# Patient Record
Sex: MALE | Race: WHITE | NOT HISPANIC OR LATINO | Employment: FULL TIME | URBAN - NONMETROPOLITAN AREA
[De-identification: names, ages, dates, MRNs, and addresses within clinical notes are randomized per-mention and may not be internally consistent; named-entity substitution may affect disease eponyms.]

---

## 2018-01-01 ENCOUNTER — OFFICE VISIT (OUTPATIENT)
Dept: URGENT CARE | Facility: PHYSICIAN GROUP | Age: 73
End: 2018-01-01
Payer: MEDICARE

## 2018-01-01 VITALS
WEIGHT: 149 LBS | TEMPERATURE: 97.2 F | SYSTOLIC BLOOD PRESSURE: 136 MMHG | BODY MASS INDEX: 22.07 KG/M2 | OXYGEN SATURATION: 97 % | RESPIRATION RATE: 16 BRPM | HEART RATE: 76 BPM | HEIGHT: 69 IN | DIASTOLIC BLOOD PRESSURE: 68 MMHG

## 2018-01-01 DIAGNOSIS — J30.9 ALLERGIC RHINITIS, UNSPECIFIED SEASONALITY, UNSPECIFIED TRIGGER: ICD-10-CM

## 2018-01-01 DIAGNOSIS — J02.9 ALLERGIC PHARYNGITIS: ICD-10-CM

## 2018-01-01 PROCEDURE — 99214 OFFICE O/P EST MOD 30 MIN: CPT | Performed by: PHYSICIAN ASSISTANT

## 2018-01-01 RX ORDER — CETIRIZINE HYDROCHLORIDE, PSEUDOEPHEDRINE HYDROCHLORIDE 5; 120 MG/1; MG/1
1 TABLET, FILM COATED, EXTENDED RELEASE ORAL 2 TIMES DAILY
Qty: 30 TAB | Refills: 0
Start: 2018-01-01 | End: 2019-01-01

## 2018-01-01 RX ORDER — FLUTICASONE PROPIONATE 50 MCG
1 SPRAY, SUSPENSION (ML) NASAL 2 TIMES DAILY
Qty: 1 BOTTLE | Refills: 0
Start: 2018-01-01 | End: 2019-01-01

## 2018-02-23 ENCOUNTER — OCCUPATIONAL MEDICINE (OUTPATIENT)
Dept: URGENT CARE | Facility: PHYSICIAN GROUP | Age: 73
End: 2018-02-23
Payer: OTHER MISCELLANEOUS

## 2018-02-23 VITALS
HEART RATE: 76 BPM | TEMPERATURE: 97.6 F | SYSTOLIC BLOOD PRESSURE: 136 MMHG | WEIGHT: 160 LBS | OXYGEN SATURATION: 94 % | HEIGHT: 69 IN | DIASTOLIC BLOOD PRESSURE: 64 MMHG | RESPIRATION RATE: 16 BRPM | BODY MASS INDEX: 23.7 KG/M2

## 2018-02-23 DIAGNOSIS — S70.01XA CONTUSION OF RIGHT HIP, INITIAL ENCOUNTER: ICD-10-CM

## 2018-02-23 PROCEDURE — 99204 OFFICE O/P NEW MOD 45 MIN: CPT | Mod: 29 | Performed by: PHYSICIAN ASSISTANT

## 2018-02-23 NOTE — LETTER
[facility]  [address] [place] [address] [place] [address]  Phone:  [phone] - Fax:  [phone]   [facility] Progress Report and Disability Certification  Date of Service: 2/23/2018   No Show:  No  Date / Time of Next Visit: 3/2/2018   Claim Information   Patient Name: Justus Barnard  Claim Number:     Employer:   [employer] Date of Injury: 2/23/2018     Insurer / TPA: Misc Workers Comp  ID / SSN:     Occupation: HOUSE KEEPING LEAD  Diagnosis: The encounter diagnosis was Contusion of right hip, initial encounter.    Medical Information   Related to Industrial Injury? Yes    Subjective Complaints:  Right hip pain after fall at work   Objective Findings: Right is without any visual deformity, erythema, edema or ecchymosis. He has some tenderness to palpation of the ischial crest. Good distal range of motion, strength, circulation and sensation     Pre-Existing Condition(s):     Assessment:   Initial Visit    Status: Additional Care Required  Permanent Disability:No    Plan: Medication  Comments:over-the-counter medications    Diagnostics:      Comments:       Disability Information   Status: Released to Full Duty    From:  2/23/2018  Through: 3/2/2018 Restrictions are: Temporary   Physical Restrictions   Sitting:    Standing:    Stooping:    Bending:      Squatting:    Walking:    Climbing:    Pushing:      Pulling:    Other:    Reaching Above Shoulder (L):   Reaching Above Shoulder (R):       Reaching Below Shoulder (L):    Reaching Below Shoulder (R):      Not to exceed Weight Limits   Carrying(hrs):   Weight Limit(lb):   Lifting(hrs):   Weight  Limit(lb):     Comments:      Repetitive Actions   Hands: i.e. Fine Manipulations from Grasping:     Feet: i.e. Operating Foot Controls:     Driving / Operate Machinery:     Physician Name: Rubén Garza P.A.-C. Physician Signature: RUBÉN Acosta P.A.-C. e-Signature: Dr. Jose Manuel Jay, Medical Director   Clinic Name /  Location: [address] [place] [address] Clinic Phone Number: Dept: [phone]   Appointment Time: 12:05 Pm Visit Start Time: 12:33 PM   Check-In Time:  12:21 Pm Visit Discharge Time:  1:00PM   Original-Treating Physician or Chiropractor    Page 2-Insurer/TPA    Page 3-Employer    Page 4-Employee

## 2018-02-23 NOTE — PROGRESS NOTES
Chief Complaint   Patient presents with   • Fall     Pt states slipped on ice this morning at work hitting R hip       HISTORY OF PRESENT ILLNESS: Patient is a 72 y.o. male who presents today because he has a work comp injury.    Date of injury 2/23/2018. He was walking on an icy ramp, slipped on ice and hit his right hip up against the handrail. He did not fall to the ground. He started to limp and someone at work saw him and recommended that he come in for evaluation. He takes Vicodin on a regular basis for other chronic pain issues. He denies any distal paresthesias, states really feels fine, except with certain movements of his head.    There are no active problems to display for this patient.      Allergies:Nkda [no known drug allergy]    Current Outpatient Prescriptions Ordered in [facility]   Medication Sig Dispense Refill   • ZOCOR PO Take 80 mg by mouth every bedtime.      • CLARITIN 10 MG PO TABS Take by mouth every day.      • PROSCAR 5 MG PO TABS 1 Tab by Oral route every day.      • ASPIRIN 162 MG PO TBEC      • levofloxacin (LEVAQUIN) 500 MG tablet Take 1 Tab by mouth every day. 10 Tab 0   • benzonatate (TESSALON) 200 MG capsule Take 1 Cap by mouth 3 times a day as needed for Cough. 30 Cap 0   • FLOMAX 0.4 MG PO CP24 1 Cap by Oral route every bedtime.        No current Epic-ordered facility-administered medications on file.        Past Medical History:   Diagnosis Date   • Backpain    • Glaucoma    • Hypertension    • Indigestion    • Psychiatric problem    • Stroke (CMS-[facility])        Social History   Substance Use Topics   • Smoking status: Current Every Day Smoker     Packs/day: 0.50     Years: 15.00     Types: Cigarettes   • Smokeless tobacco: Never Used   • Alcohol use No       No family status information on file.   History reviewed. No pertinent family history.    ROS:  Review of Systems   Constitutional: Negative for fever, chills, weight loss and malaise/fatigue.   HENT: Negative for ear pain,  "nosebleeds, congestion, sore throat and neck pain.    Eyes: Negative for blurred vision.   Respiratory: Negative for cough, sputum production, shortness of breath and wheezing.    Cardiovascular: Negative for chest pain, palpitations, orthopnea and leg swelling.   Gastrointestinal: Negative for heartburn, nausea, vomiting and abdominal pain.   Genitourinary: Negative for dysuria, urgency and frequency.     Exam:  Blood pressure 136/64, pulse 76, temperature 36.4 °C (97.6 °F), resp. rate 16, height 1.753 m (5' 9\"), weight 72.6 kg (160 lb), SpO2 94 %.  General:  Well nourished, well developed male in NAD  Head:Normocephalic, atraumatic  Eyes: PERRLA, EOM within normal limits, no conjunctival injection, no scleral icterus, visual fields and acuity grossly intact.  Extremities: no clubbing, cyanosis, or edema.  Right is without any visual deformity, erythema, edema or ecchymosis. He has some tenderness to palpation of the ischial crest. Good distal range of motion, strength, circulation and sensation    Please note that this dictation was created using voice recognition software. I have made every reasonable attempt to correct obvious errors, but I expect that there are errors of grammar and possibly content that I did not discover before finalizing the note.    Assessment/Plan:  1. Contusion of right hip, initial encounter     Follow-up in one week, recommended over-the-counter Tylenol, ice        "

## 2018-02-23 NOTE — LETTER
"EMPLOYEE’S CLAIM FOR COMPENSATION/ REPORT OF INITIAL TREATMENT  FORM C-4    EMPLOYEE’S CLAIM - PROVIDE ALL INFORMATION REQUESTED   First Name  Justus Last Name  Akil Birthdate                    1945                Sex  male Claim Number   Home Address  [address] Age  72 y.o. 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    Address or Location of Accident (if applicable)  [[facility] ]   What were you doing at the time of accident? (if applicable)  WALKING SLIP ON ICE     How did this injury or occupational disease occur? (Be specific an answer in detail. Use additional sheet if necessary)  SLIPPED ON ICE    If you believe that you have an occupational disease, when did you first have knowledge of the disability and it relationship to your employment?  NA  Witnesses to the Accident  NONE       Nature of Injury or Occupational Disease  Workers' Compensation  Part(s) of Body Injured or Affected  Hip (R), ,     I certify that the above is true and correct to the best of my knowledge and that I have provided this information in order to obtain the benefits of [place] " or [place]).  I hereby authorize any physician, chiropractor, surgeon, practitioner, or other person, any hospital, including [facility] or [facility] hospital, any medical service organization, any insurance company, or other institution or organization to release to each other, any medical or other information, including benefits paid or payable, pertinent to this injury or disease, except information relative to diagnosis, treatment and/or counseling for AIDS, psychological conditions, alcohol or controlled substances, for which I must give specific authorization.  A Photostat of this authorization shall be as valid as the original.     Date 02/23/2018   [facility]   Employee’s Signature   THIS REPORT MUST BE COMPLETED AND MAILED WITHIN 3 WORKING DAYS OF TREATMENT   Place  [facility]  Name of [facility]   Date  2/23/2018 Diagnosis  (S70.01XA) Contusion of right hip, initial encounter Is there evidence the injured employee was under the influence of alcohol and/or another controlled substance at the time of accident?   Hour  12:33 PM Description of Injury or Disease  The encounter diagnosis was Contusion of right hip, initial encounter. No   Treatment  Ice, over-the-counter anti-inflammatories  Have you advised the patient to remain off work five days or more? No   X-Ray Findings      If Yes   From Date  To Date      From information given by the employee, together with medical evidence, can you directly connect this injury or occupational disease as job incurred?  Yes If No Full Duty  Yes Modified Duty      Is additional medical care by a physician indicated?  Yes  Comments:follow-up in 6 days If Modified Duty, Specify any Limitations / Restrictions      Do you know of any previous injury or disease contributing to this condition or occupational disease?                            No   Date  2/23/2018 Print Doctor’s Name Rubén Garza P.A.-C. I certify the  "employer’s copy of  this form was mailed on:   Address  [address] Insurer’s Use Only     [facility] Zip  [address]    Provider’s Tax ID Number  [id number] Telephone  Dept: [phone]        corwin-HARSHAL Menjivar P.A.-C.   e-Signature: Dr. Jose Manuel Jay, Medical Director Degree           ORIGINAL-TREATING PHYSICIAN OR CHIROPRACTOR    PAGE 2-INSURER/TPA    PAGE 3-EMPLOYER    PAGE 4-EMPLOYEE             Form C-4 (rev10/07)              BRIEF DESCRIPTION OF RIGHTS AND BENEFITS  (Pursuant to [place] 616C.[place])    Notice of Injury or Occupational Disease (Incident Report Form C-1): If an injury or occupational disease (OD) arises out of and in the  course of employment, you must provide written notice to your employer as soon as practicable, but no later than 7 days after the accident or  OD. Your employer shall maintain a sufficient supply of the required forms.    Claim for Compensation (Form C-4): If medical treatment is sought, the form C-4 is available at the place of initial treatment. A completed  \"Claim for Compensation\" (Form C-4) must be filed within 90 days after an accident or OD. The treating physician or chiropractor must,  within 3 working days after treatment, complete and mail to the employer, the employer's insurer and third-party , the Claim for  Compensation.    Medical Treatment: If you require medical treatment for your on-the-job injury or OD, you may be required to select a physician or  chiropractor from a list provided by your workers’ compensation insurer, if it has contracted with an Organization for Managed Care (MCO) or  Preferred Provider Organization (PPO) or providers of health care. If your employer has not entered into a contract with an MCO or PPO, you  may select a physician or chiropractor from the Panel of Physicians and Chiropractors. Any medical costs related to your industrial injury or  OD will be paid by your insurer.    Temporary Total " Disability (TTD): If your doctor has certified that you are unable to work for a period of at least 5 consecutive days, or 5  cumulative days in a 20-day period, or places restrictions on you that your employer does not accommodate, you may be entitled to TTD  compensation.    Temporary Partial Disability (TPD): If the wage you receive upon reemployment is less than the compensation for TTD to which you are  entitled, the insurer may be required to pay you TPD compensation to make up the difference. TPD can only be paid for a maximum of 24  months.    Permanent Partial Disability (PPD): When your medical condition is stable and there is an indication of a PPD as a result of your injury or  OD, within 30 days, your insurer must arrange for an evaluation by a rating physician or chiropractor to determine the degree of your PPD. The  amount of your PPD award depends on the date of injury, the results of the PPD evaluation and your age and wage.    Permanent Total Disability (PTD): If you are medically certified by a treating physician or chiropractor as permanently and totally disabled  and have been granted a PTD status by your insurer, you are entitled to receive monthly benefits not to exceed 66 2/3% of your average  monthly wage. The amount of your PTD payments is subject to reduction if you previously received a PPD award.    Vocational Rehabilitation Services: You may be eligible for vocational rehabilitation services if you are unable to return to the job due to a  permanent physical impairment or permanent restrictions as a result of your injury or occupational disease.    Transportation and Per Elin Reimbursement: You may be eligible for travel expenses and per elin associated with medical treatment.    Reopening: You may be able to reopen your claim if your condition worsens after claim closure.    Appeal Process: If you disagree with a written determination issued by the insurer or the insurer does not  respond to your request, you may  appeal to the Department of Administration, , by following the instructions contained in your determination letter. You must  appeal the determination within 70 days from the date of the determination letter at [address], [address], [place], [place]  [address], or [address], [address], [place], [place] [address]. If you disagree with the  decision, you may appeal to the  Department of Administration, . You must file your appeal within 30 days from the date of the  decision  letter at [address], [address], [place], [place] [address], or [address], [address], [place], [place] [address]. If you  disagree with a decision of an , you may file a petition for judicial review with the District Court. You must do so within 30  days of the Appeal Officer’s decision. You may be represented by an  at your own expense or you may contact the [facility] for possible  representation.    [place]  for Injured Workers (NAIW): If you disagree with a  decision, you may request that NAIW represent you  without charge at an  Hearing. For information regarding denial of benefits, you may contact the [facility] at: [address], [address], [place] [address], [phone], or [address], [address], [place] [address], [phone]    To File a Complaint with the Division: If you wish to file a complaint with the  of the Division of Industrial Relations (DIR),  please contact the Workers’ Compensation Section, [address], [address], [place], [place] [address], telephone [phone], or  [address], [place] [address], telephone [phone].    For assistance with Workers’ Compensation Issues: you may contact the Office of the [facility] Consumer Health Assistance, [address]  [facility], [address], [place], [place] [address], Toll Free 1-621.455.3950, Web site: http://govcha.[facility].nv., E-mail  [email or web address].                                                                                                                                                                                                                                   __________________________________________________________________                                                                   ____02/23/2018_______                Employee Name / Signature                                                                                                                                                       Date                                                                                                                                                                                                     D-2 (rev. 10/07)

## 2018-03-01 ENCOUNTER — OCCUPATIONAL MEDICINE (OUTPATIENT)
Dept: URGENT CARE | Facility: PHYSICIAN GROUP | Age: 73
End: 2018-03-01
Payer: OTHER MISCELLANEOUS

## 2018-03-01 VITALS
HEIGHT: 69 IN | RESPIRATION RATE: 16 BRPM | OXYGEN SATURATION: 96 % | HEART RATE: 75 BPM | BODY MASS INDEX: 23.7 KG/M2 | WEIGHT: 160 LBS | SYSTOLIC BLOOD PRESSURE: 130 MMHG | DIASTOLIC BLOOD PRESSURE: 76 MMHG | TEMPERATURE: 98.5 F

## 2018-03-01 DIAGNOSIS — S70.01XD CONTUSION OF RIGHT HIP, SUBSEQUENT ENCOUNTER: ICD-10-CM

## 2018-03-01 PROCEDURE — 99213 OFFICE O/P EST LOW 20 MIN: CPT | Performed by: FAMILY MEDICINE

## 2018-03-01 NOTE — PROGRESS NOTES
"Subjective:      Chief Complaint   Patient presents with   • Hip Injury     WC F[facility]         Date of injury 2/23/2018. He was walking on an icy ramp, slipped on ice and hit his right hip up against the handrail.    He was seen in  on 2/23 and diagnosed with hip contusion.      Currently reports no pain - he is able to walk and ambulate without difficulty.         Pertinent negatives include no fever, hematuria, loss of consciousness, tingling or visual change.          Social History   Substance Use Topics   • Smoking status: Current Every Day Smoker     Packs/day: 0.50     Years: 15.00     Types: Cigarettes   • Smokeless tobacco: Never Used   • Alcohol use No         Past Medical History:   Diagnosis Date   • Backpain    • Glaucoma    • Hypertension    • Indigestion    • Psychiatric problem    • Stroke (CMS-[facility])          Current Outpatient Prescriptions on File Prior to Visit   Medication Sig Dispense Refill   • levofloxacin (LEVAQUIN) 500 MG tablet Take 1 Tab by mouth every day. 10 Tab 0   • ZOCOR PO Take 80 mg by mouth every bedtime.      • CLARITIN 10 MG PO TABS Take by mouth every day.      • PROSCAR 5 MG PO TABS 1 Tab by Oral route every day.      • ASPIRIN 162 MG PO TBEC      • benzonatate (TESSALON) 200 MG capsule Take 1 Cap by mouth 3 times a day as needed for Cough. 30 Cap 0   • FLOMAX 0.4 MG PO CP24 1 Cap by Oral route every bedtime.        No current facility-administered medications on file prior to visit.               Review of Systems   Constitutional: Negative for fever.   Genitourinary: Negative for hematuria.   Neurological: Negative for tingling and loss of consciousness.          Objective:     Blood pressure 130/76, pulse 75, temperature 36.9 °C (98.5 °F), resp. rate 16, height 1.753 m (5' 9.02\"), weight 72.6 kg (160 lb), SpO2 96 %.    Physical Exam   Constitutional: She is oriented to person, place, and time. Pt appears well-developed and well-nourished. No distress.   HENT:    "   Musculoskeletal:   Left hip - full AROM.  No tenderness to palpation, erythema or bruising.   Neurological: pt is alert and oriented to person, place, and time.   Skin: Skin is warm. Pt is not diaphoretic. No erythema.   Nursing note and vitals reviewed.              Assessment/Plan:        1. Contusion of right hip, subsequent encounter  Resolved  MMI   Cleared for full duty

## 2018-03-01 NOTE — LETTER
[facility]  [facility] [place] [address]  Phone:  [phone] - Fax:  [phone]   [facility] Progress Report and Disability Certification  Date of Service: 3/1/2018   No Show:  No  Date / Time of Next Visit:     Claim Information   Patient Name: Justus Barnard  Claim Number:     Employer:   [employer] Date of Injury: 2/23/2018     Insurer / TPA: Misc Workers Comp  ID / SSN:     Occupation: HOUSE KEEPING LEAD  Diagnosis: There were no encounter diagnoses.    Medical Information   Related to Industrial Injury? Yes    Subjective Complaints:    Date of injury 2/23/2018. He was walking on an icy ramp, slipped on ice and hit his right hip up against the handrail.    He was seen in  on 2/23 and diagnosed with hip contusion.      Currently reports no pain - he is able to walk and ambulate without difficulty.         Pertinent negatives include no fever, hematuria, loss of consciousness, tingling or visual change.       Objective Findings: Musculoskeletal:   Left hip - full AROM.  No tenderness to palpation, erythema or bruising.   Neurological: pt is alert and oriented to person, place, and time.   Skin: Skin is warm. Pt is not diaphoretic. No erythema.    Pre-Existing Condition(s):     Assessment:   Condition Improved    Status: Discharged /  MMI  Permanent Disability:No    Plan:      Diagnostics:      Comments:       Disability Information   Status: Released to Full Duty    From:     Through:   Restrictions are:     Physical Restrictions   Sitting:    Standing:    Stooping:    Bending:      Squatting:    Walking:    Climbing:    Pushing:      Pulling:    Other:    Reaching Above Shoulder (L):   Reaching Above Shoulder (R):       Reaching Below Shoulder (L):    Reaching Below Shoulder (R):      Not to exceed Weight Limits   Carrying(hrs):   Weight Limit(lb):   Lifting(hrs):   Weight  Limit(lb):     Comments: Left hip contusion  Resolved  MMI  Cleared for full duty       Repetitive Actions   Hands: i.e. Fine Manipulations from Grasping:     Feet: i.e. Operating Foot Controls:     Driving / Operate Machinery:     Physician Name: Eren Jha M.D. Physician Signature: EREN Gardner M.D. e-Signature: Dr. Jose Manuel Jay, Medical Director   Clinic Name / Location: [address] [address] Clinic Phone Number: Dept: [phone]   Appointment Time: 4:00 Pm Visit Start Time: 3:41 PM   Check-In Time:  3:35 Pm Visit Discharge Time:  3:54 pm   Original-Treating Physician or Chiropractor    Page 2-Insurer/TPA    Page 3-Employer    Page 4-Employee

## 2018-03-08 ENCOUNTER — OFFICE VISIT (OUTPATIENT)
Dept: URGENT CARE | Facility: PHYSICIAN GROUP | Age: 73
End: 2018-03-08
Payer: MEDICARE

## 2018-03-08 VITALS
HEIGHT: 69 IN | SYSTOLIC BLOOD PRESSURE: 128 MMHG | RESPIRATION RATE: 16 BRPM | DIASTOLIC BLOOD PRESSURE: 74 MMHG | WEIGHT: 160 LBS | OXYGEN SATURATION: 92 % | HEART RATE: 76 BPM | BODY MASS INDEX: 23.7 KG/M2 | TEMPERATURE: 98.2 F

## 2018-03-08 DIAGNOSIS — J44.1 COPD EXACERBATION (HCC): ICD-10-CM

## 2018-03-08 PROCEDURE — 94760 N-INVAS EAR/PLS OXIMETRY 1: CPT | Performed by: FAMILY MEDICINE

## 2018-03-08 PROCEDURE — 99214 OFFICE O/P EST MOD 30 MIN: CPT | Mod: 25 | Performed by: FAMILY MEDICINE

## 2018-03-08 RX ORDER — BENZONATATE 200 MG/1
200 CAPSULE ORAL 3 TIMES DAILY PRN
Qty: 30 CAP | Refills: 0 | Status: SHIPPED | OUTPATIENT
Start: 2018-03-08 | End: 2019-01-01

## 2018-03-08 RX ORDER — DOXYCYCLINE HYCLATE 100 MG
100 TABLET ORAL 2 TIMES DAILY
Qty: 20 TAB | Refills: 0 | Status: SHIPPED | OUTPATIENT
Start: 2018-03-08 | End: 2018-03-18

## 2018-03-08 ASSESSMENT — ENCOUNTER SYMPTOMS
MYALGIAS: 0
WEIGHT LOSS: 0
EYE DISCHARGE: 0
EYE REDNESS: 0
HEADACHES: 0

## 2018-03-08 NOTE — LETTER
March 8, 2018         Patient: Justus Barnard   YOB: 1945   Date of Visit: 3/8/2018           To Whom it May Concern:    Justus Barnard was seen in my clinic on 3/8/2018. Please excuse 3/5 through 3/9/2018.    Sincerely,           Ted Winters M.D.  Electronically Signed

## 2018-11-14 NOTE — PROGRESS NOTES
Chief Complaint   Patient presents with   • Other     hard time swollwing       HISTORY OF PRESENT ILLNESS: Patient is a 73 y.o. male who presents today because he has had a sore throat intermittently over the last 3-4 weeks.  Sometimes when he swallows he feels like it is difficult to swallow because his throat feels swollen and he cannot swallow away.  He has not been taking any medications for symptoms.  Denies any fevers, chills, nausea, vomiting or diarrhea    There are no active problems to display for this patient.      Allergies:Nkda [no known drug allergy]    Current Outpatient Prescriptions Ordered in [facility]   Medication Sig Dispense Refill   • cetirizine-psuedoephedrine (ZYRTEC-D ALLERGY & CONGESTION) 5-120 MG per tablet Take 1 Tab by mouth 2 times a day. 30 Tab 0   • fluticasone (FLONASE) 50 MCG/ACT nasal spray Spray 1 Spray in nose 2 times a day. 1 Bottle 0   • benzonatate (TESSALON) 200 MG capsule Take 1 Cap by mouth 3 times a day as needed for Cough. 30 Cap 0   • levofloxacin (LEVAQUIN) 500 MG tablet Take 1 Tab by mouth every day. 10 Tab 0   • benzonatate (TESSALON) 200 MG capsule Take 1 Cap by mouth 3 times a day as needed for Cough. 30 Cap 0   • ZOCOR PO Take 80 mg by mouth every bedtime.      • CLARITIN 10 MG PO TABS Take by mouth every day.      • FLOMAX 0.4 MG PO CP24 1 Cap by Oral route every bedtime.      • PROSCAR 5 MG PO TABS 1 Tab by Oral route every day.      • ASPIRIN 162 MG PO TBEC        No current Epic-ordered facility-administered medications on file.        Past Medical History:   Diagnosis Date   • Backpain    • Glaucoma    • Hypertension    • Indigestion    • Psychiatric problem    • Stroke (HCC)        Social History   Substance Use Topics   • Smoking status: Current Every Day Smoker     Packs/day: 0.50     Years: 15.00     Types: Cigarettes   • Smokeless tobacco: Never Used   • Alcohol use No       No family status information on file.   No family history on file.    ROS:  Review of  "Systems   Constitutional: Negative for fever, chills, weight loss and malaise/fatigue.   HENT: Negative for ear pain, nosebleeds, positive for nasal congestion, sore throat and no neck pain.    Eyes: Negative for blurred vision.   Respiratory: Negative for cough, sputum production, shortness of breath and wheezing.    Cardiovascular: Negative for chest pain, palpitations, orthopnea and leg swelling.   Gastrointestinal: Negative for heartburn, negative for nausea, vomiting and abdominal pain.   Genitourinary: Negative for dysuria, urgency and frequency.     Exam:  Blood pressure 136/68, pulse 76, temperature 36.2 °C (97.2 °F), temperature source Temporal, resp. rate 16, height 1.753 m (5' 9\"), weight 67.6 kg (149 lb), SpO2 97 %.  General:  Well nourished, well developed male in NAD  Head:Normocephalic, atraumatic  Eyes: PERRLA, EOM within normal limits, no conjunctival injection, no scleral icterus, visual fields and acuity grossly intact.  Ears: Normal shape and symmetry, no tenderness, no discharge. External canals are without any significant edema or erythema. Tympanic membranes are without any inflammation, no effusion. Gross auditory acuity is intact  Nose: Symmetrical without tenderness, no discharge.  Nasal mucosa is very pale and edematous bilaterally  Mouth: reasonable hygiene, no erythema exudates or tonsillar enlargement.  Neck: no masses, range of motion within normal limits, no tracheal deviation. No obvious thyroid enlargement.  Pulmonary: chest is symmetrical with respiration, no wheezes, crackles, or rhonchi.  Cardiovascular: regular rate and rhythm without murmurs, rubs, or gallops.  Abdomen: Nondistended, bowel tones in all 4 quadrants, soft, no tenderness to palpation, no organomegaly, no rebound referred rebound tenderness, no Whitt's or McBurney's point tenderness.  Extremities: no clubbing, cyanosis, or edema.    Please note that this dictation was created using voice recognition software. I " have made every reasonable attempt to correct obvious errors, but I expect that there are errors of grammar and possibly content that I did not discover before finalizing the note.    Assessment/Plan:  1. Allergic pharyngitis  cetirizine-psuedoephedrine (ZYRTEC-D ALLERGY & CONGESTION) 5-120 MG per tablet   2. Allergic rhinitis, unspecified seasonality, unspecified trigger  fluticasone (FLONASE) 50 MCG/ACT nasal spray   He has an appointment with his primary care provider in 2 weeks.  Recommended follow-up at that time, may have underlying hiatal hernia and will need further workup and evaluation through primary care.    Followup with primary care in the next 7-10 days if not significantly improving, return to the urgent care or go to the emergency room sooner for any worsening of symptoms.

## 2018-11-14 NOTE — PATIENT INSTRUCTIONS
"Smoking Cessation, Tips for Success  If you are ready to quit smoking, congratulations! You have chosen to help yourself be healthier. Cigarettes bring nicotine, tar, carbon monoxide, and other irritants into your body. Your lungs, heart, and blood vessels will be able to work better without these poisons. There are many different ways to quit smoking. Nicotine gum, nicotine patches, a nicotine inhaler, or nicotine nasal spray can help with physical craving. Hypnosis, support groups, and medicines help break the habit of smoking.  WHAT THINGS CAN I DO TO MAKE QUITTING EASIER?   Here are some tips to help you quit for good:  · Pick a date when you will quit smoking completely. Tell all of your friends and family about your plan to quit on that date.  · Do not try to slowly cut down on the number of cigarettes you are smoking. Pick a quit date and quit smoking completely starting on that day.  · Throw away all cigarettes.    · Clean and remove all ashtrays from your home, work, and car.  · On a card, write down your reasons for quitting. Carry the card with you and read it when you get the urge to smoke.  · Cleanse your body of nicotine. Drink enough water and fluids to keep your urine clear or pale yellow. Do this after quitting to flush the nicotine from your body.  · Learn to predict your moods. Do not let a bad situation be your excuse to have a cigarette. Some situations in your life might tempt you into wanting a cigarette.  · Never have \"just one\" cigarette. It leads to wanting another and another. Remind yourself of your decision to quit.  · Change habits associated with smoking. If you smoked while driving or when feeling stressed, try other activities to replace smoking. Stand up when drinking your coffee. Brush your teeth after eating. Sit in a different chair when you read the paper. Avoid alcohol while trying to quit, and try to drink fewer caffeinated beverages. Alcohol and caffeine may urge you to " "smoke.  · Avoid foods and drinks that can trigger a desire to smoke, such as sugary or spicy foods and alcohol.  · Ask people who smoke not to smoke around you.  · Have something planned to do right after eating or having a cup of coffee. For example, plan to take a walk or exercise.  · Try a relaxation exercise to calm you down and decrease your stress. Remember, you may be tense and nervous for the first 2 weeks after you quit, but this will pass.  · Find new activities to keep your hands busy. Play with a pen, coin, or rubber band. Doodle or draw things on paper.  · Brush your teeth right after eating. This will help cut down on the craving for the taste of tobacco after meals. You can also try mouthwash.    · Use oral substitutes in place of cigarettes. Try using lemon drops, carrots, cinnamon sticks, or chewing gum. Keep them handy so they are available when you have the urge to smoke.  · When you have the urge to smoke, try deep breathing.  · Designate your home as a nonsmoking area.  · If you are a heavy smoker, ask your health care provider about a prescription for nicotine chewing gum. It can ease your withdrawal from nicotine.  · Reward yourself. Set aside the cigarette money you save and buy yourself something nice.  · Look for support from others. Join a support group or smoking cessation program. Ask someone at home or at work to help you with your plan to quit smoking.  · Always ask yourself, \"Do I need this cigarette or is this just a reflex?\" Tell yourself, \"Today, I choose not to smoke,\" or \"I do not want to smoke.\" You are reminding yourself of your decision to quit.  · Do not replace cigarette smoking with electronic cigarettes (commonly called e-cigarettes). The safety of e-cigarettes is unknown, and some may contain harmful chemicals.  · If you relapse, do not give up! Plan ahead and think about what you will do the next time you get the urge to smoke.  HOW WILL I FEEL WHEN I QUIT SMOKING?  You " may have symptoms of withdrawal because your body is used to nicotine (the addictive substance in cigarettes). You may crave cigarettes, be irritable, feel very hungry, cough often, get headaches, or have difficulty concentrating. The withdrawal symptoms are only temporary. They are strongest when you first quit but will go away within 10-14 days. When withdrawal symptoms occur, stay in control. Think about your reasons for quitting. Remind yourself that these are signs that your body is healing and getting used to being without cigarettes. Remember that withdrawal symptoms are easier to treat than the major diseases that smoking can cause.   Even after the withdrawal is over, expect periodic urges to smoke. However, these cravings are generally short lived and will go away whether you smoke or not. Do not smoke!  WHAT RESOURCES ARE AVAILABLE TO HELP ME QUIT SMOKING?  Your health care provider can direct you to community resources or hospitals for support, which may include:  · Group support.  · Education.  · Hypnosis.  · Therapy.     This information is not intended to replace advice given to you by your health care provider. Make sure you discuss any questions you have with your health care provider.     Document Released: 09/15/2005 Document Revised: 01/08/2016 Document Reviewed: 06/05/2014  [employer] Interactive Patient Education ©2016 [employer]

## 2019-01-01 ENCOUNTER — APPOINTMENT (OUTPATIENT)
Dept: RADIOLOGY | Facility: MEDICAL CENTER | Age: 74
DRG: 656 | End: 2019-01-01
Attending: INTERNAL MEDICINE
Payer: MEDICARE

## 2019-01-01 ENCOUNTER — APPOINTMENT (OUTPATIENT)
Dept: RADIOLOGY | Facility: MEDICAL CENTER | Age: 74
DRG: 641 | End: 2019-01-01
Attending: UROLOGY
Payer: MEDICARE

## 2019-01-01 ENCOUNTER — HOSPITAL ENCOUNTER (OUTPATIENT)
Dept: RADIATION ONCOLOGY | Facility: MEDICAL CENTER | Age: 74
End: 2019-03-07

## 2019-01-01 ENCOUNTER — APPOINTMENT (OUTPATIENT)
Dept: RADIATION ONCOLOGY | Facility: MEDICAL CENTER | Age: 74
End: 2019-01-01
Payer: MEDICARE

## 2019-01-01 ENCOUNTER — APPOINTMENT (OUTPATIENT)
Dept: RADIOLOGY | Facility: MEDICAL CENTER | Age: 74
DRG: 641 | End: 2019-01-01
Attending: EMERGENCY MEDICINE
Payer: MEDICARE

## 2019-01-01 ENCOUNTER — HOSPITAL ENCOUNTER (OUTPATIENT)
Dept: RADIATION ONCOLOGY | Facility: MEDICAL CENTER | Age: 74
End: 2019-03-20

## 2019-01-01 ENCOUNTER — APPOINTMENT (OUTPATIENT)
Dept: RADIOLOGY | Facility: MEDICAL CENTER | Age: 74
DRG: 656 | End: 2019-01-01
Attending: UROLOGY
Payer: MEDICARE

## 2019-01-01 ENCOUNTER — HOSPITAL ENCOUNTER (INPATIENT)
Facility: MEDICAL CENTER | Age: 74
LOS: 6 days | DRG: 641 | End: 2019-02-18
Attending: EMERGENCY MEDICINE | Admitting: HOSPITALIST
Payer: MEDICARE

## 2019-01-01 ENCOUNTER — APPOINTMENT (OUTPATIENT)
Dept: ADMISSIONS | Facility: MEDICAL CENTER | Age: 74
End: 2019-01-01
Attending: INTERNAL MEDICINE
Payer: MEDICARE

## 2019-01-01 ENCOUNTER — APPOINTMENT (OUTPATIENT)
Dept: RADIOLOGY | Facility: MEDICAL CENTER | Age: 74
DRG: 656 | End: 2019-01-01
Attending: FAMILY MEDICINE
Payer: MEDICARE

## 2019-01-01 ENCOUNTER — HOSPITAL ENCOUNTER (OUTPATIENT)
Dept: RADIATION ONCOLOGY | Facility: MEDICAL CENTER | Age: 74
End: 2019-03-12

## 2019-01-01 ENCOUNTER — HOSPITAL ENCOUNTER (OUTPATIENT)
Dept: RADIATION ONCOLOGY | Facility: MEDICAL CENTER | Age: 74
End: 2019-03-14

## 2019-01-01 ENCOUNTER — HOSPITAL ENCOUNTER (OUTPATIENT)
Facility: MEDICAL CENTER | Age: 74
End: 2019-02-01
Attending: INTERNAL MEDICINE | Admitting: INTERNAL MEDICINE
Payer: MEDICARE

## 2019-01-01 ENCOUNTER — HOSPITAL ENCOUNTER (INPATIENT)
Facility: MEDICAL CENTER | Age: 74
LOS: 44 days | DRG: 656 | End: 2019-04-03
Attending: INTERNAL MEDICINE | Admitting: INTERNAL MEDICINE
Payer: MEDICARE

## 2019-01-01 ENCOUNTER — HOSPITAL ENCOUNTER (OUTPATIENT)
Dept: RADIATION ONCOLOGY | Facility: MEDICAL CENTER | Age: 74
End: 2019-03-21

## 2019-01-01 ENCOUNTER — PATIENT OUTREACH (OUTPATIENT)
Dept: HEALTH INFORMATION MANAGEMENT | Facility: OTHER | Age: 74
End: 2019-01-01

## 2019-01-01 ENCOUNTER — APPOINTMENT (OUTPATIENT)
Dept: RADIOLOGY | Facility: MEDICAL CENTER | Age: 74
DRG: 656 | End: 2019-01-01
Attending: HOSPITALIST
Payer: MEDICARE

## 2019-01-01 ENCOUNTER — HOSPITAL ENCOUNTER (OUTPATIENT)
Dept: RADIATION ONCOLOGY | Facility: MEDICAL CENTER | Age: 74
End: 2019-03-13

## 2019-01-01 ENCOUNTER — HOSPITAL ENCOUNTER (OUTPATIENT)
Dept: RADIATION ONCOLOGY | Facility: MEDICAL CENTER | Age: 74
End: 2019-03-15

## 2019-01-01 ENCOUNTER — HOSPITAL ENCOUNTER (OUTPATIENT)
Dept: RADIATION ONCOLOGY | Facility: MEDICAL CENTER | Age: 74
End: 2019-03-18

## 2019-01-01 VITALS
HEART RATE: 82 BPM | DIASTOLIC BLOOD PRESSURE: 66 MMHG | HEIGHT: 69 IN | BODY MASS INDEX: 17.83 KG/M2 | RESPIRATION RATE: 18 BRPM | WEIGHT: 120.37 LBS | TEMPERATURE: 97.8 F | OXYGEN SATURATION: 91 % | SYSTOLIC BLOOD PRESSURE: 161 MMHG

## 2019-01-01 VITALS
BODY MASS INDEX: 19.4 KG/M2 | HEIGHT: 69 IN | TEMPERATURE: 98.6 F | RESPIRATION RATE: 16 BRPM | DIASTOLIC BLOOD PRESSURE: 66 MMHG | OXYGEN SATURATION: 92 % | SYSTOLIC BLOOD PRESSURE: 143 MMHG | WEIGHT: 130.95 LBS

## 2019-01-01 VITALS
DIASTOLIC BLOOD PRESSURE: 50 MMHG | HEART RATE: 82 BPM | SYSTOLIC BLOOD PRESSURE: 112 MMHG | TEMPERATURE: 96.9 F | HEIGHT: 66 IN | OXYGEN SATURATION: 93 % | RESPIRATION RATE: 17 BRPM | WEIGHT: 132.5 LBS | BODY MASS INDEX: 21.29 KG/M2

## 2019-01-01 DIAGNOSIS — G89.3 CANCER ASSOCIATED PAIN: ICD-10-CM

## 2019-01-01 DIAGNOSIS — J98.4 PNEUMONITIS: ICD-10-CM

## 2019-01-01 DIAGNOSIS — R10.84 GENERALIZED ABDOMINAL PAIN: ICD-10-CM

## 2019-01-01 DIAGNOSIS — R64 MALIGNANT CACHEXIA (HCC): ICD-10-CM

## 2019-01-01 DIAGNOSIS — C15.9 MALIGNANT NEOPLASM OF ESOPHAGUS, UNSPECIFIED LOCATION (HCC): ICD-10-CM

## 2019-01-01 DIAGNOSIS — J43.1 PANLOBULAR EMPHYSEMA (HCC): ICD-10-CM

## 2019-01-01 DIAGNOSIS — F41.9 ANXIETY: ICD-10-CM

## 2019-01-01 DIAGNOSIS — C15.5 MALIGNANT NEOPLASM OF LOWER THIRD OF ESOPHAGUS (HCC): ICD-10-CM

## 2019-01-01 DIAGNOSIS — K59.00 CONSTIPATION, UNSPECIFIED CONSTIPATION TYPE: ICD-10-CM

## 2019-01-01 DIAGNOSIS — C64.1 RENAL CELL CARCINOMA OF RIGHT KIDNEY (HCC): ICD-10-CM

## 2019-01-01 DIAGNOSIS — R62.7 FAILURE TO THRIVE IN ADULT: ICD-10-CM

## 2019-01-01 LAB
ALBUMIN SERPL BCP-MCNC: 1.8 G/DL (ref 3.2–4.9)
ALBUMIN SERPL BCP-MCNC: 2 G/DL (ref 3.2–4.9)
ALBUMIN SERPL BCP-MCNC: 2.4 G/DL (ref 3.2–4.9)
ALBUMIN SERPL BCP-MCNC: 2.5 G/DL (ref 3.2–4.9)
ALBUMIN SERPL BCP-MCNC: 2.5 G/DL (ref 3.2–4.9)
ALBUMIN SERPL BCP-MCNC: 2.6 G/DL (ref 3.2–4.9)
ALBUMIN SERPL BCP-MCNC: 2.6 G/DL (ref 3.2–4.9)
ALBUMIN SERPL BCP-MCNC: 2.7 G/DL (ref 3.2–4.9)
ALBUMIN SERPL BCP-MCNC: 2.8 G/DL (ref 3.2–4.9)
ALBUMIN SERPL BCP-MCNC: 2.8 G/DL (ref 3.2–4.9)
ALBUMIN SERPL BCP-MCNC: 2.9 G/DL (ref 3.2–4.9)
ALBUMIN/GLOB SERPL: 0.7 G/DL
ALBUMIN/GLOB SERPL: 0.8 G/DL
ALBUMIN/GLOB SERPL: 0.9 G/DL
ALBUMIN/GLOB SERPL: 0.9 G/DL
ALBUMIN/GLOB SERPL: 1 G/DL
ALBUMIN/GLOB SERPL: 1.1 G/DL
ALP SERPL-CCNC: 100 U/L (ref 30–99)
ALP SERPL-CCNC: 101 U/L (ref 30–99)
ALP SERPL-CCNC: 108 U/L (ref 30–99)
ALP SERPL-CCNC: 116 U/L (ref 30–99)
ALP SERPL-CCNC: 125 U/L (ref 30–99)
ALP SERPL-CCNC: 77 U/L (ref 30–99)
ALP SERPL-CCNC: 83 U/L (ref 30–99)
ALP SERPL-CCNC: 83 U/L (ref 30–99)
ALP SERPL-CCNC: 89 U/L (ref 30–99)
ALP SERPL-CCNC: 93 U/L (ref 30–99)
ALP SERPL-CCNC: 95 U/L (ref 30–99)
ALT SERPL-CCNC: 11 U/L (ref 2–50)
ALT SERPL-CCNC: 12 U/L (ref 2–50)
ALT SERPL-CCNC: 13 U/L (ref 2–50)
ALT SERPL-CCNC: 14 U/L (ref 2–50)
ALT SERPL-CCNC: 14 U/L (ref 2–50)
ALT SERPL-CCNC: 19 U/L (ref 2–50)
ALT SERPL-CCNC: 22 U/L (ref 2–50)
ALT SERPL-CCNC: 23 U/L (ref 2–50)
ALT SERPL-CCNC: 6 U/L (ref 2–50)
ALT SERPL-CCNC: 8 U/L (ref 2–50)
ALT SERPL-CCNC: 9 U/L (ref 2–50)
ANION GAP SERPL CALC-SCNC: 11 MMOL/L (ref 0–11.9)
ANION GAP SERPL CALC-SCNC: 12 MMOL/L (ref 0–11.9)
ANION GAP SERPL CALC-SCNC: 13 MMOL/L (ref 0–11.9)
ANION GAP SERPL CALC-SCNC: 4 MMOL/L (ref 0–11.9)
ANION GAP SERPL CALC-SCNC: 4 MMOL/L (ref 0–11.9)
ANION GAP SERPL CALC-SCNC: 5 MMOL/L (ref 0–11.9)
ANION GAP SERPL CALC-SCNC: 6 MMOL/L (ref 0–11.9)
ANION GAP SERPL CALC-SCNC: 7 MMOL/L (ref 0–11.9)
ANION GAP SERPL CALC-SCNC: 8 MMOL/L (ref 0–11.9)
ANION GAP SERPL CALC-SCNC: 8 MMOL/L (ref 0–11.9)
ANION GAP SERPL CALC-SCNC: 9 MMOL/L (ref 0–11.9)
ANISOCYTOSIS BLD QL SMEAR: ABNORMAL
APPEARANCE UR: ABNORMAL
AST SERPL-CCNC: 15 U/L (ref 12–45)
AST SERPL-CCNC: 15 U/L (ref 12–45)
AST SERPL-CCNC: 16 U/L (ref 12–45)
AST SERPL-CCNC: 17 U/L (ref 12–45)
AST SERPL-CCNC: 17 U/L (ref 12–45)
AST SERPL-CCNC: 20 U/L (ref 12–45)
AST SERPL-CCNC: 21 U/L (ref 12–45)
AST SERPL-CCNC: 25 U/L (ref 12–45)
AST SERPL-CCNC: 26 U/L (ref 12–45)
BACTERIA #/AREA URNS HPF: ABNORMAL /HPF
BACTERIA SPEC RESP CULT: ABNORMAL
BASOPHILS # BLD AUTO: 0.2 % (ref 0–1.8)
BASOPHILS # BLD AUTO: 0.3 % (ref 0–1.8)
BASOPHILS # BLD AUTO: 0.4 % (ref 0–1.8)
BASOPHILS # BLD AUTO: 0.5 % (ref 0–1.8)
BASOPHILS # BLD AUTO: 0.6 % (ref 0–1.8)
BASOPHILS # BLD AUTO: 0.9 % (ref 0–1.8)
BASOPHILS # BLD: 0.02 K/UL (ref 0–0.12)
BASOPHILS # BLD: 0.04 K/UL (ref 0–0.12)
BASOPHILS # BLD: 0.04 K/UL (ref 0–0.12)
BASOPHILS # BLD: 0.05 K/UL (ref 0–0.12)
BASOPHILS # BLD: 0.06 K/UL (ref 0–0.12)
BASOPHILS # BLD: 0.07 K/UL (ref 0–0.12)
BASOPHILS # BLD: 0.08 K/UL (ref 0–0.12)
BASOPHILS # BLD: 0.08 K/UL (ref 0–0.12)
BASOPHILS # BLD: 0.16 K/UL (ref 0–0.12)
BILIRUB SERPL-MCNC: 0.2 MG/DL (ref 0.1–1.5)
BILIRUB SERPL-MCNC: 0.2 MG/DL (ref 0.1–1.5)
BILIRUB SERPL-MCNC: 0.3 MG/DL (ref 0.1–1.5)
BILIRUB SERPL-MCNC: 0.4 MG/DL (ref 0.1–1.5)
BILIRUB SERPL-MCNC: 0.5 MG/DL (ref 0.1–1.5)
BILIRUB SERPL-MCNC: 0.7 MG/DL (ref 0.1–1.5)
BILIRUB SERPL-MCNC: 0.9 MG/DL (ref 0.1–1.5)
BILIRUB SERPL-MCNC: 1.1 MG/DL (ref 0.1–1.5)
BILIRUB SERPL-MCNC: 1.2 MG/DL (ref 0.1–1.5)
BILIRUB UR QL STRIP.AUTO: ABNORMAL
BUN SERPL-MCNC: 15 MG/DL (ref 8–22)
BUN SERPL-MCNC: 16 MG/DL (ref 8–22)
BUN SERPL-MCNC: 17 MG/DL (ref 8–22)
BUN SERPL-MCNC: 19 MG/DL (ref 8–22)
BUN SERPL-MCNC: 19 MG/DL (ref 8–22)
BUN SERPL-MCNC: 21 MG/DL (ref 8–22)
BUN SERPL-MCNC: 23 MG/DL (ref 8–22)
BUN SERPL-MCNC: 25 MG/DL (ref 8–22)
BUN SERPL-MCNC: 26 MG/DL (ref 8–22)
BUN SERPL-MCNC: 28 MG/DL (ref 8–22)
BUN SERPL-MCNC: 29 MG/DL (ref 8–22)
BUN SERPL-MCNC: 30 MG/DL (ref 8–22)
BUN SERPL-MCNC: 30 MG/DL (ref 8–22)
BUN SERPL-MCNC: 31 MG/DL (ref 8–22)
BUN SERPL-MCNC: 32 MG/DL (ref 8–22)
CALCIUM SERPL-MCNC: 7.5 MG/DL (ref 8.5–10.5)
CALCIUM SERPL-MCNC: 7.8 MG/DL (ref 8.5–10.5)
CALCIUM SERPL-MCNC: 7.9 MG/DL (ref 8.4–10.2)
CALCIUM SERPL-MCNC: 7.9 MG/DL (ref 8.5–10.5)
CALCIUM SERPL-MCNC: 8 MG/DL (ref 8.4–10.2)
CALCIUM SERPL-MCNC: 8 MG/DL (ref 8.5–10.5)
CALCIUM SERPL-MCNC: 8 MG/DL (ref 8.5–10.5)
CALCIUM SERPL-MCNC: 8.1 MG/DL (ref 8.5–10.5)
CALCIUM SERPL-MCNC: 8.2 MG/DL (ref 8.5–10.5)
CALCIUM SERPL-MCNC: 8.3 MG/DL (ref 8.5–10.5)
CALCIUM SERPL-MCNC: 8.3 MG/DL (ref 8.5–10.5)
CALCIUM SERPL-MCNC: 8.4 MG/DL (ref 8.4–10.2)
CALCIUM SERPL-MCNC: 8.4 MG/DL (ref 8.5–10.5)
CALCIUM SERPL-MCNC: 8.4 MG/DL (ref 8.5–10.5)
CALCIUM SERPL-MCNC: 8.5 MG/DL (ref 8.5–10.5)
CALCIUM SERPL-MCNC: 8.6 MG/DL (ref 8.5–10.5)
CALCIUM SERPL-MCNC: 8.6 MG/DL (ref 8.5–10.5)
CALCIUM SERPL-MCNC: 8.7 MG/DL (ref 8.5–10.5)
CALCIUM SERPL-MCNC: 8.9 MG/DL (ref 8.4–10.2)
CALCIUM SERPL-MCNC: 8.9 MG/DL (ref 8.4–10.2)
CALCIUM SERPL-MCNC: 9 MG/DL (ref 8.5–10.5)
CALCIUM SERPL-MCNC: 9.1 MG/DL (ref 8.5–10.5)
CALCIUM SERPL-MCNC: 9.1 MG/DL (ref 8.5–10.5)
CALCIUM SERPL-MCNC: 9.7 MG/DL (ref 8.4–10.2)
CHEMOTHERAPY INFUSION START DATE: NORMAL
CHEMOTHERAPY RECORDS: 3
CHEMOTHERAPY RECORDS: 3000
CHEMOTHERAPY RECORDS: NORMAL
CHEMOTHERAPY RX CANCER: NORMAL
CHLORIDE SERPL-SCNC: 100 MMOL/L (ref 96–112)
CHLORIDE SERPL-SCNC: 101 MMOL/L (ref 96–112)
CHLORIDE SERPL-SCNC: 102 MMOL/L (ref 96–112)
CHLORIDE SERPL-SCNC: 103 MMOL/L (ref 96–112)
CHLORIDE SERPL-SCNC: 103 MMOL/L (ref 96–112)
CHLORIDE SERPL-SCNC: 104 MMOL/L (ref 96–112)
CHLORIDE SERPL-SCNC: 105 MMOL/L (ref 96–112)
CHLORIDE SERPL-SCNC: 94 MMOL/L (ref 96–112)
CHLORIDE SERPL-SCNC: 96 MMOL/L (ref 96–112)
CHLORIDE SERPL-SCNC: 97 MMOL/L (ref 96–112)
CHLORIDE SERPL-SCNC: 98 MMOL/L (ref 96–112)
CHLORIDE SERPL-SCNC: 99 MMOL/L (ref 96–112)
CO2 SERPL-SCNC: 22 MMOL/L (ref 20–33)
CO2 SERPL-SCNC: 23 MMOL/L (ref 20–33)
CO2 SERPL-SCNC: 25 MMOL/L (ref 20–33)
CO2 SERPL-SCNC: 27 MMOL/L (ref 20–33)
CO2 SERPL-SCNC: 27 MMOL/L (ref 20–33)
CO2 SERPL-SCNC: 28 MMOL/L (ref 20–33)
CO2 SERPL-SCNC: 29 MMOL/L (ref 20–33)
CO2 SERPL-SCNC: 30 MMOL/L (ref 20–33)
CO2 SERPL-SCNC: 31 MMOL/L (ref 20–33)
CO2 SERPL-SCNC: 32 MMOL/L (ref 20–33)
CO2 SERPL-SCNC: 34 MMOL/L (ref 20–33)
CO2 SERPL-SCNC: 35 MMOL/L (ref 20–33)
COLOR UR: YELLOW
CREAT SERPL-MCNC: 0.28 MG/DL (ref 0.5–1.4)
CREAT SERPL-MCNC: 0.31 MG/DL (ref 0.5–1.4)
CREAT SERPL-MCNC: 0.34 MG/DL (ref 0.5–1.4)
CREAT SERPL-MCNC: 0.36 MG/DL (ref 0.5–1.4)
CREAT SERPL-MCNC: 0.41 MG/DL (ref 0.5–1.4)
CREAT SERPL-MCNC: 0.42 MG/DL (ref 0.5–1.4)
CREAT SERPL-MCNC: 0.43 MG/DL (ref 0.5–1.4)
CREAT SERPL-MCNC: 0.51 MG/DL (ref 0.5–1.4)
CREAT SERPL-MCNC: 0.54 MG/DL (ref 0.5–1.4)
CREAT SERPL-MCNC: 0.57 MG/DL (ref 0.5–1.4)
CREAT SERPL-MCNC: 0.59 MG/DL (ref 0.5–1.4)
CREAT SERPL-MCNC: 0.6 MG/DL (ref 0.5–1.4)
CREAT SERPL-MCNC: 0.62 MG/DL (ref 0.5–1.4)
CREAT SERPL-MCNC: 0.63 MG/DL (ref 0.5–1.4)
CREAT SERPL-MCNC: 0.64 MG/DL (ref 0.5–1.4)
CREAT SERPL-MCNC: 0.67 MG/DL (ref 0.5–1.4)
CREAT SERPL-MCNC: 0.69 MG/DL (ref 0.5–1.4)
CREAT SERPL-MCNC: 0.75 MG/DL (ref 0.5–1.4)
CREAT SERPL-MCNC: 0.75 MG/DL (ref 0.5–1.4)
CREAT SERPL-MCNC: 0.76 MG/DL (ref 0.5–1.4)
CREAT SERPL-MCNC: 0.79 MG/DL (ref 0.5–1.4)
CREAT SERPL-MCNC: 0.83 MG/DL (ref 0.5–1.4)
CRP SERPL HS-MCNC: 1.12 MG/DL (ref 0–0.75)
CRP SERPL HS-MCNC: 19.63 MG/DL (ref 0–0.75)
CRP SERPL HS-MCNC: 2.26 MG/DL (ref 0–0.75)
CRP SERPL HS-MCNC: 2.26 MG/DL (ref 0–0.75)
CRP SERPL HS-MCNC: 3.47 MG/DL (ref 0–0.75)
CRP SERPL HS-MCNC: 3.9 MG/DL (ref 0–0.75)
CRP SERPL HS-MCNC: 5.73 MG/DL (ref 0–0.75)
EKG IMPRESSION: NORMAL
EOSINOPHIL # BLD AUTO: 0 K/UL (ref 0–0.51)
EOSINOPHIL # BLD AUTO: 0.01 K/UL (ref 0–0.51)
EOSINOPHIL # BLD AUTO: 0.16 K/UL (ref 0–0.51)
EOSINOPHIL # BLD AUTO: 0.16 K/UL (ref 0–0.51)
EOSINOPHIL # BLD AUTO: 0.17 K/UL (ref 0–0.51)
EOSINOPHIL # BLD AUTO: 0.2 K/UL (ref 0–0.51)
EOSINOPHIL # BLD AUTO: 0.23 K/UL (ref 0–0.51)
EOSINOPHIL # BLD AUTO: 0.23 K/UL (ref 0–0.51)
EOSINOPHIL # BLD AUTO: 0.29 K/UL (ref 0–0.51)
EOSINOPHIL # BLD AUTO: 0.31 K/UL (ref 0–0.51)
EOSINOPHIL # BLD AUTO: 0.34 K/UL (ref 0–0.51)
EOSINOPHIL # BLD AUTO: 0.35 K/UL (ref 0–0.51)
EOSINOPHIL # BLD AUTO: 0.42 K/UL (ref 0–0.51)
EOSINOPHIL NFR BLD: 0 % (ref 0–6.9)
EOSINOPHIL NFR BLD: 0.1 % (ref 0–6.9)
EOSINOPHIL NFR BLD: 1.1 % (ref 0–6.9)
EOSINOPHIL NFR BLD: 1.2 % (ref 0–6.9)
EOSINOPHIL NFR BLD: 1.2 % (ref 0–6.9)
EOSINOPHIL NFR BLD: 1.5 % (ref 0–6.9)
EOSINOPHIL NFR BLD: 1.6 % (ref 0–6.9)
EOSINOPHIL NFR BLD: 1.8 % (ref 0–6.9)
EOSINOPHIL NFR BLD: 1.8 % (ref 0–6.9)
EOSINOPHIL NFR BLD: 2.1 % (ref 0–6.9)
EOSINOPHIL NFR BLD: 2.1 % (ref 0–6.9)
EOSINOPHIL NFR BLD: 2.2 % (ref 0–6.9)
EOSINOPHIL NFR BLD: 2.3 % (ref 0–6.9)
EOSINOPHIL NFR BLD: 2.5 % (ref 0–6.9)
EOSINOPHIL NFR BLD: 2.9 % (ref 0–6.9)
EPI CELLS #/AREA URNS HPF: ABNORMAL /HPF
ERYTHROCYTE [DISTWIDTH] IN BLOOD BY AUTOMATED COUNT: 51.8 FL (ref 35.9–50)
ERYTHROCYTE [DISTWIDTH] IN BLOOD BY AUTOMATED COUNT: 52.2 FL (ref 35.9–50)
ERYTHROCYTE [DISTWIDTH] IN BLOOD BY AUTOMATED COUNT: 52.8 FL (ref 35.9–50)
ERYTHROCYTE [DISTWIDTH] IN BLOOD BY AUTOMATED COUNT: 54.8 FL (ref 35.9–50)
ERYTHROCYTE [DISTWIDTH] IN BLOOD BY AUTOMATED COUNT: 55 FL (ref 35.9–50)
ERYTHROCYTE [DISTWIDTH] IN BLOOD BY AUTOMATED COUNT: 55.5 FL (ref 35.9–50)
ERYTHROCYTE [DISTWIDTH] IN BLOOD BY AUTOMATED COUNT: 55.6 FL (ref 35.9–50)
ERYTHROCYTE [DISTWIDTH] IN BLOOD BY AUTOMATED COUNT: 56.2 FL (ref 35.9–50)
ERYTHROCYTE [DISTWIDTH] IN BLOOD BY AUTOMATED COUNT: 56.5 FL (ref 35.9–50)
ERYTHROCYTE [DISTWIDTH] IN BLOOD BY AUTOMATED COUNT: 56.6 FL (ref 35.9–50)
ERYTHROCYTE [DISTWIDTH] IN BLOOD BY AUTOMATED COUNT: 56.7 FL (ref 35.9–50)
ERYTHROCYTE [DISTWIDTH] IN BLOOD BY AUTOMATED COUNT: 56.7 FL (ref 35.9–50)
ERYTHROCYTE [DISTWIDTH] IN BLOOD BY AUTOMATED COUNT: 56.8 FL (ref 35.9–50)
ERYTHROCYTE [DISTWIDTH] IN BLOOD BY AUTOMATED COUNT: 57 FL (ref 35.9–50)
ERYTHROCYTE [DISTWIDTH] IN BLOOD BY AUTOMATED COUNT: 57.1 FL (ref 35.9–50)
ERYTHROCYTE [DISTWIDTH] IN BLOOD BY AUTOMATED COUNT: 57.1 FL (ref 35.9–50)
ERYTHROCYTE [DISTWIDTH] IN BLOOD BY AUTOMATED COUNT: 57.4 FL (ref 35.9–50)
ERYTHROCYTE [DISTWIDTH] IN BLOOD BY AUTOMATED COUNT: 58.5 FL (ref 35.9–50)
ERYTHROCYTE [DISTWIDTH] IN BLOOD BY AUTOMATED COUNT: 59 FL (ref 35.9–50)
ERYTHROCYTE [DISTWIDTH] IN BLOOD BY AUTOMATED COUNT: 59.1 FL (ref 35.9–50)
ERYTHROCYTE [DISTWIDTH] IN BLOOD BY AUTOMATED COUNT: 59.7 FL (ref 35.9–50)
ERYTHROCYTE [DISTWIDTH] IN BLOOD BY AUTOMATED COUNT: 61.9 FL (ref 35.9–50)
ERYTHROCYTE [DISTWIDTH] IN BLOOD BY AUTOMATED COUNT: 61.9 FL (ref 35.9–50)
ERYTHROCYTE [SEDIMENTATION RATE] IN BLOOD BY WESTERGREN METHOD: 42 MM/HOUR (ref 0–20)
EST. AVERAGE GLUCOSE BLD GHB EST-MCNC: 123 MG/DL
GLOBULIN SER CALC-MCNC: 2.4 G/DL (ref 1.9–3.5)
GLOBULIN SER CALC-MCNC: 2.6 G/DL (ref 1.9–3.5)
GLOBULIN SER CALC-MCNC: 2.8 G/DL (ref 1.9–3.5)
GLOBULIN SER CALC-MCNC: 2.9 G/DL (ref 1.9–3.5)
GLOBULIN SER CALC-MCNC: 2.9 G/DL (ref 1.9–3.5)
GLOBULIN SER CALC-MCNC: 3 G/DL (ref 1.9–3.5)
GLOBULIN SER CALC-MCNC: 3.2 G/DL (ref 1.9–3.5)
GLOBULIN SER CALC-MCNC: 3.5 G/DL (ref 1.9–3.5)
GLOBULIN SER CALC-MCNC: 4 G/DL (ref 1.9–3.5)
GLUCOSE BLD-MCNC: 78 MG/DL (ref 65–99)
GLUCOSE BLD-MCNC: 85 MG/DL (ref 65–99)
GLUCOSE SERPL-MCNC: 106 MG/DL (ref 65–99)
GLUCOSE SERPL-MCNC: 107 MG/DL (ref 65–99)
GLUCOSE SERPL-MCNC: 108 MG/DL (ref 65–99)
GLUCOSE SERPL-MCNC: 111 MG/DL (ref 65–99)
GLUCOSE SERPL-MCNC: 112 MG/DL (ref 65–99)
GLUCOSE SERPL-MCNC: 115 MG/DL (ref 65–99)
GLUCOSE SERPL-MCNC: 118 MG/DL (ref 65–99)
GLUCOSE SERPL-MCNC: 120 MG/DL (ref 65–99)
GLUCOSE SERPL-MCNC: 125 MG/DL (ref 65–99)
GLUCOSE SERPL-MCNC: 126 MG/DL (ref 65–99)
GLUCOSE SERPL-MCNC: 128 MG/DL (ref 65–99)
GLUCOSE SERPL-MCNC: 128 MG/DL (ref 65–99)
GLUCOSE SERPL-MCNC: 130 MG/DL (ref 65–99)
GLUCOSE SERPL-MCNC: 132 MG/DL (ref 65–99)
GLUCOSE SERPL-MCNC: 133 MG/DL (ref 65–99)
GLUCOSE SERPL-MCNC: 138 MG/DL (ref 65–99)
GLUCOSE SERPL-MCNC: 172 MG/DL (ref 65–99)
GLUCOSE SERPL-MCNC: 86 MG/DL (ref 65–99)
GLUCOSE SERPL-MCNC: 89 MG/DL (ref 65–99)
GLUCOSE SERPL-MCNC: 89 MG/DL (ref 65–99)
GLUCOSE SERPL-MCNC: 93 MG/DL (ref 65–99)
GLUCOSE SERPL-MCNC: 94 MG/DL (ref 65–99)
GLUCOSE SERPL-MCNC: 99 MG/DL (ref 65–99)
GLUCOSE SERPL-MCNC: 99 MG/DL (ref 65–99)
GLUCOSE UR STRIP.AUTO-MCNC: NEGATIVE MG/DL
GRAM STN SPEC: ABNORMAL
GRAM STN SPEC: ABNORMAL
GRAM STN SPEC: NORMAL
GRAM STN SPEC: NORMAL
HBA1C MFR BLD: 5.9 % (ref 0–5.6)
HCT VFR BLD AUTO: 26 % (ref 42–52)
HCT VFR BLD AUTO: 26.9 % (ref 42–52)
HCT VFR BLD AUTO: 28.1 % (ref 42–52)
HCT VFR BLD AUTO: 28.6 % (ref 42–52)
HCT VFR BLD AUTO: 29.9 % (ref 42–52)
HCT VFR BLD AUTO: 32.4 % (ref 42–52)
HCT VFR BLD AUTO: 32.8 % (ref 42–52)
HCT VFR BLD AUTO: 33.6 % (ref 42–52)
HCT VFR BLD AUTO: 33.8 % (ref 42–52)
HCT VFR BLD AUTO: 34.1 % (ref 42–52)
HCT VFR BLD AUTO: 34.7 % (ref 42–52)
HCT VFR BLD AUTO: 34.7 % (ref 42–52)
HCT VFR BLD AUTO: 34.8 % (ref 42–52)
HCT VFR BLD AUTO: 35.9 % (ref 42–52)
HCT VFR BLD AUTO: 36.4 % (ref 42–52)
HCT VFR BLD AUTO: 37 % (ref 42–52)
HCT VFR BLD AUTO: 37.2 % (ref 42–52)
HCT VFR BLD AUTO: 37.8 % (ref 42–52)
HCT VFR BLD AUTO: 38.9 % (ref 42–52)
HCT VFR BLD AUTO: 39.7 % (ref 42–52)
HCT VFR BLD AUTO: 41.5 % (ref 42–52)
HCT VFR BLD AUTO: 45 % (ref 42–52)
HCT VFR BLD AUTO: 45.2 % (ref 42–52)
HGB BLD-MCNC: 10.1 G/DL (ref 14–18)
HGB BLD-MCNC: 10.3 G/DL (ref 14–18)
HGB BLD-MCNC: 10.4 G/DL (ref 14–18)
HGB BLD-MCNC: 10.5 G/DL (ref 14–18)
HGB BLD-MCNC: 10.8 G/DL (ref 14–18)
HGB BLD-MCNC: 10.8 G/DL (ref 14–18)
HGB BLD-MCNC: 10.9 G/DL (ref 14–18)
HGB BLD-MCNC: 11 G/DL (ref 14–18)
HGB BLD-MCNC: 11.3 G/DL (ref 14–18)
HGB BLD-MCNC: 11.3 G/DL (ref 14–18)
HGB BLD-MCNC: 11.4 G/DL (ref 14–18)
HGB BLD-MCNC: 11.6 G/DL (ref 14–18)
HGB BLD-MCNC: 11.9 G/DL (ref 14–18)
HGB BLD-MCNC: 12.1 G/DL (ref 14–18)
HGB BLD-MCNC: 12.4 G/DL (ref 14–18)
HGB BLD-MCNC: 13.3 G/DL (ref 14–18)
HGB BLD-MCNC: 14.3 G/DL (ref 14–18)
HGB BLD-MCNC: 14.5 G/DL (ref 14–18)
HGB BLD-MCNC: 8 G/DL (ref 14–18)
HGB BLD-MCNC: 8.2 G/DL (ref 14–18)
HGB BLD-MCNC: 8.8 G/DL (ref 14–18)
HGB BLD-MCNC: 8.9 G/DL (ref 14–18)
HGB BLD-MCNC: 9.4 G/DL (ref 14–18)
IMM GRANULOCYTES # BLD AUTO: 0.06 K/UL (ref 0–0.11)
IMM GRANULOCYTES # BLD AUTO: 0.06 K/UL (ref 0–0.11)
IMM GRANULOCYTES # BLD AUTO: 0.07 K/UL (ref 0–0.11)
IMM GRANULOCYTES # BLD AUTO: 0.07 K/UL (ref 0–0.11)
IMM GRANULOCYTES # BLD AUTO: 0.08 K/UL (ref 0–0.11)
IMM GRANULOCYTES # BLD AUTO: 0.08 K/UL (ref 0–0.11)
IMM GRANULOCYTES # BLD AUTO: 0.09 K/UL (ref 0–0.11)
IMM GRANULOCYTES # BLD AUTO: 0.1 K/UL (ref 0–0.11)
IMM GRANULOCYTES # BLD AUTO: 0.11 K/UL (ref 0–0.11)
IMM GRANULOCYTES # BLD AUTO: 0.13 K/UL (ref 0–0.11)
IMM GRANULOCYTES # BLD AUTO: 0.14 K/UL (ref 0–0.11)
IMM GRANULOCYTES # BLD AUTO: 0.14 K/UL (ref 0–0.11)
IMM GRANULOCYTES # BLD AUTO: 0.16 K/UL (ref 0–0.11)
IMM GRANULOCYTES # BLD AUTO: 0.16 K/UL (ref 0–0.11)
IMM GRANULOCYTES NFR BLD AUTO: 0.4 % (ref 0–0.9)
IMM GRANULOCYTES NFR BLD AUTO: 0.5 % (ref 0–0.9)
IMM GRANULOCYTES NFR BLD AUTO: 0.5 % (ref 0–0.9)
IMM GRANULOCYTES NFR BLD AUTO: 0.6 % (ref 0–0.9)
IMM GRANULOCYTES NFR BLD AUTO: 0.7 % (ref 0–0.9)
IMM GRANULOCYTES NFR BLD AUTO: 0.8 % (ref 0–0.9)
IMM GRANULOCYTES NFR BLD AUTO: 0.9 % (ref 0–0.9)
IMM GRANULOCYTES NFR BLD AUTO: 1 % (ref 0–0.9)
IMM GRANULOCYTES NFR BLD AUTO: 1 % (ref 0–0.9)
INR PPP: 1.24 (ref 0.87–1.13)
KETONES UR STRIP.AUTO-MCNC: 40 MG/DL
LACTATE BLD-SCNC: 1.3 MMOL/L (ref 0.5–2)
LEUKOCYTE ESTERASE UR QL STRIP.AUTO: NEGATIVE
LIPASE SERPL-CCNC: 37 U/L (ref 7–58)
LYMPHOCYTES # BLD AUTO: 0.63 K/UL (ref 1–4.8)
LYMPHOCYTES # BLD AUTO: 1.07 K/UL (ref 1–4.8)
LYMPHOCYTES # BLD AUTO: 1.4 K/UL (ref 1–4.8)
LYMPHOCYTES # BLD AUTO: 1.41 K/UL (ref 1–4.8)
LYMPHOCYTES # BLD AUTO: 1.43 K/UL (ref 1–4.8)
LYMPHOCYTES # BLD AUTO: 1.45 K/UL (ref 1–4.8)
LYMPHOCYTES # BLD AUTO: 1.54 K/UL (ref 1–4.8)
LYMPHOCYTES # BLD AUTO: 1.67 K/UL (ref 1–4.8)
LYMPHOCYTES # BLD AUTO: 1.69 K/UL (ref 1–4.8)
LYMPHOCYTES # BLD AUTO: 1.76 K/UL (ref 1–4.8)
LYMPHOCYTES # BLD AUTO: 1.81 K/UL (ref 1–4.8)
LYMPHOCYTES # BLD AUTO: 1.88 K/UL (ref 1–4.8)
LYMPHOCYTES # BLD AUTO: 1.92 K/UL (ref 1–4.8)
LYMPHOCYTES NFR BLD: 10 % (ref 22–41)
LYMPHOCYTES NFR BLD: 10.5 % (ref 22–41)
LYMPHOCYTES NFR BLD: 10.6 % (ref 22–41)
LYMPHOCYTES NFR BLD: 12.3 % (ref 22–41)
LYMPHOCYTES NFR BLD: 12.8 % (ref 22–41)
LYMPHOCYTES NFR BLD: 12.9 % (ref 22–41)
LYMPHOCYTES NFR BLD: 13.3 % (ref 22–41)
LYMPHOCYTES NFR BLD: 13.6 % (ref 22–41)
LYMPHOCYTES NFR BLD: 13.8 % (ref 22–41)
LYMPHOCYTES NFR BLD: 14.2 % (ref 22–41)
LYMPHOCYTES NFR BLD: 4.7 % (ref 22–41)
LYMPHOCYTES NFR BLD: 7.8 % (ref 22–41)
LYMPHOCYTES NFR BLD: 7.9 % (ref 22–41)
LYMPHOCYTES NFR BLD: 8.5 % (ref 22–41)
LYMPHOCYTES NFR BLD: 9.7 % (ref 22–41)
MACROCYTES BLD QL SMEAR: ABNORMAL
MAGNESIUM SERPL-MCNC: 1.7 MG/DL (ref 1.5–2.5)
MAGNESIUM SERPL-MCNC: 1.7 MG/DL (ref 1.5–2.5)
MAGNESIUM SERPL-MCNC: 2 MG/DL (ref 1.5–2.5)
MAGNESIUM SERPL-MCNC: 2 MG/DL (ref 1.5–2.5)
MAGNESIUM SERPL-MCNC: 2.1 MG/DL (ref 1.5–2.5)
MAGNESIUM SERPL-MCNC: 2.3 MG/DL (ref 1.5–2.5)
MANUAL DIFF BLD: NORMAL
MCH RBC QN AUTO: 25 PG (ref 27–33)
MCH RBC QN AUTO: 25.4 PG (ref 27–33)
MCH RBC QN AUTO: 25.5 PG (ref 27–33)
MCH RBC QN AUTO: 25.8 PG (ref 27–33)
MCH RBC QN AUTO: 26 PG (ref 27–33)
MCH RBC QN AUTO: 26 PG (ref 27–33)
MCH RBC QN AUTO: 26.1 PG (ref 27–33)
MCH RBC QN AUTO: 26.2 PG (ref 27–33)
MCH RBC QN AUTO: 26.3 PG (ref 27–33)
MCH RBC QN AUTO: 26.4 PG (ref 27–33)
MCH RBC QN AUTO: 26.5 PG (ref 27–33)
MCH RBC QN AUTO: 26.8 PG (ref 27–33)
MCH RBC QN AUTO: 27 PG (ref 27–33)
MCH RBC QN AUTO: 27.2 PG (ref 27–33)
MCH RBC QN AUTO: 27.2 PG (ref 27–33)
MCH RBC QN AUTO: 27.4 PG (ref 27–33)
MCHC RBC AUTO-ENTMCNC: 30.5 G/DL (ref 33.7–35.3)
MCHC RBC AUTO-ENTMCNC: 30.5 G/DL (ref 33.7–35.3)
MCHC RBC AUTO-ENTMCNC: 30.8 G/DL (ref 33.7–35.3)
MCHC RBC AUTO-ENTMCNC: 30.8 G/DL (ref 33.7–35.3)
MCHC RBC AUTO-ENTMCNC: 31 G/DL (ref 33.7–35.3)
MCHC RBC AUTO-ENTMCNC: 31 G/DL (ref 33.7–35.3)
MCHC RBC AUTO-ENTMCNC: 31.1 G/DL (ref 33.7–35.3)
MCHC RBC AUTO-ENTMCNC: 31.2 G/DL (ref 33.7–35.3)
MCHC RBC AUTO-ENTMCNC: 31.3 G/DL (ref 33.7–35.3)
MCHC RBC AUTO-ENTMCNC: 31.3 G/DL (ref 33.7–35.3)
MCHC RBC AUTO-ENTMCNC: 31.4 G/DL (ref 33.7–35.3)
MCHC RBC AUTO-ENTMCNC: 31.4 G/DL (ref 33.7–35.3)
MCHC RBC AUTO-ENTMCNC: 31.5 G/DL (ref 33.7–35.3)
MCHC RBC AUTO-ENTMCNC: 31.7 G/DL (ref 33.7–35.3)
MCHC RBC AUTO-ENTMCNC: 31.8 G/DL (ref 33.7–35.3)
MCHC RBC AUTO-ENTMCNC: 31.8 G/DL (ref 33.7–35.3)
MCHC RBC AUTO-ENTMCNC: 32 G/DL (ref 33.7–35.3)
MCHC RBC AUTO-ENTMCNC: 32 G/DL (ref 33.7–35.3)
MCHC RBC AUTO-ENTMCNC: 32.1 G/DL (ref 33.7–35.3)
MCV RBC AUTO: 78.2 FL (ref 81.4–97.8)
MCV RBC AUTO: 79.2 FL (ref 81.4–97.8)
MCV RBC AUTO: 80.2 FL (ref 81.4–97.8)
MCV RBC AUTO: 81.3 FL (ref 81.4–97.8)
MCV RBC AUTO: 82.9 FL (ref 81.4–97.8)
MCV RBC AUTO: 83.4 FL (ref 81.4–97.8)
MCV RBC AUTO: 83.5 FL (ref 81.4–97.8)
MCV RBC AUTO: 83.6 FL (ref 81.4–97.8)
MCV RBC AUTO: 84 FL (ref 81.4–97.8)
MCV RBC AUTO: 84.4 FL (ref 81.4–97.8)
MCV RBC AUTO: 84.4 FL (ref 81.4–97.8)
MCV RBC AUTO: 84.5 FL (ref 81.4–97.8)
MCV RBC AUTO: 84.6 FL (ref 81.4–97.8)
MCV RBC AUTO: 84.6 FL (ref 81.4–97.8)
MCV RBC AUTO: 84.8 FL (ref 81.4–97.8)
MCV RBC AUTO: 86.1 FL (ref 81.4–97.8)
MCV RBC AUTO: 86.4 FL (ref 81.4–97.8)
MCV RBC AUTO: 87 FL (ref 81.4–97.8)
MCV RBC AUTO: 87.1 FL (ref 81.4–97.8)
MCV RBC AUTO: 87.2 FL (ref 81.4–97.8)
MCV RBC AUTO: 87.7 FL (ref 81.4–97.8)
MICRO URNS: ABNORMAL
MICROCYTES BLD QL SMEAR: ABNORMAL
MONOCYTES # BLD AUTO: 1.28 K/UL (ref 0–0.85)
MONOCYTES # BLD AUTO: 1.36 K/UL (ref 0–0.85)
MONOCYTES # BLD AUTO: 1.38 K/UL (ref 0–0.85)
MONOCYTES # BLD AUTO: 1.4 K/UL (ref 0–0.85)
MONOCYTES # BLD AUTO: 1.52 K/UL (ref 0–0.85)
MONOCYTES # BLD AUTO: 1.55 K/UL (ref 0–0.85)
MONOCYTES # BLD AUTO: 1.56 K/UL (ref 0–0.85)
MONOCYTES # BLD AUTO: 1.57 K/UL (ref 0–0.85)
MONOCYTES # BLD AUTO: 1.58 K/UL (ref 0–0.85)
MONOCYTES # BLD AUTO: 1.59 K/UL (ref 0–0.85)
MONOCYTES # BLD AUTO: 1.68 K/UL (ref 0–0.85)
MONOCYTES # BLD AUTO: 1.93 K/UL (ref 0–0.85)
MONOCYTES # BLD AUTO: 2.05 K/UL (ref 0–0.85)
MONOCYTES # BLD AUTO: 2.05 K/UL (ref 0–0.85)
MONOCYTES # BLD AUTO: 2.29 K/UL (ref 0–0.85)
MONOCYTES NFR BLD AUTO: 10.1 % (ref 0–13.4)
MONOCYTES NFR BLD AUTO: 10.5 % (ref 0–13.4)
MONOCYTES NFR BLD AUTO: 11.1 % (ref 0–13.4)
MONOCYTES NFR BLD AUTO: 11.2 % (ref 0–13.4)
MONOCYTES NFR BLD AUTO: 12 % (ref 0–13.4)
MONOCYTES NFR BLD AUTO: 12.3 % (ref 0–13.4)
MONOCYTES NFR BLD AUTO: 14.8 % (ref 0–13.4)
MONOCYTES NFR BLD AUTO: 16 % (ref 0–13.4)
MONOCYTES NFR BLD AUTO: 16.7 % (ref 0–13.4)
MONOCYTES NFR BLD AUTO: 17.7 % (ref 0–13.4)
MONOCYTES NFR BLD AUTO: 7 % (ref 0–13.4)
MONOCYTES NFR BLD AUTO: 8.7 % (ref 0–13.4)
MONOCYTES NFR BLD AUTO: 9.3 % (ref 0–13.4)
MONOCYTES NFR BLD AUTO: 9.9 % (ref 0–13.4)
MONOCYTES NFR BLD AUTO: 9.9 % (ref 0–13.4)
MORPHOLOGY BLD-IMP: NORMAL
MUCOUS THREADS #/AREA URNS HPF: ABNORMAL /HPF
NEUTROPHILS # BLD AUTO: 10.34 K/UL (ref 1.82–7.42)
NEUTROPHILS # BLD AUTO: 10.83 K/UL (ref 1.82–7.42)
NEUTROPHILS # BLD AUTO: 11.1 K/UL (ref 1.82–7.42)
NEUTROPHILS # BLD AUTO: 12.26 K/UL (ref 1.82–7.42)
NEUTROPHILS # BLD AUTO: 13.85 K/UL (ref 1.82–7.42)
NEUTROPHILS # BLD AUTO: 14.38 K/UL (ref 1.82–7.42)
NEUTROPHILS # BLD AUTO: 15.43 K/UL (ref 1.82–7.42)
NEUTROPHILS # BLD AUTO: 15.45 K/UL (ref 1.82–7.42)
NEUTROPHILS # BLD AUTO: 7.23 K/UL (ref 1.82–7.42)
NEUTROPHILS # BLD AUTO: 7.58 K/UL (ref 1.82–7.42)
NEUTROPHILS # BLD AUTO: 8.26 K/UL (ref 1.82–7.42)
NEUTROPHILS # BLD AUTO: 8.55 K/UL (ref 1.82–7.42)
NEUTROPHILS # BLD AUTO: 9.33 K/UL (ref 1.82–7.42)
NEUTROPHILS # BLD AUTO: 9.39 K/UL (ref 1.82–7.42)
NEUTROPHILS # BLD AUTO: 9.91 K/UL (ref 1.82–7.42)
NEUTROPHILS NFR BLD: 65.4 % (ref 44–72)
NEUTROPHILS NFR BLD: 67 % (ref 44–72)
NEUTROPHILS NFR BLD: 67.5 % (ref 44–72)
NEUTROPHILS NFR BLD: 68 % (ref 44–72)
NEUTROPHILS NFR BLD: 71.9 % (ref 44–72)
NEUTROPHILS NFR BLD: 72.6 % (ref 44–72)
NEUTROPHILS NFR BLD: 74.6 % (ref 44–72)
NEUTROPHILS NFR BLD: 75.6 % (ref 44–72)
NEUTROPHILS NFR BLD: 76.2 % (ref 44–72)
NEUTROPHILS NFR BLD: 76.5 % (ref 44–72)
NEUTROPHILS NFR BLD: 76.7 % (ref 44–72)
NEUTROPHILS NFR BLD: 78.8 % (ref 44–72)
NEUTROPHILS NFR BLD: 80.7 % (ref 44–72)
NEUTROPHILS NFR BLD: 81.7 % (ref 44–72)
NEUTROPHILS NFR BLD: 83.5 % (ref 44–72)
NEUTS BAND NFR BLD MANUAL: 2.6 % (ref 0–10)
NITRITE UR QL STRIP.AUTO: NEGATIVE
NRBC # BLD AUTO: 0 K/UL
NRBC # BLD AUTO: 0.02 K/UL
NRBC BLD-RTO: 0 /100 WBC
NRBC BLD-RTO: 0.2 /100 WBC
PATHOLOGY CONSULT NOTE: NORMAL
PATHOLOGY CONSULT NOTE: NORMAL
PH UR STRIP.AUTO: 5.5 [PH]
PHOSPHATE SERPL-MCNC: 2.7 MG/DL (ref 2.5–4.5)
PHOSPHATE SERPL-MCNC: 2.7 MG/DL (ref 2.5–4.5)
PLATELET # BLD AUTO: 222 K/UL (ref 164–446)
PLATELET # BLD AUTO: 273 K/UL (ref 164–446)
PLATELET # BLD AUTO: 275 K/UL (ref 164–446)
PLATELET # BLD AUTO: 278 K/UL (ref 164–446)
PLATELET # BLD AUTO: 339 K/UL (ref 164–446)
PLATELET # BLD AUTO: 356 K/UL (ref 164–446)
PLATELET # BLD AUTO: 361 K/UL (ref 164–446)
PLATELET # BLD AUTO: 368 K/UL (ref 164–446)
PLATELET # BLD AUTO: 382 K/UL (ref 164–446)
PLATELET # BLD AUTO: 382 K/UL (ref 164–446)
PLATELET # BLD AUTO: 391 K/UL (ref 164–446)
PLATELET # BLD AUTO: 391 K/UL (ref 164–446)
PLATELET # BLD AUTO: 397 K/UL (ref 164–446)
PLATELET # BLD AUTO: 402 K/UL (ref 164–446)
PLATELET # BLD AUTO: 406 K/UL (ref 164–446)
PLATELET # BLD AUTO: 412 K/UL (ref 164–446)
PLATELET # BLD AUTO: 413 K/UL (ref 164–446)
PLATELET # BLD AUTO: 417 K/UL (ref 164–446)
PLATELET # BLD AUTO: 420 K/UL (ref 164–446)
PLATELET # BLD AUTO: 431 K/UL (ref 164–446)
PLATELET # BLD AUTO: 433 K/UL (ref 164–446)
PLATELET # BLD AUTO: 451 K/UL (ref 164–446)
PLATELET # BLD AUTO: 525 K/UL (ref 164–446)
PLATELET BLD QL SMEAR: NORMAL
PMV BLD AUTO: 10.1 FL (ref 9–12.9)
PMV BLD AUTO: 10.1 FL (ref 9–12.9)
PMV BLD AUTO: 10.4 FL (ref 9–12.9)
PMV BLD AUTO: 10.6 FL (ref 9–12.9)
PMV BLD AUTO: 8.8 FL (ref 9–12.9)
PMV BLD AUTO: 9.2 FL (ref 9–12.9)
PMV BLD AUTO: 9.2 FL (ref 9–12.9)
PMV BLD AUTO: 9.3 FL (ref 9–12.9)
PMV BLD AUTO: 9.3 FL (ref 9–12.9)
PMV BLD AUTO: 9.4 FL (ref 9–12.9)
PMV BLD AUTO: 9.4 FL (ref 9–12.9)
PMV BLD AUTO: 9.5 FL (ref 9–12.9)
PMV BLD AUTO: 9.6 FL (ref 9–12.9)
PMV BLD AUTO: 9.6 FL (ref 9–12.9)
PMV BLD AUTO: 9.7 FL (ref 9–12.9)
PMV BLD AUTO: 9.8 FL (ref 9–12.9)
PMV BLD AUTO: 9.9 FL (ref 9–12.9)
POTASSIUM SERPL-SCNC: 2.5 MMOL/L (ref 3.6–5.5)
POTASSIUM SERPL-SCNC: 3.2 MMOL/L (ref 3.6–5.5)
POTASSIUM SERPL-SCNC: 3.6 MMOL/L (ref 3.6–5.5)
POTASSIUM SERPL-SCNC: 3.8 MMOL/L (ref 3.6–5.5)
POTASSIUM SERPL-SCNC: 3.9 MMOL/L (ref 3.6–5.5)
POTASSIUM SERPL-SCNC: 4 MMOL/L (ref 3.6–5.5)
POTASSIUM SERPL-SCNC: 4.1 MMOL/L (ref 3.6–5.5)
POTASSIUM SERPL-SCNC: 4.2 MMOL/L (ref 3.6–5.5)
POTASSIUM SERPL-SCNC: 4.3 MMOL/L (ref 3.6–5.5)
POTASSIUM SERPL-SCNC: 4.4 MMOL/L (ref 3.6–5.5)
POTASSIUM SERPL-SCNC: 4.4 MMOL/L (ref 3.6–5.5)
POTASSIUM SERPL-SCNC: 4.5 MMOL/L (ref 3.6–5.5)
POTASSIUM SERPL-SCNC: 4.6 MMOL/L (ref 3.6–5.5)
POTASSIUM SERPL-SCNC: 4.8 MMOL/L (ref 3.6–5.5)
POTASSIUM SERPL-SCNC: 4.9 MMOL/L (ref 3.6–5.5)
PREALB SERPL-MCNC: 13 MG/DL (ref 18–38)
PREALB SERPL-MCNC: 13 MG/DL (ref 18–38)
PREALB SERPL-MCNC: 14 MG/DL (ref 18–38)
PREALB SERPL-MCNC: 20 MG/DL (ref 18–38)
PREALB SERPL-MCNC: 5 MG/DL (ref 18–38)
PREALB SERPL-MCNC: 6 MG/DL (ref 18–38)
PROCALCITONIN SERPL-MCNC: 0.07 NG/ML
PROCALCITONIN SERPL-MCNC: <0.05 NG/ML
PROT SERPL-MCNC: 4.2 G/DL (ref 6–8.2)
PROT SERPL-MCNC: 4.6 G/DL (ref 6–8.2)
PROT SERPL-MCNC: 5.2 G/DL (ref 6–8.2)
PROT SERPL-MCNC: 5.3 G/DL (ref 6–8.2)
PROT SERPL-MCNC: 5.4 G/DL (ref 6–8.2)
PROT SERPL-MCNC: 5.5 G/DL (ref 6–8.2)
PROT SERPL-MCNC: 5.9 G/DL (ref 6–8.2)
PROT SERPL-MCNC: 6.4 G/DL (ref 6–8.2)
PROT SERPL-MCNC: 6.8 G/DL (ref 6–8.2)
PROT UR QL STRIP: NEGATIVE MG/DL
PROTHROMBIN TIME: 15.5 SEC (ref 12–14.6)
RAD ONC ARIA COURSE TREATMENT ELAPSED DAYS: NORMAL
RAD ONC ARIA PLAN TREATMENT DATES: NORMAL
RAD ONC ARIA REFERENCE POINT DOSAGE GIVEN TO DATE: 9
RAD ONC ARIA REFERENCE POINT DOSAGE GIVEN TO DATE: 9
RAD ONC ARIA REFERENCE POINT ID: NORMAL
RAD ONC ARIA REFERENCE POINT ID: NORMAL
RAD ONC ARIA REFERENCE POINT SESSION DOSAGE GIVEN: 3
RAD ONC ARIA REFERENCE POINT SESSION DOSAGE GIVEN: 3
RBC # BLD AUTO: 3.02 M/UL (ref 4.7–6.1)
RBC # BLD AUTO: 3.09 M/UL (ref 4.7–6.1)
RBC # BLD AUTO: 3.23 M/UL (ref 4.7–6.1)
RBC # BLD AUTO: 3.39 M/UL (ref 4.7–6.1)
RBC # BLD AUTO: 3.46 M/UL (ref 4.7–6.1)
RBC # BLD AUTO: 3.76 M/UL (ref 4.7–6.1)
RBC # BLD AUTO: 3.83 M/UL (ref 4.7–6.1)
RBC # BLD AUTO: 4 M/UL (ref 4.7–6.1)
RBC # BLD AUTO: 4.02 M/UL (ref 4.7–6.1)
RBC # BLD AUTO: 4.03 M/UL (ref 4.7–6.1)
RBC # BLD AUTO: 4.12 M/UL (ref 4.7–6.1)
RBC # BLD AUTO: 4.13 M/UL (ref 4.7–6.1)
RBC # BLD AUTO: 4.22 M/UL (ref 4.7–6.1)
RBC # BLD AUTO: 4.27 M/UL (ref 4.7–6.1)
RBC # BLD AUTO: 4.31 M/UL (ref 4.7–6.1)
RBC # BLD AUTO: 4.33 M/UL (ref 4.7–6.1)
RBC # BLD AUTO: 4.41 M/UL (ref 4.7–6.1)
RBC # BLD AUTO: 4.53 M/UL (ref 4.7–6.1)
RBC # BLD AUTO: 4.66 M/UL (ref 4.7–6.1)
RBC # BLD AUTO: 4.68 M/UL (ref 4.7–6.1)
RBC # BLD AUTO: 5.31 M/UL (ref 4.7–6.1)
RBC # BLD AUTO: 5.61 M/UL (ref 4.7–6.1)
RBC # BLD AUTO: 5.71 M/UL (ref 4.7–6.1)
RBC # URNS HPF: ABNORMAL /HPF
RBC BLD AUTO: PRESENT
RBC UR QL AUTO: ABNORMAL
SIGNIFICANT IND 70042: ABNORMAL
SIGNIFICANT IND 70042: ABNORMAL
SIGNIFICANT IND 70042: NORMAL
SIGNIFICANT IND 70042: NORMAL
SITE SITE: ABNORMAL
SITE SITE: ABNORMAL
SITE SITE: NORMAL
SITE SITE: NORMAL
SODIUM SERPL-SCNC: 131 MMOL/L (ref 135–145)
SODIUM SERPL-SCNC: 132 MMOL/L (ref 135–145)
SODIUM SERPL-SCNC: 133 MMOL/L (ref 135–145)
SODIUM SERPL-SCNC: 134 MMOL/L (ref 135–145)
SODIUM SERPL-SCNC: 135 MMOL/L (ref 135–145)
SODIUM SERPL-SCNC: 136 MMOL/L (ref 135–145)
SODIUM SERPL-SCNC: 137 MMOL/L (ref 135–145)
SODIUM SERPL-SCNC: 138 MMOL/L (ref 135–145)
SODIUM SERPL-SCNC: 139 MMOL/L (ref 135–145)
SODIUM SERPL-SCNC: 140 MMOL/L (ref 135–145)
SOURCE SOURCE: ABNORMAL
SOURCE SOURCE: ABNORMAL
SOURCE SOURCE: NORMAL
SOURCE SOURCE: NORMAL
SP GR UR STRIP.AUTO: 1.01
TSH SERPL DL<=0.005 MIU/L-ACNC: 1.51 UIU/ML (ref 0.38–5.33)
UNIDENT CRYS URNS QL MICRO: ABNORMAL /HPF
WBC # BLD AUTO: 10.7 K/UL (ref 4.8–10.8)
WBC # BLD AUTO: 11.1 K/UL (ref 4.8–10.8)
WBC # BLD AUTO: 11.6 K/UL (ref 4.8–10.8)
WBC # BLD AUTO: 12.8 K/UL (ref 4.8–10.8)
WBC # BLD AUTO: 13.1 K/UL (ref 4.8–10.8)
WBC # BLD AUTO: 13.3 K/UL (ref 4.8–10.8)
WBC # BLD AUTO: 13.6 K/UL (ref 4.8–10.8)
WBC # BLD AUTO: 13.7 K/UL (ref 4.8–10.8)
WBC # BLD AUTO: 13.7 K/UL (ref 4.8–10.8)
WBC # BLD AUTO: 14 K/UL (ref 4.8–10.8)
WBC # BLD AUTO: 14.1 K/UL (ref 4.8–10.8)
WBC # BLD AUTO: 14.1 K/UL (ref 4.8–10.8)
WBC # BLD AUTO: 14.9 K/UL (ref 4.8–10.8)
WBC # BLD AUTO: 15.2 K/UL (ref 4.8–10.8)
WBC # BLD AUTO: 15.7 K/UL (ref 4.8–10.8)
WBC # BLD AUTO: 16.1 K/UL (ref 4.8–10.8)
WBC # BLD AUTO: 17.7 K/UL (ref 4.8–10.8)
WBC # BLD AUTO: 17.8 K/UL (ref 4.8–10.8)
WBC # BLD AUTO: 18.1 K/UL (ref 4.8–10.8)
WBC # BLD AUTO: 18.3 K/UL (ref 4.8–10.8)
WBC # BLD AUTO: 18.6 K/UL (ref 4.8–10.8)
WBC # BLD AUTO: 19.6 K/UL (ref 4.8–10.8)
WBC # BLD AUTO: 8.9 K/UL (ref 4.8–10.8)
WBC #/AREA URNS HPF: ABNORMAL /HPF

## 2019-01-01 PROCEDURE — 97530 THERAPEUTIC ACTIVITIES: CPT

## 2019-01-01 PROCEDURE — 700102 HCHG RX REV CODE 250 W/ 637 OVERRIDE(OP): Performed by: INTERNAL MEDICINE

## 2019-01-01 PROCEDURE — 700102 HCHG RX REV CODE 250 W/ 637 OVERRIDE(OP): Performed by: FAMILY MEDICINE

## 2019-01-01 PROCEDURE — A9270 NON-COVERED ITEM OR SERVICE: HCPCS | Performed by: HOSPITALIST

## 2019-01-01 PROCEDURE — 700102 HCHG RX REV CODE 250 W/ 637 OVERRIDE(OP): Performed by: HOSPITALIST

## 2019-01-01 PROCEDURE — 99232 SBSQ HOSP IP/OBS MODERATE 35: CPT | Performed by: HOSPITALIST

## 2019-01-01 PROCEDURE — A9270 NON-COVERED ITEM OR SERVICE: HCPCS | Performed by: INTERNAL MEDICINE

## 2019-01-01 PROCEDURE — 700111 HCHG RX REV CODE 636 W/ 250 OVERRIDE (IP): Performed by: INTERNAL MEDICINE

## 2019-01-01 PROCEDURE — 770001 HCHG ROOM/CARE - MED/SURG/GYN PRIV*

## 2019-01-01 PROCEDURE — A9270 NON-COVERED ITEM OR SERVICE: HCPCS | Performed by: FAMILY MEDICINE

## 2019-01-01 PROCEDURE — 700111 HCHG RX REV CODE 636 W/ 250 OVERRIDE (IP): Performed by: EMERGENCY MEDICINE

## 2019-01-01 PROCEDURE — 97535 SELF CARE MNGMENT TRAINING: CPT

## 2019-01-01 PROCEDURE — 94640 AIRWAY INHALATION TREATMENT: CPT

## 2019-01-01 PROCEDURE — 84134 ASSAY OF PREALBUMIN: CPT

## 2019-01-01 PROCEDURE — 86140 C-REACTIVE PROTEIN: CPT

## 2019-01-01 PROCEDURE — 99232 SBSQ HOSP IP/OBS MODERATE 35: CPT | Performed by: INTERNAL MEDICINE

## 2019-01-01 PROCEDURE — 97165 OT EVAL LOW COMPLEX 30 MIN: CPT

## 2019-01-01 PROCEDURE — 99232 SBSQ HOSP IP/OBS MODERATE 35: CPT | Performed by: FAMILY MEDICINE

## 2019-01-01 PROCEDURE — 700101 HCHG RX REV CODE 250

## 2019-01-01 PROCEDURE — 700111 HCHG RX REV CODE 636 W/ 250 OVERRIDE (IP): Performed by: ANESTHESIOLOGY

## 2019-01-01 PROCEDURE — 94760 N-INVAS EAR/PLS OXIMETRY 1: CPT

## 2019-01-01 PROCEDURE — 700105 HCHG RX REV CODE 258: Performed by: HOSPITALIST

## 2019-01-01 PROCEDURE — 700111 HCHG RX REV CODE 636 W/ 250 OVERRIDE (IP): Performed by: HOSPITALIST

## 2019-01-01 PROCEDURE — 88313 SPECIAL STAINS GROUP 2: CPT

## 2019-01-01 PROCEDURE — 80048 BASIC METABOLIC PNL TOTAL CA: CPT

## 2019-01-01 PROCEDURE — 500066 HCHG BITE BLOCK, ECT: Performed by: INTERNAL MEDICINE

## 2019-01-01 PROCEDURE — A9270 NON-COVERED ITEM OR SERVICE: HCPCS | Performed by: NURSE PRACTITIONER

## 2019-01-01 PROCEDURE — 80053 COMPREHEN METABOLIC PANEL: CPT

## 2019-01-01 PROCEDURE — 77427 RADIATION TX MANAGEMENT X5: CPT | Performed by: RADIOLOGY

## 2019-01-01 PROCEDURE — 3E1K78Z IRRIGATION OF GENITOURINARY TRACT USING IRRIGATING SUBSTANCE, VIA NATURAL OR ARTIFICIAL OPENING: ICD-10-PCS | Performed by: UROLOGY

## 2019-01-01 PROCEDURE — 700105 HCHG RX REV CODE 258

## 2019-01-01 PROCEDURE — 700111 HCHG RX REV CODE 636 W/ 250 OVERRIDE (IP): Performed by: FAMILY MEDICINE

## 2019-01-01 PROCEDURE — 77334 RADIATION TREATMENT AID(S): CPT | Mod: 26 | Performed by: RADIOLOGY

## 2019-01-01 PROCEDURE — 36415 COLL VENOUS BLD VENIPUNCTURE: CPT

## 2019-01-01 PROCEDURE — 160009 HCHG ANES TIME/MIN: Performed by: UROLOGY

## 2019-01-01 PROCEDURE — 77300 RADIATION THERAPY DOSE PLAN: CPT | Performed by: RADIOLOGY

## 2019-01-01 PROCEDURE — 99498 ADVNCD CARE PLAN ADDL 30 MIN: CPT | Performed by: NURSE PRACTITIONER

## 2019-01-01 PROCEDURE — 500800 HCHG LAPAROSCOPIC J/L HOOK: Performed by: SURGERY

## 2019-01-01 PROCEDURE — 160009 HCHG ANES TIME/MIN: Performed by: INTERNAL MEDICINE

## 2019-01-01 PROCEDURE — 87205 SMEAR GRAM STAIN: CPT

## 2019-01-01 PROCEDURE — 302140 GU CART: Performed by: NURSE PRACTITIONER

## 2019-01-01 PROCEDURE — 700101 HCHG RX REV CODE 250: Performed by: INTERNAL MEDICINE

## 2019-01-01 PROCEDURE — 85025 COMPLETE CBC W/AUTO DIFF WBC: CPT

## 2019-01-01 PROCEDURE — 700102 HCHG RX REV CODE 250 W/ 637 OVERRIDE(OP): Performed by: NURSE PRACTITIONER

## 2019-01-01 PROCEDURE — 302172 SOFT BED BELT: Performed by: FAMILY MEDICINE

## 2019-01-01 PROCEDURE — 94667 MNPJ CHEST WALL 1ST: CPT

## 2019-01-01 PROCEDURE — 770006 HCHG ROOM/CARE - MED/SURG/GYN SEMI*

## 2019-01-01 PROCEDURE — 306595 SYSTEM,FEED TUBE CLOG ZAPPER: Performed by: HOSPITALIST

## 2019-01-01 PROCEDURE — 501838 HCHG SUTURE GENERAL: Performed by: SURGERY

## 2019-01-01 PROCEDURE — 700111 HCHG RX REV CODE 636 W/ 250 OVERRIDE (IP)

## 2019-01-01 PROCEDURE — 500002 HCHG ADHESIVE, DERMABOND: Performed by: UROLOGY

## 2019-01-01 PROCEDURE — 700105 HCHG RX REV CODE 258: Performed by: INTERNAL MEDICINE

## 2019-01-01 PROCEDURE — 99233 SBSQ HOSP IP/OBS HIGH 50: CPT | Performed by: FAMILY MEDICINE

## 2019-01-01 PROCEDURE — 770020 HCHG ROOM/CARE - TELE (206)

## 2019-01-01 PROCEDURE — 85610 PROTHROMBIN TIME: CPT

## 2019-01-01 PROCEDURE — 84443 ASSAY THYROID STIM HORMONE: CPT

## 2019-01-01 PROCEDURE — 97110 THERAPEUTIC EXERCISES: CPT

## 2019-01-01 PROCEDURE — 160036 HCHG PACU - EA ADDL 30 MINS PHASE I: Performed by: UROLOGY

## 2019-01-01 PROCEDURE — 77470 SPECIAL RADIATION TREATMENT: CPT | Mod: 26 | Performed by: RADIOLOGY

## 2019-01-01 PROCEDURE — 85027 COMPLETE CBC AUTOMATED: CPT

## 2019-01-01 PROCEDURE — 160036 HCHG PACU - EA ADDL 30 MINS PHASE I: Performed by: INTERNAL MEDICINE

## 2019-01-01 PROCEDURE — 88307 TISSUE EXAM BY PATHOLOGIST: CPT

## 2019-01-01 PROCEDURE — 74177 CT ABD & PELVIS W/CONTRAST: CPT

## 2019-01-01 PROCEDURE — 160036 HCHG PACU - EA ADDL 30 MINS PHASE I: Performed by: SURGERY

## 2019-01-01 PROCEDURE — 500868 HCHG NEEDLE, SURGI(VARES): Performed by: SURGERY

## 2019-01-01 PROCEDURE — 96374 THER/PROPH/DIAG INJ IV PUSH: CPT

## 2019-01-01 PROCEDURE — 96376 TX/PRO/DX INJ SAME DRUG ADON: CPT

## 2019-01-01 PROCEDURE — 83690 ASSAY OF LIPASE: CPT

## 2019-01-01 PROCEDURE — 85652 RBC SED RATE AUTOMATED: CPT

## 2019-01-01 PROCEDURE — 700117 HCHG RX CONTRAST REV CODE 255: Performed by: EMERGENCY MEDICINE

## 2019-01-01 PROCEDURE — 96375 TX/PRO/DX INJ NEW DRUG ADDON: CPT

## 2019-01-01 PROCEDURE — 306310 ANTI-EMBOLISM STOCKINGS XXLRG REG: Performed by: INTERNAL MEDICINE

## 2019-01-01 PROCEDURE — 700102 HCHG RX REV CODE 250 W/ 637 OVERRIDE(OP): Performed by: ANESTHESIOLOGY

## 2019-01-01 PROCEDURE — 160031 HCHG SURGERY MINUTES - 1ST 30 MINS LEVEL 5: Performed by: UROLOGY

## 2019-01-01 PROCEDURE — 700102 HCHG RX REV CODE 250 W/ 637 OVERRIDE(OP)

## 2019-01-01 PROCEDURE — 81001 URINALYSIS AUTO W/SCOPE: CPT

## 2019-01-01 PROCEDURE — 160028 HCHG SURGERY MINUTES - 1ST 30 MINS LEVEL 3: Performed by: SURGERY

## 2019-01-01 PROCEDURE — 97116 GAIT TRAINING THERAPY: CPT

## 2019-01-01 PROCEDURE — 501623 HCHG TUBE, GASTROSTOMY: Performed by: SURGERY

## 2019-01-01 PROCEDURE — 160203 HCHG ENDO MINUTES - 1ST 30 MINS LEVEL 4: Performed by: INTERNAL MEDICINE

## 2019-01-01 PROCEDURE — 99497 ADVNCD CARE PLAN 30 MIN: CPT | Performed by: NURSE PRACTITIONER

## 2019-01-01 PROCEDURE — 160047 HCHG PACU  - EA ADDL 30 MINS PHASE II: Performed by: INTERNAL MEDICINE

## 2019-01-01 PROCEDURE — 3E0G76Z INTRODUCTION OF NUTRITIONAL SUBSTANCE INTO UPPER GI, VIA NATURAL OR ARTIFICIAL OPENING: ICD-10-PCS | Performed by: SURGERY

## 2019-01-01 PROCEDURE — 84100 ASSAY OF PHOSPHORUS: CPT

## 2019-01-01 PROCEDURE — 160002 HCHG RECOVERY MINUTES (STAT)

## 2019-01-01 PROCEDURE — 160002 HCHG RECOVERY MINUTES (STAT): Performed by: INTERNAL MEDICINE

## 2019-01-01 PROCEDURE — 700101 HCHG RX REV CODE 250: Performed by: ANESTHESIOLOGY

## 2019-01-01 PROCEDURE — 99223 1ST HOSP IP/OBS HIGH 75: CPT | Mod: AI | Performed by: INTERNAL MEDICINE

## 2019-01-01 PROCEDURE — 77334 RADIATION TREATMENT AID(S): CPT | Performed by: RADIOLOGY

## 2019-01-01 PROCEDURE — 93010 ELECTROCARDIOGRAM REPORT: CPT | Performed by: INTERNAL MEDICINE

## 2019-01-01 PROCEDURE — A6402 STERILE GAUZE <= 16 SQ IN: HCPCS | Performed by: SURGERY

## 2019-01-01 PROCEDURE — 502714 HCHG ROBOTIC SURGERY SERVICES: Performed by: UROLOGY

## 2019-01-01 PROCEDURE — 82962 GLUCOSE BLOOD TEST: CPT

## 2019-01-01 PROCEDURE — 160048 HCHG OR STATISTICAL LEVEL 1-5: Performed by: SURGERY

## 2019-01-01 PROCEDURE — 71260 CT THORAX DX C+: CPT

## 2019-01-01 PROCEDURE — 501838 HCHG SUTURE GENERAL: Performed by: UROLOGY

## 2019-01-01 PROCEDURE — 700102 HCHG RX REV CODE 250 W/ 637 OVERRIDE(OP): Performed by: SURGERY

## 2019-01-01 PROCEDURE — 99223 1ST HOSP IP/OBS HIGH 75: CPT | Performed by: RADIOLOGY

## 2019-01-01 PROCEDURE — 302135 SEQUENTIAL COMPRESSION MACHINE: Performed by: INTERNAL MEDICINE

## 2019-01-01 PROCEDURE — 501571 HCHG TROCAR, SEPARATOR 12X100: Performed by: UROLOGY

## 2019-01-01 PROCEDURE — 92610 EVALUATE SWALLOWING FUNCTION: CPT

## 2019-01-01 PROCEDURE — 99221 1ST HOSP IP/OBS SF/LOW 40: CPT | Mod: 25 | Performed by: NURSE PRACTITIONER

## 2019-01-01 PROCEDURE — 99233 SBSQ HOSP IP/OBS HIGH 50: CPT | Mod: 25 | Performed by: NURSE PRACTITIONER

## 2019-01-01 PROCEDURE — 77300 RADIATION THERAPY DOSE PLAN: CPT | Mod: 26 | Performed by: RADIOLOGY

## 2019-01-01 PROCEDURE — A9270 NON-COVERED ITEM OR SERVICE: HCPCS | Performed by: SURGERY

## 2019-01-01 PROCEDURE — C1751 CATH, INF, PER/CENT/MIDLINE: HCPCS

## 2019-01-01 PROCEDURE — 71045 X-RAY EXAM CHEST 1 VIEW: CPT

## 2019-01-01 PROCEDURE — 500514 HCHG ENDOCLIP: Performed by: UROLOGY

## 2019-01-01 PROCEDURE — 92526 ORAL FUNCTION THERAPY: CPT

## 2019-01-01 PROCEDURE — 87077 CULTURE AEROBIC IDENTIFY: CPT | Mod: 91

## 2019-01-01 PROCEDURE — 83735 ASSAY OF MAGNESIUM: CPT

## 2019-01-01 PROCEDURE — 77014 PR CT GUIDANCE PLACEMENT RAD THERAPY FIELDS: CPT | Mod: 26 | Performed by: RADIOLOGY

## 2019-01-01 PROCEDURE — 99233 SBSQ HOSP IP/OBS HIGH 50: CPT | Performed by: INTERNAL MEDICINE

## 2019-01-01 PROCEDURE — 160002 HCHG RECOVERY MINUTES (STAT): Performed by: SURGERY

## 2019-01-01 PROCEDURE — 77295 3-D RADIOTHERAPY PLAN: CPT | Mod: 26 | Performed by: RADIOLOGY

## 2019-01-01 PROCEDURE — 88331 PATH CONSLTJ SURG 1 BLK 1SPC: CPT

## 2019-01-01 PROCEDURE — 93971 EXTREMITY STUDY: CPT | Mod: LT

## 2019-01-01 PROCEDURE — 110454 HCHG SHELL REV 250: Performed by: UROLOGY

## 2019-01-01 PROCEDURE — 501338 HCHG SHEARS, ENDO: Performed by: UROLOGY

## 2019-01-01 PROCEDURE — 74018 RADEX ABDOMEN 1 VIEW: CPT

## 2019-01-01 PROCEDURE — 77290 THER RAD SIMULAJ FIELD CPLX: CPT | Mod: 26 | Performed by: RADIOLOGY

## 2019-01-01 PROCEDURE — 99232 SBSQ HOSP IP/OBS MODERATE 35: CPT | Mod: 25 | Performed by: HOSPITALIST

## 2019-01-01 PROCEDURE — 160048 HCHG OR STATISTICAL LEVEL 1-5: Performed by: UROLOGY

## 2019-01-01 PROCEDURE — 0DH64UZ INSERTION OF FEEDING DEVICE INTO STOMACH, PERCUTANEOUS ENDOSCOPIC APPROACH: ICD-10-PCS | Performed by: SURGERY

## 2019-01-01 PROCEDURE — 71046 X-RAY EXAM CHEST 2 VIEWS: CPT

## 2019-01-01 PROCEDURE — 77412 RADIATION TX DELIVERY LVL 3: CPT | Performed by: RADIOLOGY

## 2019-01-01 PROCEDURE — A9270 NON-COVERED ITEM OR SERVICE: HCPCS

## 2019-01-01 PROCEDURE — 501570 HCHG TROCAR, SEPARATOR: Performed by: UROLOGY

## 2019-01-01 PROCEDURE — 87070 CULTURE OTHR SPECIMN AEROBIC: CPT

## 2019-01-01 PROCEDURE — 99497 ADVNCD CARE PLAN 30 MIN: CPT | Performed by: HOSPITALIST

## 2019-01-01 PROCEDURE — 36592 COLLECT BLOOD FROM PICC: CPT

## 2019-01-01 PROCEDURE — A9270 NON-COVERED ITEM OR SERVICE: HCPCS | Performed by: UROLOGY

## 2019-01-01 PROCEDURE — 502571 HCHG PACK, LAP CHOLE: Performed by: SURGERY

## 2019-01-01 PROCEDURE — 500042 HCHG BAG, URINARY DRAINAGE (CLOSED): Performed by: UROLOGY

## 2019-01-01 PROCEDURE — 160009 HCHG ANES TIME/MIN: Performed by: SURGERY

## 2019-01-01 PROCEDURE — 99232 SBSQ HOSP IP/OBS MODERATE 35: CPT | Mod: 25 | Performed by: NURSE PRACTITIONER

## 2019-01-01 PROCEDURE — 160035 HCHG PACU - 1ST 60 MINS PHASE I: Performed by: UROLOGY

## 2019-01-01 PROCEDURE — 160035 HCHG PACU - 1ST 60 MINS PHASE I: Performed by: SURGERY

## 2019-01-01 PROCEDURE — 83605 ASSAY OF LACTIC ACID: CPT

## 2019-01-01 PROCEDURE — 88342 IMHCHEM/IMCYTCHM 1ST ANTB: CPT

## 2019-01-01 PROCEDURE — 501450 HCHG STAPLES, ENDO MULTIFIRE: Performed by: UROLOGY

## 2019-01-01 PROCEDURE — 87186 SC STD MICRODIL/AGAR DIL: CPT

## 2019-01-01 PROCEDURE — 307059 PAD,EAR PROTECTOR: Performed by: HOSPITALIST

## 2019-01-01 PROCEDURE — 85007 BL SMEAR W/DIFF WBC COUNT: CPT

## 2019-01-01 PROCEDURE — 84145 PROCALCITONIN (PCT): CPT

## 2019-01-01 PROCEDURE — 160048 HCHG OR STATISTICAL LEVEL 1-5: Performed by: INTERNAL MEDICINE

## 2019-01-01 PROCEDURE — 77336 RADIATION PHYSICS CONSULT: CPT | Performed by: RADIOLOGY

## 2019-01-01 PROCEDURE — 94668 MNPJ CHEST WALL SBSQ: CPT

## 2019-01-01 PROCEDURE — 94669 MECHANICAL CHEST WALL OSCILL: CPT

## 2019-01-01 PROCEDURE — 700117 HCHG RX CONTRAST REV CODE 255: Performed by: INTERNAL MEDICINE

## 2019-01-01 PROCEDURE — 700111 HCHG RX REV CODE 636 W/ 250 OVERRIDE (IP): Performed by: NURSE PRACTITIONER

## 2019-01-01 PROCEDURE — 700117 HCHG RX CONTRAST REV CODE 255: Performed by: FAMILY MEDICINE

## 2019-01-01 PROCEDURE — 83036 HEMOGLOBIN GLYCOSYLATED A1C: CPT

## 2019-01-01 PROCEDURE — 99231 SBSQ HOSP IP/OBS SF/LOW 25: CPT | Performed by: NURSE PRACTITIONER

## 2019-01-01 PROCEDURE — 0T9B70Z DRAINAGE OF BLADDER WITH DRAINAGE DEVICE, VIA NATURAL OR ARTIFICIAL OPENING: ICD-10-PCS | Performed by: UROLOGY

## 2019-01-01 PROCEDURE — 0TT04ZZ RESECTION OF RIGHT KIDNEY, PERCUTANEOUS ENDOSCOPIC APPROACH: ICD-10-PCS | Performed by: UROLOGY

## 2019-01-01 PROCEDURE — 77417 THER RADIOLOGY PORT IMAGE(S): CPT | Performed by: RADIOLOGY

## 2019-01-01 PROCEDURE — 97162 PT EVAL MOD COMPLEX 30 MIN: CPT

## 2019-01-01 PROCEDURE — 77387 GUIDANCE FOR RADJ TX DLVR: CPT | Performed by: RADIOLOGY

## 2019-01-01 PROCEDURE — 0TB03ZX EXCISION OF RIGHT KIDNEY, PERCUTANEOUS APPROACH, DIAGNOSTIC: ICD-10-PCS | Performed by: RADIOLOGY

## 2019-01-01 PROCEDURE — 99223 1ST HOSP IP/OBS HIGH 75: CPT | Performed by: INTERNAL MEDICINE

## 2019-01-01 PROCEDURE — 99285 EMERGENCY DEPT VISIT HI MDM: CPT

## 2019-01-01 PROCEDURE — 160039 HCHG SURGERY MINUTES - EA ADDL 1 MIN LEVEL 3: Performed by: SURGERY

## 2019-01-01 PROCEDURE — 160046 HCHG PACU - 1ST 60 MINS PHASE II: Performed by: INTERNAL MEDICINE

## 2019-01-01 PROCEDURE — 500868 HCHG NEEDLE, SURGI(VARES): Performed by: UROLOGY

## 2019-01-01 PROCEDURE — A9270 NON-COVERED ITEM OR SERVICE: HCPCS | Performed by: EMERGENCY MEDICINE

## 2019-01-01 PROCEDURE — 99221 1ST HOSP IP/OBS SF/LOW 40: CPT | Mod: AI | Performed by: HOSPITALIST

## 2019-01-01 PROCEDURE — 700102 HCHG RX REV CODE 250 W/ 637 OVERRIDE(OP): Performed by: UROLOGY

## 2019-01-01 PROCEDURE — 99239 HOSP IP/OBS DSCHRG MGMT >30: CPT | Performed by: INTERNAL MEDICINE

## 2019-01-01 PROCEDURE — 77012 CT SCAN FOR NEEDLE BIOPSY: CPT

## 2019-01-01 PROCEDURE — 302136 NUTRITION PUMP: Performed by: INTERNAL MEDICINE

## 2019-01-01 PROCEDURE — 501664 HCHG TUBING, FILTER STRYKER: Performed by: UROLOGY

## 2019-01-01 PROCEDURE — 77295 3-D RADIOTHERAPY PLAN: CPT | Performed by: RADIOLOGY

## 2019-01-01 PROCEDURE — 160025 RECOVERY II MINUTES (STATS): Performed by: INTERNAL MEDICINE

## 2019-01-01 PROCEDURE — 8E0W4CZ ROBOTIC ASSISTED PROCEDURE OF TRUNK REGION, PERCUTANEOUS ENDOSCOPIC APPROACH: ICD-10-PCS | Performed by: UROLOGY

## 2019-01-01 PROCEDURE — 87184 SC STD DISK METHOD PER PLATE: CPT

## 2019-01-01 PROCEDURE — 88305 TISSUE EXAM BY PATHOLOGIST: CPT

## 2019-01-01 PROCEDURE — 3E043XZ INTRODUCTION OF VASOPRESSOR INTO CENTRAL VEIN, PERCUTANEOUS APPROACH: ICD-10-PCS | Performed by: INTERNAL MEDICINE

## 2019-01-01 PROCEDURE — 93005 ELECTROCARDIOGRAM TRACING: CPT | Performed by: INTERNAL MEDICINE

## 2019-01-01 PROCEDURE — 160042 HCHG SURGERY MINUTES - EA ADDL 1 MIN LEVEL 5: Performed by: UROLOGY

## 2019-01-01 PROCEDURE — 700102 HCHG RX REV CODE 250 W/ 637 OVERRIDE(OP): Performed by: EMERGENCY MEDICINE

## 2019-01-01 PROCEDURE — 77290 THER RAD SIMULAJ FIELD CPLX: CPT | Performed by: RADIOLOGY

## 2019-01-01 PROCEDURE — 501399 HCHG SPECIMAN BAG, ENDO CATC: Performed by: UROLOGY

## 2019-01-01 PROCEDURE — 77280 THER RAD SIMULAJ FIELD SMPL: CPT | Mod: 26 | Performed by: RADIOLOGY

## 2019-01-01 PROCEDURE — 99231 SBSQ HOSP IP/OBS SF/LOW 25: CPT | Performed by: INTERNAL MEDICINE

## 2019-01-01 PROCEDURE — 05HY33Z INSERTION OF INFUSION DEVICE INTO UPPER VEIN, PERCUTANEOUS APPROACH: ICD-10-PCS | Performed by: INTERNAL MEDICINE

## 2019-01-01 PROCEDURE — 77470 SPECIAL RADIATION TREATMENT: CPT | Performed by: RADIOLOGY

## 2019-01-01 PROCEDURE — 160002 HCHG RECOVERY MINUTES (STAT): Performed by: UROLOGY

## 2019-01-01 PROCEDURE — 0WBF4ZX EXCISION OF ABDOMINAL WALL, PERCUTANEOUS ENDOSCOPIC APPROACH, DIAGNOSTIC: ICD-10-PCS | Performed by: UROLOGY

## 2019-01-01 PROCEDURE — 77263 THER RADIOLOGY TX PLNG CPLX: CPT | Performed by: RADIOLOGY

## 2019-01-01 PROCEDURE — 77280 THER RAD SIMULAJ FIELD SMPL: CPT | Performed by: RADIOLOGY

## 2019-01-01 PROCEDURE — A9270 NON-COVERED ITEM OR SERVICE: HCPCS | Performed by: ANESTHESIOLOGY

## 2019-01-01 PROCEDURE — 88341 IMHCHEM/IMCYTCHM EA ADD ANTB: CPT | Mod: 91

## 2019-01-01 PROCEDURE — 302136 NUTRITION PUMP: Performed by: SURGERY

## 2019-01-01 PROCEDURE — 160035 HCHG PACU - 1ST 60 MINS PHASE I: Performed by: INTERNAL MEDICINE

## 2019-01-01 PROCEDURE — DD001ZZ BEAM RADIATION OF ESOPHAGUS USING PHOTONS 1 - 10 MEV: ICD-10-PCS | Performed by: RADIOLOGY

## 2019-01-01 PROCEDURE — 501576 HCHG TROCAR, SMTH CAN&SEAL12: Performed by: UROLOGY

## 2019-01-01 RX ORDER — GABAPENTIN 250 MG/5ML
250 SOLUTION ORAL 3 TIMES DAILY
Status: DISCONTINUED | OUTPATIENT
Start: 2019-01-01 | End: 2019-01-01

## 2019-01-01 RX ORDER — SODIUM CHLORIDE 9 MG/ML
INJECTION, SOLUTION INTRAVENOUS CONTINUOUS
Status: DISCONTINUED | OUTPATIENT
Start: 2019-01-01 | End: 2019-01-01

## 2019-01-01 RX ORDER — DIPHENHYDRAMINE HYDROCHLORIDE 50 MG/ML
12.5 INJECTION INTRAMUSCULAR; INTRAVENOUS
Status: DISCONTINUED | OUTPATIENT
Start: 2019-01-01 | End: 2019-01-01 | Stop reason: HOSPADM

## 2019-01-01 RX ORDER — METOPROLOL TARTRATE 1 MG/ML
1 INJECTION, SOLUTION INTRAVENOUS
Status: DISCONTINUED | OUTPATIENT
Start: 2019-01-01 | End: 2019-01-01 | Stop reason: HOSPADM

## 2019-01-01 RX ORDER — SODIUM CHLORIDE, SODIUM LACTATE, POTASSIUM CHLORIDE, CALCIUM CHLORIDE 600; 310; 30; 20 MG/100ML; MG/100ML; MG/100ML; MG/100ML
INJECTION, SOLUTION INTRAVENOUS CONTINUOUS
Status: DISCONTINUED | OUTPATIENT
Start: 2019-01-01 | End: 2019-01-01 | Stop reason: HOSPADM

## 2019-01-01 RX ORDER — DULOXETIN HYDROCHLORIDE 20 MG/1
20 CAPSULE, DELAYED RELEASE ORAL DAILY
Qty: 30 CAP | Refills: 2 | Status: SHIPPED | OUTPATIENT
Start: 2019-01-01

## 2019-01-01 RX ORDER — SODIUM CHLORIDE 9 MG/ML
500 INJECTION, SOLUTION INTRAVENOUS
Status: ACTIVE | OUTPATIENT
Start: 2019-01-01 | End: 2019-01-01

## 2019-01-01 RX ORDER — MIDAZOLAM HYDROCHLORIDE 1 MG/ML
1 INJECTION INTRAMUSCULAR; INTRAVENOUS
Status: DISCONTINUED | OUTPATIENT
Start: 2019-01-01 | End: 2019-01-01 | Stop reason: HOSPADM

## 2019-01-01 RX ORDER — PROMETHAZINE HYDROCHLORIDE 25 MG/1
25 TABLET ORAL EVERY 6 HOURS PRN
Status: DISCONTINUED | OUTPATIENT
Start: 2019-01-01 | End: 2019-01-01

## 2019-01-01 RX ORDER — MORPHINE SULFATE 4 MG/ML
4-6 INJECTION, SOLUTION INTRAMUSCULAR; INTRAVENOUS
Status: DISCONTINUED | OUTPATIENT
Start: 2019-01-01 | End: 2019-01-01 | Stop reason: HOSPADM

## 2019-01-01 RX ORDER — ONDANSETRON 2 MG/ML
4 INJECTION INTRAMUSCULAR; INTRAVENOUS PRN
Status: ACTIVE | OUTPATIENT
Start: 2019-01-01 | End: 2019-01-01

## 2019-01-01 RX ORDER — ONDANSETRON 2 MG/ML
4 INJECTION INTRAMUSCULAR; INTRAVENOUS
Status: DISCONTINUED | OUTPATIENT
Start: 2019-01-01 | End: 2019-01-01 | Stop reason: HOSPADM

## 2019-01-01 RX ORDER — ONDANSETRON 2 MG/ML
4 INJECTION INTRAMUSCULAR; INTRAVENOUS EVERY 8 HOURS PRN
Status: CANCELLED | OUTPATIENT
Start: 2019-01-01 | End: 2019-01-01

## 2019-01-01 RX ORDER — FENTANYL 50 UG/1
1 PATCH TRANSDERMAL
Status: DISCONTINUED | OUTPATIENT
Start: 2019-01-01 | End: 2019-01-01

## 2019-01-01 RX ORDER — LISINOPRIL 20 MG/1
20 TABLET ORAL
Status: DISCONTINUED | OUTPATIENT
Start: 2019-01-01 | End: 2019-01-01 | Stop reason: HOSPADM

## 2019-01-01 RX ORDER — LORAZEPAM 2 MG/ML
0.5 INJECTION INTRAMUSCULAR ONCE
Status: COMPLETED | OUTPATIENT
Start: 2019-01-01 | End: 2019-01-01

## 2019-01-01 RX ORDER — HYDROMORPHONE HYDROCHLORIDE 1 MG/ML
1 INJECTION, SOLUTION INTRAMUSCULAR; INTRAVENOUS; SUBCUTANEOUS ONCE
Status: COMPLETED | OUTPATIENT
Start: 2019-01-01 | End: 2019-01-01

## 2019-01-01 RX ORDER — DIPHENHYDRAMINE HCL 25 MG
25 TABLET ORAL
Status: DISCONTINUED | OUTPATIENT
Start: 2019-01-01 | End: 2019-01-01 | Stop reason: HOSPADM

## 2019-01-01 RX ORDER — HYDROMORPHONE HYDROCHLORIDE 1 MG/ML
0.4 INJECTION, SOLUTION INTRAMUSCULAR; INTRAVENOUS; SUBCUTANEOUS
Status: DISCONTINUED | OUTPATIENT
Start: 2019-01-01 | End: 2019-01-01 | Stop reason: HOSPADM

## 2019-01-01 RX ORDER — AMLODIPINE BESYLATE 10 MG/1
10 TABLET ORAL
Status: DISCONTINUED | OUTPATIENT
Start: 2019-01-01 | End: 2019-01-01

## 2019-01-01 RX ORDER — MIDAZOLAM HYDROCHLORIDE 1 MG/ML
.5-2 INJECTION INTRAMUSCULAR; INTRAVENOUS PRN
Status: ACTIVE | OUTPATIENT
Start: 2019-01-01 | End: 2019-01-01

## 2019-01-01 RX ORDER — OXYCODONE HCL 5 MG/5 ML
5 SOLUTION, ORAL ORAL EVERY 4 HOURS PRN
Status: DISCONTINUED | OUTPATIENT
Start: 2019-01-01 | End: 2019-01-01 | Stop reason: HOSPADM

## 2019-01-01 RX ORDER — POLYETHYLENE GLYCOL 3350 17 G/17G
1 POWDER, FOR SOLUTION ORAL 2 TIMES DAILY
Status: DISCONTINUED | OUTPATIENT
Start: 2019-01-01 | End: 2019-01-01

## 2019-01-01 RX ORDER — POLYETHYLENE GLYCOL 3350 17 G/17G
1 POWDER, FOR SOLUTION ORAL 3 TIMES DAILY
Status: DISCONTINUED | OUTPATIENT
Start: 2019-01-01 | End: 2019-01-01

## 2019-01-01 RX ORDER — SODIUM CHLORIDE, SODIUM LACTATE, POTASSIUM CHLORIDE, CALCIUM CHLORIDE 600; 310; 30; 20 MG/100ML; MG/100ML; MG/100ML; MG/100ML
INJECTION, SOLUTION INTRAVENOUS ONCE
Status: COMPLETED | OUTPATIENT
Start: 2019-01-01 | End: 2019-01-01

## 2019-01-01 RX ORDER — BUPIVACAINE HYDROCHLORIDE 2.5 MG/ML
INJECTION, SOLUTION EPIDURAL; INFILTRATION; INTRACAUDAL
Status: DISCONTINUED | OUTPATIENT
Start: 2019-01-01 | End: 2019-01-01 | Stop reason: HOSPADM

## 2019-01-01 RX ORDER — AMOXICILLIN 250 MG
2 CAPSULE ORAL 2 TIMES DAILY
Status: DISCONTINUED | OUTPATIENT
Start: 2019-01-01 | End: 2019-01-01

## 2019-01-01 RX ORDER — SODIUM CHLORIDE 9 MG/ML
INJECTION, SOLUTION INTRAVENOUS
Status: COMPLETED
Start: 2019-01-01 | End: 2019-01-01

## 2019-01-01 RX ORDER — POLYETHYLENE GLYCOL 3350 17 G/17G
1 POWDER, FOR SOLUTION ORAL 2 TIMES DAILY
Status: CANCELLED | OUTPATIENT
Start: 2019-01-01

## 2019-01-01 RX ORDER — FENTANYL 50 UG/1
1 PATCH TRANSDERMAL
Status: CANCELLED | OUTPATIENT
Start: 2019-01-01

## 2019-01-01 RX ORDER — ACETAMINOPHEN 160 MG/5ML
650 SUSPENSION ORAL ONCE
Status: CANCELLED | OUTPATIENT
Start: 2019-01-01 | End: 2019-01-01

## 2019-01-01 RX ORDER — POLYETHYLENE GLYCOL 3350 17 G/17G
1 POWDER, FOR SOLUTION ORAL
Status: DISCONTINUED | OUTPATIENT
Start: 2019-01-01 | End: 2019-01-01

## 2019-01-01 RX ORDER — BISACODYL 10 MG
10 SUPPOSITORY, RECTAL RECTAL DAILY
Status: CANCELLED | OUTPATIENT
Start: 2019-01-01

## 2019-01-01 RX ORDER — METRONIDAZOLE 500 MG/1
500 TABLET ORAL 3 TIMES DAILY
Status: ON HOLD | COMMUNITY
Start: 2019-01-01 | End: 2019-01-01

## 2019-01-01 RX ORDER — NICOTINE 21 MG/24HR
21 PATCH, TRANSDERMAL 24 HOURS TRANSDERMAL
Status: DISCONTINUED | OUTPATIENT
Start: 2019-01-01 | End: 2019-01-01

## 2019-01-01 RX ORDER — HYDROMORPHONE HYDROCHLORIDE 2 MG/ML
2 INJECTION, SOLUTION INTRAMUSCULAR; INTRAVENOUS; SUBCUTANEOUS ONCE
Status: COMPLETED | OUTPATIENT
Start: 2019-01-01 | End: 2019-01-01

## 2019-01-01 RX ORDER — OXYCODONE HCL 5 MG/5 ML
15 SOLUTION, ORAL ORAL EVERY 4 HOURS PRN
Status: DISCONTINUED | OUTPATIENT
Start: 2019-01-01 | End: 2019-01-01 | Stop reason: HOSPADM

## 2019-01-01 RX ORDER — ONDANSETRON 4 MG/1
4 TABLET, ORALLY DISINTEGRATING ORAL EVERY 4 HOURS PRN
Qty: 30 TAB | Refills: 2 | Status: SHIPPED | OUTPATIENT
Start: 2019-01-01

## 2019-01-01 RX ORDER — OXYCODONE HCL 5 MG/5 ML
10 SOLUTION, ORAL ORAL
Status: DISCONTINUED | OUTPATIENT
Start: 2019-01-01 | End: 2019-01-01 | Stop reason: HOSPADM

## 2019-01-01 RX ORDER — BISACODYL 10 MG
10 SUPPOSITORY, RECTAL RECTAL DAILY
Status: DISCONTINUED | OUTPATIENT
Start: 2019-01-01 | End: 2019-01-01 | Stop reason: HOSPADM

## 2019-01-01 RX ORDER — HYDROMORPHONE HYDROCHLORIDE 1 MG/ML
1 INJECTION, SOLUTION INTRAMUSCULAR; INTRAVENOUS; SUBCUTANEOUS
Status: DISCONTINUED | OUTPATIENT
Start: 2019-01-01 | End: 2019-01-01 | Stop reason: HOSPADM

## 2019-01-01 RX ORDER — SIMETHICONE 40MG/0.6ML
80 SUSPENSION, DROPS(FINAL DOSAGE FORM)(ML) ORAL EVERY 6 HOURS PRN
Status: DISCONTINUED | OUTPATIENT
Start: 2019-01-01 | End: 2019-01-01

## 2019-01-01 RX ORDER — DILTIAZEM HYDROCHLORIDE 300 MG/1
300 CAPSULE, COATED, EXTENDED RELEASE ORAL DAILY
COMMUNITY

## 2019-01-01 RX ORDER — LIDOCAINE HYDROCHLORIDE 10 MG/ML
INJECTION, SOLUTION INFILTRATION; PERINEURAL
Status: COMPLETED
Start: 2019-01-01 | End: 2019-01-01

## 2019-01-01 RX ORDER — DEXAMETHASONE 4 MG/1
4 TABLET ORAL 2 TIMES DAILY
Qty: 60 TAB | Refills: 2 | Status: SHIPPED | OUTPATIENT
Start: 2019-01-01

## 2019-01-01 RX ORDER — PRAVASTATIN SODIUM 40 MG
40 TABLET ORAL NIGHTLY
Status: ON HOLD | COMMUNITY
End: 2019-01-01

## 2019-01-01 RX ORDER — OXYCODONE HYDROCHLORIDE 5 MG/1
5 TABLET ORAL
Status: CANCELLED | OUTPATIENT
Start: 2019-01-01 | End: 2019-01-01

## 2019-01-01 RX ORDER — POLYETHYLENE GLYCOL 3350 17 G/17G
1 POWDER, FOR SOLUTION ORAL DAILY
Status: DISCONTINUED | OUTPATIENT
Start: 2019-01-01 | End: 2019-01-01 | Stop reason: HOSPADM

## 2019-01-01 RX ORDER — OXYCODONE HCL 5 MG/5 ML
15 SOLUTION, ORAL ORAL EVERY 4 HOURS PRN
Qty: 240 ML | Refills: 0 | Status: SHIPPED | OUTPATIENT
Start: 2019-01-01 | End: 2019-01-01

## 2019-01-01 RX ORDER — ALBUTEROL SULFATE 90 UG/1
2 AEROSOL, METERED RESPIRATORY (INHALATION) EVERY 4 HOURS PRN
Status: DISCONTINUED | OUTPATIENT
Start: 2019-01-01 | End: 2019-01-01 | Stop reason: HOSPADM

## 2019-01-01 RX ORDER — NICOTINE 21 MG/24HR
14 PATCH, TRANSDERMAL 24 HOURS TRANSDERMAL
Status: DISCONTINUED | OUTPATIENT
Start: 2019-01-01 | End: 2019-01-01 | Stop reason: HOSPADM

## 2019-01-01 RX ORDER — ONDANSETRON 2 MG/ML
4 INJECTION INTRAMUSCULAR; INTRAVENOUS EVERY 6 HOURS PRN
Status: CANCELLED | OUTPATIENT
Start: 2019-01-01

## 2019-01-01 RX ORDER — OXYCODONE HCL 5 MG/5 ML
5 SOLUTION, ORAL ORAL EVERY 4 HOURS PRN
Status: DISCONTINUED | OUTPATIENT
Start: 2019-01-01 | End: 2019-01-01

## 2019-01-01 RX ORDER — ALPRAZOLAM 0.5 MG/1
0.5 TABLET ORAL
Status: DISCONTINUED | OUTPATIENT
Start: 2019-01-01 | End: 2019-01-01 | Stop reason: HOSPADM

## 2019-01-01 RX ORDER — HYDROMORPHONE HYDROCHLORIDE 1 MG/ML
1 INJECTION, SOLUTION INTRAMUSCULAR; INTRAVENOUS; SUBCUTANEOUS ONCE
Status: DISCONTINUED | OUTPATIENT
Start: 2019-01-01 | End: 2019-01-01

## 2019-01-01 RX ORDER — HALOPERIDOL 5 MG/ML
1 INJECTION INTRAMUSCULAR
Status: DISCONTINUED | OUTPATIENT
Start: 2019-01-01 | End: 2019-01-01 | Stop reason: HOSPADM

## 2019-01-01 RX ORDER — FENTANYL 75 UG/1
1 PATCH TRANSDERMAL
Status: DISCONTINUED | OUTPATIENT
Start: 2019-01-01 | End: 2019-01-01

## 2019-01-01 RX ORDER — ALBUTEROL SULFATE 90 UG/1
AEROSOL, METERED RESPIRATORY (INHALATION)
Status: COMPLETED
Start: 2019-01-01 | End: 2019-01-01

## 2019-01-01 RX ORDER — BISACODYL 10 MG
10 SUPPOSITORY, RECTAL RECTAL DAILY
Status: DISCONTINUED | OUTPATIENT
Start: 2019-01-01 | End: 2019-01-01

## 2019-01-01 RX ORDER — GABAPENTIN 250 MG/5ML
250 SOLUTION ORAL 3 TIMES DAILY
Status: DISCONTINUED | OUTPATIENT
Start: 2019-01-01 | End: 2019-01-01 | Stop reason: HOSPADM

## 2019-01-01 RX ORDER — HYDROMORPHONE HYDROCHLORIDE 1 MG/ML
0.2 INJECTION, SOLUTION INTRAMUSCULAR; INTRAVENOUS; SUBCUTANEOUS
Status: DISCONTINUED | OUTPATIENT
Start: 2019-01-01 | End: 2019-01-01 | Stop reason: HOSPADM

## 2019-01-01 RX ORDER — HYDROMORPHONE HYDROCHLORIDE 1 MG/ML
0.5 INJECTION, SOLUTION INTRAMUSCULAR; INTRAVENOUS; SUBCUTANEOUS ONCE
Status: COMPLETED | OUTPATIENT
Start: 2019-01-01 | End: 2019-01-01

## 2019-01-01 RX ORDER — OMEPRAZOLE 40 MG/1
40 CAPSULE, DELAYED RELEASE ORAL DAILY
COMMUNITY

## 2019-01-01 RX ORDER — MEPERIDINE HYDROCHLORIDE 25 MG/ML
12.5 INJECTION INTRAMUSCULAR; INTRAVENOUS; SUBCUTANEOUS
Status: DISCONTINUED | OUTPATIENT
Start: 2019-01-01 | End: 2019-01-01 | Stop reason: HOSPADM

## 2019-01-01 RX ORDER — FUROSEMIDE 20 MG/1
20 TABLET ORAL
Status: DISCONTINUED | OUTPATIENT
Start: 2019-01-01 | End: 2019-01-01

## 2019-01-01 RX ORDER — FUROSEMIDE 10 MG/ML
40 INJECTION INTRAMUSCULAR; INTRAVENOUS ONCE
Status: DISCONTINUED | OUTPATIENT
Start: 2019-01-01 | End: 2019-01-01

## 2019-01-01 RX ORDER — OXYCODONE HCL 5 MG/5 ML
5 SOLUTION, ORAL ORAL
Status: DISCONTINUED | OUTPATIENT
Start: 2019-01-01 | End: 2019-01-01 | Stop reason: HOSPADM

## 2019-01-01 RX ORDER — MORPHINE SULFATE 4 MG/ML
2-4 INJECTION, SOLUTION INTRAMUSCULAR; INTRAVENOUS
Status: DISCONTINUED | OUTPATIENT
Start: 2019-01-01 | End: 2019-01-01

## 2019-01-01 RX ORDER — MORPHINE SULFATE 4 MG/ML
4-6 INJECTION, SOLUTION INTRAMUSCULAR; INTRAVENOUS
Status: DISCONTINUED | OUTPATIENT
Start: 2019-01-01 | End: 2019-01-01

## 2019-01-01 RX ORDER — SODIUM CHLORIDE AND POTASSIUM CHLORIDE 300; 900 MG/100ML; MG/100ML
INJECTION, SOLUTION INTRAVENOUS CONTINUOUS
Status: DISCONTINUED | OUTPATIENT
Start: 2019-01-01 | End: 2019-01-01

## 2019-01-01 RX ORDER — FENTANYL 100 UG/1
1 PATCH TRANSDERMAL
Qty: 10 PATCH | Refills: 0 | Status: SHIPPED | OUTPATIENT
Start: 2019-01-01 | End: 2019-05-02

## 2019-01-01 RX ORDER — DEXAMETHASONE 4 MG/1
4 TABLET ORAL 2 TIMES DAILY
Status: DISCONTINUED | OUTPATIENT
Start: 2019-01-01 | End: 2019-01-01 | Stop reason: HOSPADM

## 2019-01-01 RX ORDER — AMLODIPINE BESYLATE 5 MG/1
10 TABLET ORAL
Status: CANCELLED | OUTPATIENT
Start: 2019-01-01

## 2019-01-01 RX ORDER — METOPROLOL TARTRATE 1 MG/ML
2.5 INJECTION, SOLUTION INTRAVENOUS EVERY 6 HOURS
Status: DISCONTINUED | OUTPATIENT
Start: 2019-01-01 | End: 2019-01-01

## 2019-01-01 RX ORDER — MORPHINE SULFATE 4 MG/ML
4-6 INJECTION, SOLUTION INTRAMUSCULAR; INTRAVENOUS
Status: CANCELLED | OUTPATIENT
Start: 2019-01-01

## 2019-01-01 RX ORDER — MEPERIDINE HYDROCHLORIDE 25 MG/ML
6.25 INJECTION INTRAMUSCULAR; INTRAVENOUS; SUBCUTANEOUS
Status: DISCONTINUED | OUTPATIENT
Start: 2019-01-01 | End: 2019-01-01 | Stop reason: HOSPADM

## 2019-01-01 RX ORDER — HYDRALAZINE HYDROCHLORIDE 20 MG/ML
10 INJECTION INTRAMUSCULAR; INTRAVENOUS EVERY 4 HOURS PRN
Status: DISCONTINUED | OUTPATIENT
Start: 2019-01-01 | End: 2019-01-01 | Stop reason: HOSPADM

## 2019-01-01 RX ORDER — FENTANYL 50 UG/1
1 PATCH TRANSDERMAL
Status: DISCONTINUED | OUTPATIENT
Start: 2019-01-01 | End: 2019-01-01 | Stop reason: HOSPADM

## 2019-01-01 RX ORDER — SIMETHICONE 80 MG
80 TABLET,CHEWABLE ORAL EVERY 6 HOURS PRN
Qty: 30 TAB | Refills: 3 | Status: SHIPPED | OUTPATIENT
Start: 2019-01-01

## 2019-01-01 RX ORDER — LISINOPRIL 20 MG/1
20 TABLET ORAL DAILY
Qty: 30 TAB | Refills: 2 | Status: SHIPPED | OUTPATIENT
Start: 2019-01-01

## 2019-01-01 RX ORDER — ZOLPIDEM TARTRATE 10 MG/1
10 TABLET ORAL
COMMUNITY

## 2019-01-01 RX ORDER — ONDANSETRON 2 MG/ML
4 INJECTION INTRAMUSCULAR; INTRAVENOUS EVERY 6 HOURS PRN
Status: DISCONTINUED | OUTPATIENT
Start: 2019-01-01 | End: 2019-01-01 | Stop reason: HOSPADM

## 2019-01-01 RX ORDER — ALPRAZOLAM 0.5 MG/1
0.5 TABLET ORAL
Status: DISCONTINUED | OUTPATIENT
Start: 2019-01-01 | End: 2019-01-01

## 2019-01-01 RX ORDER — PROMETHAZINE HYDROCHLORIDE 25 MG/1
25 TABLET ORAL EVERY 6 HOURS PRN
Status: DISCONTINUED | OUTPATIENT
Start: 2019-01-01 | End: 2019-01-01 | Stop reason: HOSPADM

## 2019-01-01 RX ORDER — METOCLOPRAMIDE HYDROCHLORIDE 5 MG/ML
10 INJECTION INTRAMUSCULAR; INTRAVENOUS EVERY 6 HOURS
Status: CANCELLED | OUTPATIENT
Start: 2019-01-01

## 2019-01-01 RX ORDER — SODIUM CHLORIDE FOR INHALATION 3 %
3 VIAL, NEBULIZER (ML) INHALATION
Status: DISCONTINUED | OUTPATIENT
Start: 2019-01-01 | End: 2019-01-01

## 2019-01-01 RX ORDER — ALBUTEROL SULFATE 90 UG/1
2 AEROSOL, METERED RESPIRATORY (INHALATION) EVERY 6 HOURS PRN
COMMUNITY

## 2019-01-01 RX ORDER — IPRATROPIUM BROMIDE AND ALBUTEROL SULFATE 2.5; .5 MG/3ML; MG/3ML
3 SOLUTION RESPIRATORY (INHALATION) 4 TIMES DAILY
Status: DISCONTINUED | OUTPATIENT
Start: 2019-01-01 | End: 2019-01-01

## 2019-01-01 RX ORDER — ALPRAZOLAM 0.5 MG/1
0.5 TABLET ORAL 3 TIMES DAILY PRN
Qty: 15 TAB | Refills: 0 | Status: SHIPPED | OUTPATIENT
Start: 2019-01-01 | End: 2019-01-01

## 2019-01-01 RX ORDER — NICOTINE 21 MG/24HR
21 PATCH, TRANSDERMAL 24 HOURS TRANSDERMAL
Status: CANCELLED | OUTPATIENT
Start: 2019-01-01

## 2019-01-01 RX ORDER — HYDROMORPHONE HYDROCHLORIDE 1 MG/ML
0.5 INJECTION, SOLUTION INTRAMUSCULAR; INTRAVENOUS; SUBCUTANEOUS
Status: DISCONTINUED | OUTPATIENT
Start: 2019-01-01 | End: 2019-01-01 | Stop reason: HOSPADM

## 2019-01-01 RX ORDER — FUROSEMIDE 10 MG/ML
20 INJECTION INTRAMUSCULAR; INTRAVENOUS ONCE
Status: COMPLETED | OUTPATIENT
Start: 2019-01-01 | End: 2019-01-01

## 2019-01-01 RX ORDER — SIMETHICONE 80 MG
80 TABLET,CHEWABLE ORAL EVERY 6 HOURS PRN
Status: DISCONTINUED | OUTPATIENT
Start: 2019-01-01 | End: 2019-01-01 | Stop reason: HOSPADM

## 2019-01-01 RX ORDER — OXYCODONE AND ACETAMINOPHEN 10; 325 MG/1; MG/1
1 TABLET ORAL
Status: ON HOLD | COMMUNITY
End: 2019-01-01

## 2019-01-01 RX ORDER — LISINOPRIL 10 MG/1
10 TABLET ORAL
Status: DISCONTINUED | OUTPATIENT
Start: 2019-01-01 | End: 2019-01-01

## 2019-01-01 RX ORDER — GABAPENTIN 100 MG/1
200 CAPSULE ORAL 2 TIMES DAILY
Status: DISCONTINUED | OUTPATIENT
Start: 2019-01-01 | End: 2019-01-01

## 2019-01-01 RX ORDER — CEFAZOLIN SODIUM 2 G/100ML
2 INJECTION, SOLUTION INTRAVENOUS EVERY 8 HOURS
Status: DISCONTINUED | OUTPATIENT
Start: 2019-01-01 | End: 2019-01-01

## 2019-01-01 RX ORDER — FENTANYL 100 UG/1
1 PATCH TRANSDERMAL
Status: DISCONTINUED | OUTPATIENT
Start: 2019-01-01 | End: 2019-01-01 | Stop reason: HOSPADM

## 2019-01-01 RX ORDER — DIPHENHYDRAMINE HCL 25 MG
25 TABLET ORAL
Status: DISCONTINUED | OUTPATIENT
Start: 2019-01-01 | End: 2019-01-01

## 2019-01-01 RX ORDER — METOCLOPRAMIDE HYDROCHLORIDE 5 MG/ML
10 INJECTION INTRAMUSCULAR; INTRAVENOUS EVERY 6 HOURS
Status: DISCONTINUED | OUTPATIENT
Start: 2019-01-01 | End: 2019-01-01

## 2019-01-01 RX ORDER — OXYCODONE HCL 5 MG/5 ML
20 SOLUTION, ORAL ORAL
Status: COMPLETED | OUTPATIENT
Start: 2019-01-01 | End: 2019-01-01

## 2019-01-01 RX ORDER — ACETAMINOPHEN 325 MG/1
650 TABLET ORAL EVERY 6 HOURS PRN
Status: DISCONTINUED | OUTPATIENT
Start: 2019-01-01 | End: 2019-01-01

## 2019-01-01 RX ORDER — ONDANSETRON 2 MG/ML
4 INJECTION INTRAMUSCULAR; INTRAVENOUS ONCE
Status: COMPLETED | OUTPATIENT
Start: 2019-01-01 | End: 2019-01-01

## 2019-01-01 RX ORDER — HYDROMORPHONE HYDROCHLORIDE 1 MG/ML
0.1 INJECTION, SOLUTION INTRAMUSCULAR; INTRAVENOUS; SUBCUTANEOUS
Status: DISCONTINUED | OUTPATIENT
Start: 2019-01-01 | End: 2019-01-01 | Stop reason: HOSPADM

## 2019-01-01 RX ORDER — FUROSEMIDE 10 MG/ML
40 INJECTION INTRAMUSCULAR; INTRAVENOUS ONCE
Status: COMPLETED | OUTPATIENT
Start: 2019-01-01 | End: 2019-01-01

## 2019-01-01 RX ORDER — HYDRALAZINE HYDROCHLORIDE 20 MG/ML
5 INJECTION INTRAMUSCULAR; INTRAVENOUS
Status: DISCONTINUED | OUTPATIENT
Start: 2019-01-01 | End: 2019-01-01 | Stop reason: HOSPADM

## 2019-01-01 RX ORDER — HYDRALAZINE HYDROCHLORIDE 10 MG/1
5 TABLET, FILM COATED ORAL EVERY 6 HOURS PRN
Status: DISCONTINUED | OUTPATIENT
Start: 2019-01-01 | End: 2019-01-01 | Stop reason: HOSPADM

## 2019-01-01 RX ORDER — METOCLOPRAMIDE HYDROCHLORIDE 5 MG/ML
10 INJECTION INTRAMUSCULAR; INTRAVENOUS EVERY 6 HOURS
Status: DISCONTINUED | OUTPATIENT
Start: 2019-01-01 | End: 2019-01-01 | Stop reason: HOSPADM

## 2019-01-01 RX ORDER — DULOXETIN HYDROCHLORIDE 20 MG/1
20 CAPSULE, DELAYED RELEASE ORAL DAILY
Status: DISCONTINUED | OUTPATIENT
Start: 2019-01-01 | End: 2019-01-01 | Stop reason: HOSPADM

## 2019-01-01 RX ORDER — POTASSIUM CHLORIDE 7.45 MG/ML
10 INJECTION INTRAVENOUS
Status: COMPLETED | OUTPATIENT
Start: 2019-01-01 | End: 2019-01-01

## 2019-01-01 RX ORDER — ZOLPIDEM TARTRATE 5 MG/1
10 TABLET ORAL ONCE
Status: COMPLETED | OUTPATIENT
Start: 2019-01-01 | End: 2019-01-01

## 2019-01-01 RX ORDER — IPRATROPIUM BROMIDE AND ALBUTEROL SULFATE 2.5; .5 MG/3ML; MG/3ML
3 SOLUTION RESPIRATORY (INHALATION)
Status: DISCONTINUED | OUTPATIENT
Start: 2019-01-01 | End: 2019-01-01

## 2019-01-01 RX ORDER — OXYCODONE HCL 5 MG/5 ML
15 SOLUTION, ORAL ORAL ONCE
Status: CANCELLED | OUTPATIENT
Start: 2019-01-01 | End: 2019-01-01

## 2019-01-01 RX ORDER — NICOTINE 21 MG/24HR
21 PATCH, TRANSDERMAL 24 HOURS TRANSDERMAL
Status: DISCONTINUED | OUTPATIENT
Start: 2019-01-01 | End: 2019-01-01 | Stop reason: HOSPADM

## 2019-01-01 RX ORDER — GABAPENTIN 250 MG/5ML
250 SOLUTION ORAL 3 TIMES DAILY
Qty: 1 BOTTLE | Refills: 2 | Status: SHIPPED | OUTPATIENT
Start: 2019-01-01

## 2019-01-01 RX ORDER — OXYCODONE HCL 5 MG/5 ML
10 SOLUTION, ORAL ORAL EVERY 4 HOURS PRN
Status: DISCONTINUED | OUTPATIENT
Start: 2019-01-01 | End: 2019-01-01

## 2019-01-01 RX ORDER — AMOXICILLIN 250 MG
2 CAPSULE ORAL DAILY
Qty: 30 TAB | Refills: 0 | Status: SHIPPED | OUTPATIENT
Start: 2019-01-01

## 2019-01-01 RX ORDER — ZOLPIDEM TARTRATE 5 MG/1
10 TABLET ORAL NIGHTLY PRN
Status: DISCONTINUED | OUTPATIENT
Start: 2019-01-01 | End: 2019-01-01 | Stop reason: HOSPADM

## 2019-01-01 RX ORDER — BUPIVACAINE HYDROCHLORIDE AND EPINEPHRINE 5; 5 MG/ML; UG/ML
INJECTION, SOLUTION EPIDURAL; INTRACAUDAL; PERINEURAL
Status: DISCONTINUED | OUTPATIENT
Start: 2019-01-01 | End: 2019-01-01 | Stop reason: HOSPADM

## 2019-01-01 RX ORDER — OXYCODONE HCL 5 MG/5 ML
5 SOLUTION, ORAL ORAL EVERY 4 HOURS PRN
Status: CANCELLED | OUTPATIENT
Start: 2019-01-01

## 2019-01-01 RX ORDER — MAGNESIUM SULFATE HEPTAHYDRATE 40 MG/ML
2 INJECTION, SOLUTION INTRAVENOUS ONCE
Status: COMPLETED | OUTPATIENT
Start: 2019-01-01 | End: 2019-01-01

## 2019-01-01 RX ORDER — BACITRACIN ZINC 500 [USP'U]/G
OINTMENT TOPICAL 3 TIMES DAILY
Status: DISCONTINUED | OUTPATIENT
Start: 2019-01-01 | End: 2019-01-01

## 2019-01-01 RX ORDER — DULOXETIN HYDROCHLORIDE 20 MG/1
20 CAPSULE, DELAYED RELEASE ORAL DAILY
Status: DISCONTINUED | OUTPATIENT
Start: 2019-01-01 | End: 2019-01-01

## 2019-01-01 RX ORDER — IPRATROPIUM BROMIDE AND ALBUTEROL SULFATE 2.5; .5 MG/3ML; MG/3ML
3 SOLUTION RESPIRATORY (INHALATION)
Status: DISCONTINUED | OUTPATIENT
Start: 2019-01-01 | End: 2019-01-01 | Stop reason: HOSPADM

## 2019-01-01 RX ORDER — SODIUM CHLORIDE 9 MG/ML
INJECTION, SOLUTION INTRAVENOUS
Status: ACTIVE
Start: 2019-01-01 | End: 2019-01-01

## 2019-01-01 RX ORDER — SODIUM CHLORIDE, SODIUM LACTATE, POTASSIUM CHLORIDE, CALCIUM CHLORIDE 600; 310; 30; 20 MG/100ML; MG/100ML; MG/100ML; MG/100ML
INJECTION, SOLUTION INTRAVENOUS
Status: ACTIVE
Start: 2019-01-01 | End: 2019-01-01

## 2019-01-01 RX ORDER — ACETAMINOPHEN 325 MG/1
650 TABLET ORAL EVERY 6 HOURS PRN
Status: DISCONTINUED | OUTPATIENT
Start: 2019-01-01 | End: 2019-01-01 | Stop reason: HOSPADM

## 2019-01-01 RX ORDER — OXYCODONE HCL 5 MG/5 ML
15 SOLUTION, ORAL ORAL EVERY 4 HOURS PRN
Status: DISCONTINUED | OUTPATIENT
Start: 2019-01-01 | End: 2019-01-01

## 2019-01-01 RX ORDER — ZOLPIDEM TARTRATE 5 MG/1
10 TABLET ORAL
Status: DISCONTINUED | OUTPATIENT
Start: 2019-01-01 | End: 2019-01-01

## 2019-01-01 RX ORDER — OXYCODONE HYDROCHLORIDE 10 MG/1
10 TABLET ORAL
Status: CANCELLED | OUTPATIENT
Start: 2019-01-01 | End: 2019-01-01

## 2019-01-01 RX ORDER — AMLODIPINE BESYLATE 10 MG/1
10 TABLET ORAL
Status: DISCONTINUED | OUTPATIENT
Start: 2019-01-01 | End: 2019-01-01 | Stop reason: HOSPADM

## 2019-01-01 RX ORDER — HEPARIN SODIUM 5000 [USP'U]/ML
5000 INJECTION, SOLUTION INTRAVENOUS; SUBCUTANEOUS EVERY 8 HOURS
Status: DISCONTINUED | OUTPATIENT
Start: 2019-01-01 | End: 2019-01-01

## 2019-01-01 RX ORDER — FUROSEMIDE 10 MG/ML
80 INJECTION INTRAMUSCULAR; INTRAVENOUS ONCE
Status: DISCONTINUED | OUTPATIENT
Start: 2019-01-01 | End: 2019-01-01

## 2019-01-01 RX ORDER — AMLODIPINE BESYLATE 5 MG/1
10 TABLET ORAL
Status: DISCONTINUED | OUTPATIENT
Start: 2019-01-01 | End: 2019-01-01 | Stop reason: HOSPADM

## 2019-01-01 RX ORDER — ALPRAZOLAM 0.25 MG/1
0.25 TABLET ORAL
Status: COMPLETED | OUTPATIENT
Start: 2019-01-01 | End: 2019-01-01

## 2019-01-01 RX ORDER — AMOXICILLIN 250 MG
2 CAPSULE ORAL DAILY
Status: DISCONTINUED | OUTPATIENT
Start: 2019-01-01 | End: 2019-01-01 | Stop reason: HOSPADM

## 2019-01-01 RX ORDER — OXYCODONE HCL 5 MG/5 ML
15 SOLUTION, ORAL ORAL
Status: COMPLETED | OUTPATIENT
Start: 2019-01-01 | End: 2019-01-01

## 2019-01-01 RX ADMIN — GABAPENTIN 250 MG: 250 SUSPENSION ORAL at 12:18

## 2019-01-01 RX ADMIN — APIXABAN 5 MG: 5 TABLET, FILM COATED ORAL at 17:32

## 2019-01-01 RX ADMIN — MORPHINE SULFATE 4 MG: 4 INJECTION INTRAVENOUS at 11:39

## 2019-01-01 RX ADMIN — MORPHINE SULFATE 4 MG: 4 INJECTION INTRAVENOUS at 22:59

## 2019-01-01 RX ADMIN — SENNOSIDES AND DOCUSATE SODIUM 2 TABLET: 8.6; 5 TABLET ORAL at 18:00

## 2019-01-01 RX ADMIN — OXYCODONE HYDROCHLORIDE 15 MG: 5 SOLUTION ORAL at 21:02

## 2019-01-01 RX ADMIN — Medication 1 APPLICATION: at 18:33

## 2019-01-01 RX ADMIN — LISINOPRIL 20 MG: 20 TABLET ORAL at 04:42

## 2019-01-01 RX ADMIN — METOCLOPRAMIDE 10 MG: 5 INJECTION, SOLUTION INTRAMUSCULAR; INTRAVENOUS at 00:32

## 2019-01-01 RX ADMIN — GABAPENTIN 250 MG: 250 SUSPENSION ORAL at 05:56

## 2019-01-01 RX ADMIN — OXYCODONE HYDROCHLORIDE 15 MG: 5 SOLUTION ORAL at 15:47

## 2019-01-01 RX ADMIN — ENOXAPARIN SODIUM 60 MG: 100 INJECTION SUBCUTANEOUS at 05:41

## 2019-01-01 RX ADMIN — METOCLOPRAMIDE 10 MG: 5 INJECTION, SOLUTION INTRAMUSCULAR; INTRAVENOUS at 04:51

## 2019-01-01 RX ADMIN — FUROSEMIDE 20 MG: 20 TABLET ORAL at 05:58

## 2019-01-01 RX ADMIN — APIXABAN 5 MG: 5 TABLET, FILM COATED ORAL at 17:30

## 2019-01-01 RX ADMIN — POTASSIUM CHLORIDE AND SODIUM CHLORIDE: 900; 300 INJECTION, SOLUTION INTRAVENOUS at 01:33

## 2019-01-01 RX ADMIN — ENOXAPARIN SODIUM 60 MG: 100 INJECTION SUBCUTANEOUS at 06:22

## 2019-01-01 RX ADMIN — SODIUM CHLORIDE: 9 INJECTION, SOLUTION INTRAVENOUS at 09:39

## 2019-01-01 RX ADMIN — METOCLOPRAMIDE 10 MG: 5 INJECTION, SOLUTION INTRAMUSCULAR; INTRAVENOUS at 11:10

## 2019-01-01 RX ADMIN — OXYCODONE HYDROCHLORIDE 5 MG: 5 SOLUTION ORAL at 17:16

## 2019-01-01 RX ADMIN — DULOXETINE HYDROCHLORIDE 20 MG: 20 CAPSULE, DELAYED RELEASE ORAL at 04:41

## 2019-01-01 RX ADMIN — OXYCODONE HYDROCHLORIDE 15 MG: 5 SOLUTION ORAL at 05:37

## 2019-01-01 RX ADMIN — AMLODIPINE BESYLATE 10 MG: 10 TABLET ORAL at 05:43

## 2019-01-01 RX ADMIN — NICOTINE 21 MG: 21 PATCH, EXTENDED RELEASE TRANSDERMAL at 06:53

## 2019-01-01 RX ADMIN — HYDROMORPHONE HYDROCHLORIDE 1 MG: 1 INJECTION, SOLUTION INTRAMUSCULAR; INTRAVENOUS; SUBCUTANEOUS at 09:28

## 2019-01-01 RX ADMIN — HYDROMORPHONE HYDROCHLORIDE 1 MG: 1 INJECTION, SOLUTION INTRAMUSCULAR; INTRAVENOUS; SUBCUTANEOUS at 09:43

## 2019-01-01 RX ADMIN — OXYCODONE HYDROCHLORIDE 15 MG: 5 SOLUTION ORAL at 04:50

## 2019-01-01 RX ADMIN — OXYCODONE HYDROCHLORIDE 15 MG: 5 SOLUTION ORAL at 22:03

## 2019-01-01 RX ADMIN — RIVAROXABAN 15 MG: 15 TABLET, FILM COATED ORAL at 07:38

## 2019-01-01 RX ADMIN — POTASSIUM CHLORIDE AND SODIUM CHLORIDE: 900; 300 INJECTION, SOLUTION INTRAVENOUS at 10:59

## 2019-01-01 RX ADMIN — Medication: at 05:10

## 2019-01-01 RX ADMIN — APIXABAN 5 MG: 5 TABLET, FILM COATED ORAL at 05:43

## 2019-01-01 RX ADMIN — DIPHENHYDRAMINE HCL 25 MG: 25 TABLET ORAL at 23:17

## 2019-01-01 RX ADMIN — SENNOSIDES AND DOCUSATE SODIUM 2 TABLET: 8.6; 5 TABLET ORAL at 04:53

## 2019-01-01 RX ADMIN — MORPHINE SULFATE 4 MG: 4 INJECTION INTRAVENOUS at 06:46

## 2019-01-01 RX ADMIN — ENOXAPARIN SODIUM 60 MG: 100 INJECTION SUBCUTANEOUS at 18:05

## 2019-01-01 RX ADMIN — OXYCODONE HYDROCHLORIDE 15 MG: 5 SOLUTION ORAL at 14:52

## 2019-01-01 RX ADMIN — Medication: at 16:54

## 2019-01-01 RX ADMIN — DEXAMETHASONE 4 MG: 4 TABLET ORAL at 18:16

## 2019-01-01 RX ADMIN — GABAPENTIN 250 MG: 250 SUSPENSION ORAL at 12:46

## 2019-01-01 RX ADMIN — ALBUTEROL SULFATE 2 PUFF: 90 AEROSOL, METERED RESPIRATORY (INHALATION) at 04:15

## 2019-01-01 RX ADMIN — NICOTINE 21 MG: 21 PATCH, EXTENDED RELEASE TRANSDERMAL at 05:43

## 2019-01-01 RX ADMIN — POLYETHYLENE GLYCOL 3350 1 PACKET: 17 POWDER, FOR SOLUTION ORAL at 05:36

## 2019-01-01 RX ADMIN — BISACODYL 10 MG: 10 SUPPOSITORY RECTAL at 07:59

## 2019-01-01 RX ADMIN — ENOXAPARIN SODIUM 60 MG: 100 INJECTION SUBCUTANEOUS at 05:32

## 2019-01-01 RX ADMIN — AMLODIPINE BESYLATE 10 MG: 10 TABLET ORAL at 05:35

## 2019-01-01 RX ADMIN — GABAPENTIN 250 MG: 250 SUSPENSION ORAL at 17:55

## 2019-01-01 RX ADMIN — Medication: at 12:39

## 2019-01-01 RX ADMIN — FUROSEMIDE 20 MG: 20 TABLET ORAL at 05:41

## 2019-01-01 RX ADMIN — ENOXAPARIN SODIUM 60 MG: 100 INJECTION SUBCUTANEOUS at 05:38

## 2019-01-01 RX ADMIN — HYDRALAZINE HYDROCHLORIDE 10 MG: 20 INJECTION INTRAMUSCULAR; INTRAVENOUS at 17:45

## 2019-01-01 RX ADMIN — LISINOPRIL 20 MG: 20 TABLET ORAL at 05:18

## 2019-01-01 RX ADMIN — MORPHINE SULFATE 4 MG: 4 INJECTION INTRAVENOUS at 07:21

## 2019-01-01 RX ADMIN — AMLODIPINE BESYLATE 10 MG: 10 TABLET ORAL at 06:16

## 2019-01-01 RX ADMIN — OXYCODONE HYDROCHLORIDE 15 MG: 5 SOLUTION ORAL at 09:32

## 2019-01-01 RX ADMIN — OXYCODONE HYDROCHLORIDE 15 MG: 5 SOLUTION ORAL at 18:51

## 2019-01-01 RX ADMIN — GABAPENTIN 250 MG: 250 SUSPENSION ORAL at 05:25

## 2019-01-01 RX ADMIN — SODIUM CHLORIDE: 9 INJECTION, SOLUTION INTRAVENOUS at 17:55

## 2019-01-01 RX ADMIN — LISINOPRIL 20 MG: 20 TABLET ORAL at 06:04

## 2019-01-01 RX ADMIN — MORPHINE SULFATE 4 MG: 4 INJECTION INTRAVENOUS at 09:54

## 2019-01-01 RX ADMIN — GABAPENTIN 250 MG: 250 SUSPENSION ORAL at 12:20

## 2019-01-01 RX ADMIN — OXYCODONE HYDROCHLORIDE 15 MG: 5 SOLUTION ORAL at 21:50

## 2019-01-01 RX ADMIN — PIPERACILLIN AND TAZOBACTAM 4.5 G: 4; .5 INJECTION, POWDER, LYOPHILIZED, FOR SOLUTION INTRAVENOUS; PARENTERAL at 11:54

## 2019-01-01 RX ADMIN — HYDROMORPHONE HYDROCHLORIDE 2 MG: 2 INJECTION INTRAMUSCULAR; INTRAVENOUS; SUBCUTANEOUS at 09:45

## 2019-01-01 RX ADMIN — ENOXAPARIN SODIUM 60 MG: 100 INJECTION SUBCUTANEOUS at 06:11

## 2019-01-01 RX ADMIN — OXYCODONE HYDROCHLORIDE 15 MG: 5 SOLUTION ORAL at 23:15

## 2019-01-01 RX ADMIN — OXYCODONE HYDROCHLORIDE 5 MG: 5 SOLUTION ORAL at 07:11

## 2019-01-01 RX ADMIN — DEXAMETHASONE 4 MG: 4 TABLET ORAL at 18:00

## 2019-01-01 RX ADMIN — SENNOSIDES AND DOCUSATE SODIUM 2 TABLET: 8.6; 5 TABLET ORAL at 06:00

## 2019-01-01 RX ADMIN — AMLODIPINE BESYLATE 10 MG: 5 TABLET ORAL at 09:56

## 2019-01-01 RX ADMIN — APIXABAN 5 MG: 5 TABLET, FILM COATED ORAL at 17:24

## 2019-01-01 RX ADMIN — MORPHINE SULFATE 4 MG: 4 INJECTION INTRAVENOUS at 17:25

## 2019-01-01 RX ADMIN — DEXAMETHASONE 4 MG: 4 TABLET ORAL at 08:22

## 2019-01-01 RX ADMIN — FENTANYL 1 PATCH: 75 PATCH, EXTENDED RELEASE TRANSDERMAL at 09:38

## 2019-01-01 RX ADMIN — GABAPENTIN 250 MG: 250 SUSPENSION ORAL at 11:38

## 2019-01-01 RX ADMIN — POLYETHYLENE GLYCOL 3350 1 PACKET: 17 POWDER, FOR SOLUTION ORAL at 17:55

## 2019-01-01 RX ADMIN — CEFAZOLIN SODIUM 2 G: 2 INJECTION, SOLUTION INTRAVENOUS at 05:40

## 2019-01-01 RX ADMIN — FUROSEMIDE 20 MG: 20 TABLET ORAL at 06:04

## 2019-01-01 RX ADMIN — Medication: at 11:13

## 2019-01-01 RX ADMIN — OXYCODONE HYDROCHLORIDE 15 MG: 5 SOLUTION ORAL at 08:22

## 2019-01-01 RX ADMIN — HYDROMORPHONE HYDROCHLORIDE 1 MG: 1 INJECTION, SOLUTION INTRAMUSCULAR; INTRAVENOUS; SUBCUTANEOUS at 20:29

## 2019-01-01 RX ADMIN — HYDROMORPHONE HYDROCHLORIDE 1 MG: 1 INJECTION, SOLUTION INTRAMUSCULAR; INTRAVENOUS; SUBCUTANEOUS at 23:12

## 2019-01-01 RX ADMIN — PIPERACILLIN AND TAZOBACTAM 4.5 G: 4; .5 INJECTION, POWDER, LYOPHILIZED, FOR SOLUTION INTRAVENOUS; PARENTERAL at 22:12

## 2019-01-01 RX ADMIN — LISINOPRIL 10 MG: 10 TABLET ORAL at 09:06

## 2019-01-01 RX ADMIN — ENOXAPARIN SODIUM 60 MG: 100 INJECTION SUBCUTANEOUS at 18:49

## 2019-01-01 RX ADMIN — OXYCODONE HYDROCHLORIDE 5 MG: 5 SOLUTION ORAL at 18:20

## 2019-01-01 RX ADMIN — POLYETHYLENE GLYCOL 3350 1 PACKET: 17 POWDER, FOR SOLUTION ORAL at 16:52

## 2019-01-01 RX ADMIN — FUROSEMIDE 20 MG: 20 TABLET ORAL at 06:39

## 2019-01-01 RX ADMIN — SODIUM CHLORIDE: 9 INJECTION, SOLUTION INTRAVENOUS at 12:53

## 2019-01-01 RX ADMIN — FENTANYL CITRATE 50 MCG: 50 INJECTION, SOLUTION INTRAMUSCULAR; INTRAVENOUS at 16:30

## 2019-01-01 RX ADMIN — MORPHINE SULFATE 2 MG: 4 INJECTION INTRAVENOUS at 15:01

## 2019-01-01 RX ADMIN — DEXAMETHASONE 4 MG: 4 TABLET ORAL at 08:46

## 2019-01-01 RX ADMIN — GABAPENTIN 250 MG: 250 SUSPENSION ORAL at 17:56

## 2019-01-01 RX ADMIN — METOCLOPRAMIDE 10 MG: 5 INJECTION, SOLUTION INTRAMUSCULAR; INTRAVENOUS at 05:07

## 2019-01-01 RX ADMIN — HYDROMORPHONE HYDROCHLORIDE 1 MG: 1 INJECTION, SOLUTION INTRAMUSCULAR; INTRAVENOUS; SUBCUTANEOUS at 15:54

## 2019-01-01 RX ADMIN — CEFAZOLIN SODIUM 2 G: 2 INJECTION, SOLUTION INTRAVENOUS at 21:45

## 2019-01-01 RX ADMIN — HYDROMORPHONE HYDROCHLORIDE 1 MG: 1 INJECTION, SOLUTION INTRAMUSCULAR; INTRAVENOUS; SUBCUTANEOUS at 15:58

## 2019-01-01 RX ADMIN — Medication: at 17:52

## 2019-01-01 RX ADMIN — ENOXAPARIN SODIUM 60 MG: 100 INJECTION SUBCUTANEOUS at 06:04

## 2019-01-01 RX ADMIN — FENTANYL 1 PATCH: 100 PATCH, EXTENDED RELEASE TRANSDERMAL at 09:23

## 2019-01-01 RX ADMIN — DEXAMETHASONE 4 MG: 4 TABLET ORAL at 10:06

## 2019-01-01 RX ADMIN — SODIUM CHLORIDE: 9 INJECTION, SOLUTION INTRAVENOUS at 09:17

## 2019-01-01 RX ADMIN — HYDROMORPHONE HYDROCHLORIDE 1 MG: 1 INJECTION, SOLUTION INTRAMUSCULAR; INTRAVENOUS; SUBCUTANEOUS at 12:06

## 2019-01-01 RX ADMIN — METOCLOPRAMIDE 10 MG: 5 INJECTION, SOLUTION INTRAMUSCULAR; INTRAVENOUS at 23:46

## 2019-01-01 RX ADMIN — GABAPENTIN 250 MG: 250 SUSPENSION ORAL at 17:24

## 2019-01-01 RX ADMIN — LISINOPRIL 10 MG: 10 TABLET ORAL at 05:41

## 2019-01-01 RX ADMIN — SODIUM CHLORIDE: 9 INJECTION, SOLUTION INTRAVENOUS at 15:48

## 2019-01-01 RX ADMIN — ONDANSETRON 4 MG: 2 INJECTION INTRAMUSCULAR; INTRAVENOUS at 16:29

## 2019-01-01 RX ADMIN — IOHEXOL 100 ML: 350 INJECTION, SOLUTION INTRAVENOUS at 22:11

## 2019-01-01 RX ADMIN — FENTANYL 1 PATCH: 75 PATCH, EXTENDED RELEASE TRANSDERMAL at 07:44

## 2019-01-01 RX ADMIN — FUROSEMIDE 20 MG: 20 TABLET ORAL at 06:15

## 2019-01-01 RX ADMIN — HYDROMORPHONE HYDROCHLORIDE 1 MG: 1 INJECTION, SOLUTION INTRAMUSCULAR; INTRAVENOUS; SUBCUTANEOUS at 05:21

## 2019-01-01 RX ADMIN — LISINOPRIL 20 MG: 20 TABLET ORAL at 06:10

## 2019-01-01 RX ADMIN — SODIUM CHLORIDE SOLN NEBU 3% 3 ML: 3 NEBU SOLN at 20:29

## 2019-01-01 RX ADMIN — GABAPENTIN 250 MG: 250 SUSPENSION ORAL at 13:15

## 2019-01-01 RX ADMIN — Medication: at 06:48

## 2019-01-01 RX ADMIN — IPRATROPIUM BROMIDE AND ALBUTEROL SULFATE 3 ML: .5; 3 SOLUTION RESPIRATORY (INHALATION) at 20:29

## 2019-01-01 RX ADMIN — ENOXAPARIN SODIUM 60 MG: 100 INJECTION SUBCUTANEOUS at 17:38

## 2019-01-01 RX ADMIN — GABAPENTIN 250 MG: 250 SUSPENSION ORAL at 18:31

## 2019-01-01 RX ADMIN — GABAPENTIN 250 MG: 250 SUSPENSION ORAL at 06:10

## 2019-01-01 RX ADMIN — CEFAZOLIN SODIUM 2 G: 2 INJECTION, SOLUTION INTRAVENOUS at 05:10

## 2019-01-01 RX ADMIN — AMLODIPINE BESYLATE 10 MG: 10 TABLET ORAL at 06:00

## 2019-01-01 RX ADMIN — ENOXAPARIN SODIUM 40 MG: 100 INJECTION SUBCUTANEOUS at 04:51

## 2019-01-01 RX ADMIN — MORPHINE SULFATE 4 MG: 4 INJECTION INTRAVENOUS at 01:24

## 2019-01-01 RX ADMIN — OXYCODONE HYDROCHLORIDE 15 MG: 5 SOLUTION ORAL at 19:39

## 2019-01-01 RX ADMIN — OXYCODONE HYDROCHLORIDE 15 MG: 5 SOLUTION ORAL at 17:08

## 2019-01-01 RX ADMIN — NICOTINE 14 MG: 14 PATCH, EXTENDED RELEASE TRANSDERMAL at 06:22

## 2019-01-01 RX ADMIN — PIPERACILLIN AND TAZOBACTAM 4.5 G: 4; .5 INJECTION, POWDER, LYOPHILIZED, FOR SOLUTION INTRAVENOUS; PARENTERAL at 20:44

## 2019-01-01 RX ADMIN — OXYCODONE HYDROCHLORIDE 15 MG: 5 SOLUTION ORAL at 05:36

## 2019-01-01 RX ADMIN — LISINOPRIL 10 MG: 10 TABLET ORAL at 05:18

## 2019-01-01 RX ADMIN — OXYCODONE HYDROCHLORIDE 15 MG: 5 SOLUTION ORAL at 14:15

## 2019-01-01 RX ADMIN — POLYETHYLENE GLYCOL 3350 1 PACKET: 17 POWDER, FOR SOLUTION ORAL at 05:21

## 2019-01-01 RX ADMIN — OXYCODONE HYDROCHLORIDE 5 MG: 5 SOLUTION ORAL at 14:28

## 2019-01-01 RX ADMIN — OXYCODONE HYDROCHLORIDE 15 MG: 5 SOLUTION ORAL at 18:50

## 2019-01-01 RX ADMIN — OXYCODONE HYDROCHLORIDE 15 MG: 5 SOLUTION ORAL at 04:53

## 2019-01-01 RX ADMIN — ENOXAPARIN SODIUM 60 MG: 100 INJECTION SUBCUTANEOUS at 18:16

## 2019-01-01 RX ADMIN — FUROSEMIDE 20 MG: 20 TABLET ORAL at 06:22

## 2019-01-01 RX ADMIN — MORPHINE SULFATE 4 MG: 4 INJECTION INTRAVENOUS at 00:59

## 2019-01-01 RX ADMIN — OXYCODONE HYDROCHLORIDE 15 MG: 5 SOLUTION ORAL at 23:54

## 2019-01-01 RX ADMIN — DEXAMETHASONE 4 MG: 4 TABLET ORAL at 09:12

## 2019-01-01 RX ADMIN — POLYETHYLENE GLYCOL 3350 1 PACKET: 17 POWDER, FOR SOLUTION ORAL at 17:16

## 2019-01-01 RX ADMIN — POLYETHYLENE GLYCOL 3350 1 PACKET: 17 POWDER, FOR SOLUTION ORAL at 17:19

## 2019-01-01 RX ADMIN — OXYCODONE HYDROCHLORIDE 15 MG: 5 SOLUTION ORAL at 05:45

## 2019-01-01 RX ADMIN — HYDROMORPHONE HYDROCHLORIDE 1 MG: 1 INJECTION, SOLUTION INTRAMUSCULAR; INTRAVENOUS; SUBCUTANEOUS at 20:30

## 2019-01-01 RX ADMIN — OXYCODONE HYDROCHLORIDE 15 MG: 5 SOLUTION ORAL at 23:41

## 2019-01-01 RX ADMIN — MORPHINE SULFATE 4 MG: 4 INJECTION INTRAVENOUS at 09:26

## 2019-01-01 RX ADMIN — Medication: at 18:00

## 2019-01-01 RX ADMIN — ALTEPLASE 2 MG: 2.2 INJECTION, POWDER, LYOPHILIZED, FOR SOLUTION INTRAVENOUS at 01:13

## 2019-01-01 RX ADMIN — OXYCODONE HYDROCHLORIDE 15 MG: 5 SOLUTION ORAL at 23:14

## 2019-01-01 RX ADMIN — MORPHINE SULFATE 4 MG: 4 INJECTION INTRAVENOUS at 17:40

## 2019-01-01 RX ADMIN — MORPHINE SULFATE 4 MG: 4 INJECTION INTRAVENOUS at 12:48

## 2019-01-01 RX ADMIN — Medication: at 13:10

## 2019-01-01 RX ADMIN — MORPHINE SULFATE 4 MG: 4 INJECTION INTRAVENOUS at 07:27

## 2019-01-01 RX ADMIN — Medication: at 11:57

## 2019-01-01 RX ADMIN — OXYCODONE HYDROCHLORIDE 5 MG: 5 SOLUTION ORAL at 05:55

## 2019-01-01 RX ADMIN — LORAZEPAM 0.5 MG: 2 INJECTION INTRAMUSCULAR; INTRAVENOUS at 21:48

## 2019-01-01 RX ADMIN — ENOXAPARIN SODIUM 40 MG: 100 INJECTION SUBCUTANEOUS at 06:00

## 2019-01-01 RX ADMIN — DULOXETINE HYDROCHLORIDE 20 MG: 20 CAPSULE, DELAYED RELEASE ORAL at 06:04

## 2019-01-01 RX ADMIN — FENTANYL 1 PATCH: 75 PATCH, EXTENDED RELEASE TRANSDERMAL at 08:10

## 2019-01-01 RX ADMIN — RIVAROXABAN 15 MG: 15 TABLET, FILM COATED ORAL at 18:08

## 2019-01-01 RX ADMIN — OXYCODONE HYDROCHLORIDE 15 MG: 5 SOLUTION ORAL at 00:08

## 2019-01-01 RX ADMIN — AMLODIPINE BESYLATE 10 MG: 10 TABLET ORAL at 05:21

## 2019-01-01 RX ADMIN — MORPHINE SULFATE 4 MG: 4 INJECTION INTRAVENOUS at 20:54

## 2019-01-01 RX ADMIN — FUROSEMIDE 20 MG: 20 TABLET ORAL at 04:50

## 2019-01-01 RX ADMIN — HYDROMORPHONE HYDROCHLORIDE 1 MG: 1 INJECTION, SOLUTION INTRAMUSCULAR; INTRAVENOUS; SUBCUTANEOUS at 08:53

## 2019-01-01 RX ADMIN — GABAPENTIN 250 MG: 250 SUSPENSION ORAL at 15:55

## 2019-01-01 RX ADMIN — POLYETHYLENE GLYCOL 3350 1 PACKET: 17 POWDER, FOR SOLUTION ORAL at 12:57

## 2019-01-01 RX ADMIN — AMLODIPINE BESYLATE 10 MG: 10 TABLET ORAL at 05:58

## 2019-01-01 RX ADMIN — METOCLOPRAMIDE 10 MG: 5 INJECTION, SOLUTION INTRAMUSCULAR; INTRAVENOUS at 00:59

## 2019-01-01 RX ADMIN — ENOXAPARIN SODIUM 60 MG: 100 INJECTION SUBCUTANEOUS at 05:23

## 2019-01-01 RX ADMIN — OXYCODONE HYDROCHLORIDE 15 MG: 5 SOLUTION ORAL at 05:14

## 2019-01-01 RX ADMIN — NICOTINE 14 MG: 14 PATCH, EXTENDED RELEASE TRANSDERMAL at 15:09

## 2019-01-01 RX ADMIN — OXYCODONE HYDROCHLORIDE 15 MG: 5 SOLUTION ORAL at 09:10

## 2019-01-01 RX ADMIN — MORPHINE SULFATE 4 MG: 4 INJECTION INTRAVENOUS at 11:48

## 2019-01-01 RX ADMIN — NICOTINE 14 MG: 14 PATCH, EXTENDED RELEASE TRANSDERMAL at 05:53

## 2019-01-01 RX ADMIN — DEXAMETHASONE 4 MG: 4 TABLET ORAL at 09:41

## 2019-01-01 RX ADMIN — NICOTINE 14 MG: 14 PATCH, EXTENDED RELEASE TRANSDERMAL at 05:17

## 2019-01-01 RX ADMIN — OXYCODONE HYDROCHLORIDE 15 MG: 5 SOLUTION ORAL at 18:29

## 2019-01-01 RX ADMIN — HYDROMORPHONE HYDROCHLORIDE 1 MG: 1 INJECTION, SOLUTION INTRAMUSCULAR; INTRAVENOUS; SUBCUTANEOUS at 07:55

## 2019-01-01 RX ADMIN — OXYCODONE HYDROCHLORIDE 15 MG: 5 SOLUTION ORAL at 16:24

## 2019-01-01 RX ADMIN — PIPERACILLIN AND TAZOBACTAM 4.5 G: 4; .5 INJECTION, POWDER, LYOPHILIZED, FOR SOLUTION INTRAVENOUS; PARENTERAL at 05:35

## 2019-01-01 RX ADMIN — GABAPENTIN 250 MG: 250 SUSPENSION ORAL at 05:31

## 2019-01-01 RX ADMIN — HYDROMORPHONE HYDROCHLORIDE 1 MG: 1 INJECTION, SOLUTION INTRAMUSCULAR; INTRAVENOUS; SUBCUTANEOUS at 20:18

## 2019-01-01 RX ADMIN — SODIUM CHLORIDE: 9 INJECTION, SOLUTION INTRAVENOUS at 06:39

## 2019-01-01 RX ADMIN — OXYCODONE HYDROCHLORIDE 15 MG: 5 SOLUTION ORAL at 13:34

## 2019-01-01 RX ADMIN — OXYCODONE HYDROCHLORIDE 15 MG: 5 SOLUTION ORAL at 10:19

## 2019-01-01 RX ADMIN — Medication: at 12:03

## 2019-01-01 RX ADMIN — LISINOPRIL 20 MG: 20 TABLET ORAL at 06:40

## 2019-01-01 RX ADMIN — METOCLOPRAMIDE 10 MG: 5 INJECTION, SOLUTION INTRAMUSCULAR; INTRAVENOUS at 16:20

## 2019-01-01 RX ADMIN — ENOXAPARIN SODIUM 60 MG: 100 INJECTION SUBCUTANEOUS at 05:14

## 2019-01-01 RX ADMIN — NICOTINE 21 MG: 21 PATCH, EXTENDED RELEASE TRANSDERMAL at 05:50

## 2019-01-01 RX ADMIN — HYDROMORPHONE HYDROCHLORIDE 1 MG: 1 INJECTION, SOLUTION INTRAMUSCULAR; INTRAVENOUS; SUBCUTANEOUS at 12:39

## 2019-01-01 RX ADMIN — Medication: at 06:37

## 2019-01-01 RX ADMIN — NICOTINE 21 MG: 21 PATCH, EXTENDED RELEASE TRANSDERMAL at 06:12

## 2019-01-01 RX ADMIN — MORPHINE SULFATE 4 MG: 4 INJECTION INTRAVENOUS at 18:12

## 2019-01-01 RX ADMIN — PIPERACILLIN AND TAZOBACTAM 4.5 G: 4; .5 INJECTION, POWDER, LYOPHILIZED, FOR SOLUTION INTRAVENOUS; PARENTERAL at 18:11

## 2019-01-01 RX ADMIN — POLYETHYLENE GLYCOL 3350 1 PACKET: 17 POWDER, FOR SOLUTION ORAL at 06:00

## 2019-01-01 RX ADMIN — OXYCODONE HYDROCHLORIDE 15 MG: 5 SOLUTION ORAL at 15:19

## 2019-01-01 RX ADMIN — GABAPENTIN 250 MG: 250 SUSPENSION ORAL at 06:05

## 2019-01-01 RX ADMIN — OXYCODONE HYDROCHLORIDE 15 MG: 5 SOLUTION ORAL at 04:27

## 2019-01-01 RX ADMIN — HYDROMORPHONE HYDROCHLORIDE 1 MG: 1 INJECTION, SOLUTION INTRAMUSCULAR; INTRAVENOUS; SUBCUTANEOUS at 05:20

## 2019-01-01 RX ADMIN — FUROSEMIDE 20 MG: 20 TABLET ORAL at 05:22

## 2019-01-01 RX ADMIN — ONDANSETRON 4 MG: 2 INJECTION INTRAMUSCULAR; INTRAVENOUS at 15:21

## 2019-01-01 RX ADMIN — OXYCODONE HYDROCHLORIDE 15 MG: 5 SOLUTION ORAL at 05:31

## 2019-01-01 RX ADMIN — CEFAZOLIN SODIUM 2 G: 2 INJECTION, SOLUTION INTRAVENOUS at 21:54

## 2019-01-01 RX ADMIN — DIPHENHYDRAMINE HCL 25 MG: 25 TABLET ORAL at 23:59

## 2019-01-01 RX ADMIN — HYDROMORPHONE HYDROCHLORIDE 1 MG: 1 INJECTION, SOLUTION INTRAMUSCULAR; INTRAVENOUS; SUBCUTANEOUS at 20:20

## 2019-01-01 RX ADMIN — GABAPENTIN 250 MG: 250 SUSPENSION ORAL at 13:16

## 2019-01-01 RX ADMIN — HYDROMORPHONE HYDROCHLORIDE 1 MG: 1 INJECTION, SOLUTION INTRAMUSCULAR; INTRAVENOUS; SUBCUTANEOUS at 19:32

## 2019-01-01 RX ADMIN — OXYCODONE HYDROCHLORIDE 15 MG: 5 SOLUTION ORAL at 10:07

## 2019-01-01 RX ADMIN — OXYCODONE HYDROCHLORIDE 15 MG: 5 SOLUTION ORAL at 13:26

## 2019-01-01 RX ADMIN — OXYCODONE HYDROCHLORIDE 15 MG: 5 SOLUTION ORAL at 01:43

## 2019-01-01 RX ADMIN — NICOTINE 21 MG: 21 PATCH, EXTENDED RELEASE TRANSDERMAL at 04:59

## 2019-01-01 RX ADMIN — GABAPENTIN 250 MG: 250 SUSPENSION ORAL at 12:59

## 2019-01-01 RX ADMIN — OXYCODONE HYDROCHLORIDE 15 MG: 5 SOLUTION ORAL at 00:44

## 2019-01-01 RX ADMIN — FUROSEMIDE 40 MG: 10 INJECTION, SOLUTION INTRAMUSCULAR; INTRAVENOUS at 13:24

## 2019-01-01 RX ADMIN — AMLODIPINE BESYLATE 10 MG: 5 TABLET ORAL at 06:01

## 2019-01-01 RX ADMIN — DULOXETINE HYDROCHLORIDE 20 MG: 20 CAPSULE, DELAYED RELEASE ORAL at 05:56

## 2019-01-01 RX ADMIN — Medication: at 05:18

## 2019-01-01 RX ADMIN — ENOXAPARIN SODIUM 60 MG: 100 INJECTION SUBCUTANEOUS at 06:17

## 2019-01-01 RX ADMIN — HYDROMORPHONE HYDROCHLORIDE 1 MG: 1 INJECTION, SOLUTION INTRAMUSCULAR; INTRAVENOUS; SUBCUTANEOUS at 11:32

## 2019-01-01 RX ADMIN — OXYCODONE HYDROCHLORIDE 15 MG: 5 SOLUTION ORAL at 12:22

## 2019-01-01 RX ADMIN — DULOXETINE HYDROCHLORIDE 20 MG: 30 CAPSULE, DELAYED RELEASE ORAL at 18:07

## 2019-01-01 RX ADMIN — OXYCODONE HYDROCHLORIDE 15 MG: 5 SOLUTION ORAL at 11:36

## 2019-01-01 RX ADMIN — POLYETHYLENE GLYCOL 3350 1 PACKET: 17 POWDER, FOR SOLUTION ORAL at 05:22

## 2019-01-01 RX ADMIN — DEXAMETHASONE 4 MG: 4 TABLET ORAL at 17:54

## 2019-01-01 RX ADMIN — OXYCODONE HYDROCHLORIDE 5 MG: 5 SOLUTION ORAL at 02:03

## 2019-01-01 RX ADMIN — DULOXETINE HYDROCHLORIDE 20 MG: 30 CAPSULE, DELAYED RELEASE ORAL at 04:46

## 2019-01-01 RX ADMIN — HYDROMORPHONE HYDROCHLORIDE 1 MG: 1 INJECTION, SOLUTION INTRAMUSCULAR; INTRAVENOUS; SUBCUTANEOUS at 01:43

## 2019-01-01 RX ADMIN — LISINOPRIL 10 MG: 10 TABLET ORAL at 06:22

## 2019-01-01 RX ADMIN — METOCLOPRAMIDE 10 MG: 5 INJECTION, SOLUTION INTRAMUSCULAR; INTRAVENOUS at 18:21

## 2019-01-01 RX ADMIN — Medication: at 18:05

## 2019-01-01 RX ADMIN — OXYCODONE HYDROCHLORIDE 15 MG: 5 SOLUTION ORAL at 12:44

## 2019-01-01 RX ADMIN — DIPHENHYDRAMINE HCL 25 MG: 25 TABLET ORAL at 06:22

## 2019-01-01 RX ADMIN — AMLODIPINE BESYLATE 10 MG: 10 TABLET ORAL at 05:14

## 2019-01-01 RX ADMIN — HYDROMORPHONE HYDROCHLORIDE 1 MG: 1 INJECTION, SOLUTION INTRAMUSCULAR; INTRAVENOUS; SUBCUTANEOUS at 09:22

## 2019-01-01 RX ADMIN — HYDROMORPHONE HYDROCHLORIDE 1 MG: 1 INJECTION, SOLUTION INTRAMUSCULAR; INTRAVENOUS; SUBCUTANEOUS at 16:38

## 2019-01-01 RX ADMIN — GABAPENTIN 250 MG: 250 SUSPENSION ORAL at 18:35

## 2019-01-01 RX ADMIN — MORPHINE SULFATE 4 MG: 4 INJECTION INTRAVENOUS at 22:28

## 2019-01-01 RX ADMIN — ENOXAPARIN SODIUM 60 MG: 100 INJECTION SUBCUTANEOUS at 17:07

## 2019-01-01 RX ADMIN — OXYCODONE HYDROCHLORIDE 15 MG: 5 SOLUTION ORAL at 18:19

## 2019-01-01 RX ADMIN — FENTANYL 1 PATCH: 75 PATCH, EXTENDED RELEASE TRANSDERMAL at 09:12

## 2019-01-01 RX ADMIN — OXYCODONE HYDROCHLORIDE 15 MG: 5 SOLUTION ORAL at 23:38

## 2019-01-01 RX ADMIN — ENOXAPARIN SODIUM 60 MG: 100 INJECTION SUBCUTANEOUS at 04:41

## 2019-01-01 RX ADMIN — ACETAMINOPHEN 650 MG: 325 TABLET, FILM COATED ORAL at 11:59

## 2019-01-01 RX ADMIN — OXYCODONE HYDROCHLORIDE 15 MG: 5 SOLUTION ORAL at 22:49

## 2019-01-01 RX ADMIN — MORPHINE SULFATE 4 MG: 4 INJECTION INTRAVENOUS at 08:40

## 2019-01-01 RX ADMIN — PIPERACILLIN AND TAZOBACTAM 4.5 G: 4; .5 INJECTION, POWDER, LYOPHILIZED, FOR SOLUTION INTRAVENOUS; PARENTERAL at 21:31

## 2019-01-01 RX ADMIN — ENOXAPARIN SODIUM 60 MG: 100 INJECTION SUBCUTANEOUS at 06:00

## 2019-01-01 RX ADMIN — HYDROMORPHONE HYDROCHLORIDE 1 MG: 1 INJECTION, SOLUTION INTRAMUSCULAR; INTRAVENOUS; SUBCUTANEOUS at 14:01

## 2019-01-01 RX ADMIN — METOCLOPRAMIDE 10 MG: 5 INJECTION, SOLUTION INTRAMUSCULAR; INTRAVENOUS at 12:52

## 2019-01-01 RX ADMIN — RIVAROXABAN 15 MG: 15 TABLET, FILM COATED ORAL at 09:06

## 2019-01-01 RX ADMIN — METOCLOPRAMIDE 10 MG: 5 INJECTION, SOLUTION INTRAMUSCULAR; INTRAVENOUS at 04:57

## 2019-01-01 RX ADMIN — Medication: at 18:22

## 2019-01-01 RX ADMIN — MORPHINE SULFATE 4 MG: 4 INJECTION INTRAVENOUS at 16:03

## 2019-01-01 RX ADMIN — Medication: at 05:31

## 2019-01-01 RX ADMIN — POLYETHYLENE GLYCOL 3350 1 PACKET: 17 POWDER, FOR SOLUTION ORAL at 04:41

## 2019-01-01 RX ADMIN — ENOXAPARIN SODIUM 60 MG: 100 INJECTION SUBCUTANEOUS at 18:29

## 2019-01-01 RX ADMIN — HYDRALAZINE HYDROCHLORIDE 5 MG: 20 INJECTION, SOLUTION INTRAMUSCULAR; INTRAVENOUS at 16:14

## 2019-01-01 RX ADMIN — IPRATROPIUM BROMIDE AND ALBUTEROL SULFATE 3 ML: .5; 3 SOLUTION RESPIRATORY (INHALATION) at 16:01

## 2019-01-01 RX ADMIN — MORPHINE SULFATE 4 MG: 4 INJECTION INTRAVENOUS at 20:39

## 2019-01-01 RX ADMIN — OXYCODONE HYDROCHLORIDE 15 MG: 5 SOLUTION ORAL at 07:21

## 2019-01-01 RX ADMIN — OXYCODONE HYDROCHLORIDE 15 MG: 5 SOLUTION ORAL at 00:19

## 2019-01-01 RX ADMIN — AMLODIPINE BESYLATE 10 MG: 10 TABLET ORAL at 05:41

## 2019-01-01 RX ADMIN — ACETAMINOPHEN 650 MG: 325 TABLET, FILM COATED ORAL at 17:22

## 2019-01-01 RX ADMIN — POLYETHYLENE GLYCOL 3350 1 PACKET: 17 POWDER, FOR SOLUTION ORAL at 17:31

## 2019-01-01 RX ADMIN — OXYCODONE HYDROCHLORIDE 15 MG: 5 SOLUTION ORAL at 08:06

## 2019-01-01 RX ADMIN — NICOTINE 14 MG: 14 PATCH, EXTENDED RELEASE TRANSDERMAL at 06:16

## 2019-01-01 RX ADMIN — FUROSEMIDE 20 MG: 10 INJECTION, SOLUTION INTRAMUSCULAR; INTRAVENOUS at 13:15

## 2019-01-01 RX ADMIN — OXYCODONE HYDROCHLORIDE 15 MG: 5 SOLUTION ORAL at 08:11

## 2019-01-01 RX ADMIN — GABAPENTIN 250 MG: 250 SUSPENSION ORAL at 17:49

## 2019-01-01 RX ADMIN — OXYCODONE HYDROCHLORIDE 15 MG: 5 SOLUTION ORAL at 10:22

## 2019-01-01 RX ADMIN — MORPHINE SULFATE 4 MG: 4 INJECTION INTRAVENOUS at 00:32

## 2019-01-01 RX ADMIN — OXYCODONE HYDROCHLORIDE 15 MG: 5 SOLUTION ORAL at 18:16

## 2019-01-01 RX ADMIN — OXYCODONE HYDROCHLORIDE 15 MG: 5 SOLUTION ORAL at 01:32

## 2019-01-01 RX ADMIN — OXYCODONE HYDROCHLORIDE 15 MG: 5 SOLUTION ORAL at 23:09

## 2019-01-01 RX ADMIN — BISACODYL 10 MG: 10 SUPPOSITORY RECTAL at 05:00

## 2019-01-01 RX ADMIN — APIXABAN 5 MG: 5 TABLET, FILM COATED ORAL at 05:31

## 2019-01-01 RX ADMIN — METOCLOPRAMIDE 10 MG: 5 INJECTION, SOLUTION INTRAMUSCULAR; INTRAVENOUS at 05:01

## 2019-01-01 RX ADMIN — MORPHINE SULFATE 4 MG: 4 INJECTION INTRAVENOUS at 07:25

## 2019-01-01 RX ADMIN — BISACODYL 10 MG: 10 SUPPOSITORY RECTAL at 06:01

## 2019-01-01 RX ADMIN — POLYETHYLENE GLYCOL 3350 1 PACKET: 17 POWDER, FOR SOLUTION ORAL at 18:12

## 2019-01-01 RX ADMIN — OXYCODONE HYDROCHLORIDE 15 MG: 5 SOLUTION ORAL at 05:03

## 2019-01-01 RX ADMIN — Medication: at 17:07

## 2019-01-01 RX ADMIN — OXYCODONE HYDROCHLORIDE 15 MG: 5 SOLUTION ORAL at 07:59

## 2019-01-01 RX ADMIN — PIPERACILLIN AND TAZOBACTAM 4.5 G: 4; .5 INJECTION, POWDER, LYOPHILIZED, FOR SOLUTION INTRAVENOUS; PARENTERAL at 05:02

## 2019-01-01 RX ADMIN — Medication 1 EACH: at 12:22

## 2019-01-01 RX ADMIN — SODIUM CHLORIDE: 9 INJECTION, SOLUTION INTRAVENOUS at 07:17

## 2019-01-01 RX ADMIN — AMLODIPINE BESYLATE 10 MG: 10 TABLET ORAL at 06:39

## 2019-01-01 RX ADMIN — Medication: at 17:12

## 2019-01-01 RX ADMIN — FUROSEMIDE 20 MG: 20 TABLET ORAL at 05:09

## 2019-01-01 RX ADMIN — HYDROMORPHONE HYDROCHLORIDE 1 MG: 1 INJECTION, SOLUTION INTRAMUSCULAR; INTRAVENOUS; SUBCUTANEOUS at 18:11

## 2019-01-01 RX ADMIN — OXYCODONE HYDROCHLORIDE 15 MG: 5 SOLUTION ORAL at 03:50

## 2019-01-01 RX ADMIN — IPRATROPIUM BROMIDE AND ALBUTEROL SULFATE 3 ML: .5; 3 SOLUTION RESPIRATORY (INHALATION) at 11:41

## 2019-01-01 RX ADMIN — OXYCODONE HYDROCHLORIDE 5 MG: 5 SOLUTION ORAL at 04:32

## 2019-01-01 RX ADMIN — ENOXAPARIN SODIUM 60 MG: 100 INJECTION SUBCUTANEOUS at 18:20

## 2019-01-01 RX ADMIN — PIPERACILLIN AND TAZOBACTAM 4.5 G: 4; .5 INJECTION, POWDER, LYOPHILIZED, FOR SOLUTION INTRAVENOUS; PARENTERAL at 06:12

## 2019-01-01 RX ADMIN — ALPRAZOLAM 0.25 MG: 0.25 TABLET ORAL at 08:45

## 2019-01-01 RX ADMIN — FENTANYL TRANSDERMAL 1 PATCH: 50 PATCH, EXTENDED RELEASE TRANSDERMAL at 08:25

## 2019-01-01 RX ADMIN — METOCLOPRAMIDE 10 MG: 5 INJECTION, SOLUTION INTRAMUSCULAR; INTRAVENOUS at 12:06

## 2019-01-01 RX ADMIN — OXYCODONE HYDROCHLORIDE 10 MG: 5 SOLUTION ORAL at 11:19

## 2019-01-01 RX ADMIN — ALBUTEROL SULFATE 2.5 MG: 2.5 SOLUTION RESPIRATORY (INHALATION) at 16:36

## 2019-01-01 RX ADMIN — OXYCODONE HYDROCHLORIDE 15 MG: 5 SOLUTION ORAL at 16:29

## 2019-01-01 RX ADMIN — OXYCODONE HYDROCHLORIDE 15 MG: 5 SOLUTION ORAL at 22:19

## 2019-01-01 RX ADMIN — NICOTINE 21 MG: 21 PATCH, EXTENDED RELEASE TRANSDERMAL at 05:31

## 2019-01-01 RX ADMIN — ALBUTEROL SULFATE 2 PUFF: 90 AEROSOL, METERED RESPIRATORY (INHALATION) at 03:13

## 2019-01-01 RX ADMIN — SODIUM CHLORIDE: 9 INJECTION, SOLUTION INTRAVENOUS at 18:01

## 2019-01-01 RX ADMIN — HYDROMORPHONE HYDROCHLORIDE 1 MG: 1 INJECTION, SOLUTION INTRAMUSCULAR; INTRAVENOUS; SUBCUTANEOUS at 14:49

## 2019-01-01 RX ADMIN — SENNOSIDES AND DOCUSATE SODIUM 2 TABLET: 8.6; 5 TABLET ORAL at 06:52

## 2019-01-01 RX ADMIN — Medication: at 18:21

## 2019-01-01 RX ADMIN — PIPERACILLIN AND TAZOBACTAM 4.5 G: 4; .5 INJECTION, POWDER, LYOPHILIZED, FOR SOLUTION INTRAVENOUS; PARENTERAL at 12:59

## 2019-01-01 RX ADMIN — OXYCODONE HYDROCHLORIDE 15 MG: 5 SOLUTION ORAL at 09:06

## 2019-01-01 RX ADMIN — OXYCODONE HYDROCHLORIDE 15 MG: 5 SOLUTION ORAL at 18:31

## 2019-01-01 RX ADMIN — OXYCODONE HYDROCHLORIDE 15 MG: 5 SOLUTION ORAL at 14:18

## 2019-01-01 RX ADMIN — MEPERIDINE HYDROCHLORIDE 6.5 MG: 25 INJECTION INTRAMUSCULAR; INTRAVENOUS; SUBCUTANEOUS at 20:48

## 2019-01-01 RX ADMIN — POLYETHYLENE GLYCOL 3350 1 PACKET: 17 POWDER, FOR SOLUTION ORAL at 06:24

## 2019-01-01 RX ADMIN — OXYCODONE HYDROCHLORIDE 15 MG: 5 SOLUTION ORAL at 11:38

## 2019-01-01 RX ADMIN — MORPHINE SULFATE 4 MG: 4 INJECTION INTRAVENOUS at 11:47

## 2019-01-01 RX ADMIN — DEXAMETHASONE 4 MG: 4 TABLET ORAL at 11:00

## 2019-01-01 RX ADMIN — MORPHINE SULFATE 4 MG: 4 INJECTION INTRAVENOUS at 13:58

## 2019-01-01 RX ADMIN — OXYCODONE HYDROCHLORIDE 15 MG: 5 SOLUTION ORAL at 20:36

## 2019-01-01 RX ADMIN — METOCLOPRAMIDE 10 MG: 5 INJECTION, SOLUTION INTRAMUSCULAR; INTRAVENOUS at 11:48

## 2019-01-01 RX ADMIN — APIXABAN 5 MG: 5 TABLET, FILM COATED ORAL at 17:21

## 2019-01-01 RX ADMIN — OXYCODONE HYDROCHLORIDE 10 MG: 5 SOLUTION ORAL at 21:09

## 2019-01-01 RX ADMIN — OXYCODONE HYDROCHLORIDE 15 MG: 5 SOLUTION ORAL at 11:06

## 2019-01-01 RX ADMIN — NICOTINE 14 MG: 14 PATCH, EXTENDED RELEASE TRANSDERMAL at 06:39

## 2019-01-01 RX ADMIN — Medication: at 18:46

## 2019-01-01 RX ADMIN — OXYCODONE HYDROCHLORIDE 15 MG: 5 SOLUTION ORAL at 13:21

## 2019-01-01 RX ADMIN — HYDROMORPHONE HYDROCHLORIDE 1 MG: 1 INJECTION, SOLUTION INTRAMUSCULAR; INTRAVENOUS; SUBCUTANEOUS at 08:03

## 2019-01-01 RX ADMIN — OXYCODONE HYDROCHLORIDE 15 MG: 5 SOLUTION ORAL at 06:32

## 2019-01-01 RX ADMIN — AMLODIPINE BESYLATE 10 MG: 10 TABLET ORAL at 05:23

## 2019-01-01 RX ADMIN — NICOTINE 21 MG: 21 PATCH, EXTENDED RELEASE TRANSDERMAL at 06:01

## 2019-01-01 RX ADMIN — OXYCODONE HYDROCHLORIDE 15 MG: 5 SOLUTION ORAL at 16:58

## 2019-01-01 RX ADMIN — DEXAMETHASONE 4 MG: 4 TABLET ORAL at 09:32

## 2019-01-01 RX ADMIN — OXYCODONE HYDROCHLORIDE 15 MG: 5 SOLUTION ORAL at 16:27

## 2019-01-01 RX ADMIN — OXYCODONE HYDROCHLORIDE 20 MG: 5 SOLUTION ORAL at 21:14

## 2019-01-01 RX ADMIN — AMLODIPINE BESYLATE 10 MG: 10 TABLET ORAL at 05:44

## 2019-01-01 RX ADMIN — ACETAMINOPHEN 650 MG: 325 TABLET, FILM COATED ORAL at 03:14

## 2019-01-01 RX ADMIN — FENTANYL 1 PATCH: 75 PATCH, EXTENDED RELEASE TRANSDERMAL at 09:42

## 2019-01-01 RX ADMIN — OXYCODONE HYDROCHLORIDE 15 MG: 5 SOLUTION ORAL at 14:04

## 2019-01-01 RX ADMIN — AMLODIPINE BESYLATE 10 MG: 10 TABLET ORAL at 06:04

## 2019-01-01 RX ADMIN — OXYCODONE HYDROCHLORIDE 15 MG: 5 SOLUTION ORAL at 09:03

## 2019-01-01 RX ADMIN — METOCLOPRAMIDE 10 MG: 5 INJECTION, SOLUTION INTRAMUSCULAR; INTRAVENOUS at 06:23

## 2019-01-01 RX ADMIN — SODIUM CHLORIDE 500 ML: 9 INJECTION, SOLUTION INTRAVENOUS at 20:16

## 2019-01-01 RX ADMIN — NICOTINE 14 MG: 14 PATCH, EXTENDED RELEASE TRANSDERMAL at 06:33

## 2019-01-01 RX ADMIN — OXYCODONE HYDROCHLORIDE 15 MG: 5 SOLUTION ORAL at 19:27

## 2019-01-01 RX ADMIN — Medication: at 05:43

## 2019-01-01 RX ADMIN — DULOXETINE HYDROCHLORIDE 20 MG: 20 CAPSULE, DELAYED RELEASE ORAL at 05:30

## 2019-01-01 RX ADMIN — HYDROMORPHONE HYDROCHLORIDE 1 MG: 1 INJECTION, SOLUTION INTRAMUSCULAR; INTRAVENOUS; SUBCUTANEOUS at 14:33

## 2019-01-01 RX ADMIN — OXYCODONE HYDROCHLORIDE 15 MG: 5 SOLUTION ORAL at 05:50

## 2019-01-01 RX ADMIN — Medication: at 11:37

## 2019-01-01 RX ADMIN — NICOTINE 21 MG: 21 PATCH, EXTENDED RELEASE TRANSDERMAL at 06:22

## 2019-01-01 RX ADMIN — CEFAZOLIN SODIUM 2 G: 2 INJECTION, SOLUTION INTRAVENOUS at 13:10

## 2019-01-01 RX ADMIN — HYDROMORPHONE HYDROCHLORIDE 1 MG: 1 INJECTION, SOLUTION INTRAMUSCULAR; INTRAVENOUS; SUBCUTANEOUS at 08:09

## 2019-01-01 RX ADMIN — AMLODIPINE BESYLATE 10 MG: 10 TABLET ORAL at 05:31

## 2019-01-01 RX ADMIN — POLYETHYLENE GLYCOL 3350 1 PACKET: 17 POWDER, FOR SOLUTION ORAL at 17:14

## 2019-01-01 RX ADMIN — ATROPA BELLADONNA AND OPIUM 30 MG: 16.2; 3 SUPPOSITORY RECTAL at 13:18

## 2019-01-01 RX ADMIN — NICOTINE 14 MG: 14 PATCH, EXTENDED RELEASE TRANSDERMAL at 06:04

## 2019-01-01 RX ADMIN — OXYCODONE HYDROCHLORIDE 15 MG: 5 SOLUTION ORAL at 21:40

## 2019-01-01 RX ADMIN — SODIUM CHLORIDE 100 ML: 900 INJECTION INTRAVENOUS at 22:12

## 2019-01-01 RX ADMIN — Medication: at 06:18

## 2019-01-01 RX ADMIN — CEFAZOLIN SODIUM 2 G: 2 INJECTION, SOLUTION INTRAVENOUS at 14:44

## 2019-01-01 RX ADMIN — ALPRAZOLAM 0.5 MG: 0.5 TABLET ORAL at 08:47

## 2019-01-01 RX ADMIN — DULOXETINE HYDROCHLORIDE 20 MG: 20 CAPSULE, DELAYED RELEASE ORAL at 05:23

## 2019-01-01 RX ADMIN — Medication: at 17:24

## 2019-01-01 RX ADMIN — FUROSEMIDE 20 MG: 20 TABLET ORAL at 05:28

## 2019-01-01 RX ADMIN — OXYCODONE HYDROCHLORIDE 15 MG: 5 SOLUTION ORAL at 05:26

## 2019-01-01 RX ADMIN — FENTANYL 1 PATCH: 75 PATCH, EXTENDED RELEASE TRANSDERMAL at 08:20

## 2019-01-01 RX ADMIN — ONDANSETRON 4 MG: 2 INJECTION INTRAMUSCULAR; INTRAVENOUS at 12:22

## 2019-01-01 RX ADMIN — OXYCODONE HYDROCHLORIDE 15 MG: 5 SOLUTION ORAL at 14:36

## 2019-01-01 RX ADMIN — IOHEXOL 75 ML: 350 INJECTION, SOLUTION INTRAVENOUS at 12:45

## 2019-01-01 RX ADMIN — OXYCODONE HYDROCHLORIDE 15 MG: 5 SOLUTION ORAL at 06:22

## 2019-01-01 RX ADMIN — GABAPENTIN 250 MG: 250 SUSPENSION ORAL at 12:15

## 2019-01-01 RX ADMIN — METHYLNALTREXONE BROMIDE 12 MG: 12 INJECTION, SOLUTION SUBCUTANEOUS at 14:02

## 2019-01-01 RX ADMIN — CEFAZOLIN SODIUM 2 G: 2 INJECTION, SOLUTION INTRAVENOUS at 21:55

## 2019-01-01 RX ADMIN — SIMETHICONE CHEW TAB 80 MG 80 MG: 80 TABLET ORAL at 09:50

## 2019-01-01 RX ADMIN — MORPHINE SULFATE 4 MG: 4 INJECTION INTRAVENOUS at 20:11

## 2019-01-01 RX ADMIN — AMLODIPINE BESYLATE 10 MG: 5 TABLET ORAL at 06:03

## 2019-01-01 RX ADMIN — ONDANSETRON 4 MG: 2 INJECTION INTRAMUSCULAR; INTRAVENOUS at 02:34

## 2019-01-01 RX ADMIN — ATROPA BELLADONNA AND OPIUM 30 MG: 16.2; 3 SUPPOSITORY RECTAL at 03:07

## 2019-01-01 RX ADMIN — POLYETHYLENE GLYCOL 3350 1 PACKET: 17 POWDER, FOR SOLUTION ORAL at 04:51

## 2019-01-01 RX ADMIN — OXYCODONE HYDROCHLORIDE 15 MG: 5 SOLUTION ORAL at 17:50

## 2019-01-01 RX ADMIN — FENTANYL CITRATE 50 MCG: 50 INJECTION, SOLUTION INTRAMUSCULAR; INTRAVENOUS at 16:17

## 2019-01-01 RX ADMIN — AMLODIPINE BESYLATE 10 MG: 10 TABLET ORAL at 04:58

## 2019-01-01 RX ADMIN — RIVAROXABAN 15 MG: 15 TABLET, FILM COATED ORAL at 18:32

## 2019-01-01 RX ADMIN — FUROSEMIDE 20 MG: 20 TABLET ORAL at 05:18

## 2019-01-01 RX ADMIN — SODIUM CHLORIDE 500 ML: 9 INJECTION, SOLUTION INTRAVENOUS at 16:15

## 2019-01-01 RX ADMIN — POLYETHYLENE GLYCOL 3350 1 PACKET: 17 POWDER, FOR SOLUTION ORAL at 04:58

## 2019-01-01 RX ADMIN — HYDROMORPHONE HYDROCHLORIDE 1 MG: 1 INJECTION, SOLUTION INTRAMUSCULAR; INTRAVENOUS; SUBCUTANEOUS at 09:14

## 2019-01-01 RX ADMIN — HYDROMORPHONE HYDROCHLORIDE 1 MG: 1 INJECTION, SOLUTION INTRAMUSCULAR; INTRAVENOUS; SUBCUTANEOUS at 13:01

## 2019-01-01 RX ADMIN — CEFAZOLIN SODIUM 2 G: 2 INJECTION, SOLUTION INTRAVENOUS at 06:18

## 2019-01-01 RX ADMIN — HYDROMORPHONE HYDROCHLORIDE 1 MG: 1 INJECTION, SOLUTION INTRAMUSCULAR; INTRAVENOUS; SUBCUTANEOUS at 15:12

## 2019-01-01 RX ADMIN — HYDRALAZINE HYDROCHLORIDE 5 MG: 20 INJECTION, SOLUTION INTRAMUSCULAR; INTRAVENOUS at 16:02

## 2019-01-01 RX ADMIN — NICOTINE 14 MG: 14 PATCH, EXTENDED RELEASE TRANSDERMAL at 05:13

## 2019-01-01 RX ADMIN — METOCLOPRAMIDE 10 MG: 5 INJECTION, SOLUTION INTRAMUSCULAR; INTRAVENOUS at 14:28

## 2019-01-01 RX ADMIN — GABAPENTIN 250 MG: 250 SUSPENSION ORAL at 18:34

## 2019-01-01 RX ADMIN — Medication: at 06:19

## 2019-01-01 RX ADMIN — HYDROMORPHONE HYDROCHLORIDE 1 MG: 1 INJECTION, SOLUTION INTRAMUSCULAR; INTRAVENOUS; SUBCUTANEOUS at 18:57

## 2019-01-01 RX ADMIN — OXYCODONE HYDROCHLORIDE 15 MG: 5 SOLUTION ORAL at 16:22

## 2019-01-01 RX ADMIN — APIXABAN 5 MG: 5 TABLET, FILM COATED ORAL at 05:20

## 2019-01-01 RX ADMIN — Medication: at 06:20

## 2019-01-01 RX ADMIN — ENOXAPARIN SODIUM 60 MG: 100 INJECTION SUBCUTANEOUS at 16:47

## 2019-01-01 RX ADMIN — OXYCODONE HYDROCHLORIDE 15 MG: 5 SOLUTION ORAL at 13:57

## 2019-01-01 RX ADMIN — GABAPENTIN 250 MG: 250 SUSPENSION ORAL at 18:13

## 2019-01-01 RX ADMIN — Medication: at 18:43

## 2019-01-01 RX ADMIN — BISACODYL 10 MG: 10 SUPPOSITORY RECTAL at 07:32

## 2019-01-01 RX ADMIN — HYDROMORPHONE HYDROCHLORIDE 1 MG: 1 INJECTION, SOLUTION INTRAMUSCULAR; INTRAVENOUS; SUBCUTANEOUS at 05:25

## 2019-01-01 RX ADMIN — SODIUM CHLORIDE: 9 INJECTION, SOLUTION INTRAVENOUS at 23:52

## 2019-01-01 RX ADMIN — ENOXAPARIN SODIUM 60 MG: 100 INJECTION SUBCUTANEOUS at 18:08

## 2019-01-01 RX ADMIN — IPRATROPIUM BROMIDE AND ALBUTEROL SULFATE 3 ML: .5; 3 SOLUTION RESPIRATORY (INHALATION) at 19:48

## 2019-01-01 RX ADMIN — OXYCODONE HYDROCHLORIDE 15 MG: 5 SOLUTION ORAL at 11:54

## 2019-01-01 RX ADMIN — ALPRAZOLAM 0.5 MG: 0.5 TABLET ORAL at 04:50

## 2019-01-01 RX ADMIN — FENTANYL TRANSDERMAL 1 PATCH: 50 PATCH, EXTENDED RELEASE TRANSDERMAL at 07:36

## 2019-01-01 RX ADMIN — Medication: at 05:42

## 2019-01-01 RX ADMIN — MORPHINE SULFATE 6 MG: 4 INJECTION INTRAVENOUS at 15:48

## 2019-01-01 RX ADMIN — SODIUM CHLORIDE: 9 INJECTION, SOLUTION INTRAVENOUS at 13:45

## 2019-01-01 RX ADMIN — DEXAMETHASONE 4 MG: 4 TABLET ORAL at 10:08

## 2019-01-01 RX ADMIN — OXYCODONE HYDROCHLORIDE 15 MG: 5 SOLUTION ORAL at 18:34

## 2019-01-01 RX ADMIN — Medication: at 05:32

## 2019-01-01 RX ADMIN — METOCLOPRAMIDE 10 MG: 5 INJECTION, SOLUTION INTRAMUSCULAR; INTRAVENOUS at 17:54

## 2019-01-01 RX ADMIN — OXYCODONE HYDROCHLORIDE 15 MG: 5 SOLUTION ORAL at 16:19

## 2019-01-01 RX ADMIN — NICOTINE 14 MG: 14 PATCH, EXTENDED RELEASE TRANSDERMAL at 04:41

## 2019-01-01 RX ADMIN — DULOXETINE HYDROCHLORIDE 20 MG: 20 CAPSULE, DELAYED RELEASE ORAL at 06:19

## 2019-01-01 RX ADMIN — GABAPENTIN 250 MG: 250 SUSPENSION ORAL at 17:51

## 2019-01-01 RX ADMIN — OXYCODONE HYDROCHLORIDE 15 MG: 5 SOLUTION ORAL at 13:48

## 2019-01-01 RX ADMIN — DEXAMETHASONE 4 MG: 4 TABLET ORAL at 17:56

## 2019-01-01 RX ADMIN — NICOTINE 14 MG: 14 PATCH, EXTENDED RELEASE TRANSDERMAL at 04:50

## 2019-01-01 RX ADMIN — LISINOPRIL 20 MG: 20 TABLET ORAL at 05:28

## 2019-01-01 RX ADMIN — Medication: at 16:47

## 2019-01-01 RX ADMIN — AMLODIPINE BESYLATE 10 MG: 10 TABLET ORAL at 06:32

## 2019-01-01 RX ADMIN — OXYCODONE HYDROCHLORIDE 15 MG: 5 SOLUTION ORAL at 02:02

## 2019-01-01 RX ADMIN — GABAPENTIN 250 MG: 250 SUSPENSION ORAL at 17:28

## 2019-01-01 RX ADMIN — OXYCODONE HYDROCHLORIDE 5 MG: 5 SOLUTION ORAL at 00:22

## 2019-01-01 RX ADMIN — SODIUM CHLORIDE: 9 INJECTION, SOLUTION INTRAVENOUS at 10:57

## 2019-01-01 RX ADMIN — AMLODIPINE BESYLATE 10 MG: 10 TABLET ORAL at 05:09

## 2019-01-01 RX ADMIN — MORPHINE SULFATE 4 MG: 4 INJECTION INTRAVENOUS at 20:03

## 2019-01-01 RX ADMIN — POLYETHYLENE GLYCOL 3350 1 PACKET: 17 POWDER, FOR SOLUTION ORAL at 05:39

## 2019-01-01 RX ADMIN — NICOTINE 21 MG: 21 PATCH, EXTENDED RELEASE TRANSDERMAL at 05:10

## 2019-01-01 RX ADMIN — OXYCODONE HYDROCHLORIDE 15 MG: 5 SOLUTION ORAL at 19:32

## 2019-01-01 RX ADMIN — NICOTINE 21 MG: 21 PATCH, EXTENDED RELEASE TRANSDERMAL at 06:37

## 2019-01-01 RX ADMIN — OXYCODONE HYDROCHLORIDE 15 MG: 5 SOLUTION ORAL at 12:35

## 2019-01-01 RX ADMIN — SODIUM CHLORIDE: 9 INJECTION, SOLUTION INTRAVENOUS at 12:48

## 2019-01-01 RX ADMIN — ALPRAZOLAM 0.5 MG: 0.5 TABLET ORAL at 09:52

## 2019-01-01 RX ADMIN — HYDROMORPHONE HYDROCHLORIDE 1 MG: 1 INJECTION, SOLUTION INTRAMUSCULAR; INTRAVENOUS; SUBCUTANEOUS at 12:50

## 2019-01-01 RX ADMIN — ALPRAZOLAM 0.5 MG: 0.5 TABLET ORAL at 08:43

## 2019-01-01 RX ADMIN — MORPHINE SULFATE 4 MG: 4 INJECTION INTRAVENOUS at 08:25

## 2019-01-01 RX ADMIN — IPRATROPIUM BROMIDE AND ALBUTEROL SULFATE 3 ML: .5; 3 SOLUTION RESPIRATORY (INHALATION) at 06:22

## 2019-01-01 RX ADMIN — CEFAZOLIN SODIUM 2 G: 2 INJECTION, SOLUTION INTRAVENOUS at 06:25

## 2019-01-01 RX ADMIN — DEXAMETHASONE 4 MG: 4 TABLET ORAL at 09:06

## 2019-01-01 RX ADMIN — Medication: at 04:53

## 2019-01-01 RX ADMIN — LISINOPRIL 20 MG: 20 TABLET ORAL at 06:46

## 2019-01-01 RX ADMIN — HYDROMORPHONE HYDROCHLORIDE 1 MG: 1 INJECTION, SOLUTION INTRAMUSCULAR; INTRAVENOUS; SUBCUTANEOUS at 13:00

## 2019-01-01 RX ADMIN — ENOXAPARIN SODIUM 60 MG: 100 INJECTION SUBCUTANEOUS at 05:18

## 2019-01-01 RX ADMIN — MORPHINE SULFATE 4 MG: 4 INJECTION INTRAVENOUS at 03:54

## 2019-01-01 RX ADMIN — DULOXETINE HYDROCHLORIDE 20 MG: 20 CAPSULE, DELAYED RELEASE ORAL at 06:44

## 2019-01-01 RX ADMIN — METOCLOPRAMIDE 10 MG: 5 INJECTION, SOLUTION INTRAMUSCULAR; INTRAVENOUS at 06:00

## 2019-01-01 RX ADMIN — METOCLOPRAMIDE 10 MG: 5 INJECTION, SOLUTION INTRAMUSCULAR; INTRAVENOUS at 17:43

## 2019-01-01 RX ADMIN — POLYETHYLENE GLYCOL 3350 1 PACKET: 17 POWDER, FOR SOLUTION ORAL at 05:43

## 2019-01-01 RX ADMIN — CEFAZOLIN SODIUM 2 G: 2 INJECTION, SOLUTION INTRAVENOUS at 06:23

## 2019-01-01 RX ADMIN — OXYCODONE HYDROCHLORIDE 15 MG: 5 SOLUTION ORAL at 12:42

## 2019-01-01 RX ADMIN — SODIUM CHLORIDE: 9 INJECTION, SOLUTION INTRAVENOUS at 18:29

## 2019-01-01 RX ADMIN — METOCLOPRAMIDE 10 MG: 5 INJECTION, SOLUTION INTRAMUSCULAR; INTRAVENOUS at 23:09

## 2019-01-01 RX ADMIN — AMLODIPINE BESYLATE 10 MG: 10 TABLET ORAL at 05:04

## 2019-01-01 RX ADMIN — NICOTINE 14 MG: 14 PATCH, EXTENDED RELEASE TRANSDERMAL at 05:27

## 2019-01-01 RX ADMIN — GABAPENTIN 250 MG: 250 SUSPENSION ORAL at 05:11

## 2019-01-01 RX ADMIN — ONDANSETRON 4 MG: 2 INJECTION INTRAMUSCULAR; INTRAVENOUS at 22:19

## 2019-01-01 RX ADMIN — RIVAROXABAN 15 MG: 15 TABLET, FILM COATED ORAL at 09:09

## 2019-01-01 RX ADMIN — PIPERACILLIN AND TAZOBACTAM 4.5 G: 4; .5 INJECTION, POWDER, LYOPHILIZED, FOR SOLUTION INTRAVENOUS; PARENTERAL at 12:27

## 2019-01-01 RX ADMIN — DULOXETINE HYDROCHLORIDE 20 MG: 20 CAPSULE, DELAYED RELEASE ORAL at 05:28

## 2019-01-01 RX ADMIN — OXYCODONE HYDROCHLORIDE 5 MG: 5 SOLUTION ORAL at 03:07

## 2019-01-01 RX ADMIN — HYDROMORPHONE HYDROCHLORIDE 1 MG: 1 INJECTION, SOLUTION INTRAMUSCULAR; INTRAVENOUS; SUBCUTANEOUS at 22:17

## 2019-01-01 RX ADMIN — SODIUM CHLORIDE: 9 INJECTION, SOLUTION INTRAVENOUS at 09:27

## 2019-01-01 RX ADMIN — Medication: at 17:53

## 2019-01-01 RX ADMIN — POLYETHYLENE GLYCOL 3350, SODIUM SULFATE ANHYDROUS, SODIUM BICARBONATE, SODIUM CHLORIDE, POTASSIUM CHLORIDE 4 L: 236; 22.74; 6.74; 5.86; 2.97 POWDER, FOR SOLUTION ORAL at 16:18

## 2019-01-01 RX ADMIN — NICOTINE 21 MG: 21 PATCH, EXTENDED RELEASE TRANSDERMAL at 05:36

## 2019-01-01 RX ADMIN — OXYCODONE HYDROCHLORIDE 15 MG: 5 SOLUTION ORAL at 06:18

## 2019-01-01 RX ADMIN — POLYETHYLENE GLYCOL 3350 1 PACKET: 17 POWDER, FOR SOLUTION ORAL at 06:37

## 2019-01-01 RX ADMIN — MORPHINE SULFATE 4 MG: 4 INJECTION INTRAVENOUS at 04:48

## 2019-01-01 RX ADMIN — OXYCODONE HYDROCHLORIDE 15 MG: 5 SOLUTION ORAL at 17:11

## 2019-01-01 RX ADMIN — Medication: at 17:14

## 2019-01-01 RX ADMIN — POLYETHYLENE GLYCOL 3350 1 PACKET: 17 POWDER, FOR SOLUTION ORAL at 06:39

## 2019-01-01 RX ADMIN — MORPHINE SULFATE 4 MG: 4 INJECTION INTRAVENOUS at 09:27

## 2019-01-01 RX ADMIN — MORPHINE SULFATE 4 MG: 4 INJECTION INTRAVENOUS at 03:43

## 2019-01-01 RX ADMIN — SODIUM CHLORIDE 500 ML: 9 INJECTION, SOLUTION INTRAVENOUS at 05:28

## 2019-01-01 RX ADMIN — AMLODIPINE BESYLATE 10 MG: 10 TABLET ORAL at 05:50

## 2019-01-01 RX ADMIN — MORPHINE SULFATE 4 MG: 4 INJECTION INTRAVENOUS at 11:02

## 2019-01-01 RX ADMIN — DEXAMETHASONE 4 MG: 4 TABLET ORAL at 18:14

## 2019-01-01 RX ADMIN — LISINOPRIL 20 MG: 20 TABLET ORAL at 04:50

## 2019-01-01 RX ADMIN — GABAPENTIN 250 MG: 250 SUSPENSION ORAL at 06:22

## 2019-01-01 RX ADMIN — OXYCODONE HYDROCHLORIDE 5 MG: 5 SOLUTION ORAL at 11:12

## 2019-01-01 RX ADMIN — DEXAMETHASONE 4 MG: 4 TABLET ORAL at 17:38

## 2019-01-01 RX ADMIN — LISINOPRIL 20 MG: 20 TABLET ORAL at 06:16

## 2019-01-01 RX ADMIN — OXYCODONE HYDROCHLORIDE 5 MG: 5 SOLUTION ORAL at 18:10

## 2019-01-01 RX ADMIN — ONDANSETRON 4 MG: 2 INJECTION INTRAMUSCULAR; INTRAVENOUS at 21:54

## 2019-01-01 RX ADMIN — AMLODIPINE BESYLATE 10 MG: 10 TABLET ORAL at 06:37

## 2019-01-01 RX ADMIN — OXYCODONE HYDROCHLORIDE 15 MG: 5 SOLUTION ORAL at 12:24

## 2019-01-01 RX ADMIN — SODIUM CHLORIDE: 9 INJECTION, SOLUTION INTRAVENOUS at 21:00

## 2019-01-01 RX ADMIN — DEXAMETHASONE 4 MG: 4 TABLET ORAL at 12:14

## 2019-01-01 RX ADMIN — AMLODIPINE BESYLATE 10 MG: 10 TABLET ORAL at 04:42

## 2019-01-01 RX ADMIN — HYDROMORPHONE HYDROCHLORIDE 1 MG: 1 INJECTION, SOLUTION INTRAMUSCULAR; INTRAVENOUS; SUBCUTANEOUS at 13:30

## 2019-01-01 RX ADMIN — OXYCODONE HYDROCHLORIDE 15 MG: 5 SOLUTION ORAL at 16:45

## 2019-01-01 RX ADMIN — DULOXETINE HYDROCHLORIDE 20 MG: 20 CAPSULE, DELAYED RELEASE ORAL at 06:50

## 2019-01-01 RX ADMIN — ENOXAPARIN SODIUM 60 MG: 100 INJECTION SUBCUTANEOUS at 17:45

## 2019-01-01 RX ADMIN — FUROSEMIDE 20 MG: 20 TABLET ORAL at 04:42

## 2019-01-01 RX ADMIN — METOCLOPRAMIDE 10 MG: 5 INJECTION, SOLUTION INTRAMUSCULAR; INTRAVENOUS at 00:51

## 2019-01-01 RX ADMIN — OXYCODONE HYDROCHLORIDE 15 MG: 5 SOLUTION ORAL at 17:28

## 2019-01-01 RX ADMIN — OXYCODONE HYDROCHLORIDE 15 MG: 5 SOLUTION ORAL at 06:41

## 2019-01-01 RX ADMIN — IOHEXOL 100 ML: 350 INJECTION, SOLUTION INTRAVENOUS at 14:38

## 2019-01-01 RX ADMIN — NICOTINE 14 MG: 14 PATCH, EXTENDED RELEASE TRANSDERMAL at 06:10

## 2019-01-01 RX ADMIN — ONDANSETRON 4 MG: 2 INJECTION INTRAMUSCULAR; INTRAVENOUS at 05:25

## 2019-01-01 RX ADMIN — FUROSEMIDE 20 MG: 20 TABLET ORAL at 04:46

## 2019-01-01 RX ADMIN — FUROSEMIDE 20 MG: 20 TABLET ORAL at 05:39

## 2019-01-01 RX ADMIN — OXYCODONE HYDROCHLORIDE 15 MG: 5 SOLUTION ORAL at 22:05

## 2019-01-01 RX ADMIN — LISINOPRIL 10 MG: 10 TABLET ORAL at 05:39

## 2019-01-01 RX ADMIN — GABAPENTIN 250 MG: 250 SUSPENSION ORAL at 21:51

## 2019-01-01 RX ADMIN — MORPHINE SULFATE 4 MG: 4 INJECTION INTRAVENOUS at 10:50

## 2019-01-01 RX ADMIN — OXYCODONE HYDROCHLORIDE 15 MG: 5 SOLUTION ORAL at 07:52

## 2019-01-01 RX ADMIN — OXYCODONE HYDROCHLORIDE 15 MG: 5 SOLUTION ORAL at 08:16

## 2019-01-01 RX ADMIN — ENOXAPARIN SODIUM 60 MG: 100 INJECTION SUBCUTANEOUS at 04:58

## 2019-01-01 RX ADMIN — CEFAZOLIN SODIUM 2 G: 2 INJECTION, SOLUTION INTRAVENOUS at 18:25

## 2019-01-01 RX ADMIN — Medication: at 13:00

## 2019-01-01 RX ADMIN — HYDROMORPHONE HYDROCHLORIDE 0.25 MG: 1 INJECTION, SOLUTION INTRAMUSCULAR; INTRAVENOUS; SUBCUTANEOUS at 15:09

## 2019-01-01 RX ADMIN — ENOXAPARIN SODIUM 60 MG: 100 INJECTION SUBCUTANEOUS at 05:59

## 2019-01-01 RX ADMIN — ALTEPLASE 2 MG: 2.2 INJECTION, POWDER, LYOPHILIZED, FOR SOLUTION INTRAVENOUS at 08:36

## 2019-01-01 RX ADMIN — HYDROMORPHONE HYDROCHLORIDE 1 MG: 1 INJECTION, SOLUTION INTRAMUSCULAR; INTRAVENOUS; SUBCUTANEOUS at 16:42

## 2019-01-01 RX ADMIN — MORPHINE SULFATE 4 MG: 4 INJECTION INTRAVENOUS at 18:05

## 2019-01-01 RX ADMIN — FUROSEMIDE 20 MG: 20 TABLET ORAL at 06:17

## 2019-01-01 RX ADMIN — FUROSEMIDE 20 MG: 20 TABLET ORAL at 06:32

## 2019-01-01 RX ADMIN — FENTANYL 1 PATCH: 75 PATCH, EXTENDED RELEASE TRANSDERMAL at 06:39

## 2019-01-01 RX ADMIN — OXYCODONE HYDROCHLORIDE 15 MG: 5 SOLUTION ORAL at 09:14

## 2019-01-01 RX ADMIN — OXYCODONE HYDROCHLORIDE 15 MG: 5 SOLUTION ORAL at 09:09

## 2019-01-01 RX ADMIN — FUROSEMIDE 20 MG: 20 TABLET ORAL at 06:11

## 2019-01-01 RX ADMIN — AMLODIPINE BESYLATE 10 MG: 10 TABLET ORAL at 06:47

## 2019-01-01 RX ADMIN — ENOXAPARIN SODIUM 60 MG: 100 INJECTION SUBCUTANEOUS at 17:12

## 2019-01-01 RX ADMIN — PIPERACILLIN AND TAZOBACTAM 4.5 G: 4; .5 INJECTION, POWDER, LYOPHILIZED, FOR SOLUTION INTRAVENOUS; PARENTERAL at 04:41

## 2019-01-01 RX ADMIN — OXYCODONE HYDROCHLORIDE 15 MG: 5 SOLUTION ORAL at 03:14

## 2019-01-01 RX ADMIN — ACETAMINOPHEN 650 MG: 325 TABLET, FILM COATED ORAL at 09:52

## 2019-01-01 RX ADMIN — HYDROMORPHONE HYDROCHLORIDE 1 MG: 1 INJECTION, SOLUTION INTRAMUSCULAR; INTRAVENOUS; SUBCUTANEOUS at 10:49

## 2019-01-01 RX ADMIN — RIVAROXABAN 15 MG: 15 TABLET, FILM COATED ORAL at 18:34

## 2019-01-01 RX ADMIN — OXYCODONE HYDROCHLORIDE 10 MG: 5 SOLUTION ORAL at 14:50

## 2019-01-01 RX ADMIN — ENOXAPARIN SODIUM 60 MG: 100 INJECTION SUBCUTANEOUS at 06:39

## 2019-01-01 RX ADMIN — OXYCODONE HYDROCHLORIDE 15 MG: 5 SOLUTION ORAL at 21:56

## 2019-01-01 RX ADMIN — LISINOPRIL 10 MG: 10 TABLET ORAL at 04:46

## 2019-01-01 RX ADMIN — HYDROMORPHONE HYDROCHLORIDE 1 MG: 1 INJECTION, SOLUTION INTRAMUSCULAR; INTRAVENOUS; SUBCUTANEOUS at 16:04

## 2019-01-01 RX ADMIN — HYDROMORPHONE HYDROCHLORIDE 1 MG: 1 INJECTION, SOLUTION INTRAMUSCULAR; INTRAVENOUS; SUBCUTANEOUS at 16:52

## 2019-01-01 RX ADMIN — PIPERACILLIN AND TAZOBACTAM 4.5 G: 4; .5 INJECTION, POWDER, LYOPHILIZED, FOR SOLUTION INTRAVENOUS; PARENTERAL at 12:51

## 2019-01-01 RX ADMIN — OXYCODONE HYDROCHLORIDE 15 MG: 5 SOLUTION ORAL at 15:48

## 2019-01-01 RX ADMIN — OXYCODONE HYDROCHLORIDE 5 MG: 5 SOLUTION ORAL at 05:41

## 2019-01-01 RX ADMIN — Medication: at 12:58

## 2019-01-01 RX ADMIN — HYDROMORPHONE HYDROCHLORIDE 1 MG: 1 INJECTION, SOLUTION INTRAMUSCULAR; INTRAVENOUS; SUBCUTANEOUS at 12:58

## 2019-01-01 RX ADMIN — OXYCODONE HYDROCHLORIDE 15 MG: 5 SOLUTION ORAL at 09:02

## 2019-01-01 RX ADMIN — AMLODIPINE BESYLATE 10 MG: 10 TABLET ORAL at 05:29

## 2019-01-01 RX ADMIN — HYDROMORPHONE HYDROCHLORIDE 1 MG: 1 INJECTION, SOLUTION INTRAMUSCULAR; INTRAVENOUS; SUBCUTANEOUS at 03:21

## 2019-01-01 RX ADMIN — FUROSEMIDE 20 MG: 20 TABLET ORAL at 09:14

## 2019-01-01 RX ADMIN — PIPERACILLIN AND TAZOBACTAM 4.5 G: 4; .5 INJECTION, POWDER, LYOPHILIZED, FOR SOLUTION INTRAVENOUS; PARENTERAL at 05:44

## 2019-01-01 RX ADMIN — Medication: at 05:54

## 2019-01-01 RX ADMIN — OXYCODONE HYDROCHLORIDE 15 MG: 5 SOLUTION ORAL at 18:24

## 2019-01-01 RX ADMIN — PIPERACILLIN AND TAZOBACTAM 4.5 G: 4; .5 INJECTION, POWDER, LYOPHILIZED, FOR SOLUTION INTRAVENOUS; PARENTERAL at 20:28

## 2019-01-01 RX ADMIN — ONDANSETRON 4 MG: 2 INJECTION INTRAMUSCULAR; INTRAVENOUS at 20:27

## 2019-01-01 RX ADMIN — HYDROMORPHONE HYDROCHLORIDE 1 MG: 1 INJECTION, SOLUTION INTRAMUSCULAR; INTRAVENOUS; SUBCUTANEOUS at 12:28

## 2019-01-01 RX ADMIN — MORPHINE SULFATE 4 MG: 4 INJECTION INTRAVENOUS at 04:46

## 2019-01-01 RX ADMIN — FENTANYL 1 PATCH: 75 PATCH, EXTENDED RELEASE TRANSDERMAL at 07:58

## 2019-01-01 RX ADMIN — ZOLPIDEM TARTRATE 10 MG: 5 TABLET ORAL at 22:59

## 2019-01-01 RX ADMIN — OXYCODONE HYDROCHLORIDE 15 MG: 5 SOLUTION ORAL at 19:58

## 2019-01-01 RX ADMIN — SODIUM CHLORIDE SOLN NEBU 3% 3 ML: 3 NEBU SOLN at 16:01

## 2019-01-01 RX ADMIN — HYDROMORPHONE HYDROCHLORIDE 1 MG: 1 INJECTION, SOLUTION INTRAMUSCULAR; INTRAVENOUS; SUBCUTANEOUS at 09:47

## 2019-01-01 RX ADMIN — NICOTINE 21 MG: 21 PATCH, EXTENDED RELEASE TRANSDERMAL at 05:35

## 2019-01-01 RX ADMIN — SODIUM CHLORIDE: 9 INJECTION, SOLUTION INTRAVENOUS at 02:35

## 2019-01-01 RX ADMIN — FUROSEMIDE 20 MG: 10 INJECTION, SOLUTION INTRAMUSCULAR; INTRAVENOUS at 17:07

## 2019-01-01 RX ADMIN — HYDRALAZINE HYDROCHLORIDE 10 MG: 20 INJECTION INTRAMUSCULAR; INTRAVENOUS at 04:39

## 2019-01-01 RX ADMIN — MORPHINE SULFATE 4 MG: 4 INJECTION INTRAVENOUS at 14:48

## 2019-01-01 RX ADMIN — HYDROMORPHONE HYDROCHLORIDE 1 MG: 1 INJECTION, SOLUTION INTRAMUSCULAR; INTRAVENOUS; SUBCUTANEOUS at 20:04

## 2019-01-01 RX ADMIN — FENTANYL 1 PATCH: 75 PATCH, EXTENDED RELEASE TRANSDERMAL at 08:41

## 2019-01-01 RX ADMIN — ATROPA BELLADONNA AND OPIUM 30 MG: 16.2; 3 SUPPOSITORY RECTAL at 13:51

## 2019-01-01 RX ADMIN — POLYETHYLENE GLYCOL 3350 1 PACKET: 17 POWDER, FOR SOLUTION ORAL at 17:11

## 2019-01-01 RX ADMIN — OXYCODONE HYDROCHLORIDE 5 MG: 5 SOLUTION ORAL at 15:25

## 2019-01-01 RX ADMIN — METOCLOPRAMIDE 10 MG: 5 INJECTION, SOLUTION INTRAMUSCULAR; INTRAVENOUS at 17:25

## 2019-01-01 RX ADMIN — GABAPENTIN 250 MG: 250 SUSPENSION ORAL at 05:29

## 2019-01-01 RX ADMIN — CEFTRIAXONE SODIUM 2 G: 2 INJECTION, POWDER, FOR SOLUTION INTRAMUSCULAR; INTRAVENOUS at 05:27

## 2019-01-01 RX ADMIN — NICOTINE 14 MG: 14 PATCH, EXTENDED RELEASE TRANSDERMAL at 04:54

## 2019-01-01 RX ADMIN — HYDROMORPHONE HYDROCHLORIDE 1 MG: 1 INJECTION, SOLUTION INTRAMUSCULAR; INTRAVENOUS; SUBCUTANEOUS at 00:52

## 2019-01-01 RX ADMIN — Medication: at 05:26

## 2019-01-01 RX ADMIN — AMLODIPINE BESYLATE 10 MG: 10 TABLET ORAL at 04:50

## 2019-01-01 RX ADMIN — LISINOPRIL 10 MG: 10 TABLET ORAL at 05:22

## 2019-01-01 RX ADMIN — AMLODIPINE BESYLATE 10 MG: 10 TABLET ORAL at 05:53

## 2019-01-01 RX ADMIN — HYDROMORPHONE HYDROCHLORIDE 0.5 MG: 1 INJECTION, SOLUTION INTRAMUSCULAR; INTRAVENOUS; SUBCUTANEOUS at 23:33

## 2019-01-01 RX ADMIN — MORPHINE SULFATE 2 MG: 4 INJECTION INTRAVENOUS at 22:19

## 2019-01-01 RX ADMIN — SODIUM CHLORIDE: 9 INJECTION, SOLUTION INTRAVENOUS at 06:25

## 2019-01-01 RX ADMIN — SODIUM CHLORIDE 500 ML: 9 INJECTION, SOLUTION INTRAVENOUS at 17:52

## 2019-01-01 RX ADMIN — HYDROMORPHONE HYDROCHLORIDE 1 MG: 1 INJECTION, SOLUTION INTRAMUSCULAR; INTRAVENOUS; SUBCUTANEOUS at 22:47

## 2019-01-01 RX ADMIN — GABAPENTIN 250 MG: 250 SUSPENSION ORAL at 04:52

## 2019-01-01 RX ADMIN — OXYCODONE HYDROCHLORIDE 5 MG: 5 SOLUTION ORAL at 13:46

## 2019-01-01 RX ADMIN — OXYCODONE HYDROCHLORIDE 5 MG: 5 SOLUTION ORAL at 23:13

## 2019-01-01 RX ADMIN — APIXABAN 5 MG: 5 TABLET, FILM COATED ORAL at 05:50

## 2019-01-01 RX ADMIN — ALPRAZOLAM 0.5 MG: 0.5 TABLET ORAL at 09:23

## 2019-01-01 RX ADMIN — OXYCODONE HYDROCHLORIDE 15 MG: 5 SOLUTION ORAL at 17:54

## 2019-01-01 RX ADMIN — MORPHINE SULFATE 4 MG: 4 INJECTION INTRAVENOUS at 06:22

## 2019-01-01 RX ADMIN — AMLODIPINE BESYLATE 10 MG: 10 TABLET ORAL at 06:24

## 2019-01-01 RX ADMIN — ONDANSETRON 4 MG: 2 INJECTION INTRAMUSCULAR; INTRAVENOUS at 17:30

## 2019-01-01 RX ADMIN — NICOTINE 21 MG: 21 PATCH, EXTENDED RELEASE TRANSDERMAL at 06:25

## 2019-01-01 RX ADMIN — GABAPENTIN 250 MG: 250 SUSPENSION ORAL at 18:16

## 2019-01-01 RX ADMIN — NICOTINE 14 MG: 14 PATCH, EXTENDED RELEASE TRANSDERMAL at 05:29

## 2019-01-01 RX ADMIN — AMLODIPINE BESYLATE 10 MG: 10 TABLET ORAL at 04:46

## 2019-01-01 RX ADMIN — AMLODIPINE BESYLATE 10 MG: 10 TABLET ORAL at 06:22

## 2019-01-01 RX ADMIN — GABAPENTIN 250 MG: 250 SUSPENSION ORAL at 05:14

## 2019-01-01 RX ADMIN — LISINOPRIL 20 MG: 20 TABLET ORAL at 05:53

## 2019-01-01 RX ADMIN — NICOTINE 21 MG: 21 PATCH, EXTENDED RELEASE TRANSDERMAL at 05:02

## 2019-01-01 RX ADMIN — SODIUM CHLORIDE 500 ML: 9 INJECTION, SOLUTION INTRAVENOUS at 13:21

## 2019-01-01 RX ADMIN — OXYCODONE HYDROCHLORIDE 15 MG: 5 SOLUTION ORAL at 15:27

## 2019-01-01 RX ADMIN — Medication: at 06:26

## 2019-01-01 RX ADMIN — OXYCODONE HYDROCHLORIDE 15 MG: 5 SOLUTION ORAL at 11:19

## 2019-01-01 RX ADMIN — Medication: at 17:59

## 2019-01-01 RX ADMIN — NICOTINE 21 MG: 21 PATCH, EXTENDED RELEASE TRANSDERMAL at 05:20

## 2019-01-01 RX ADMIN — Medication: at 17:26

## 2019-01-01 RX ADMIN — ENOXAPARIN SODIUM 60 MG: 100 INJECTION SUBCUTANEOUS at 17:19

## 2019-01-01 RX ADMIN — MORPHINE SULFATE 4 MG: 4 INJECTION INTRAVENOUS at 03:19

## 2019-01-01 RX ADMIN — GABAPENTIN 250 MG: 250 SUSPENSION ORAL at 17:45

## 2019-01-01 RX ADMIN — HYDROMORPHONE HYDROCHLORIDE 1 MG: 1 INJECTION, SOLUTION INTRAMUSCULAR; INTRAVENOUS; SUBCUTANEOUS at 08:42

## 2019-01-01 RX ADMIN — AMLODIPINE BESYLATE 10 MG: 10 TABLET ORAL at 05:39

## 2019-01-01 RX ADMIN — OXYCODONE HYDROCHLORIDE 15 MG: 5 SOLUTION ORAL at 02:46

## 2019-01-01 RX ADMIN — AMLODIPINE BESYLATE 10 MG: 10 TABLET ORAL at 06:12

## 2019-01-01 RX ADMIN — POTASSIUM CHLORIDE 10 MEQ: 10 INJECTION, SOLUTION INTRAVENOUS at 13:17

## 2019-01-01 RX ADMIN — IOHEXOL 90 ML: 350 INJECTION, SOLUTION INTRAVENOUS at 10:45

## 2019-01-01 RX ADMIN — APIXABAN 5 MG: 5 TABLET, FILM COATED ORAL at 17:55

## 2019-01-01 RX ADMIN — HYDROMORPHONE HYDROCHLORIDE 1 MG: 1 INJECTION, SOLUTION INTRAMUSCULAR; INTRAVENOUS; SUBCUTANEOUS at 18:45

## 2019-01-01 RX ADMIN — NICOTINE 21 MG: 21 PATCH, EXTENDED RELEASE TRANSDERMAL at 05:08

## 2019-01-01 RX ADMIN — HYDROMORPHONE HYDROCHLORIDE 1 MG: 1 INJECTION, SOLUTION INTRAMUSCULAR; INTRAVENOUS; SUBCUTANEOUS at 02:30

## 2019-01-01 RX ADMIN — GABAPENTIN 250 MG: 250 SUSPENSION ORAL at 12:00

## 2019-01-01 RX ADMIN — OXYCODONE HYDROCHLORIDE 15 MG: 5 SOLUTION ORAL at 15:18

## 2019-01-01 RX ADMIN — NICOTINE 21 MG: 21 PATCH, EXTENDED RELEASE TRANSDERMAL at 06:00

## 2019-01-01 RX ADMIN — HYDRALAZINE HYDROCHLORIDE 10 MG: 20 INJECTION INTRAMUSCULAR; INTRAVENOUS at 04:42

## 2019-01-01 RX ADMIN — NICOTINE 21 MG: 21 PATCH, EXTENDED RELEASE TRANSDERMAL at 05:38

## 2019-01-01 RX ADMIN — MORPHINE SULFATE 6 MG: 4 INJECTION INTRAVENOUS at 16:48

## 2019-01-01 RX ADMIN — ENOXAPARIN SODIUM 60 MG: 100 INJECTION SUBCUTANEOUS at 17:28

## 2019-01-01 RX ADMIN — OXYCODONE HYDROCHLORIDE 15 MG: 5 SOLUTION ORAL at 00:42

## 2019-01-01 RX ADMIN — ENOXAPARIN SODIUM 60 MG: 100 INJECTION SUBCUTANEOUS at 18:07

## 2019-01-01 RX ADMIN — POLYETHYLENE GLYCOL 3350 1 PACKET: 17 POWDER, FOR SOLUTION ORAL at 15:09

## 2019-01-01 RX ADMIN — DEXAMETHASONE 4 MG: 4 TABLET ORAL at 18:08

## 2019-01-01 RX ADMIN — MORPHINE SULFATE 4 MG: 4 INJECTION INTRAVENOUS at 19:46

## 2019-01-01 RX ADMIN — NICOTINE 21 MG: 21 PATCH, EXTENDED RELEASE TRANSDERMAL at 05:00

## 2019-01-01 RX ADMIN — SODIUM CHLORIDE: 9 INJECTION, SOLUTION INTRAVENOUS at 14:45

## 2019-01-01 RX ADMIN — OXYCODONE HYDROCHLORIDE 15 MG: 5 SOLUTION ORAL at 19:07

## 2019-01-01 RX ADMIN — DEXAMETHASONE 4 MG: 4 TABLET ORAL at 18:34

## 2019-01-01 RX ADMIN — OXYCODONE HYDROCHLORIDE 15 MG: 5 SOLUTION ORAL at 09:51

## 2019-01-01 RX ADMIN — IPRATROPIUM BROMIDE AND ALBUTEROL SULFATE 3 ML: .5; 3 SOLUTION RESPIRATORY (INHALATION) at 15:10

## 2019-01-01 RX ADMIN — OXYCODONE HYDROCHLORIDE 15 MG: 5 SOLUTION ORAL at 07:43

## 2019-01-01 RX ADMIN — AMLODIPINE BESYLATE 10 MG: 10 TABLET ORAL at 06:15

## 2019-01-01 RX ADMIN — OXYCODONE HYDROCHLORIDE 15 MG: 5 SOLUTION ORAL at 14:59

## 2019-01-01 RX ADMIN — FUROSEMIDE 40 MG: 10 INJECTION, SOLUTION INTRAMUSCULAR; INTRAVENOUS at 12:33

## 2019-01-01 RX ADMIN — ENOXAPARIN SODIUM 60 MG: 100 INJECTION SUBCUTANEOUS at 04:47

## 2019-01-01 RX ADMIN — NICOTINE 14 MG: 14 PATCH, EXTENDED RELEASE TRANSDERMAL at 06:02

## 2019-01-01 RX ADMIN — GABAPENTIN 250 MG: 250 SUSPENSION ORAL at 11:59

## 2019-01-01 RX ADMIN — HYDROMORPHONE HYDROCHLORIDE 1 MG: 1 INJECTION, SOLUTION INTRAMUSCULAR; INTRAVENOUS; SUBCUTANEOUS at 15:34

## 2019-01-01 RX ADMIN — Medication: at 18:30

## 2019-01-01 RX ADMIN — SODIUM CHLORIDE: 9 INJECTION, SOLUTION INTRAVENOUS at 00:32

## 2019-01-01 RX ADMIN — ONDANSETRON 4 MG: 2 INJECTION INTRAMUSCULAR; INTRAVENOUS at 09:14

## 2019-01-01 RX ADMIN — OXYCODONE HYDROCHLORIDE 15 MG: 5 SOLUTION ORAL at 05:38

## 2019-01-01 RX ADMIN — MORPHINE SULFATE 4 MG: 4 INJECTION INTRAVENOUS at 02:37

## 2019-01-01 RX ADMIN — NICOTINE 21 MG: 21 PATCH, EXTENDED RELEASE TRANSDERMAL at 05:21

## 2019-01-01 RX ADMIN — OXYCODONE HYDROCHLORIDE 5 MG: 5 SOLUTION ORAL at 08:36

## 2019-01-01 RX ADMIN — OXYCODONE HYDROCHLORIDE 15 MG: 5 SOLUTION ORAL at 06:12

## 2019-01-01 RX ADMIN — POLYETHYLENE GLYCOL 3350 1 PACKET: 17 POWDER, FOR SOLUTION ORAL at 17:21

## 2019-01-01 RX ADMIN — HYDRALAZINE HYDROCHLORIDE 10 MG: 20 INJECTION INTRAMUSCULAR; INTRAVENOUS at 11:46

## 2019-01-01 RX ADMIN — CEFAZOLIN SODIUM 2 G: 2 INJECTION, SOLUTION INTRAVENOUS at 13:58

## 2019-01-01 RX ADMIN — DEXAMETHASONE 4 MG: 4 TABLET ORAL at 10:03

## 2019-01-01 RX ADMIN — ENOXAPARIN SODIUM 60 MG: 100 INJECTION SUBCUTANEOUS at 17:06

## 2019-01-01 RX ADMIN — HYDROMORPHONE HYDROCHLORIDE 1 MG: 1 INJECTION, SOLUTION INTRAMUSCULAR; INTRAVENOUS; SUBCUTANEOUS at 09:55

## 2019-01-01 RX ADMIN — MORPHINE SULFATE 6 MG: 4 INJECTION INTRAVENOUS at 10:41

## 2019-01-01 RX ADMIN — HYDROMORPHONE HYDROCHLORIDE 1 MG: 1 INJECTION, SOLUTION INTRAMUSCULAR; INTRAVENOUS; SUBCUTANEOUS at 14:21

## 2019-01-01 RX ADMIN — ENOXAPARIN SODIUM 60 MG: 100 INJECTION SUBCUTANEOUS at 17:55

## 2019-01-01 RX ADMIN — PIPERACILLIN AND TAZOBACTAM 4.5 G: 4; .5 INJECTION, POWDER, LYOPHILIZED, FOR SOLUTION INTRAVENOUS; PARENTERAL at 12:50

## 2019-01-01 RX ADMIN — Medication: at 17:55

## 2019-01-01 RX ADMIN — POLYETHYLENE GLYCOL 3350 1 PACKET: 17 POWDER, FOR SOLUTION ORAL at 17:24

## 2019-01-01 RX ADMIN — FENTANYL 1 PATCH: 75 PATCH, EXTENDED RELEASE TRANSDERMAL at 08:03

## 2019-01-01 RX ADMIN — ZOLPIDEM TARTRATE 10 MG: 5 TABLET ORAL at 23:30

## 2019-01-01 RX ADMIN — SODIUM CHLORIDE, SODIUM LACTATE, POTASSIUM CHLORIDE, CALCIUM CHLORIDE: 600; 310; 30; 20 INJECTION, SOLUTION INTRAVENOUS at 13:45

## 2019-01-01 RX ADMIN — METOCLOPRAMIDE 10 MG: 5 INJECTION, SOLUTION INTRAMUSCULAR; INTRAVENOUS at 11:02

## 2019-01-01 RX ADMIN — Medication: at 06:22

## 2019-01-01 RX ADMIN — LIDOCAINE HYDROCHLORIDE: 10 INJECTION, SOLUTION INFILTRATION; PERINEURAL at 16:30

## 2019-01-01 RX ADMIN — MORPHINE SULFATE 6 MG: 4 INJECTION INTRAVENOUS at 08:17

## 2019-01-01 RX ADMIN — FENTANYL 1 PATCH: 75 PATCH, EXTENDED RELEASE TRANSDERMAL at 10:53

## 2019-01-01 RX ADMIN — METOCLOPRAMIDE 10 MG: 5 INJECTION, SOLUTION INTRAMUSCULAR; INTRAVENOUS at 06:04

## 2019-01-01 RX ADMIN — GABAPENTIN 250 MG: 250 SUSPENSION ORAL at 12:35

## 2019-01-01 RX ADMIN — OXYCODONE HYDROCHLORIDE 15 MG: 5 SOLUTION ORAL at 03:49

## 2019-01-01 RX ADMIN — Medication: at 18:26

## 2019-01-01 RX ADMIN — HYDROMORPHONE HYDROCHLORIDE 1 MG: 1 INJECTION, SOLUTION INTRAMUSCULAR; INTRAVENOUS; SUBCUTANEOUS at 22:43

## 2019-01-01 RX ADMIN — METOCLOPRAMIDE 10 MG: 5 INJECTION, SOLUTION INTRAMUSCULAR; INTRAVENOUS at 18:13

## 2019-01-01 RX ADMIN — Medication: at 04:41

## 2019-01-01 RX ADMIN — SODIUM CHLORIDE: 9 INJECTION, SOLUTION INTRAVENOUS at 05:37

## 2019-01-01 RX ADMIN — OXYCODONE HYDROCHLORIDE 15 MG: 5 SOLUTION ORAL at 08:45

## 2019-01-01 RX ADMIN — DEXAMETHASONE 4 MG: 4 TABLET ORAL at 17:51

## 2019-01-01 RX ADMIN — LISINOPRIL 10 MG: 10 TABLET ORAL at 05:58

## 2019-01-01 RX ADMIN — POTASSIUM CHLORIDE 10 MEQ: 10 INJECTION, SOLUTION INTRAVENOUS at 10:59

## 2019-01-01 RX ADMIN — OXYCODONE HYDROCHLORIDE 15 MG: 5 SOLUTION ORAL at 19:14

## 2019-01-01 RX ADMIN — METOCLOPRAMIDE 10 MG: 5 INJECTION, SOLUTION INTRAMUSCULAR; INTRAVENOUS at 00:22

## 2019-01-01 RX ADMIN — NICOTINE 21 MG: 21 PATCH, EXTENDED RELEASE TRANSDERMAL at 04:52

## 2019-01-01 RX ADMIN — DEXAMETHASONE 4 MG: 4 TABLET ORAL at 18:11

## 2019-01-01 RX ADMIN — NICOTINE 14 MG: 14 PATCH, EXTENDED RELEASE TRANSDERMAL at 06:51

## 2019-01-01 RX ADMIN — DEXAMETHASONE 4 MG: 4 TABLET ORAL at 18:21

## 2019-01-01 RX ADMIN — CEFTRIAXONE SODIUM 2 G: 2 INJECTION, POWDER, FOR SOLUTION INTRAMUSCULAR; INTRAVENOUS at 13:17

## 2019-01-01 RX ADMIN — DULOXETINE HYDROCHLORIDE 20 MG: 20 CAPSULE, DELAYED RELEASE ORAL at 04:52

## 2019-01-01 RX ADMIN — ACETAMINOPHEN 650 MG: 325 TABLET, FILM COATED ORAL at 18:45

## 2019-01-01 RX ADMIN — ATROPA BELLADONNA AND OPIUM 30 MG: 16.2; 3 SUPPOSITORY RECTAL at 06:37

## 2019-01-01 RX ADMIN — ATROPA BELLADONNA AND OPIUM 30 MG: 16.2; 3 SUPPOSITORY RECTAL at 14:33

## 2019-01-01 RX ADMIN — ONDANSETRON 4 MG: 2 INJECTION INTRAMUSCULAR; INTRAVENOUS at 16:08

## 2019-01-01 RX ADMIN — METOCLOPRAMIDE 10 MG: 5 INJECTION, SOLUTION INTRAMUSCULAR; INTRAVENOUS at 17:16

## 2019-01-01 RX ADMIN — HYDROMORPHONE HYDROCHLORIDE 1 MG: 1 INJECTION, SOLUTION INTRAMUSCULAR; INTRAVENOUS; SUBCUTANEOUS at 12:31

## 2019-01-01 RX ADMIN — BISACODYL 10 MG: 10 SUPPOSITORY RECTAL at 06:37

## 2019-01-01 RX ADMIN — MORPHINE SULFATE 4 MG: 4 INJECTION INTRAVENOUS at 23:42

## 2019-01-01 RX ADMIN — MORPHINE SULFATE 4 MG: 4 INJECTION INTRAVENOUS at 04:42

## 2019-01-01 RX ADMIN — HYDROMORPHONE HYDROCHLORIDE 1 MG: 1 INJECTION, SOLUTION INTRAMUSCULAR; INTRAVENOUS; SUBCUTANEOUS at 13:51

## 2019-01-01 RX ADMIN — Medication: at 12:35

## 2019-01-01 RX ADMIN — ENOXAPARIN SODIUM 40 MG: 100 INJECTION SUBCUTANEOUS at 06:01

## 2019-01-01 RX ADMIN — MAGNESIUM SULFATE 2 G: 2 INJECTION INTRAVENOUS at 12:47

## 2019-01-01 RX ADMIN — ONDANSETRON 4 MG: 2 INJECTION INTRAMUSCULAR; INTRAVENOUS at 07:00

## 2019-01-01 RX ADMIN — DEXAMETHASONE 4 MG: 4 TABLET ORAL at 17:45

## 2019-01-01 RX ADMIN — NICOTINE 21 MG: 21 PATCH, EXTENDED RELEASE TRANSDERMAL at 06:30

## 2019-01-01 RX ADMIN — SODIUM CHLORIDE: 9 INJECTION, SOLUTION INTRAVENOUS at 03:56

## 2019-01-01 RX ADMIN — OXYCODONE HYDROCHLORIDE 15 MG: 5 SOLUTION ORAL at 10:31

## 2019-01-01 RX ADMIN — SODIUM CHLORIDE: 9 INJECTION, SOLUTION INTRAVENOUS at 20:12

## 2019-01-01 RX ADMIN — PIPERACILLIN AND TAZOBACTAM 4.5 G: 4; .5 INJECTION, POWDER, LYOPHILIZED, FOR SOLUTION INTRAVENOUS; PARENTERAL at 20:48

## 2019-01-01 RX ADMIN — PIPERACILLIN AND TAZOBACTAM 4.5 G: 4; .5 INJECTION, POWDER, LYOPHILIZED, FOR SOLUTION INTRAVENOUS; PARENTERAL at 05:36

## 2019-01-01 RX ADMIN — HYDROMORPHONE HYDROCHLORIDE 1 MG: 1 INJECTION, SOLUTION INTRAMUSCULAR; INTRAVENOUS; SUBCUTANEOUS at 09:15

## 2019-01-01 RX ADMIN — OXYCODONE HYDROCHLORIDE 15 MG: 5 SOLUTION ORAL at 18:05

## 2019-01-01 RX ADMIN — PIPERACILLIN AND TAZOBACTAM 4.5 G: 4; .5 INJECTION, POWDER, LYOPHILIZED, FOR SOLUTION INTRAVENOUS; PARENTERAL at 13:56

## 2019-01-01 RX ADMIN — SENNOSIDES AND DOCUSATE SODIUM 2 TABLET: 8.6; 5 TABLET ORAL at 04:42

## 2019-01-01 RX ADMIN — ONDANSETRON 4 MG: 2 INJECTION INTRAMUSCULAR; INTRAVENOUS at 17:21

## 2019-01-01 RX ADMIN — OXYCODONE HYDROCHLORIDE 15 MG: 5 SOLUTION ORAL at 14:02

## 2019-01-01 RX ADMIN — NICOTINE 21 MG: 21 PATCH, EXTENDED RELEASE TRANSDERMAL at 06:23

## 2019-01-01 RX ADMIN — LISINOPRIL 20 MG: 20 TABLET ORAL at 04:56

## 2019-01-01 RX ADMIN — SIMETHICONE CHEW TAB 80 MG 80 MG: 80 TABLET ORAL at 09:23

## 2019-01-01 RX ADMIN — HYDROMORPHONE HYDROCHLORIDE 1 MG: 1 INJECTION, SOLUTION INTRAMUSCULAR; INTRAVENOUS; SUBCUTANEOUS at 14:58

## 2019-01-01 RX ADMIN — Medication: at 05:20

## 2019-01-01 RX ADMIN — NICOTINE 21 MG: 21 PATCH, EXTENDED RELEASE TRANSDERMAL at 06:19

## 2019-01-01 RX ADMIN — SODIUM CHLORIDE: 9 INJECTION, SOLUTION INTRAVENOUS at 05:18

## 2019-01-01 RX ADMIN — ALBUTEROL SULFATE 2.5 MG: 2.5 SOLUTION RESPIRATORY (INHALATION) at 10:33

## 2019-01-01 RX ADMIN — HYDROMORPHONE HYDROCHLORIDE 1 MG: 1 INJECTION, SOLUTION INTRAMUSCULAR; INTRAVENOUS; SUBCUTANEOUS at 07:32

## 2019-01-01 RX ADMIN — MORPHINE SULFATE 4 MG: 4 INJECTION INTRAVENOUS at 17:30

## 2019-01-01 RX ADMIN — OXYCODONE HYDROCHLORIDE 15 MG: 5 SOLUTION ORAL at 08:51

## 2019-01-01 RX ADMIN — PIPERACILLIN AND TAZOBACTAM 4.5 G: 4; .5 INJECTION, POWDER, LYOPHILIZED, FOR SOLUTION INTRAVENOUS; PARENTERAL at 15:34

## 2019-01-01 RX ADMIN — MORPHINE SULFATE 4 MG: 4 INJECTION INTRAVENOUS at 20:33

## 2019-01-01 RX ADMIN — AMLODIPINE BESYLATE 10 MG: 10 TABLET ORAL at 05:22

## 2019-01-01 RX ADMIN — OXYCODONE HYDROCHLORIDE 15 MG: 5 SOLUTION ORAL at 12:31

## 2019-01-01 RX ADMIN — METOCLOPRAMIDE 10 MG: 5 INJECTION, SOLUTION INTRAMUSCULAR; INTRAVENOUS at 23:13

## 2019-01-01 RX ADMIN — OXYCODONE HYDROCHLORIDE 15 MG: 5 SOLUTION ORAL at 22:45

## 2019-01-01 RX ADMIN — GABAPENTIN 250 MG: 250 SUSPENSION ORAL at 04:46

## 2019-01-01 RX ADMIN — DIPHENHYDRAMINE HCL 25 MG: 25 TABLET ORAL at 23:26

## 2019-01-01 RX ADMIN — AMLODIPINE BESYLATE 10 MG: 10 TABLET ORAL at 06:11

## 2019-01-01 RX ADMIN — Medication: at 06:51

## 2019-01-01 RX ADMIN — POLYETHYLENE GLYCOL 3350 1 PACKET: 17 POWDER, FOR SOLUTION ORAL at 05:31

## 2019-01-01 RX ADMIN — POTASSIUM CHLORIDE 10 MEQ: 10 INJECTION, SOLUTION INTRAVENOUS at 12:09

## 2019-01-01 RX ADMIN — IPRATROPIUM BROMIDE AND ALBUTEROL SULFATE 3 ML: .5; 3 SOLUTION RESPIRATORY (INHALATION) at 08:25

## 2019-01-01 RX ADMIN — POLYETHYLENE GLYCOL 3350 1 PACKET: 17 POWDER, FOR SOLUTION ORAL at 05:32

## 2019-01-01 RX ADMIN — FENTANYL 1 PATCH: 75 PATCH, EXTENDED RELEASE TRANSDERMAL at 09:30

## 2019-01-01 RX ADMIN — MORPHINE SULFATE 4 MG: 4 INJECTION INTRAVENOUS at 00:51

## 2019-01-01 RX ADMIN — OXYCODONE HYDROCHLORIDE 15 MG: 5 SOLUTION ORAL at 20:10

## 2019-01-01 RX ADMIN — MORPHINE SULFATE 4 MG: 4 INJECTION INTRAVENOUS at 22:11

## 2019-01-01 RX ADMIN — SODIUM CHLORIDE 500 ML: 9 INJECTION, SOLUTION INTRAVENOUS at 09:47

## 2019-01-01 RX ADMIN — DULOXETINE HYDROCHLORIDE 20 MG: 20 CAPSULE, DELAYED RELEASE ORAL at 05:14

## 2019-01-01 RX ADMIN — HYDROMORPHONE HYDROCHLORIDE 1 MG: 1 INJECTION, SOLUTION INTRAMUSCULAR; INTRAVENOUS; SUBCUTANEOUS at 06:18

## 2019-01-01 RX ADMIN — BISACODYL 10 MG: 10 SUPPOSITORY RECTAL at 04:58

## 2019-01-01 RX ADMIN — GABAPENTIN 250 MG: 250 SUSPENSION ORAL at 06:44

## 2019-01-01 RX ADMIN — AMLODIPINE BESYLATE 10 MG: 10 TABLET ORAL at 05:01

## 2019-01-01 RX ADMIN — OXYCODONE HYDROCHLORIDE 15 MG: 5 SOLUTION ORAL at 02:14

## 2019-01-01 RX ADMIN — MORPHINE SULFATE 4 MG: 4 INJECTION INTRAVENOUS at 14:00

## 2019-01-01 RX ADMIN — Medication: at 17:28

## 2019-01-01 RX ADMIN — AMLODIPINE BESYLATE 10 MG: 10 TABLET ORAL at 05:36

## 2019-01-01 RX ADMIN — ENOXAPARIN SODIUM 60 MG: 100 INJECTION SUBCUTANEOUS at 17:54

## 2019-01-01 RX ADMIN — ALBUTEROL SULFATE 2.5 MG: 2.5 SOLUTION RESPIRATORY (INHALATION) at 13:18

## 2019-01-01 RX ADMIN — ENOXAPARIN SODIUM 60 MG: 100 INJECTION SUBCUTANEOUS at 18:10

## 2019-01-01 RX ADMIN — MORPHINE SULFATE 4 MG: 4 INJECTION INTRAVENOUS at 15:38

## 2019-01-01 RX ADMIN — HYDROMORPHONE HYDROCHLORIDE 1 MG: 1 INJECTION, SOLUTION INTRAMUSCULAR; INTRAVENOUS; SUBCUTANEOUS at 19:44

## 2019-01-01 RX ADMIN — HYDROMORPHONE HYDROCHLORIDE 1 MG: 1 INJECTION, SOLUTION INTRAMUSCULAR; INTRAVENOUS; SUBCUTANEOUS at 19:31

## 2019-01-01 RX ADMIN — HYDROMORPHONE HYDROCHLORIDE 1 MG: 1 INJECTION, SOLUTION INTRAMUSCULAR; INTRAVENOUS; SUBCUTANEOUS at 20:48

## 2019-01-01 RX ADMIN — DEXAMETHASONE 4 MG: 4 TABLET ORAL at 09:09

## 2019-01-01 RX ADMIN — NICOTINE 21 MG: 21 PATCH, EXTENDED RELEASE TRANSDERMAL at 05:41

## 2019-01-01 RX ADMIN — DEXAMETHASONE 4 MG: 4 TABLET ORAL at 18:33

## 2019-01-01 RX ADMIN — HYDROMORPHONE HYDROCHLORIDE 1 MG: 1 INJECTION, SOLUTION INTRAMUSCULAR; INTRAVENOUS; SUBCUTANEOUS at 18:27

## 2019-01-01 RX ADMIN — MORPHINE SULFATE 4 MG: 4 INJECTION INTRAVENOUS at 09:49

## 2019-01-01 RX ADMIN — MORPHINE SULFATE 4 MG: 4 INJECTION INTRAVENOUS at 02:29

## 2019-01-01 RX ADMIN — GABAPENTIN 250 MG: 250 SUSPENSION ORAL at 06:51

## 2019-01-01 RX ADMIN — GABAPENTIN 250 MG: 250 SUSPENSION ORAL at 12:57

## 2019-01-01 RX ADMIN — POLYETHYLENE GLYCOL 3350 1 PACKET: 17 POWDER, FOR SOLUTION ORAL at 18:44

## 2019-01-01 RX ADMIN — ENOXAPARIN SODIUM 60 MG: 100 INJECTION SUBCUTANEOUS at 06:33

## 2019-01-01 RX ADMIN — HYDROMORPHONE HYDROCHLORIDE 1 MG: 1 INJECTION, SOLUTION INTRAMUSCULAR; INTRAVENOUS; SUBCUTANEOUS at 07:12

## 2019-01-01 RX ADMIN — OXYCODONE HYDROCHLORIDE 5 MG: 5 SOLUTION ORAL at 10:47

## 2019-01-01 RX ADMIN — ENOXAPARIN SODIUM 60 MG: 100 INJECTION SUBCUTANEOUS at 17:52

## 2019-01-01 RX ADMIN — Medication: at 11:42

## 2019-01-01 RX ADMIN — DEXAMETHASONE 4 MG: 4 TABLET ORAL at 10:07

## 2019-01-01 RX ADMIN — GABAPENTIN 250 MG: 250 SUSPENSION ORAL at 12:24

## 2019-01-01 RX ADMIN — CEFAZOLIN SODIUM 2 G: 2 INJECTION, SOLUTION INTRAVENOUS at 22:02

## 2019-01-01 RX ADMIN — DEXAMETHASONE 4 MG: 4 TABLET ORAL at 17:22

## 2019-01-01 RX ADMIN — Medication: at 17:54

## 2019-01-01 RX ADMIN — SODIUM CHLORIDE: 9 INJECTION, SOLUTION INTRAVENOUS at 11:20

## 2019-01-01 RX ADMIN — SODIUM CHLORIDE 500 ML: 9 INJECTION, SOLUTION INTRAVENOUS at 18:12

## 2019-01-01 RX ADMIN — ONDANSETRON 4 MG: 2 INJECTION INTRAMUSCULAR; INTRAVENOUS at 20:18

## 2019-01-01 RX ADMIN — DEXAMETHASONE 4 MG: 4 TABLET ORAL at 09:43

## 2019-01-01 RX ADMIN — GABAPENTIN 250 MG: 250 SUSPENSION ORAL at 06:20

## 2019-01-01 RX ADMIN — NICOTINE 21 MG: 21 PATCH, EXTENDED RELEASE TRANSDERMAL at 05:03

## 2019-01-01 RX ADMIN — METOCLOPRAMIDE 10 MG: 5 INJECTION, SOLUTION INTRAMUSCULAR; INTRAVENOUS at 11:20

## 2019-01-01 RX ADMIN — HYDROMORPHONE HYDROCHLORIDE 1 MG: 1 INJECTION, SOLUTION INTRAMUSCULAR; INTRAVENOUS; SUBCUTANEOUS at 22:42

## 2019-01-01 RX ADMIN — FENTANYL 1 PATCH: 100 PATCH, EXTENDED RELEASE TRANSDERMAL at 09:05

## 2019-01-01 RX ADMIN — HYDROMORPHONE HYDROCHLORIDE 1 MG: 1 INJECTION, SOLUTION INTRAMUSCULAR; INTRAVENOUS; SUBCUTANEOUS at 17:38

## 2019-01-01 RX ADMIN — PIPERACILLIN AND TAZOBACTAM 4.5 G: 4; .5 INJECTION, POWDER, LYOPHILIZED, FOR SOLUTION INTRAVENOUS; PARENTERAL at 21:35

## 2019-01-01 RX ADMIN — OXYCODONE HYDROCHLORIDE 15 MG: 5 SOLUTION ORAL at 16:02

## 2019-01-01 RX ADMIN — NICOTINE 21 MG: 21 PATCH, EXTENDED RELEASE TRANSDERMAL at 05:44

## 2019-01-01 RX ADMIN — OXYCODONE HYDROCHLORIDE 15 MG: 5 SOLUTION ORAL at 09:23

## 2019-01-01 RX ADMIN — GABAPENTIN 250 MG: 250 SUSPENSION ORAL at 12:42

## 2019-01-01 RX ADMIN — ENOXAPARIN SODIUM 60 MG: 100 INJECTION SUBCUTANEOUS at 04:51

## 2019-01-01 RX ADMIN — SODIUM CHLORIDE: 9 INJECTION, SOLUTION INTRAVENOUS at 20:45

## 2019-01-01 RX ADMIN — GABAPENTIN 250 MG: 250 SUSPENSION ORAL at 04:41

## 2019-01-01 RX ADMIN — POTASSIUM CHLORIDE 10 MEQ: 10 INJECTION, SOLUTION INTRAVENOUS at 14:57

## 2019-01-01 RX ADMIN — PIPERACILLIN AND TAZOBACTAM 4.5 G: 4; .5 INJECTION, POWDER, LYOPHILIZED, FOR SOLUTION INTRAVENOUS; PARENTERAL at 05:03

## 2019-01-01 RX ADMIN — FUROSEMIDE 20 MG: 20 TABLET ORAL at 06:16

## 2019-01-01 RX ADMIN — MORPHINE SULFATE 4 MG: 4 INJECTION INTRAVENOUS at 20:13

## 2019-01-01 RX ADMIN — ENOXAPARIN SODIUM 60 MG: 100 INJECTION SUBCUTANEOUS at 05:29

## 2019-01-01 RX ADMIN — Medication: at 05:11

## 2019-01-01 RX ADMIN — OXYCODONE HYDROCHLORIDE 15 MG: 5 SOLUTION ORAL at 10:05

## 2019-01-01 RX ADMIN — MORPHINE SULFATE 6 MG: 4 INJECTION INTRAVENOUS at 12:41

## 2019-01-01 RX ADMIN — DEXAMETHASONE 4 MG: 4 TABLET ORAL at 07:38

## 2019-01-01 RX ADMIN — OXYCODONE HYDROCHLORIDE 15 MG: 5 SOLUTION ORAL at 23:23

## 2019-01-01 RX ADMIN — HYDROMORPHONE HYDROCHLORIDE 1 MG: 1 INJECTION, SOLUTION INTRAMUSCULAR; INTRAVENOUS; SUBCUTANEOUS at 09:12

## 2019-01-01 RX ADMIN — MORPHINE SULFATE 4 MG: 4 INJECTION INTRAVENOUS at 21:34

## 2019-01-01 RX ADMIN — OXYCODONE HYDROCHLORIDE 15 MG: 5 SOLUTION ORAL at 00:40

## 2019-01-01 RX ADMIN — GABAPENTIN 250 MG: 250 SUSPENSION ORAL at 18:08

## 2019-01-01 RX ADMIN — OXYCODONE HYDROCHLORIDE 15 MG: 5 SOLUTION ORAL at 21:44

## 2019-01-01 RX ADMIN — OXYCODONE HYDROCHLORIDE 15 MG: 5 SOLUTION ORAL at 23:44

## 2019-01-01 RX ADMIN — MORPHINE SULFATE 4 MG: 4 INJECTION INTRAVENOUS at 01:41

## 2019-01-01 RX ADMIN — LISINOPRIL 20 MG: 20 TABLET ORAL at 05:30

## 2019-01-01 RX ADMIN — Medication: at 06:44

## 2019-01-01 RX ADMIN — NICOTINE 14 MG: 14 PATCH, EXTENDED RELEASE TRANSDERMAL at 04:46

## 2019-01-01 RX ADMIN — OXYCODONE HYDROCHLORIDE 15 MG: 5 SOLUTION ORAL at 11:13

## 2019-01-01 RX ADMIN — AMLODIPINE BESYLATE 10 MG: 10 TABLET ORAL at 06:19

## 2019-01-01 RX ADMIN — SODIUM CHLORIDE SOLN NEBU 3% 3 ML: 3 NEBU SOLN at 11:45

## 2019-01-01 RX ADMIN — HYDROMORPHONE HYDROCHLORIDE 1 MG: 1 INJECTION, SOLUTION INTRAMUSCULAR; INTRAVENOUS; SUBCUTANEOUS at 22:30

## 2019-01-01 RX ADMIN — CEFAZOLIN SODIUM 2 G: 2 INJECTION, SOLUTION INTRAVENOUS at 21:22

## 2019-01-01 RX ADMIN — FUROSEMIDE 20 MG: 20 TABLET ORAL at 06:24

## 2019-01-01 RX ADMIN — HYDROMORPHONE HYDROCHLORIDE 1 MG: 1 INJECTION, SOLUTION INTRAMUSCULAR; INTRAVENOUS; SUBCUTANEOUS at 05:30

## 2019-01-01 RX ADMIN — ONDANSETRON 4 MG: 2 INJECTION INTRAMUSCULAR; INTRAVENOUS at 19:49

## 2019-01-01 RX ADMIN — Medication: at 09:06

## 2019-01-01 RX ADMIN — OXYCODONE HYDROCHLORIDE 15 MG: 5 SOLUTION ORAL at 22:51

## 2019-01-01 RX ADMIN — LISINOPRIL 10 MG: 10 TABLET ORAL at 09:00

## 2019-01-01 RX ADMIN — LISINOPRIL 10 MG: 10 TABLET ORAL at 06:32

## 2019-01-01 RX ADMIN — HYDROMORPHONE HYDROCHLORIDE 1 MG: 1 INJECTION, SOLUTION INTRAMUSCULAR; INTRAVENOUS; SUBCUTANEOUS at 15:55

## 2019-01-01 RX ADMIN — OXYCODONE HYDROCHLORIDE 15 MG: 5 SOLUTION ORAL at 20:01

## 2019-01-01 RX ADMIN — SODIUM CHLORIDE: 9 INJECTION, SOLUTION INTRAVENOUS at 03:10

## 2019-01-01 RX ADMIN — ENOXAPARIN SODIUM 60 MG: 100 INJECTION SUBCUTANEOUS at 17:22

## 2019-01-01 RX ADMIN — PIPERACILLIN AND TAZOBACTAM 4.5 G: 4; .5 INJECTION, POWDER, LYOPHILIZED, FOR SOLUTION INTRAVENOUS; PARENTERAL at 20:16

## 2019-01-01 RX ADMIN — Medication: at 12:44

## 2019-01-01 RX ADMIN — OXYCODONE HYDROCHLORIDE 5 MG: 5 SOLUTION ORAL at 22:59

## 2019-01-01 RX ADMIN — ENOXAPARIN SODIUM 60 MG: 100 INJECTION SUBCUTANEOUS at 18:44

## 2019-01-01 RX ADMIN — CEFAZOLIN SODIUM 2 G: 2 INJECTION, SOLUTION INTRAVENOUS at 13:26

## 2019-01-01 RX ADMIN — OXYCODONE HYDROCHLORIDE 15 MG: 5 SOLUTION ORAL at 10:45

## 2019-01-01 RX ADMIN — ENOXAPARIN SODIUM 60 MG: 100 INJECTION SUBCUTANEOUS at 06:23

## 2019-01-01 RX ADMIN — HALOPERIDOL LACTATE 1 MG: 5 INJECTION, SOLUTION INTRAMUSCULAR at 20:39

## 2019-01-01 RX ADMIN — DULOXETINE HYDROCHLORIDE 20 MG: 20 CAPSULE, DELAYED RELEASE ORAL at 06:11

## 2019-01-01 RX ADMIN — DEXAMETHASONE 4 MG: 4 TABLET ORAL at 09:23

## 2019-01-01 RX ADMIN — MORPHINE SULFATE 4 MG: 4 INJECTION INTRAVENOUS at 05:36

## 2019-01-01 RX ADMIN — OXYCODONE HYDROCHLORIDE 15 MG: 5 SOLUTION ORAL at 09:50

## 2019-01-01 RX ADMIN — SODIUM CHLORIDE: 9 INJECTION, SOLUTION INTRAVENOUS at 21:50

## 2019-01-01 RX ADMIN — ZOLPIDEM TARTRATE 10 MG: 5 TABLET ORAL at 23:33

## 2019-01-01 RX ADMIN — OXYCODONE HYDROCHLORIDE 15 MG: 5 SOLUTION ORAL at 06:09

## 2019-01-01 RX ADMIN — Medication: at 17:38

## 2019-01-01 RX ADMIN — SODIUM CHLORIDE: 9 INJECTION, SOLUTION INTRAVENOUS at 17:28

## 2019-01-01 RX ADMIN — MORPHINE SULFATE 4 MG: 4 INJECTION INTRAVENOUS at 06:55

## 2019-01-01 RX ADMIN — NICOTINE 21 MG: 21 PATCH, EXTENDED RELEASE TRANSDERMAL at 06:20

## 2019-01-01 RX ADMIN — OXYCODONE HYDROCHLORIDE 15 MG: 5 SOLUTION ORAL at 14:23

## 2019-01-01 RX ADMIN — ONDANSETRON 4 MG: 2 INJECTION INTRAMUSCULAR; INTRAVENOUS at 12:35

## 2019-01-01 RX ADMIN — OXYCODONE HYDROCHLORIDE 5 MG: 5 SOLUTION ORAL at 10:12

## 2019-01-01 RX ADMIN — NICOTINE 14 MG: 14 PATCH, EXTENDED RELEASE TRANSDERMAL at 05:41

## 2019-01-01 RX ADMIN — POLYETHYLENE GLYCOL 3350 1 PACKET: 17 POWDER, FOR SOLUTION ORAL at 05:20

## 2019-01-01 RX ADMIN — AMLODIPINE BESYLATE 10 MG: 10 TABLET ORAL at 05:07

## 2019-01-01 RX ADMIN — OXYCODONE HYDROCHLORIDE 15 MG: 5 SOLUTION ORAL at 12:05

## 2019-01-01 RX ADMIN — AMLODIPINE BESYLATE 10 MG: 10 TABLET ORAL at 05:18

## 2019-01-01 ASSESSMENT — ENCOUNTER SYMPTOMS
EYE PAIN: 0
SENSORY CHANGE: 0
PALPITATIONS: 0
MYALGIAS: 0
TREMORS: 0
CLAUDICATION: 0
DEPRESSION: 0
VOMITING: 0
COUGH: 0
NERVOUS/ANXIOUS: 1
BRUISES/BLEEDS EASILY: 0
EYE PAIN: 0
SPUTUM PRODUCTION: 0
DIARRHEA: 0
BACK PAIN: 1
DIARRHEA: 0
SHORTNESS OF BREATH: 0
FEVER: 0
BACK PAIN: 0
TREMORS: 0
SEIZURES: 0
NERVOUS/ANXIOUS: 0
BACK PAIN: 1
FOCAL WEAKNESS: 0
NERVOUS/ANXIOUS: 1
SORE THROAT: 0
PALPITATIONS: 0
NERVOUS/ANXIOUS: 0
WEAKNESS: 1
EYE DISCHARGE: 0
EYE PAIN: 0
FOCAL WEAKNESS: 0
ABDOMINAL PAIN: 0
DOUBLE VISION: 0
PHOTOPHOBIA: 0
DIZZINESS: 0
FLANK PAIN: 0
TREMORS: 0
HEARTBURN: 0
WHEEZING: 0
SHORTNESS OF BREATH: 0
SEIZURES: 0
WEAKNESS: 1
FEVER: 0
WHEEZING: 1
HEADACHES: 0
WEAKNESS: 1
HEADACHES: 0
DIZZINESS: 0
LOSS OF CONSCIOUSNESS: 0
BRUISES/BLEEDS EASILY: 0
TREMORS: 0
HEADACHES: 0
PALPITATIONS: 0
ABDOMINAL PAIN: 0
NECK PAIN: 0
SHORTNESS OF BREATH: 1
WEIGHT LOSS: 1
WEAKNESS: 1
DIZZINESS: 0
COUGH: 0
WEAKNESS: 1
ORTHOPNEA: 0
SPUTUM PRODUCTION: 0
ORTHOPNEA: 0
DIARRHEA: 0
DOUBLE VISION: 0
BLURRED VISION: 0
ABDOMINAL PAIN: 0
DIARRHEA: 0
EYE PAIN: 0
FEVER: 0
NAUSEA: 0
DIZZINESS: 0
DIARRHEA: 0
SENSORY CHANGE: 0
DEPRESSION: 0
SPEECH CHANGE: 0
DIARRHEA: 0
BRUISES/BLEEDS EASILY: 0
WEAKNESS: 1
WEAKNESS: 1
ROS GI COMMENTS: IMPROVED
NAUSEA: 1
EYE DISCHARGE: 0
PALPITATIONS: 0
WEAKNESS: 1
SPUTUM PRODUCTION: 0
NECK PAIN: 0
HEMOPTYSIS: 0
SENSORY CHANGE: 0
BACK PAIN: 0
CLAUDICATION: 0
DIZZINESS: 0
HEARTBURN: 0
BLURRED VISION: 0
SPEECH CHANGE: 0
TREMORS: 0
HEMOPTYSIS: 0
FEVER: 0
BACK PAIN: 1
SPUTUM PRODUCTION: 0
BACK PAIN: 0
WEAKNESS: 1
DIARRHEA: 0
WEIGHT LOSS: 1
DIARRHEA: 0
NECK PAIN: 0
SEIZURES: 0
HEARTBURN: 0
ABDOMINAL PAIN: 0
SPUTUM PRODUCTION: 0
BRUISES/BLEEDS EASILY: 0
TINGLING: 0
ABDOMINAL PAIN: 0
ABDOMINAL PAIN: 1
SPUTUM PRODUCTION: 0
WEIGHT LOSS: 1
CLAUDICATION: 0
HEADACHES: 0
CHILLS: 0
DIZZINESS: 0
NERVOUS/ANXIOUS: 0
CLAUDICATION: 0
ABDOMINAL PAIN: 1
BACK PAIN: 0
ABDOMINAL PAIN: 1
CHILLS: 0
WEAKNESS: 1
PHOTOPHOBIA: 0
DIARRHEA: 0
DIZZINESS: 0
LOSS OF CONSCIOUSNESS: 0
CONSTIPATION: 0
HEARTBURN: 0
WHEEZING: 0
WEIGHT LOSS: 1
FEVER: 0
VOMITING: 1
VOMITING: 0
PALPITATIONS: 0
VOMITING: 0
DIARRHEA: 0
TINGLING: 0
NECK PAIN: 0
COUGH: 1
PALPITATIONS: 0
EYE DISCHARGE: 0
CHILLS: 0
DOUBLE VISION: 0
PHOTOPHOBIA: 0
CHILLS: 0
SINUS PAIN: 0
WEAKNESS: 1
WEIGHT LOSS: 1
HEMOPTYSIS: 0
DEPRESSION: 1
COUGH: 0
FEVER: 0
HEMOPTYSIS: 0
EYE DISCHARGE: 0
EYE DISCHARGE: 0
COUGH: 0
DIZZINESS: 0
NECK PAIN: 0
DIARRHEA: 0
CHILLS: 0
FEVER: 0
DEPRESSION: 1
HEADACHES: 0
VOMITING: 0
DIAPHORESIS: 0
EYE PAIN: 0
CHILLS: 0
TREMORS: 0
FLANK PAIN: 0
BACK PAIN: 0
CLAUDICATION: 0
ORTHOPNEA: 0
CONSTIPATION: 1
VOMITING: 0
NECK PAIN: 0
TINGLING: 0
SORE THROAT: 0
DIARRHEA: 0
NERVOUS/ANXIOUS: 1
FLANK PAIN: 0
FEVER: 0
DIARRHEA: 0
FEVER: 0
HEMOPTYSIS: 0
FEVER: 0
SENSORY CHANGE: 0
HEARTBURN: 0
WEAKNESS: 1
HEADACHES: 0
BRUISES/BLEEDS EASILY: 0
TINGLING: 1
DEPRESSION: 0
DEPRESSION: 1
SENSORY CHANGE: 0
HEMOPTYSIS: 0
HEADACHES: 0
BACK PAIN: 1
NAUSEA: 0
BACK PAIN: 0
CHILLS: 0
STRIDOR: 0
TREMORS: 0
MYALGIAS: 0
DIARRHEA: 0
TINGLING: 0
COUGH: 1
TINGLING: 0
DIAPHORESIS: 0
CHILLS: 0
CHILLS: 0
PALPITATIONS: 0
DOUBLE VISION: 0
NECK PAIN: 0
ORTHOPNEA: 0
HEMOPTYSIS: 0
DIZZINESS: 0
WEAKNESS: 1
CLAUDICATION: 0
STRIDOR: 0
DIZZINESS: 0
DIAPHORESIS: 0
CHILLS: 0
FEVER: 0
SHORTNESS OF BREATH: 0
WEIGHT LOSS: 1
PALPITATIONS: 0
ORTHOPNEA: 0
DOUBLE VISION: 0
WEIGHT LOSS: 1
HEADACHES: 0
STRIDOR: 0
DIZZINESS: 0
WEIGHT LOSS: 1
WHEEZING: 0
NERVOUS/ANXIOUS: 0
SEIZURES: 0
PALPITATIONS: 0
DIARRHEA: 0
DIZZINESS: 0
DIZZINESS: 0
MYALGIAS: 1
ABDOMINAL PAIN: 0
WHEEZING: 1
PALPITATIONS: 0
CHILLS: 0
MYALGIAS: 0
WEAKNESS: 1
TINGLING: 0
BLURRED VISION: 0
VOMITING: 0
TREMORS: 0
DEPRESSION: 1
EYE PAIN: 0
TINGLING: 0
TINGLING: 0
PALPITATIONS: 0
BACK PAIN: 1
WEIGHT LOSS: 1
NAUSEA: 0
BACK PAIN: 0
ABDOMINAL PAIN: 1
DIZZINESS: 0
EYE REDNESS: 0
ABDOMINAL PAIN: 1
BACK PAIN: 1
EYE PAIN: 0
CONSTIPATION: 0
DEPRESSION: 0
BRUISES/BLEEDS EASILY: 0
CLAUDICATION: 0
HEMOPTYSIS: 0
SENSORY CHANGE: 0
WEAKNESS: 1
TREMORS: 0
WEAKNESS: 1
SHORTNESS OF BREATH: 0
TINGLING: 0
STRIDOR: 0
PHOTOPHOBIA: 0
EYE REDNESS: 0
ABDOMINAL PAIN: 1
BACK PAIN: 0
ABDOMINAL PAIN: 1
HEADACHES: 0
HEMOPTYSIS: 0
BRUISES/BLEEDS EASILY: 0
SENSORY CHANGE: 0
TREMORS: 0
WEIGHT LOSS: 1
NERVOUS/ANXIOUS: 0
HEADACHES: 0
HEADACHES: 0
BACK PAIN: 1
BLOOD IN STOOL: 0
SENSORY CHANGE: 0
WEAKNESS: 1
HEMOPTYSIS: 0
ABDOMINAL PAIN: 1
CHILLS: 0
BACK PAIN: 0
CLAUDICATION: 0
SORE THROAT: 0
SHORTNESS OF BREATH: 0
VOMITING: 0
EYE REDNESS: 0
WEAKNESS: 1
COUGH: 0
HEARTBURN: 0
BACK PAIN: 1
EYE DISCHARGE: 0
FOCAL WEAKNESS: 0
SHORTNESS OF BREATH: 0
BLURRED VISION: 0
COUGH: 1
TINGLING: 0
TINGLING: 0
NAUSEA: 0
DEPRESSION: 1
SPUTUM PRODUCTION: 0
BRUISES/BLEEDS EASILY: 0
COUGH: 0
SORE THROAT: 0
BACK PAIN: 0
MYALGIAS: 0
ABDOMINAL PAIN: 1
ABDOMINAL PAIN: 0
FEVER: 0
DEPRESSION: 1
DIARRHEA: 1
PALPITATIONS: 0
HEMOPTYSIS: 0
PALPITATIONS: 0
MYALGIAS: 0
COUGH: 1
MYALGIAS: 0
TREMORS: 0
COUGH: 1
NERVOUS/ANXIOUS: 1
CONSTIPATION: 0
PALPITATIONS: 0
VOMITING: 0
PALPITATIONS: 0
NAUSEA: 0
PHOTOPHOBIA: 0
VOMITING: 0
BLURRED VISION: 0
CHILLS: 0
DIARRHEA: 0
BACK PAIN: 0
BACK PAIN: 0
CHILLS: 0
BACK PAIN: 0
VOMITING: 0
MYALGIAS: 0
SPUTUM PRODUCTION: 0
WHEEZING: 0
DIZZINESS: 0
DIZZINESS: 0
PALPITATIONS: 0
HEARTBURN: 0
WEAKNESS: 1
VOMITING: 0
CONSTIPATION: 1
CLAUDICATION: 0
VOMITING: 0
SPEECH CHANGE: 0
DEPRESSION: 0
FEVER: 0
WHEEZING: 0
DIARRHEA: 0
NECK PAIN: 0
FEVER: 0
COUGH: 0
SPUTUM PRODUCTION: 0
SORE THROAT: 0
WEIGHT LOSS: 1
WHEEZING: 0
CLAUDICATION: 0
VOMITING: 0
CLAUDICATION: 0
HEADACHES: 0
ORTHOPNEA: 0
HEMOPTYSIS: 0
VOMITING: 1
SENSORY CHANGE: 0
ABDOMINAL PAIN: 1
SPUTUM PRODUCTION: 0
NAUSEA: 1
NECK PAIN: 0
NECK PAIN: 0
TREMORS: 0
HEMOPTYSIS: 0
PALPITATIONS: 0
NAUSEA: 0
ABDOMINAL PAIN: 1
DIZZINESS: 0
WEAKNESS: 1
MYALGIAS: 0
NAUSEA: 1
FEVER: 0
NERVOUS/ANXIOUS: 0
COUGH: 0
FEVER: 0
SENSORY CHANGE: 0
EYE PAIN: 0
HEARTBURN: 0
CHILLS: 0
ABDOMINAL PAIN: 1
ORTHOPNEA: 0
CHILLS: 0
SHORTNESS OF BREATH: 0
SPUTUM PRODUCTION: 0
PHOTOPHOBIA: 0
SPUTUM PRODUCTION: 0
FEVER: 0
HEADACHES: 0
MYALGIAS: 0
ABDOMINAL PAIN: 1
WEIGHT LOSS: 1
BRUISES/BLEEDS EASILY: 0
NECK PAIN: 0
WEAKNESS: 1
SINUS PAIN: 0
CONSTIPATION: 1
WEIGHT LOSS: 1
BLOOD IN STOOL: 0
SHORTNESS OF BREATH: 0
HEARTBURN: 0
HEARTBURN: 0
HEADACHES: 0
NAUSEA: 0
ORTHOPNEA: 0
FOCAL WEAKNESS: 0
HEMOPTYSIS: 0
SPUTUM PRODUCTION: 0
NECK PAIN: 0
HEADACHES: 0
CLAUDICATION: 0
HEMOPTYSIS: 0
WEIGHT LOSS: 1
EYE PAIN: 0
BLURRED VISION: 0
SHORTNESS OF BREATH: 0
ORTHOPNEA: 0
DEPRESSION: 0
SHORTNESS OF BREATH: 0
CHILLS: 0
COUGH: 0
TINGLING: 0
SPEECH CHANGE: 0
BACK PAIN: 0
BLURRED VISION: 0
BRUISES/BLEEDS EASILY: 0
SORE THROAT: 0
FEVER: 0
STRIDOR: 0
MYALGIAS: 1
TREMORS: 0
CONSTIPATION: 0
PHOTOPHOBIA: 0
VOMITING: 0
CHILLS: 0
BLURRED VISION: 0
ORTHOPNEA: 0
HEADACHES: 0
COUGH: 1
DEPRESSION: 0
NERVOUS/ANXIOUS: 1
EYE REDNESS: 0
ROS GI COMMENTS: IMPROVED
NERVOUS/ANXIOUS: 1
SHORTNESS OF BREATH: 0
VOMITING: 0
HEADACHES: 0
FEVER: 0
BLURRED VISION: 0
COUGH: 0
DOUBLE VISION: 0
DOUBLE VISION: 0
DIARRHEA: 0
FEVER: 0
DOUBLE VISION: 0
HEADACHES: 0
ORTHOPNEA: 0
VOMITING: 0
HEMOPTYSIS: 0
DIARRHEA: 0
DEPRESSION: 0
VOMITING: 0
SORE THROAT: 0
CHILLS: 0
VOMITING: 0
PALPITATIONS: 0
CONSTIPATION: 0
VOMITING: 1
NAUSEA: 1
FEVER: 0
BACK PAIN: 0
ABDOMINAL PAIN: 1
SORE THROAT: 1
WEAKNESS: 1
WEIGHT LOSS: 1
BACK PAIN: 0
TREMORS: 0
HEMOPTYSIS: 0
DIARRHEA: 1
WEIGHT LOSS: 1
COUGH: 0
COUGH: 1
HEMOPTYSIS: 0
CONSTIPATION: 0
VOMITING: 0
EYE REDNESS: 0
SHORTNESS OF BREATH: 0
SORE THROAT: 0
SHORTNESS OF BREATH: 0
WEAKNESS: 1
BACK PAIN: 0
DOUBLE VISION: 0
SPUTUM PRODUCTION: 0
ORTHOPNEA: 0
PALPITATIONS: 0
PALPITATIONS: 0
DIARRHEA: 0
STRIDOR: 0
SORE THROAT: 0
EYE REDNESS: 0
HEARTBURN: 0
HEARTBURN: 0
SPUTUM PRODUCTION: 0
BLOOD IN STOOL: 0
WEIGHT LOSS: 1
SPUTUM PRODUCTION: 0
ORTHOPNEA: 0
SHORTNESS OF BREATH: 0
COUGH: 0
HEARTBURN: 0
BLOOD IN STOOL: 0
SORE THROAT: 0
BRUISES/BLEEDS EASILY: 0
SPUTUM PRODUCTION: 1
SPUTUM PRODUCTION: 0
SPEECH CHANGE: 0
DEPRESSION: 0
DOUBLE VISION: 0
WHEEZING: 0
VOMITING: 1
FLANK PAIN: 0
HEARTBURN: 0
NECK PAIN: 0
SPUTUM PRODUCTION: 1
EYE DISCHARGE: 0
ABDOMINAL PAIN: 0
HEMOPTYSIS: 0
DOUBLE VISION: 0
CONSTIPATION: 1
SPUTUM PRODUCTION: 0
DIARRHEA: 0
MYALGIAS: 0
SEIZURES: 0
NAUSEA: 0
COUGH: 1
ABDOMINAL PAIN: 1
WHEEZING: 0
WEIGHT LOSS: 1
HEADACHES: 0
PALPITATIONS: 0
DIARRHEA: 0
WEAKNESS: 1
FLANK PAIN: 1
SEIZURES: 0
NECK PAIN: 0
SHORTNESS OF BREATH: 0
BACK PAIN: 1
BLURRED VISION: 0
BRUISES/BLEEDS EASILY: 0
NAUSEA: 0
HEADACHES: 0
DEPRESSION: 1
NECK PAIN: 0
SHORTNESS OF BREATH: 0
DIAPHORESIS: 0
FEVER: 0
TREMORS: 0
PALPITATIONS: 0
SORE THROAT: 0
HEMOPTYSIS: 0
NECK PAIN: 0
WEIGHT LOSS: 1
SENSORY CHANGE: 0
CLAUDICATION: 0
EYE REDNESS: 0
NECK PAIN: 0
SPEECH CHANGE: 0
DOUBLE VISION: 0
PALPITATIONS: 0
FOCAL WEAKNESS: 0
SPEECH CHANGE: 0
SPUTUM PRODUCTION: 0
MYALGIAS: 0
CHILLS: 0
BLURRED VISION: 0
NAUSEA: 0
CHILLS: 0
NECK PAIN: 0
SENSORY CHANGE: 0
HEADACHES: 0
SHORTNESS OF BREATH: 0
CONSTIPATION: 0
NAUSEA: 1
VOMITING: 0
NAUSEA: 0
CLAUDICATION: 0
BLOOD IN STOOL: 0
ROS GI COMMENTS: IMPROVED
EYE REDNESS: 0
VOMITING: 0
WHEEZING: 0
NECK PAIN: 0
BACK PAIN: 0
BLURRED VISION: 0
CHILLS: 0
FEVER: 0
VOMITING: 0
TREMORS: 0
FEVER: 0
COUGH: 0
EYE REDNESS: 0
WEAKNESS: 1
FEVER: 0
VOMITING: 0
BACK PAIN: 0
DIARRHEA: 0
COUGH: 1
SPEECH CHANGE: 0
DEPRESSION: 0
CHILLS: 0
ABDOMINAL PAIN: 0
NERVOUS/ANXIOUS: 0
FEVER: 0
DIARRHEA: 0
ABDOMINAL PAIN: 1
NECK PAIN: 0
NAUSEA: 1
SORE THROAT: 0
PALPITATIONS: 0
SPUTUM PRODUCTION: 1
DOUBLE VISION: 0
WEAKNESS: 1
CHILLS: 0
NECK PAIN: 0
NAUSEA: 0
EYE PAIN: 0
CHILLS: 0
FEVER: 0
DIZZINESS: 0
HEADACHES: 0
STRIDOR: 0
CLAUDICATION: 0
FEVER: 0
BACK PAIN: 0
DOUBLE VISION: 0
NAUSEA: 0
VOMITING: 0
TREMORS: 0
VOMITING: 0
PALPITATIONS: 0
LOSS OF CONSCIOUSNESS: 0
COUGH: 1
HEADACHES: 0
WEIGHT LOSS: 1
VOMITING: 0
CONSTIPATION: 0
NAUSEA: 0
SENSORY CHANGE: 0
SPUTUM PRODUCTION: 0
NAUSEA: 0
HEADACHES: 0
VOMITING: 0
MYALGIAS: 0
BRUISES/BLEEDS EASILY: 0
DIARRHEA: 1
MYALGIAS: 1
SORE THROAT: 0
BACK PAIN: 0
SORE THROAT: 0
SENSORY CHANGE: 0
CHILLS: 0
HEMOPTYSIS: 0
NERVOUS/ANXIOUS: 1
EYE PAIN: 0
ABDOMINAL PAIN: 0
BACK PAIN: 0
SENSORY CHANGE: 0
SENSORY CHANGE: 0
WEIGHT LOSS: 1
VOMITING: 0
DIZZINESS: 0
SHORTNESS OF BREATH: 0
HEARTBURN: 0
DIZZINESS: 0
CHILLS: 0
CHILLS: 0
TINGLING: 0
PHOTOPHOBIA: 0
SPUTUM PRODUCTION: 0
MYALGIAS: 0
NECK PAIN: 0
SHORTNESS OF BREATH: 0
COUGH: 0
SHORTNESS OF BREATH: 0
HEADACHES: 0
CHILLS: 0
BLURRED VISION: 0
SORE THROAT: 0
DEPRESSION: 0
NAUSEA: 0
HEARTBURN: 0
HEADACHES: 0
BRUISES/BLEEDS EASILY: 0
WEAKNESS: 1
SHORTNESS OF BREATH: 0
CHILLS: 0
VOMITING: 0
BRUISES/BLEEDS EASILY: 0
TINGLING: 0
COUGH: 0
PHOTOPHOBIA: 0
ABDOMINAL PAIN: 0
NECK PAIN: 0
WEIGHT LOSS: 1
HEARTBURN: 0
FEVER: 0
SHORTNESS OF BREATH: 0
FLANK PAIN: 0
WEAKNESS: 1
HEARTBURN: 0
TINGLING: 0
BACK PAIN: 0
NAUSEA: 0
SPEECH CHANGE: 0
BRUISES/BLEEDS EASILY: 0
PALPITATIONS: 0
CHILLS: 0
NAUSEA: 0
BLURRED VISION: 0
VOMITING: 0
TREMORS: 0
CLAUDICATION: 0
HEARTBURN: 0
DIZZINESS: 0
NERVOUS/ANXIOUS: 0
SENSORY CHANGE: 0
ORTHOPNEA: 0
BRUISES/BLEEDS EASILY: 0
NAUSEA: 0
NAUSEA: 0
COUGH: 0
SPEECH CHANGE: 0
CONSTIPATION: 1
ABDOMINAL PAIN: 0
SPEECH CHANGE: 0
WEIGHT LOSS: 1
BACK PAIN: 0
ABDOMINAL PAIN: 0
DIARRHEA: 0
DEPRESSION: 1
COUGH: 0
NECK PAIN: 0
DIZZINESS: 0
WEIGHT LOSS: 1
SHORTNESS OF BREATH: 0
STRIDOR: 0
HEADACHES: 0
BACK PAIN: 0
SENSORY CHANGE: 1
SHORTNESS OF BREATH: 1
HEARTBURN: 0
HEARTBURN: 0
NAUSEA: 0
ORTHOPNEA: 0
FOCAL WEAKNESS: 0
MYALGIAS: 1
COUGH: 0
ABDOMINAL PAIN: 0
CLAUDICATION: 0
SHORTNESS OF BREATH: 0
ORTHOPNEA: 0
VOMITING: 0
DOUBLE VISION: 0
LOSS OF CONSCIOUSNESS: 0
NAUSEA: 1
TINGLING: 0
WEAKNESS: 1
FEVER: 0
MYALGIAS: 0
EYE REDNESS: 0
ABDOMINAL PAIN: 1
WEIGHT LOSS: 1
NERVOUS/ANXIOUS: 0
BACK PAIN: 0
CHILLS: 0
HEMOPTYSIS: 0
NAUSEA: 1
WEAKNESS: 1
SPUTUM PRODUCTION: 0
COUGH: 0
SHORTNESS OF BREATH: 0
ABDOMINAL PAIN: 1
NAUSEA: 1
BRUISES/BLEEDS EASILY: 0
ABDOMINAL PAIN: 1
FEVER: 0
BACK PAIN: 0
DIZZINESS: 0
PHOTOPHOBIA: 0
BRUISES/BLEEDS EASILY: 0
EYE DISCHARGE: 0
FEVER: 0
HEADACHES: 0
FOCAL WEAKNESS: 0
BLURRED VISION: 0
DIZZINESS: 0
WEAKNESS: 1
SHORTNESS OF BREATH: 0
SORE THROAT: 0
MYALGIAS: 1
SORE THROAT: 0
FEVER: 0
SENSORY CHANGE: 0
ORTHOPNEA: 0
PHOTOPHOBIA: 0
SHORTNESS OF BREATH: 0
SHORTNESS OF BREATH: 0
ORTHOPNEA: 0
DIZZINESS: 0
BLURRED VISION: 0
PALPITATIONS: 0
VOMITING: 1
NERVOUS/ANXIOUS: 0
WEAKNESS: 1
MYALGIAS: 0
CHILLS: 0
SHORTNESS OF BREATH: 0
DEPRESSION: 1
BRUISES/BLEEDS EASILY: 0
BLURRED VISION: 0
WEIGHT LOSS: 1
DEPRESSION: 1
TREMORS: 0
COUGH: 0
SPEECH CHANGE: 0
HEADACHES: 0
HEMOPTYSIS: 0
COUGH: 0
NECK PAIN: 0
STRIDOR: 0
NAUSEA: 0
FEVER: 0
NECK PAIN: 0
FEVER: 0
TREMORS: 0
FEVER: 0
DIAPHORESIS: 0
NERVOUS/ANXIOUS: 0
WEAKNESS: 1
HEADACHES: 0
NERVOUS/ANXIOUS: 0
FEVER: 0
MYALGIAS: 0
NAUSEA: 0
TINGLING: 0
DEPRESSION: 1
NAUSEA: 1
CLAUDICATION: 0
SHORTNESS OF BREATH: 1
CONSTIPATION: 1
NAUSEA: 0
WEAKNESS: 1
PHOTOPHOBIA: 0
COUGH: 1
DIARRHEA: 0
BACK PAIN: 0
ORTHOPNEA: 0
TINGLING: 0
DIZZINESS: 0
CHILLS: 0
BLURRED VISION: 0
HEARTBURN: 0
EYE DISCHARGE: 0
BRUISES/BLEEDS EASILY: 0
EYE PAIN: 0
TINGLING: 0
PALPITATIONS: 0
PHOTOPHOBIA: 0
HEMOPTYSIS: 0
CHILLS: 0
TINGLING: 0
NERVOUS/ANXIOUS: 0
TREMORS: 0
ABDOMINAL PAIN: 1
SPUTUM PRODUCTION: 1
SHORTNESS OF BREATH: 0
CHILLS: 0
FEVER: 0
COUGH: 1
EYE DISCHARGE: 0
STRIDOR: 0
VOMITING: 0
DOUBLE VISION: 0
ABDOMINAL PAIN: 1
CONSTIPATION: 0
HEARTBURN: 0
NAUSEA: 0
FEVER: 0
DIAPHORESIS: 0
INSOMNIA: 1
TREMORS: 0
SHORTNESS OF BREATH: 0
ABDOMINAL PAIN: 1
WEIGHT LOSS: 1
DIARRHEA: 0
SINUS PAIN: 0
HEARTBURN: 0
WEIGHT LOSS: 1
SHORTNESS OF BREATH: 0
SPUTUM PRODUCTION: 0
FEVER: 0
STRIDOR: 0
SHORTNESS OF BREATH: 0
SPUTUM PRODUCTION: 0
FEVER: 0
WEAKNESS: 1
DEPRESSION: 0
COUGH: 0
DIZZINESS: 0
ROS GI COMMENTS: IMPROVED
MYALGIAS: 0
CHILLS: 0
ABDOMINAL PAIN: 0
TINGLING: 0
BRUISES/BLEEDS EASILY: 0
COUGH: 0
NECK PAIN: 0
PHOTOPHOBIA: 0
TINGLING: 0
PALPITATIONS: 0
EYE DISCHARGE: 0
NERVOUS/ANXIOUS: 1
PALPITATIONS: 0
WEIGHT LOSS: 1
SPEECH CHANGE: 0
NECK PAIN: 0
ORTHOPNEA: 0
SPEECH CHANGE: 0
SHORTNESS OF BREATH: 0
CLAUDICATION: 0
NAUSEA: 1
VOMITING: 0
SHORTNESS OF BREATH: 1
WHEEZING: 0
PALPITATIONS: 0
NERVOUS/ANXIOUS: 1
PHOTOPHOBIA: 0
WEIGHT LOSS: 1
COUGH: 0
SHORTNESS OF BREATH: 0
ABDOMINAL PAIN: 1
HEMOPTYSIS: 0
DEPRESSION: 0
HEADACHES: 0
HEADACHES: 0
COUGH: 1
HEMOPTYSIS: 0
PALPITATIONS: 0
MYALGIAS: 0
DOUBLE VISION: 0
PALPITATIONS: 0
NAUSEA: 1
NECK PAIN: 0
NECK PAIN: 0
SINUS PAIN: 0
CLAUDICATION: 0
DIZZINESS: 0
PALPITATIONS: 0
STRIDOR: 0
NECK PAIN: 0
HEARTBURN: 0
WEIGHT LOSS: 1
PALPITATIONS: 0
WEAKNESS: 1
HEARTBURN: 0
WEAKNESS: 1
TREMORS: 0
DEPRESSION: 1
DIZZINESS: 0
BACK PAIN: 0
EYE PAIN: 0
COUGH: 1
HEARTBURN: 0
COUGH: 0
NERVOUS/ANXIOUS: 0
NAUSEA: 0
STRIDOR: 0
TINGLING: 0
TREMORS: 0
NECK PAIN: 0
NAUSEA: 0
SPEECH CHANGE: 0
PHOTOPHOBIA: 0
NECK PAIN: 0
WEIGHT LOSS: 1
ORTHOPNEA: 0
SPEECH CHANGE: 0
ABDOMINAL PAIN: 0
EYE DISCHARGE: 0
WEAKNESS: 1
CHILLS: 0
NAUSEA: 1
NERVOUS/ANXIOUS: 0
WEAKNESS: 1
PALPITATIONS: 0
BLURRED VISION: 0
FOCAL WEAKNESS: 0
ABDOMINAL PAIN: 1
FOCAL WEAKNESS: 0
ABDOMINAL PAIN: 1
NECK PAIN: 0
WEIGHT LOSS: 1
CHILLS: 0
WEAKNESS: 1
VOMITING: 0
STRIDOR: 0
SEIZURES: 0
FEVER: 0
COUGH: 0
TREMORS: 0
NERVOUS/ANXIOUS: 1
BACK PAIN: 1
CHILLS: 0
LOSS OF CONSCIOUSNESS: 0
EYE DISCHARGE: 0
NECK PAIN: 0
WEAKNESS: 1
DIZZINESS: 0
MYALGIAS: 0
BACK PAIN: 1
BACK PAIN: 0
PALPITATIONS: 0
HALLUCINATIONS: 0
NECK PAIN: 0
CLAUDICATION: 0
SINUS PAIN: 0
DIARRHEA: 0
SHORTNESS OF BREATH: 0
SHORTNESS OF BREATH: 0
LOSS OF CONSCIOUSNESS: 0
STRIDOR: 0
DOUBLE VISION: 0
SHORTNESS OF BREATH: 0
CONSTIPATION: 1
DIARRHEA: 0
BACK PAIN: 0
EYE REDNESS: 0
WEAKNESS: 1
BACK PAIN: 0
WHEEZING: 0
EYE REDNESS: 0
SHORTNESS OF BREATH: 0
HEMOPTYSIS: 0
NAUSEA: 1
VOMITING: 0
SPUTUM PRODUCTION: 0
COUGH: 0
HEMOPTYSIS: 0
SPUTUM PRODUCTION: 0
BACK PAIN: 1
HEADACHES: 0
WEIGHT LOSS: 1
FEVER: 0
DIZZINESS: 0
CONSTIPATION: 1
DIARRHEA: 0
COUGH: 1
ABDOMINAL PAIN: 1
NAUSEA: 0
SPUTUM PRODUCTION: 1
CONSTIPATION: 1
BRUISES/BLEEDS EASILY: 0
HEARTBURN: 0
CLAUDICATION: 0
VOMITING: 0
DIARRHEA: 0
SORE THROAT: 0
WEIGHT LOSS: 1
HEARTBURN: 0
SENSORY CHANGE: 0
BACK PAIN: 0
BLOOD IN STOOL: 0
DIARRHEA: 0
NAUSEA: 0
BLURRED VISION: 0
CLAUDICATION: 0
DIZZINESS: 0
SENSORY CHANGE: 0
DIARRHEA: 1
TINGLING: 0
TINGLING: 0
ABDOMINAL PAIN: 1
DEPRESSION: 0
VOMITING: 0
DIARRHEA: 0
CONSTIPATION: 0
BLURRED VISION: 0
FEVER: 0
WEAKNESS: 1
NAUSEA: 0
PHOTOPHOBIA: 0
FEVER: 0
HEMOPTYSIS: 0
BLURRED VISION: 0
DIZZINESS: 0
SPEECH CHANGE: 0
SPUTUM PRODUCTION: 0
SPUTUM PRODUCTION: 0
MYALGIAS: 0
VOMITING: 0
DIAPHORESIS: 0
FEVER: 0
DIZZINESS: 0
SHORTNESS OF BREATH: 0
BACK PAIN: 0
DIARRHEA: 1
BRUISES/BLEEDS EASILY: 0
WEIGHT LOSS: 1
DOUBLE VISION: 0
HEARTBURN: 0
WEAKNESS: 1
DIZZINESS: 0
CHILLS: 0
STRIDOR: 0
DIZZINESS: 0
BLURRED VISION: 0
ORTHOPNEA: 0
BACK PAIN: 0
HEARTBURN: 0
DIZZINESS: 0
HEADACHES: 0
VOMITING: 0
NECK PAIN: 0
CHILLS: 0
FLANK PAIN: 1
COUGH: 0
ORTHOPNEA: 0
NECK PAIN: 0
WHEEZING: 0
WEIGHT LOSS: 1
ORTHOPNEA: 0
STRIDOR: 0
SPUTUM PRODUCTION: 0
PALPITATIONS: 0
CLAUDICATION: 0
EYE DISCHARGE: 0
HEARTBURN: 0
TINGLING: 0
WEAKNESS: 1
VOMITING: 1
NAUSEA: 0
ABDOMINAL PAIN: 0
BLURRED VISION: 0
CHILLS: 0
DOUBLE VISION: 0
NAUSEA: 0
COUGH: 0
ABDOMINAL PAIN: 1
CHILLS: 0
COUGH: 0
BLURRED VISION: 0
CLAUDICATION: 0
DEPRESSION: 0
BLOOD IN STOOL: 0
EYE REDNESS: 0
CHILLS: 0
DIARRHEA: 0
NECK PAIN: 0
BLURRED VISION: 0
NAUSEA: 0
HEADACHES: 0
DOUBLE VISION: 0
HEADACHES: 0
HEADACHES: 0
FEVER: 0
NECK PAIN: 0
VOMITING: 0
TINGLING: 0
PALPITATIONS: 0
PALPITATIONS: 0
BRUISES/BLEEDS EASILY: 0
FLANK PAIN: 1
BLURRED VISION: 0
HEARTBURN: 0
BLURRED VISION: 0
BLURRED VISION: 0
CHILLS: 0
TREMORS: 0
COUGH: 0
COUGH: 0
DIARRHEA: 0
NECK PAIN: 0
DIAPHORESIS: 0
HEMOPTYSIS: 0
WEIGHT LOSS: 1
COUGH: 0
SPUTUM PRODUCTION: 1
ABDOMINAL PAIN: 1
COUGH: 0
HEADACHES: 0
NAUSEA: 0
SHORTNESS OF BREATH: 0
PALPITATIONS: 0
COUGH: 0
DIARRHEA: 0
STRIDOR: 0
HEADACHES: 0
EYE REDNESS: 0
MYALGIAS: 0
BRUISES/BLEEDS EASILY: 0
PHOTOPHOBIA: 0
SPEECH CHANGE: 0
NAUSEA: 0
HEARTBURN: 0
EYE DISCHARGE: 0
LOSS OF CONSCIOUSNESS: 0
ABDOMINAL PAIN: 1
DIZZINESS: 0
PALPITATIONS: 0
ABDOMINAL PAIN: 1
PALPITATIONS: 0
FLANK PAIN: 1
CHILLS: 0
DIAPHORESIS: 0
TREMORS: 0
SORE THROAT: 0
MYALGIAS: 0
DIZZINESS: 0
HEARTBURN: 0
SPUTUM PRODUCTION: 1
BRUISES/BLEEDS EASILY: 0
HEADACHES: 0
NAUSEA: 1
DIZZINESS: 0
NERVOUS/ANXIOUS: 0
VOMITING: 1
WEAKNESS: 1
DEPRESSION: 1
FEVER: 0
ABDOMINAL PAIN: 0
DIARRHEA: 0
ORTHOPNEA: 0
EYE DISCHARGE: 0
SPEECH CHANGE: 0
COUGH: 0
NECK PAIN: 0
NECK PAIN: 0
VOMITING: 0
ORTHOPNEA: 0
ABDOMINAL PAIN: 1

## 2019-01-01 ASSESSMENT — COGNITIVE AND FUNCTIONAL STATUS - GENERAL
WALKING IN HOSPITAL ROOM: A LITTLE
DAILY ACTIVITIY SCORE: 16
STANDING UP FROM CHAIR USING ARMS: A LITTLE
DRESSING REGULAR LOWER BODY CLOTHING: A LITTLE
STANDING UP FROM CHAIR USING ARMS: A LITTLE
PERSONAL GROOMING: A LITTLE
MOVING FROM LYING ON BACK TO SITTING ON SIDE OF FLAT BED: A LITTLE
WALKING IN HOSPITAL ROOM: A LITTLE
SUGGESTED CMS G CODE MODIFIER DAILY ACTIVITY: CK
DRESSING REGULAR LOWER BODY CLOTHING: A LITTLE
MOBILITY SCORE: 21
PERSONAL GROOMING: A LITTLE
MOVING TO AND FROM BED TO CHAIR: UNABLE
CLIMB 3 TO 5 STEPS WITH RAILING: A LITTLE
DAILY ACTIVITIY SCORE: 15
TOILETING: A LITTLE
MOBILITY SCORE: 14
HELP NEEDED FOR BATHING: A LITTLE
CLIMB 3 TO 5 STEPS WITH RAILING: A LITTLE
SUGGESTED CMS G CODE MODIFIER DAILY ACTIVITY: CK
SUGGESTED CMS G CODE MODIFIER MOBILITY: CL
DAILY ACTIVITIY SCORE: 19
DRESSING REGULAR LOWER BODY CLOTHING: A LITTLE
TURNING FROM BACK TO SIDE WHILE IN FLAT BAD: A LITTLE
SUGGESTED CMS G CODE MODIFIER DAILY ACTIVITY: CJ
MOBILITY SCORE: 13
WALKING IN HOSPITAL ROOM: A LITTLE
SUGGESTED CMS G CODE MODIFIER MOBILITY: CL
WALKING IN HOSPITAL ROOM: A LITTLE
DRESSING REGULAR LOWER BODY CLOTHING: A LITTLE
STANDING UP FROM CHAIR USING ARMS: A LITTLE
TOILETING: A LITTLE
WALKING IN HOSPITAL ROOM: A LITTLE
DRESSING REGULAR UPPER BODY CLOTHING: A LITTLE
CLIMB 3 TO 5 STEPS WITH RAILING: A LITTLE
EATING MEALS: A LITTLE
HELP NEEDED FOR BATHING: A LOT
DRESSING REGULAR UPPER BODY CLOTHING: A LITTLE
MOBILITY SCORE: 14
MOVING FROM LYING ON BACK TO SITTING ON SIDE OF FLAT BED: UNABLE
DRESSING REGULAR UPPER BODY CLOTHING: A LITTLE
MOVING FROM LYING ON BACK TO SITTING ON SIDE OF FLAT BED: UNABLE
MOBILITY SCORE: 19
MOBILITY SCORE: 23
CLIMB 3 TO 5 STEPS WITH RAILING: A LITTLE
SUGGESTED CMS G CODE MODIFIER DAILY ACTIVITY: CK
STANDING UP FROM CHAIR USING ARMS: A LITTLE
MOVING FROM LYING ON BACK TO SITTING ON SIDE OF FLAT BED: UNABLE
SUGGESTED CMS G CODE MODIFIER DAILY ACTIVITY: CK
SUGGESTED CMS G CODE MODIFIER MOBILITY: CL
MOVING TO AND FROM BED TO CHAIR: UNABLE
MOBILITY SCORE: 14
STANDING UP FROM CHAIR USING ARMS: A LITTLE
SUGGESTED CMS G CODE MODIFIER MOBILITY: CL
DRESSING REGULAR LOWER BODY CLOTHING: A LITTLE
HELP NEEDED FOR BATHING: A LITTLE
TOILETING: A LITTLE
TURNING FROM BACK TO SIDE WHILE IN FLAT BAD: A LITTLE
MOVING TO AND FROM BED TO CHAIR: UNABLE
SUGGESTED CMS G CODE MODIFIER DAILY ACTIVITY: CK
TOILETING: A LITTLE
SUGGESTED CMS G CODE MODIFIER MOBILITY: CK
DAILY ACTIVITIY SCORE: 19
MOVING TO AND FROM BED TO CHAIR: UNABLE
SUGGESTED CMS G CODE MODIFIER MOBILITY: CI
CLIMB 3 TO 5 STEPS WITH RAILING: A LOT
HELP NEEDED FOR BATHING: A LITTLE
SUGGESTED CMS G CODE MODIFIER MOBILITY: CJ
CLIMB 3 TO 5 STEPS WITH RAILING: A LITTLE
HELP NEEDED FOR BATHING: A LITTLE
TOILETING: A LITTLE
MOVING FROM LYING ON BACK TO SITTING ON SIDE OF FLAT BED: UNABLE
PERSONAL GROOMING: A LITTLE
CLIMB 3 TO 5 STEPS WITH RAILING: A LITTLE
STANDING UP FROM CHAIR USING ARMS: A LITTLE
DRESSING REGULAR UPPER BODY CLOTHING: A LITTLE
TOILETING: A LITTLE
DAILY ACTIVITIY SCORE: 20
MOVING TO AND FROM BED TO CHAIR: UNABLE
PERSONAL GROOMING: A LITTLE
WALKING IN HOSPITAL ROOM: A LITTLE
HELP NEEDED FOR BATHING: A LITTLE
TURNING FROM BACK TO SIDE WHILE IN FLAT BAD: A LITTLE
DAILY ACTIVITIY SCORE: 19
CLIMB 3 TO 5 STEPS WITH RAILING: A LITTLE
TOILETING: A LITTLE
EATING MEALS: TOTAL
WALKING IN HOSPITAL ROOM: A LITTLE
HELP NEEDED FOR BATHING: A LITTLE
MOVING TO AND FROM BED TO CHAIR: A LITTLE
PERSONAL GROOMING: A LITTLE
SUGGESTED CMS G CODE MODIFIER MOBILITY: CL
DAILY ACTIVITIY SCORE: 20
DRESSING REGULAR LOWER BODY CLOTHING: A LITTLE
DRESSING REGULAR UPPER BODY CLOTHING: A LITTLE
TURNING FROM BACK TO SIDE WHILE IN FLAT BAD: A LITTLE
EATING MEALS: TOTAL
TURNING FROM BACK TO SIDE WHILE IN FLAT BAD: A LITTLE
MOBILITY SCORE: 14
STANDING UP FROM CHAIR USING ARMS: A LITTLE
DRESSING REGULAR UPPER BODY CLOTHING: A LITTLE
SUGGESTED CMS G CODE MODIFIER DAILY ACTIVITY: CJ
MOVING FROM LYING ON BACK TO SITTING ON SIDE OF FLAT BED: UNABLE
PERSONAL GROOMING: A LITTLE

## 2019-01-01 ASSESSMENT — PATIENT HEALTH QUESTIONNAIRE - PHQ9
1. LITTLE INTEREST OR PLEASURE IN DOING THINGS: NOT AT ALL
1. LITTLE INTEREST OR PLEASURE IN DOING THINGS: NOT AT ALL
2. FEELING DOWN, DEPRESSED, IRRITABLE, OR HOPELESS: NOT AT ALL
1. LITTLE INTEREST OR PLEASURE IN DOING THINGS: NOT AT ALL
2. FEELING DOWN, DEPRESSED, IRRITABLE, OR HOPELESS: NOT AT ALL
2. FEELING DOWN, DEPRESSED, IRRITABLE, OR HOPELESS: NOT AT ALL
SUM OF ALL RESPONSES TO PHQ9 QUESTIONS 1 AND 2: 0

## 2019-01-01 ASSESSMENT — LIFESTYLE VARIABLES
SUBSTANCE_ABUSE: 0
EVER_SMOKED: YES
SUBSTANCE_ABUSE: 0
ALCOHOL_USE: NO
SUBSTANCE_ABUSE: 0
EVER_SMOKED: YES
DO YOU DRINK ALCOHOL: NO
SUBSTANCE_ABUSE: 0
EVER_SMOKED: UNABLE TO EVALUATE AT THIS TIME - NEEDS ASSESSMENT PRIOR TO DISCHARGE
SUBSTANCE_ABUSE: 0
SUBSTANCE_ABUSE: 0
DO YOU DRINK ALCOHOL: NO
EVER_SMOKED: YES
SUBSTANCE_ABUSE: 0

## 2019-01-01 ASSESSMENT — GAIT ASSESSMENTS
DISTANCE (FEET): 120
DISTANCE (FEET): 40
DISTANCE (FEET): 10
DISTANCE (FEET): 20
ASSISTIVE DEVICE: FRONT WHEEL WALKER
ASSISTIVE DEVICE: FRONT WHEEL WALKER
DEVIATION: ANTALGIC;DECREASED BASE OF SUPPORT;DECREASED HEEL STRIKE;DECREASED TOE OFF
GAIT LEVEL OF ASSIST: STAND BY ASSIST
DEVIATION: DECREASED BASE OF SUPPORT;STEP TO;SHUFFLED GAIT;DECREASED HEEL STRIKE;DECREASED TOE OFF
DEVIATION: ANTALGIC;DECREASED BASE OF SUPPORT;DECREASED HEEL STRIKE;DECREASED TOE OFF
DISTANCE (FEET): 15
DEVIATION: ANTALGIC;DECREASED BASE OF SUPPORT;DECREASED HEEL STRIKE;DECREASED TOE OFF
GAIT LEVEL OF ASSIST: CONTACT GUARD ASSIST
GAIT LEVEL OF ASSIST: CONTACT GUARD ASSIST
DEVIATION: DECREASED BASE OF SUPPORT;STEP TO;SHUFFLED GAIT;DECREASED HEEL STRIKE;DECREASED TOE OFF
GAIT LEVEL OF ASSIST: MINIMAL ASSIST
ASSISTIVE DEVICE: FRONT WHEEL WALKER
GAIT LEVEL OF ASSIST: CONTACT GUARD ASSIST
ASSISTIVE DEVICE: FRONT WHEEL WALKER
GAIT LEVEL OF ASSIST: SUPERVISED
DISTANCE (FEET): 125
DEVIATION: DECREASED BASE OF SUPPORT;STEP TO;SHUFFLED GAIT;DECREASED HEEL STRIKE;DECREASED TOE OFF

## 2019-01-01 ASSESSMENT — COPD QUESTIONNAIRES
IN THE PAST 12 MONTHS DO YOU DO LESS THAN YOU USED TO BECAUSE OF YOUR BREATHING PROBLEMS: DISAGREE/UNSURE
HAVE YOU SMOKED AT LEAST 100 CIGARETTES IN YOUR ENTIRE LIFE: YES
DO YOU EVER COUGH UP ANY MUCUS OR PHLEGM?: YES, EVERY DAY
HAVE YOU SMOKED AT LEAST 100 CIGARETTES IN YOUR ENTIRE LIFE: YES
DO YOU EVER COUGH UP ANY MUCUS OR PHLEGM?: YES, A FEW DAYS A WEEK OR MONTH
DURING THE PAST 4 WEEKS HOW MUCH DID YOU FEEL SHORT OF BREATH: SOME OF THE TIME
COPD SCREENING SCORE: 5
COPD SCREENING SCORE: 9
DURING THE PAST 4 WEEKS HOW MUCH DID YOU FEEL SHORT OF BREATH: NONE/LITTLE OF THE TIME

## 2019-01-01 ASSESSMENT — ACTIVITIES OF DAILY LIVING (ADL): TOILETING: INDEPENDENT

## 2019-01-31 NOTE — OR NURSING
Pre admit apt: Pt. Instructed to continue regularly prescribed medications through day before surgery.  Instructed to take the following medications, the day of surgery, with a sip of water per anesthesia protocol:diltiazem, flagyl, omeprazole, percocet

## 2019-02-01 NOTE — PROGRESS NOTES
Indication:Esophageal cancer.   Risks, benefits, and alternatives were discussed with consenting person(s). Consenting person(s) were given an opportunity to ask questions and discuss other options. Risks including but not limited to failed or incomplete EUS, ineffective therapy, pancreatitis (with potential future complications), perforation, infection, bleeding, missed lesion(s), missed diagnosis, cardiac and/or pulmonary event, aspiration, stroke, possible need for surgery, hospitalization possibly prolonged, discomfort, unsuccessful and/or incomplete procedure, possible need for repeat procedures and/or additional testings, damage to adjacent organs and/or vascular structures, medication reaction, disability, death, and other adverse events possibly life-threatening. Discussion was undertaken with Layman's terms. Consenting persons stated understanding and acceptance of these risks, and wished to proceed. Consent was given in clear state of mind.

## 2019-02-01 NOTE — OR NURSING
1243: Brought patient back to pre-op and assumed care.  1340: Patient allergies and NPO status verified, home medication reconciliation completed and belongings secured. Patient verbalizes understanding of pain scale, expected course of stay and plan of care. Surgical site verified with patient. IV access established.

## 2019-02-01 NOTE — PROCEDURES
Echoendoscope/Esophagogastroduodenoscopy  Date of Procedure: 2/1/2019  Attending Physician: Gen Gonzales MD    Indications: Esophageal cancer    Instrument: Olympus Echoendoscope  Sedation:   Anesthesiologist/Type of Anesthesia:  Anesthesiologist: Farshad Rivers D.O./General Anesthesia    Surgical Staff:  Circulator: Seamus Chawla R.N.  Endoscopy Technician: Keerthi Esteban      Pre-Anesthesia Assessment:  Prior to the procedure, a History and Physical was performed, and patient medications and allergies were reviewed. The patient’s tolerance of previous anesthesia was also reviewed. The risks and benefits of the procedure and the sedation options and risks were discussed with the patient including but not limited to infection, bleeding, aspiration, perforation, adverse medication reaction, missed diagnosis,  pancreatitis, and missed lesions. The patient verbalized understanding. All questions were answered, and informed consent was obtained.     After I obtained informed consent from the patient, the patient was placed in the left lateral position. Appropriate time-out protocol was followed: the correct patient, the correct procedure, and the correct equipment in the room were confirmed. Throughout the procedure, the patient’s blood pressure, pulse, and oxygen saturations were monitored continuously. The echoendoscope was inserted through the oropharynx, esophagus intubated, then advanced to the gastrointestinal tract.  Findings and interventions were performed and documented below. Air was then withdrawn and the echoendoscope was removed. The patient tolerated the procedure well. There were no immediate postoperative complications.     Findings:    EGD:  Esophagus:Proximal esophagus appears normal.  There appears to be salmon-colored mucosa consistent with known Mckeon's esophagus starting at 28 cm from incisors.  There appears to be ulceration.  Irregularity of mucosal mass with  ulceration friable tissue consistent with known esophageal adenocarcinoma extending from approximately 30 cm all the way to the hiatus at 38 cm.  There is also a deep ulceration at the distal esophagus at approximately 36 cm.  There were no visible vessel noted.    Stomach: Retroflexion demonstrated a small hiatal hernia.  There is no involvement of the cardia or fundus by the malignancy.    Duodenum: Duodenum bulb and second portion normal      EUS:  Please note this is a limited examination due to the radial echoendoscope unable to traverse the entire area of the esophagus as involved.  This potentially may over stage and/or under stage the malignancy.  The radial echoendoscope has a outer diameter of 13.8 mm.  The scope was able to advance to 32cm from incisors.    From the examined portion of the esophagus there was gross mucosal thickening of the esophagus wall up to at least 8.2 mm in thickness.  There appears to be breaks in the muscularis propria consistent with suspected pseudopodia.  There was an irregular hypoechoic tissue outside the esophagus suggestive of lymph node measuring 13.3 x 3.6 mm.  Endoscopic ultrasound findings suggestive of T3N1      Impressions:   1.Known esophageal adenocarcinoma unable to traverse completely with endoscopic ultrasound scope; endoscopic ultrasound staging at least a T3 N1  2. Ulcerated esophageal mass started from 30cm from incisors and extend to 38cm from incisors.  3. Mckeon's mucosa starting from 28cm from incisors.       Recommendations:   1. Follow-up with oncology  2. Consideration for neoadjuvant therapy          This note was generated using voice recognition software which has a small chance of producing errors of grammar and possibly content. I have made every reasonable attempt to find and correct any obvious errors, but expect that some may not be found prior to finalization of this note

## 2019-02-01 NOTE — OR NURSING
1453: Pt to PACU from endo.  Oral airway in place.  Lungs clear and equal bilaterally.  Patient does not around to voice or touch at this time.   1505:  Patient resting in bed with eyes closed, no reported pain or nausea at this time.  1507: Dr. Gonzales at bedside, spoke with wife on phone and he answered her questions.    1520: Patient resting in bed with eyes closed, no reported pain or nausea.  Working on weaning off O2.   1535: No changes.   1550: Patient using IS to help maintain O2 sats.    1603: Transferred to stage 2.

## 2019-02-02 NOTE — OR NURSING
1618 Spoke with Dr. Rivers regarding pts oxygen saturation. Dr. Rivers at chairside to assess pt. Dr. Rivers ok to discharge pt at current oxygen saturation. Pt beginning to desaturate into high 70s. Do not plan to discharge pt.   1630 Dr. Gonzales at chairside. No new orders received. Plan to give pt breathing tx.   1645 Pt up to bathroom with help of CNA.   1710 Dr. Gonzales back at chairside discussing POC with pt. Pts oxygen at 90% after breathing tx. Janet Ardon states he feels safe to discharge pt.   1725 Pts oxygen consistently above 90%. Pt anxious to leave. Pt states he feels ready to go home   1733  Discharged pt at this time. VSS

## 2019-02-02 NOTE — DISCHARGE INSTRUCTIONS
ENDOSCOPY HOME CARE INSTRUCTIONS    GASTROSCOPY OR ERCP  1. Don't eat or drink anything for about an hour after the test. You can then resume your regular diet.  2. Don't drive or drink alcohol for 24 hours. The medication you received will make you too drowsy.  3. Don't take any coffee, tea, or aspirin products until after you see your doctor. These can harm the lining of your stomach.  4. If you begin to vomit bloody material, or develop black or bloody stools, call your doctor as soon as possible.  5. If you have any neck, chest, abdominal pain or temp of 100 degrees, call your doctor.  6. See your doctor as instructed  7. Additional instructions:   Impressions:   1.Known esophageal adenocarcinoma unable to traverse completely with endoscopic ultrasound scope; endoscopic ultrasound staging at least a T3 N1  2. Ulcerated esophageal mass started from 30cm from incisors and extend to 38cm from incisors.  3. Mckeon's mucosa starting from 28cm from incisors.         Recommendations:   1. Follow-up with oncology  2. Consideration for neoadjuvant therapy              You should call 911 if you develop problems with breathing or chest pain.  If any questions arise, call your doctor. If your doctor is not available, please feel free to call [phone]. You can also call the HEALTH HOTLINE open 24 hours/day, 7 days/week and speak to a nurse at [phone], or toll free [phone].    Depression / Suicide Risk    As you are discharged from this [facility] facility, it is important to learn how to keep safe from harming yourself.    Recognize the warning signs:  · Abrupt changes in personality, positive or negative- including increase in energy   · Giving away possessions  · Change in eating patterns- significant weight changes-  positive or negative  · Change in sleeping patterns- unable to sleep or sleeping all the time   · Unwillingness or inability to communicate  · Depression  · Unusual sadness,  discouragement and loneliness  · Talk of wanting to die  · Neglect of personal appearance   · Rebelliousness- reckless behavior  · Withdrawal from people/activities they love  · Confusion- inability to concentrate     If you or a loved one observes any of these behaviors or has concerns about self-harm, here's what you can do:  · Talk about it- your feelings and reasons for harming yourself  · Remove any means that you might use to hurt yourself (examples: pills, rope, extension cords, firearm)  · Get professional help from the community (Mental Health, Substance Abuse, psychological counseling)  · Do not be alone:Call your Safe Contact- someone whom you trust who will be there for you.  · Call your local CRISIS HOTLINE 229-8167 or 364-148-4214  · Call your local Children's Mobile Crisis Response Team [place] (665) 429-9567 or www.[employer]  · Call the toll free National Suicide Prevention Hotlines   · National Suicide Prevention Lifeline 536-908-OLFV (8922)  · National Hope Line Network 800-SUICIDE (432-6481)    I acknowledge receipt and understanding of these Home Care Instructions.

## 2019-02-12 NOTE — DISCHARGE PLANNING
Anticipated Discharge Disposition: TBD    Action: Received IPCSS requesting AD and family conference to discuss code status.    Conducted chart review [dx esophageal cancer], and [facility] hospitalist, Dr Aguero, requesting palliative order to f/u w/ family as above.    Barriers to Discharge: TBD    Plan: f/u w/ medical team

## 2019-02-12 NOTE — ED TRIAGE NOTES
"Chief Complaint   Patient presents with   • Constipation     x 1 month   • Loss of Appetite   • Sent by MD     recent DX Esophageal CA     /86   Pulse 84   Temp 36.4 °C (97.6 °F) (Temporal)   Resp 20   Ht 1.753 m (5' 9\")   SpO2 93%   BMI 19.34 kg/m²     "

## 2019-02-12 NOTE — ED PROVIDER NOTES
Briefly patient was signed out to me by Dr. Shi pending CT abdomen pelvis to rule out small bowel obstruction.  CT resulted with no evidence of acute small bowel obstruction at this time however moderate stool burden noted.  Patient will be admitted to medicine for further evaluation and care.

## 2019-02-12 NOTE — PROGRESS NOTES
2 RN skin assessment completed. Pt's skin is fragile. Edema in both feet observed; both heels dry & calloused. Otherwise skin is WDL.

## 2019-02-12 NOTE — CARE PLAN
Problem: Safety  Goal: Will remain free from injury  Outcome: PROGRESSING AS EXPECTED  Pt educated to call when in need. Call light and personal belongings w/n reach. Room free of clutters. Bed locked and in lowest position. Answer call light immediately. Hourly rounding.    Problem: Knowledge Deficit  Goal: Knowledge of disease process/condition, treatment plan, diagnostic tests, and medications will improve  Outcome: PROGRESSING AS EXPECTED  Pt educated and informed on the treatment plans, the need to follow prescribed medications, and routine labs. Educated and encouraged on the importance of ambulation as tolerated. Educated factors that contribute fatigue or reduced alertness. Encouraged to voice feelings.

## 2019-02-12 NOTE — PROGRESS NOTES
Telemetry Shift Summary    Rhythm SR  HR Range 70s-90s  Ectopy rare PAC  Measurements 0.16/0.08/0.42        Normal Values  Rhythm SR  HR Range    Measurements 0.12-0.20 / 0.06-0.10  / 0.30-0.52

## 2019-02-12 NOTE — ED PROVIDER NOTES
ED Provider Note        CHIEF COMPLAINT  Chief Complaint   Patient presents with   • Constipation     x 1 month   • Loss of Appetite   • Sent by MD     recent DX Esophageal CA       HPI    Justus Barnard is a 73 y.o. male presenting with multiple complaints.  Recent diagnosis of esophageal cancer, seeing Dr. Page, but to come to emergency department today.  Has been constant and no bowel movements in 1 month.  Aggressively worsening appetite and inability to tolerate p.o.  Now only tolerating a sip of water over the last 24 hours, vomiting anything he attempts to take.  Significant weight loss over the last 6 months, 15 pounds in the last week.  States abdominal pain is generalized, cannot characterize type, pain is 7/10 intensity, all of her abdomen, present for the past week and worsening today.  Not passing gas today.        REVIEW OF SYSTEMS  Positives as above. Pertinent negatives include no diarrhea, fevers, dysuria, chest pain, shortness of breath, rash  All other review of systems are negative        PAST MEDICAL HISTORY   has a past medical history of Backpain (01/2019); Bowel habit changes (01/2019); Breath shortness (01/2019); Cancer ([facility]) (2018); Cataract; Dental disorder; Dysphagia; Emphysema of lung ([facility]); Glaucoma; Heart burn; Hiatus hernia syndrome; High cholesterol; Hypertension; Indigestion; Psychiatric problem (01/2019); Renal disorder (01/2019); and Stroke ([facility]) (2013).    SOCIAL HISTORY  Social History     Social History Main Topics   • Smoking status: Current Every Day Smoker     Packs/day: 0.50     Years: 15.00     Types: Cigarettes   • Smokeless tobacco: Never Used   • Alcohol use No   • Drug use: No   • Sexual activity: Not on file       SURGICAL HISTORY   has a past surgical history that includes other (1980); cataract extraction with iol (Bilateral, 2005); other orthopedic surgery (1997); gastroscopy (2/1/2019); egd w/endoscopic ultrasound (2/1/2019); and egd with asp/bx  "(2/1/2019).    CURRENT MEDICATIONS  Home Medications    **Home medications have not yet been reviewed for this encounter**         ALLERGIES  Allergies   Allergen Reactions   • Nkda [No Known Drug Allergy]        PHYSICAL EXAM  VITAL SIGNS: /86   Pulse 82   Temp 36.4 °C (97.6 °F) (Temporal)   Resp 20   Ht 1.753 m (5' 9\")   Wt 53 kg (116 lb 13.5 oz)   SpO2 89%   BMI 17.25 kg/m²    SpO2: I interpret this pulse oximetry as normal  Constitutional: Alert in mild apparent distress.  Ill-appearing.  HENT: Normocephalic atraumatic, MMM  Eyes: PERRL, Conjunctiva normal, Non-icteric.   Neck: Normal range of motion, No tenderness, Supple, No stridor.   Lymphatic: No lymphadenopathy noted.   Cardiovascular: Regular rate and rhythm, no murmurs.   Thorax & Lungs: Normal breath sounds, No respiratory distress, No wheezing, No chest tenderness.   Abdomen: Soft, diffuse mild tenderness, No pulsatile masses. No peritoneal signs.  Skin: Warm, Dry, No erythema, No rash.   Back: No bony tenderness, No CVA tenderness.   Extremities: Intact distal pulses, No edema, No tenderness, No cyanosis  Musculoskeletal: Good range of motion in all major joints. No tenderness to palpation or major deformities noted.   Neurologic: Alert and oriented x3, No focal deficits noted.   Psychiatric: Affect normal, Judgment normal, Mood normal.     DIAGNOSTIC STUDIES / PROCEDURES      LABORATORY  Labs Reviewed   CBC WITH DIFFERENTIAL - Abnormal; Notable for the following:        Result Value    WBC 13.6 (*)     MCV 79.2 (*)     MCH 25.4 (*)     MCHC 32.1 (*)     RDW 52.2 (*)     MPV 8.8 (*)     Neutrophils-Polys 72.60 (*)     Lymphocytes 13.80 (*)     Neutrophils (Absolute) 9.91 (*)     Monos (Absolute) 1.52 (*)     All other components within normal limits   COMP METABOLIC PANEL - Abnormal; Notable for the following:     Anion Gap 12.0 (*)     Alkaline Phosphatase 116 (*)     Albumin 2.8 (*)     Globulin 4.0 (*)     All other components within " normal limits   LIPASE   LACTIC ACID   ESTIMATED GFR   URINALYSIS,CULTURE IF INDICATED           RADIOLOGY    DX-CHEST-PORTABLE (1 VIEW)   Final Result      1.  COPD. No acute cardiopulmonary abnormality.   2.  Small hiatal hernia.      CT-ABDOMEN-PELVIS WITH    (Results Pending)       Chest XR, 1 view (my read): No focal infiltrates or large effusion.  Normal mediastinum. No prior films were immediately available for comparison.  Impression: Normal chest x-ray      CHART REVIEW  Pertinent medical chart information was reviewed and reveals: Colonoscopy on 2/1, no other recent pertinent encounters    Medications   HYDROmorphone pf (DILAUDID) injection 0.5 mg (not administered)   zolpidem (AMBIEN) tablet 10 mg (not administered)   HYDROmorphone pf (DILAUDID) injection 1 mg (1 mg Intravenous Given 2/11/19 2018)   ondansetron (ZOFRAN) syringe/vial injection 4 mg (4 mg Intravenous Given 2/11/19 2018)   LORazepam (ATIVAN) injection 0.5 mg (0.5 mg Intravenous Given 2/11/19 2148)   iohexol (OMNIPAQUE) 350 mg/mL (100 mL Intravenous Given 2/11/19 2211)       COURSE & MEDICAL DECISION MAKING  Pertinent Labs & Imaging studies reviewed. (See chart for details)    8:19 PM discussed with Dr. Avendaño, on-call for Dr. Page, agrees with current plan.    10:25 PM discussed with Dr. Fernandes, will admit the patient, CT read pending now and plan to call surgery if surgical process or obstruction.  Patient      Differential diagnosis includes, but not limited to:  Malignancy, obstruction, gastritis, diverticulitis, UTI, pyelonephritis, electrolyte abnormality, ileus, failure to thrive    Vitals:    02/11/19 1916 02/11/19 2000 02/11/19 2130 02/11/19 2200   BP:       Pulse:  86 87 82   Resp:       Temp:       TempSrc:       SpO2:  94% 92% 89%   Weight: 53 kg (116 lb 13.5 oz)      Height:         73-year-old male with recent diagnosis of esophageal cancer presenting for inability to tolerate p.o., constipation, abdominal pain.  Also has  had significant weight loss over the last several months.  Currently unable to tolerate any p.o., vomits a small sips of water, however no vomiting without p.o. challenge.  Abdominal pain is generalized, no evidence of acute surgical abdomen on exam.  Vital signs and exam are otherwise unremarkable.  Given pain medications and antiemetics.  Laboratory work is also unremarkable.  Urinalysis pending, patient is not been able to make urine in the department.  CT pending radiology read.  With inability to tolerate p.o. patient, consulted medicine for admission.  Will call surgery if having bowel obstruction on CT read, signed out to oncoming ED physician, Dr. Bran, who will follow up in the CT.  Patient hemodynamically stable and comfortable at time of admission.         DISPOSITION  Admitted to medicine in guarded condition    FINAL IMPRESSION  Visit Diagnoses     ICD-10-CM   1. Generalized abdominal pain R10.84   2. Constipation, unspecified constipation type K59.00   3. Failure to thrive in adult R62.7   4. Malignant neoplasm of esophagus, unspecified location (HCC) C15.9          Electronically signed by: Clive Shi MD, GRACE 2/11/2019     This dictation has been created using voice recognition software and/or scribes. The accuracy of the dictation is limited by the abilities of the software and the expertise of the scribes. I expect there may be some errors of grammar and possibly content. I made every attempt to manually correct the errors within my dictation. However, errors related to voice recognition software and/or scribes may still exist and should be interpreted within the appropriate context.

## 2019-02-12 NOTE — DISCHARGE PLANNING
Care Transition Team Assessment  Spoke to pt who deferred assessment to his wife, and gave permission for Lsw to call. Spoke to pt's wife via phone, and acquired the following information:  Pt has no AD, but LSw provided copy at bedside for wife. Also, LSW provided cancer information and resources for support including support groups, [employer] [place] supports both financial and transports, and counseling as pt has depression and anxiety since new cancer dx.       Information Source  Orientation : Oriented x 4  Information Given By: Patient  Informant's Name: angleique  Who is responsible for making decisions for patient? : Patient    Readmission Evaluation  Is this a readmission?: No    Elopement Risk  Legal Hold: No  Ambulatory or Self Mobile in Wheelchair: Yes  Disoriented: No  Psychiatric Symptoms: None  History of Wandering: No  Elopement this Admit: No  Vocalizing Wanting to Leave: No  Displays Behaviors, Body Language Wanting to Leave: No-Not at Risk for Elopement  Elopement Risk: Not at Risk for Elopement    Interdisciplinary Discharge Planning  Primary Care Physician: Robert Yanez FNP  Lives with - Patient's Self Care Capacity: Spouse  Patient or legal guardian wants to designate a caregiver (see row info): No  Support Systems: Children, Family Member(s), Friends / Neighbors, Spouse / Significant Other  Housing / Facility: 1 Story House  Do You Take your Prescribed Medications Regularly: Yes  Able to Return to Previous ADL's: Other (depends on weakness level as unable to eat)  Mobility Issues: Yes (using w/c to MD apts, etc prior to admit)  Prior Services:  (pending VA benefits, needs to provide more reqd docs)  Durable Medical Equipment:  (w/c, protein drink meals)    Discharge Preparedness  What are your discharge supports?:  (see above)  Prior Functional Level: Ambulatory, Drives Self, Independent with Activities of Daily Living  Difficulity with ADLs:  (receiving wife's support w/ transport, meds  and food- now )    Functional Assesment  Prior Functional Level: Ambulatory, Drives Self, Independent with Activities of Daily Living    Finances  Financial Barriers to Discharge: Yes  Prescription Coverage: Yes ([employer] in [place])    Vision / Hearing Impairment  Right Eye Vision: Impaired, Wears Glasses  Left Eye Vision: Impaired, Wears Glasses  Hearing Impairment : No    Values / Beliefs / Concerns  Values / Beliefs Concerns : No    Advance Directive  Advance Directive?: None (pt requested LSw speak to wife for assessment and AD info)  Advance Directive offered?: AD Booklet given    Domestic Abuse  Have you ever been the victim of abuse or violence?: No  Physical Abuse or Sexual Abuse: No  Verbal Abuse or Emotional Abuse: No    Psychological Assessment  History of Substance Abuse:  (tobacco)  History of Psychiatric Problems:  (since cancer dx, DEP and ANX)  Newly Diagnosed Illness: Yes         Anticipated Discharge Information  Anticipated discharge disposition: Discharge needs currently unknown  Discharge Address: home: [address] [place] [id number]  Discharge Contact Phone Number: wife cell [phone], house [phone]

## 2019-02-12 NOTE — PROGRESS NOTES
0118 Report received from JANETTE Rosas. POC discussed.      0145 Pt arrived to unit via gurney. Ambulated from gurney to bed, x1 assist. Tele monitor applied, vitals taken. Pt assessed. A&Ox4.  Discussed POC with pt and family (at bedside), including diet order. Denies any pain, nausea, dizziness. Edema seen in BLE.  Communication board filled out. Questions and concerns addressed, verbalized understanding. Fall precautions in place. Pt demonstrates ability to use call light appropriately. Pt left in lowest position. Bed locked and low, bed alarm on.

## 2019-02-12 NOTE — PROGRESS NOTES
Med rec updated and complete  Allergies reviewed  Interviewed pt with family at bedside with permission from pt.  Pt reports that he has been taking his PERCOCET 10-325MG 1 tablet 5 times a day.

## 2019-02-12 NOTE — PROGRESS NOTES
Telemetry Shift Summary    Rhythm Afib/flutter  HR Range 70's-80's, down to 33  Ectopy none  Measurements .14/.06/.42        Normal Values  Rhythm SR  HR Range    Measurements 0.12-0.20 / 0.06-0.10  / 0.30-0.52

## 2019-02-12 NOTE — H&P
Hospital Medicine History & Physical Note    Date of Service  2/11/2019    Primary Care Physician  IVETTE Hughes    Consultants  None    Code Status  Full code for now. Family discussing with patient.    Chief Complaint  Esophageal cancer with oropharyngeal dysphagia and constipation x1 month    History of Presenting Illness  73 y.o. Male, active smoker, who was recently diagnosed with esophageal cancer, presented 2/11/2019 with complaint of failure to thrive. He was seeing Dr. Garcia (oncology) for the second time today and was referred to the ED. He has progressively not been able to eat or drank anything especially in he last 2 days in which he has been unable to drink at all. He vomits anything he attempts to take in by mouth. Per daughter, he has lost over 50 lbs in the last couple of months. Patient has generalized abdominal pain that wraps around as well. Hs not has a bowel movement in a month. Has been on Percocet  mg several times a day. He is not able to pass gas today. Had C-scope and EGD in January, as well as EUS. Patient has been smoking cigarette since age 14. He was in process of quitting until his recent diagnosis of esophageal cancer where he started smoking heavily again to deal with the stress. Patient says there is not much I can do for him. He denies fever, chills, palpitations, shortness of breath, visual disturbance or headaches.     Review of Systems  ROS  12-point ros negative except for pertinent positives stated in HPI.    Past Medical History   has a past medical history of Backpain (01/2019); Bowel habit changes (01/2019); Breath shortness (01/2019); Cancer (HCC) (2018); Cataract; Dental disorder; Dysphagia; Emphysema of lung ([facility]); Glaucoma; Heart burn; Hiatus hernia syndrome; High cholesterol; Hypertension; Indigestion; Psychiatric problem (01/2019); Renal disorder (01/2019); and Stroke ([facility]) (2013). He also has no past medical history of COPD.    Surgical History   has  a past surgical history that includes other (1980); cataract extraction with iol (Bilateral, 2005); other orthopedic surgery (1997); gastroscopy (2/1/2019); egd w/endoscopic ultrasound (2/1/2019); and egd with asp/bx (2/1/2019).     Family History  family history is not on file.     Social History   reports that he has been smoking Cigarettes.  He has a 7.50 pack-year smoking history. He has never used smokeless tobacco. He reports that he does not drink alcohol or use drugs.    Allergies  Allergies   Allergen Reactions   • Nkda [No Known Drug Allergy]        Medications  Prior to Admission Medications   Prescriptions Last Dose Informant Patient Reported? Taking?   DILTIAZem CD (CARDIZEM CD) 300 MG CAPSULE SR 24 HR   Yes No   Sig: Take 300 mg by mouth every day.   aspirin EC (ECOTRIN) 81 MG Tablet Delayed Response   Yes No   Sig: Take 81 mg by mouth every day.   metroNIDAZOLE (FLAGYL) 500 MG Tab   Yes No   Sig: Take 500 mg by mouth 3 times a day.   omeprazole (PRILOSEC) 40 MG delayed-release capsule   Yes No   Sig: Take 40 mg by mouth every day.   oxyCODONE-acetaminophen (PERCOCET)  MG Tab   Yes No   Sig: Take 1-2 Tabs by mouth every four hours as needed for Severe Pain.   pravastatin (PRAVACHOL) 40 MG tablet   Yes No   Sig: Take 40 mg by mouth every evening.   zolpidem (AMBIEN) 10 MG Tab   Yes No   Sig: Take 10 mg by mouth at bedtime as needed for Sleep.      Facility-Administered Medications: None       Physical Exam  Temp:  [36.4 °C (97.6 °F)] 36.4 °C (97.6 °F)  Pulse:  [82-87] 86  Resp:  [20] 20  BP: (149)/(86) 149/86  SpO2:  [89 %-94 %] 89 %    Physical Exam   Gen: cachetic appearing male in NAD. Sad appearing however. Wife an d daughter at bedside.  HEENT: NC/AT, PERRLA, EOMI, oral mucosa moderately dry  Neck: supple  Pulm: CTAB but diminished air movement, no respiratory distress.   CVS: RRR, normal S1, S2, no appreciable m/r/g. Mild non-pitting edema in bilat LEs  Abd: soft, mild diffused tenderness  over most of the abdomen  Ext: intact peripheral pulses, trace non-pitting edema.  Neuro: alert and oriented to person, place, time and event.  Psych: sad mood.      Laboratory:  Recent Labs      02/11/19 2015   WBC  13.6*   RBC  5.71   HEMOGLOBIN  14.5   HEMATOCRIT  45.2   MCV  79.2*   MCH  25.4*   MCHC  32.1*   RDW  52.2*   PLATELETCT  412   MPV  8.8*     Recent Labs      02/11/19 2015   SODIUM  135   POTASSIUM  4.0   CHLORIDE  98   CO2  25   GLUCOSE  89   BUN  21   CREATININE  0.76   CALCIUM  9.7     Recent Labs      02/11/19 2015   ALTSGPT  22   ASTSGOT  20   ALKPHOSPHAT  116*   TBILIRUBIN  1.1   LIPASE  37   GLUCOSE  89                 No results for input(s): TROPONINI in the last 72 hours.    Urinalysis:    Recent Labs      02/11/19   2327   SPECGRAVITY  1.015   GLUCOSEUR  Negative   KETONES  40*   NITRITE  Negative   LEUKESTERAS  Negative   WBCURINE  0-2*   RBCURINE  10-20*   BACTERIA  Rare*   EPITHELCELL  Rare        Imaging:  CT-ABDOMEN-PELVIS WITH   Final Result         1. Thickening of the distal esophagus, likely related to known carcinoma.   2.  Gastrohepatic, periportal, aortocaval and retroperitoneal [place] metastases.   3. Large, 8.7 cm right renal mass, consistent with malignancy. There may be early invasion of the right renal vein.   4. Moderate amount of stool throughout the colon.   5. Emphysema.      DX-CHEST-PORTABLE (1 VIEW)   Final Result      1.  COPD. No acute cardiopulmonary abnormality.   2.  Small hiatal hernia.            Assessment/Plan:  Pleasant unfortunate 73 year-old male with more than 100 pack smoking history with recent diagnosis of esophageal cancer and renal mass presenting with failure to throve and constipation x1 month.    1. Esophageal cancer  2. ?Afib - patient on Diltiazem 300 mg, unknown reason. He believes it is for hypertension.  3. FTT  4. Leukocytosis - likely due to hemoconcentration.   5. Constipation  6. COPD  7. Nicotine dependence    Plan:  1. SW consult  to arrange family meeting and determine goals of mamagement  2. Consider palliative care consultation in a.m.  3. IV fluids with D5  4. Lopressor 2.5 mg q6h with hold parameters as diltiazem is being held as he canot take oral  5. Nicotine patch 21 mg  6. Bisacodyl suppository  7. Tap water enema  8 Consider morphine for pain management, cannot start ER due to inability to swallow. Perhaps fentanyl patch started today.   9. Consider surgery/GI consultation if patient wishes to purse aggressive measures including placement of a feeding tube.  10. Readdress code status with patient and family. Wants to undergo CPR per wife wishes but not sure. Advised family to discuss with patient and to request my attention if they have any additional questions.     I anticipate this patient will require at least two midnights for appropriate medical management, necessitating inpatient admission.    No new Assessment & Plan notes have been filed under this hospital service since the last note was generated.  Service: Hospital Medicine    H2B for GI PPX  VTE prophylaxis: LMWH

## 2019-02-12 NOTE — DISCHARGE PLANNING
Anticipated Discharge Disposition: TBD    Action: Per MDT rounds, palliative has been consulted, and pt may have a feed tube placed.     LSw acquired completed Cert of Comp.    Lsw provided all resources requested by pt's wife to bedside.      Barriers to Discharge: TBD    Plan: f/u w/ medical team, pt

## 2019-02-13 PROBLEM — G89.3 CANCER ASSOCIATED PAIN: Status: ACTIVE | Noted: 2019-01-01

## 2019-02-13 PROBLEM — K59.03 CONSTIPATION DUE TO PAIN MEDICATION THERAPY: Status: ACTIVE | Noted: 2019-01-01

## 2019-02-13 PROBLEM — R64 MALIGNANT CACHEXIA (HCC): Status: ACTIVE | Noted: 2019-01-01

## 2019-02-13 PROBLEM — N28.89 RENAL MASS, RIGHT: Status: ACTIVE | Noted: 2019-01-01

## 2019-02-13 PROBLEM — Z72.0 TOBACCO ABUSE: Status: ACTIVE | Noted: 2019-01-01

## 2019-02-13 PROBLEM — C15.9 ESOPHAGEAL CANCER (HCC): Status: ACTIVE | Noted: 2019-01-01

## 2019-02-13 PROBLEM — J44.89 COPD (CHRONIC OBSTRUCTIVE PULMONARY DISEASE) WITH CHRONIC BRONCHITIS (HCC): Status: ACTIVE | Noted: 2019-01-01

## 2019-02-13 NOTE — CARE PLAN
Problem: Safety  Goal: Will remain free from injury  Outcome: PROGRESSING AS EXPECTED  Call light use; safe ambulation

## 2019-02-13 NOTE — PROGRESS NOTES
Admitted early this morning-  73-year-old male with past medical history of tobacco use disorder, low back pain with sciatica, hypertension, dyslipidemia.  Has lost approximately 65 pounds over the last year, 50 of it within the last 3 months.     He recently had EGD to evaluate for esophageal cancer  Neoadjuvant chemo is planned and he has seen Dr. Page.    Over the last few weeks his pain has increased and he is not tolerating oral intake well at all, he has severe abdominal pain when he eats-CT scan shows abundant stool in the proximal colon, he is cachectic.   Patient additionally has a large suspicious right renal mass which will require excision.     Given his severe protein calorie malnutrition and weakness, his functional status in order to tolerate chemo, radiation, surgery is severely impaired.  Oncologist is recommending that feeding tube be placed.    Discussed guarded prognosis, feeding tube with patient and family at bedside.  They are agreeable with this immediate plan of care, they will discuss further goals of therapy with palliative care.  Family is agreeable with meeting with palliative care team.    Patient has an outpatient appointment with urology on Friday, given timing of feeding tube placement etc., it is unlikely he will be able to make this appointment.  Therefore we will have urology see him inpatient.

## 2019-02-13 NOTE — PROGRESS NOTES
Assessment completed. Pt AAOx4. C/o 6/10 pain in lower abd and left hip area. PRN pain med given. Denies any nausea, vomiting, SOB, dizziness, numbness or tingling. 2+ pitting edema on both feet seen. Pt got up to the bathroom to void w/ standby assist, gait steady. Safety and comfort measures in place. No additional needs at this time. Pt in bed resting.

## 2019-02-13 NOTE — CONSULTS
"[facility] [place] Consult/H&P Note    [facility]: [facility]  Attending: MARIE Bellamy*  Patient's Name/MRN: Justus Barnard, [id number]    Admit Date:2/11/2019  Today's Date: 2/13/2019   Length of stay:  LOS: 1 day   Room #: 2202/01      Reason for consult/chief complaint: right renal mass consistent with malignancy  ID: Justus Barnard is a 73 y.o. male patient with right renal mass     HPI: Patient presented to the emergency department yesterday morning. Has a history of esophageal cancer with planned neoadjuvant chemo with Dr. Page. He has had weight loss of 50 lbs over the last 3 months. Reports generalized weakness and night sweats. Has some flank pain, bilaterally. Renal mass found incidentally on imaging 1 month ago. History of smoking 1 ppd for upwards of 50 +years and is a current tobacco smoker. Denies any urinary complaints including hematuria, dysuria, frequency or urgency. Denies fevers, chills, N/V but reports some \"dry heaving\".      Past Medical History:   Past Medical History:   Diagnosis Date   • Backpain 01/2019   • Bowel habit changes 01/2019    Constipation   • Breath shortness 01/2019    With exertion   • Cancer (HCC) 2018    Renal mass, esophagel CA   • Cataract     Bilateral IOL implants   • Dental disorder     Upper and lower dentures   • Dysphagia    • Emphysema of lung (HCC)    • Glaucoma    • Heart burn    • Hiatus hernia syndrome    • High cholesterol    • Hypertension    • Indigestion    • Psychiatric problem 01/2019    depression/anxiety, no current meds   • Renal disorder 01/2019    Recent kidney mass dx   • Stroke (HCC) 2013    No residual weakness        Past Surgical History:   Past Surgical History:   Procedure Laterality Date   • GASTROSCOPY  2/1/2019    Procedure: GASTROSCOPY;  Surgeon: Gen Gonzales M.D.;  Location: SURGERY [facility];  Service: EUS   • EGD W/ENDOSCOPIC ULTRASOUND  2/1/2019    Procedure: EGD W/ENDOSCOPIC ULTRASOUND - UPPER, " RADIAL, POSS LINEAR;  Surgeon: Gen Gonzales M.D.;  Location: SURGERY [facility];  Service: EUS   • EGD WITH ASP/BX  2/1/2019    Procedure: EGD WITH ASP/BX - FNA OF TUMOR OF ESOPHAGUS;  Surgeon: Gen Gonzales M.D.;  Location: SURGERY [facility];  Service: EUS   • CATARACT EXTRACTION WITH IOL Bilateral 2005   • OTHER ORTHOPEDIC SURGERY  1997    Lower back surgery L4,L5   • OTHER  1980    Benign cyst removed on back of neck        Family History:   History reviewed. No pertinent family history.      Social History:   Social History   Substance Use Topics   • Smoking status: Current Every Day Smoker     Packs/day: 0.50     Years: 15.00     Types: Cigarettes   • Smokeless tobacco: Never Used   • Alcohol use No      Social History     Social History Narrative   • No narrative on file        Allergies: he Nkda [no known drug allergy]    Medications:   Prescriptions Prior to Admission   Medication Sig Dispense Refill Last Dose   • albuterol 108 (90 Base) MCG/ACT Aero Soln inhalation aerosol Inhale 2 Puffs by mouth every 6 hours as needed for Shortness of Breath.   > 1 week at Unknown   • aspirin EC (ECOTRIN) 81 MG Tablet Delayed Response Take 81 mg by mouth every day.   > 1 week at AM   • metroNIDAZOLE (FLAGYL) 500 MG Tab Take 500 mg by mouth 3 times a day. Pt started on 1/25/2019 for 7 day course.   1/31/2019 at Finished   • pravastatin (PRAVACHOL) 40 MG tablet Take 40 mg by mouth every evening.   > 3 days at Unknown   • DILTIAZem CD (CARDIZEM CD) 300 MG CAPSULE SR 24 HR Take 300 mg by mouth every day.   > 3 days at AM   • zolpidem (AMBIEN) 10 MG Tab Take 10 mg by mouth every bedtime.   2/10/2019 at PM   • omeprazole (PRILOSEC) 40 MG delayed-release capsule Take 40 mg by mouth every day.   > 3 days at AM   • oxyCODONE-acetaminophen (PERCOCET)  MG Tab Take 1 Tab by mouth 5 Times a Day.   2/11/2019 at 1300         Review of Systems  Review of Systems   Constitutional: Positive for malaise/fatigue and weight  "loss. Negative for chills and fever.        +night sweats   Cardiovascular: Negative for chest pain.   Gastrointestinal: Positive for abdominal pain. Negative for nausea and vomiting.   Genitourinary: Positive for flank pain. Negative for dysuria, frequency, hematuria and urgency.        Physical Exam  VITAL SIGNS: BP (!) 162/70   Pulse 94   Temp 36.5 °C (97.7 °F) (Oral)   Resp 18   Ht 1.753 m (5' 9\")   Wt 54.6 kg (120 lb 5.9 oz)   SpO2 91%   BMI 17.78 kg/m²   GENERAL:  alert, in no acute distress, cachectic, underweight  EYES: EOMI and normal accomodation  CHEST AND LUNGS: no increased respiratory effort  Abdomen: soft, non tender, without masses. Non distended. No CVAT  EXTREMITIES: Warm and well perfused   NEURO: No focal deficits  SKIN: Skin color, texture, turgor normal. No rashes, lesions, nor jaundice.      Labs:  CBC:   Lab Results   Component Value Date/Time    WBC 13.7 (H) 02/13/2019 05:12 AM    HEMOGLOBIN 14.3 02/13/2019 05:12 AM    HEMATOCRIT 45.0 02/13/2019 05:12 AM       Glucose:  Lab Results   Component Value Date/Time    GLUCOSE 86 02/13/2019 05:12 AM    GLUCOSE 89 02/11/2019 08:15 PM    GLUCOSE 106 (H) 02/01/2019 01:10 PM    GLUCOSE 82 10/14/2008 05:45 AM    Electrolytes:  Lab Results   Component Value Date/Time    SODIUM 137 02/13/2019 05:12 AM    POTASSIUM 3.6 02/13/2019 05:12 AM    CHLORIDE 102 02/13/2019 05:12 AM    CO2 22 02/13/2019 05:12 AM    GLUCOSE 86 02/13/2019 05:12 AM    BUN 16 02/13/2019 05:12 AM    CREATININE 0.60 02/13/2019 05:12 AM    CREATININE 1.0 10/14/2008 05:45 AM    CALCIUM 8.9 02/13/2019 05:12 AM       Coags:  No results found for: INR      Urinalysis:   Lab Results   Component Value Date/Time    COLORURINE Yellow 02/11/2019 11:27 PM    CLARITY Hazy (A) 02/11/2019 11:27 PM    SPECGRAVITY 1.015 02/11/2019 11:27 PM    PHURINE 5.5 02/11/2019 11:27 PM    GLUCOSEUR Negative 02/11/2019 11:27 PM    KETONES 40 (A) 02/11/2019 11:27 PM    NITRITE Negative 02/11/2019 11:27 PM    " OCCULTBLOOD Moderate (A) 02/11/2019 11:27 PM    RBCURINE 10-20 (A) 02/11/2019 11:27 PM    BACTERIA Rare (A) 02/11/2019 11:27 PM    EPITHELCELL Rare 02/11/2019 11:27 PM          Imaging:  CT abd/pelvis w/wo 2/11/19   1. Thickening of the distal esophagus, likely related to known carcinoma.  2. Gastrohepatic, periportal, aortocaval and retroperitoneal raleigh metastases.  3. Large, 8.7 cm right renal mass, consistent with malignancy. There may be early invasion of the right renal vein.  4. Moderate amount of stool throughout the colon.  5. Emphysema.     CXR 2/11/19   1.  COPD. No acute cardiopulmonary abnormality.  2.  Small hiatal hernia.     Assessment/Recommendation   73 y.o.male with large right renal mass consistent with malignancy    · Order Chest CT to assess for metastasis, patient is claustrophobic and will need sedation for exam  · Renal mass suspicious for possible transitional cell carcinoma, will schedule for IR guided needle biopsy of renal mass  · Consult with palliative care  · Will follow    Dr. Sesay is aware of this consultation and is in agreement with the outlined care plan       Kenneth Mancuso PA-C

## 2019-02-13 NOTE — RESPIRATORY CARE
COPD EDUCATION by COPD CLINICAL EDUCATOR  2/13/2019 at 7:34 AM by Opal Cook     Patient reviewed by COPD education team. Patient does not qualify for COPD program.

## 2019-02-13 NOTE — PROGRESS NOTES
Telemetry Shift Summary    Rhythm ST/ST  HR Range 's  Ectopy Rare PAC, PVC's  Measurements 0.16/0.08/0.40        Normal Values  Rhythm SR  HR Range    Measurements 0.12-0.20 / 0.06-0.10  / 0.30-0.52

## 2019-02-13 NOTE — CARE PLAN
Problem: Bowel/Gastric:  Goal: Normal bowel function is maintained or improved  Outcome: PROGRESSING AS EXPECTED  Pt denies nausea and vomiting. Still not having BM. Has scheduled bisacodyl in the morning.     Problem: Pain Management  Goal: Pain level will decrease to patient's comfort goal  Outcome: PROGRESSING AS EXPECTED  Patient medicated per MD orders. Encouraged the use of non-pharm measures of pain relief (i.e. Heat, cold, distractions ) and to inform RN if pain is greater than comfort level.

## 2019-02-13 NOTE — PROGRESS NOTES
Report given to Day  RN. POC discussed. Pt resting comfortably in bed. Safety precautions in place.

## 2019-02-13 NOTE — DISCHARGE PLANNING
Anticipated Discharge Disposition: TBD    Action: Spoke to palliative RN this AM. She will meet w/ pt. Provided completed Cert of Comp and indicated had provided AD to pt at bedside yesterday. Pt thought he had one in place. However, LSw did not find one in [employer] tab. Pt's wife, via phone, indicated they would like to complete an AD.    Barriers to Discharge: TBD    Plan: f/u w/ medical team, palliative RN Nichole

## 2019-02-13 NOTE — PROGRESS NOTES
Assumed care of pt. Pt alert and oriented. Pt complains of abdominal pain, but has medication regimen and is educated about times. Bed low and locked.Side rails up x2.  Call light within reach. Offered needs.

## 2019-02-13 NOTE — CONSULTS
Reason for PC Consult: Advance Care Planning    Consulted by:   Dr. Aguero    Assessment:  General:   73 year old male admitted for esophageal cancer on 2/11/19. Pt has a history of esophageal cancer, renal mass, chronic back pain, cataract, dysphagia, emphysema, glaucoma, high cholesterol, HTN, indigestion, psychiatric problems, renal disorder, stroke and current smoker. Pt presented to [facility] ED with complaints of failure to thrive, he has not been able to eat or drink anything the past 2 days.     Dyspnea: No, 91% on RA  Last BM:  (PTA)    Pain: No   Depression: No    Dementia: No       Spiritual:  Is Voodoo or spirituality important for coping with this illness? No, Pt declined visit from   Has a  or spiritual provider visit been requested? No    Palliative Performance Scale: 40%    Advance Directive: None on File  DPOA: None on File, THO Espinosa ([phone])    POLST: None on File    Code Status: Full    Outcome:  PC RN met with pt, daughter and spouse at bedside. Introduce self and role of PC, pt expressed wanting his family to discuss. Pt's daughter Nayla verbalized understanding of two different cancers, Esophageal and Renal, as well as the option of a feeding tube. PC RN explored family's thoughts and feelings about diagnosis, they expresses the stress of the recent diagnosis and needing time to process.     Family asked what options are available, PC RN discussed following up with oncologist and following the recommended treatment plan vs going home with hospice support. PC RN discussed hospice in detail, philosophy and goals, answered questions. Discussed benefits vs burdens of treatment, quality vs quantity of life, and healthcare goals. Discussed feeding tube placement, regardless of healthcare goals.    Discussed code status, AD and POLST form. Pt expressed not wanting to make decisions, that he wants his family to make decisions. PC RN educated pt on the additional  "stress placed on family who make decisions on pt's behalf, not knowing his wishes. Discussed alleviating burden on family by expresses his wishes, Nayla verbalized support of pt's choices if she knew what they are. PC RN educated pt on CPR and intubation and mechanical ventilation. Pt expressed \"I don't want my ribs broken and still be a vegetable\". Pt expressed wanting to talk with family further before making any decisions. PC RN discussed AD and POLST form, pt and family verbalized understanding, they will discuss AD tonight and complete once they have discussed it. PC RN provided another AD packet to family.     PC RN provided contact cards, encouraged pt and family to call with any questions or concerns.     Active listening, education, validation, normalization, and emotional support provided.     Updated:   Dr. Aguero    Plan:   Pt and family to discuss GOC, feeding tube, aggressive vs comfort focused care.     Recommendations: I do not recommend an ethics or hospice consult at this time because GOC have not been established.    Thank you for allowing Palliative Care to participate in this patient's care. Please feel free to call [phone] with any questions or concerns.  "

## 2019-02-14 PROBLEM — J43.1 PANLOBULAR EMPHYSEMA (HCC): Status: ACTIVE | Noted: 2019-01-01

## 2019-02-14 NOTE — ASSESSMENT & PLAN NOTE
With dysphagia and progressive weight loss of approximately 80 pounds over the last year  50 pounds in the last 3 months  On TF

## 2019-02-14 NOTE — PROGRESS NOTES
Telemetry Shift Summary    Rhythm SR  HR Range 70-90  Ectopy Rare PVCs, PACs  Measurements .16/.08/.40        Normal Values  Rhythm SR  HR Range    Measurements 0.12-0.20 / 0.06-0.10  / 0.30-0.52

## 2019-02-14 NOTE — CONSULTS
DATE OF SERVICE:  02/13/2019    SURGICAL CONSULTATION    REQUESTING PHYSICIAN:  Trina Aguero MD    REASON FOR CONSULTATION:  The patient is a 73-year-old gentleman who recently   had had progressive dysphagia and approximately 50-pound weight loss over the   last 3 months.  On his evaluation, he was found to have esophageal cancer,   which on CT scan appears to be regionally metastatic.  He is in need of   enteral access for nutrition.    PAST MEDICAL HISTORY:  Illnesses are COPD, hiatal hernia, hypertension and   also a renal mass.    PAST SURGICAL HISTORY:  Cataract surgery.  He has had a gastroscopy and EGD.    MEDICATIONS:  Cardizem, Ecotrin, Flagyl, Prilosec, Percocet, Pravachol,   Ambien.    ALLERGIES:  None.    SOCIAL HISTORY:  He is .  He does smoke, but does not drink.    REVIEW OF SYSTEMS:  Otherwise unremarkable.    PHYSICAL EXAMINATION:  GENERAL:  He is cachectic.  HEENT:  Anicteric.  NECK:  Supple.  LUNGS:  Clear.  ABDOMEN:  Soft.  No palpable mass in the upper abdomen.  EXTREMITIES:  Without edema.  NEUROLOGIC:  Intact.    LABORATORY DATA:  Labs show a white count of 13,000, hemoglobin of 14,   platelet count is 382,000.  Electrolytes are normal with albumin 2.9.    IMAGING:  CT scan noted he has esophageal lesion within his distal esophagus.    He also has a right kidney mass and a mesenteric and retroperitoneal tumor   extending into the lesser sac.    IMPRESSION:  A 73-year-old male with esophageal cancer that is at least stage   III in need of enteral access.    PLAN:  Will be to go ahead and place a laparoscopic gastrostomy tube.  He is   not a surgical candidate for resection.  The procedure described to him and   his family as well as risks including bleeding, infection, conversion to an   open procedure and anesthetic risk.  They understand and wish to proceed.       ____________________________________     MD ANKUR MICHAEL / EFE    DD:  02/14/2019 14:41:26  DT:   02/14/2019 15:29:12    D#:  [id number]  Job#:  [id number]    cc: CARLOS TAI MD, Robert HARDING

## 2019-02-14 NOTE — PROGRESS NOTES
Telemetry Shift Summary    Rhythm SR/ST  HR Range 80s-100s  Ectopy rare PAC/PVC  Measurements 0.14/0.10/0.38        Normal Values  Rhythm SR  HR Range    Measurements 0.12-0.20 / 0.06-0.10  / 0.30-0.52

## 2019-02-14 NOTE — ASSESSMENT & PLAN NOTE
Patient had been cutting down but has been smoking more since his diagnosis  He is on nicotine replacement

## 2019-02-14 NOTE — DISCHARGE PLANNING
Anticipated Discharge Disposition: d/c unkown    Action: Lsw completed work excuse letter for pt, and provided to Hospitalist RN.     Barriers to Discharge: TBD    Plan: f/u w/ medical team

## 2019-02-14 NOTE — PROGRESS NOTES
Hospital Medicine Daily Progress Note    Date of Service  2/13/2019    Chief Complaint  Abdominal pain, weakness, poor oral intake    Hospital Course    *Unfortunate 73-year-old male with a history of tobacco use disorder and underlying COPD, recently diagnosed with advanced esophageal cancer and planning for neoadjuvant chemotherapy, he was also found to have a large suspicious right renal mass.  In the last year he is lost 80 pounds 50 of it within the last 3 months-over the last 2 weeks his oral intake has gotten worse and worse and he is having vomiting.  Sometimes his pills are getting stuck when he swallows them.  They have tried numerous laxatives and enemas at home but he has not had BM in several days*      Interval Problem Update  Discussed briefly with GI-not a candidate for PEG due to both diagnosis, future plan of care, current anatomy restrictions.  Surgical consultation is pending.  Discussed with palliative care-family is overwhelmed and unable to make a decision regarding CODE STATUS but it is under discussion.  Discussed with urology, recommendations relayed to the family.  Advised them that neither procedure can be done at [facility].  And we will need to defer this until he can be transferred or readmitted at [facility].  He received his PICC line for chemo today  He is having a CT scan to rule out metastatic disease  He is apprehensive about the CT scan as he has some claustrophobia    Patient is medically complex he has a very complex collection of diagnoses and plan of care, this necessitated speaking with multiple specialists, educating the patient and family, and overall coordination of care as detailed above.  In excess of 50 minutes were spent on today's visit with greater than 35 minutes devoted to counseling and coordination of care    Consultants/Specialty  General surgery-pending  Urology      Code Status  Currently full    Disposition  TBD    Review of Systems  Review  of Systems   Constitutional: Positive for malaise/fatigue and weight loss. Negative for chills, diaphoresis and fever.   HENT: Negative for sinus pain and sore throat.    Eyes: Negative for pain and discharge.   Respiratory: Positive for cough, sputum production and wheezing.    Cardiovascular: Negative for chest pain.   Gastrointestinal: Positive for abdominal pain, constipation and nausea.   Genitourinary: Negative for dysuria.   Musculoskeletal: Positive for back pain (Chronic with worsening left sciatica).   Skin: Negative for itching and rash.   Neurological: Positive for weakness. Negative for dizziness and headaches.   Psychiatric/Behavioral: The patient is nervous/anxious.         Physical Exam  Temp:  [36.2 °C (97.2 °F)-36.8 °C (98.2 °F)] 36.3 °C (97.3 °F)  Pulse:  [83-94] 92  Resp:  [18-20] 18  BP: (162-184)/(70-99) 181/74    Physical Exam   Constitutional: He is oriented to person, place, and time. He appears well-developed. No distress.   [place], cachectic   HENT:   Head: Normocephalic and atraumatic.   Mouth/Throat: Oropharynx is clear and moist.   Eyes: Pupils are equal, round, and reactive to light. Conjunctivae and EOM are normal. Right eye exhibits no discharge. Left eye exhibits no discharge. No scleral icterus.   Neck: Neck supple.   Cardiovascular: Normal rate and regular rhythm.    Pulmonary/Chest: Effort normal. No respiratory distress. He has wheezes. He has no rales. He exhibits no tenderness.   Diminished breath sounds with faint rhonchi,  He is coughing up translucent sputum which is slightly yellow   Abdominal: Soft. He exhibits no distension. There is no tenderness.   Diminished bowel sounds, no palpable mass or distention despite CT findings   Musculoskeletal: He exhibits no edema or tenderness.   Neurological: He is alert and oriented to person, place, and time. No cranial nerve deficit.   Skin: Skin is warm and dry. He is not diaphoretic.   Psychiatric:   Seems both anxious and  depressed   Nursing note and vitals reviewed.      Fluids    Intake/Output Summary (Last 24 hours) at 02/13/19 1740  Last data filed at 02/13/19 1600   Gross per 24 hour   Intake             2026 ml   Output                0 ml   Net             2026 ml       Laboratory  Recent Labs      02/11/19 2015 02/13/19   0512   WBC  13.6*  13.7*   RBC  5.71  5.61   HEMOGLOBIN  14.5  14.3   HEMATOCRIT  45.2  45.0   MCV  79.2*  80.2*   MCH  25.4*  25.5*   MCHC  32.1*  31.8*   RDW  52.2*  52.8*   PLATELETCT  412  382   MPV  8.8*  9.3     Recent Labs      02/11/19 2015 02/13/19   0512   SODIUM  135  137   POTASSIUM  4.0  3.6   CHLORIDE  98  102   CO2  25  22   GLUCOSE  89  86   BUN  21  16   CREATININE  0.76  0.60   CALCIUM  9.7  8.9                   Imaging  CT-ABDOMEN-PELVIS WITH   Final Result         1. Thickening of the distal esophagus, likely related to known carcinoma.   2.  Gastrohepatic, periportal, aortocaval and retroperitoneal raleigh metastases.   3. Large, 8.7 cm right renal mass, consistent with malignancy. There may be early invasion of the right renal vein.   4. Moderate amount of stool throughout the colon.   5. Emphysema.      DX-CHEST-PORTABLE (1 VIEW)   Final Result      1.  COPD. No acute cardiopulmonary abnormality.   2.  Small hiatal hernia.      CT-CHEST (THORAX) WITH    (Results Pending)   IR-RENAL BX RIGHT    (Results Pending)        Assessment/Plan  Constipation due to pain medication therapy   Assessment & Plan    Plan as noted for future pain medicine  For now he is on Reglan, scheduled MiraLAX.  If no BM by tomorrow will start relistor  On imaging the stool is in the proximal colon so enema unlikely to be effective, and he is unlikely to tolerate significant volume intake such as GoLYTELY or mag citrate given his oral limitations.     Cancer associated pain   Assessment & Plan    Currently pain is controlled however he is anxious about discharge pain medication  Some of his new abdominal pain  may be constipation related however and will see how things evolve  With a J-tube he will be unable to take sustained release pain medication therefore we will try to transition him to fentanyl patch for background pain control versus liquid methadone which may be less apt to trigger constipation     Malignant cachexia (HCC)   Assessment & Plan    Plan as noted     Tobacco abuse   Assessment & Plan    Patient had been cutting down but has been smoking more since his diagnosis  He is on nicotine replacement     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)   Assessment & Plan    With mucopurulent sputum  We will start mucolytics when he is tolerating oral intake better  Continue RT protocol       Esophageal cancer (HCC)   Assessment & Plan    With dysphasia and progressive weight loss of approximately 80 pounds over the last year  50 pounds in the last 3 months  Oral intake severely reduced over the last 2 weeks-this could be possibly due to constipation from pain medications on top of pre-existing issues  He has not a candidate for PEG, GI recommended surgical consultation for more distal enteral feeding tube.  Family updated     Renal mass, right   Assessment & Plan    Plan for IR biopsy versus nephrectomy  IR is often reluctant to biopsy renal masses-however urology feels this is the best next step  Regardless either procedure cannot be performed at this facility and he may need to schedule it at [facility] outpatient versus transfer after nutritional issues are addressed          VTE prophylaxis: lovenox

## 2019-02-14 NOTE — PROCEDURES
PICC Insertion Final 3CG    Consents confirmed, vessel patency confirmed with ultrasound. Risks and benefits of procedure explained to patient/family and education regarding central line associated bloodstream infections provided. Questions answered.     PICC placed in LUE per MD order with ultrasound guidance. 5 Fr, double lumen PICC placed in basilic vein after one attempt(s). 0 cc's of 1% lidocaine injected intradermally, 21 gauge microintroducer needle and modified Seldinger technique used. 47 cm catheter inserted with good blood return. Secured at 4 cm marker. Each lumen flushed without resistance with 10 mL 0.9% normal saline. PICC line secured with Biopatch and Tegaderm.    PICC placement is confirmed by nurse using 3CG technology. PICC line is appropriate for use at this time. Pt tolerated procedure well.  Patient condition relayed to unit RN or ordering physician via this post procedure note in the EMR.     [employer] PICC ref # [id number], Lot # [id number]

## 2019-02-14 NOTE — DIETARY
"Nutrition services: Day 2 of admit.  Justus Barnard is a 73 y.o. male with admitting DX of esophageal cancer, failure to thrive, constipation. Pt with right renal mass that is likely malignant.   Consult received for NPO x 2 days and report of poor PO PTA and significant weight loss.       Assessment:  Height: 175.3 cm (5' 9\")  Weight: 54.6 kg (120 lb 5.9 oz)  Body mass index is 17.78 kg/m².   Diet/Intake: NPO    Evaluation:   1. Palliative met with pt and family on 2/13.   2. Pt with hx of 80# weight loss x 1 year and 50# weight loss x 3 months. Pt also with poor PO intake x 2 weeks and report of constipation.  Still no BM noted despite provision of bisacodyl and Reglan. Weight loss is significant at 29% x 3 months.   3. Pt is NPO due to oropharyngeal dysphagia caused by esophageal cancer.  Current plan is for pt to have a laparoscopic G tube placed. He is not a candidate for esophagectomy due to his esophageal cancer.  4. Fluids:  NS at 100 ml/hour.  5. I&O: +2046 ml since admit (no output noted)    Malnutrition Risk: Pt is most likely malnourished AEB significant weight loss noted of 29% x 3 months and inadequate PO intake x at least 2 weeks.     Recommendations/Plan:  1. NPO with plan for placement of laparoscopic G tube  2. Monitor weight.  3. IV fluids per MD order.   4. Constipation meds as ordered by MD.   5. RD will continue to follow for possible initiation of TF s/p placement of feeding tube.           "

## 2019-02-14 NOTE — ASSESSMENT & PLAN NOTE
Plan for IR biopsy versus nephrectomy  IR is often reluctant to biopsy renal masses-however urology feels this is the best next step  Will need Xfer to [facility] for either Bx or nephrectomy

## 2019-02-14 NOTE — PROGRESS NOTES
Pt seen and examined  Has regionally advanced esophageal cancer and probable renal cancer  Plan to start treatment but needs enteral access for TF  Plan lap G tube as patient not surgical candidate for esophagectomy  Tentatively scheduling for 2/14 PM

## 2019-02-14 NOTE — CARE PLAN
Problem: Nutritional:  Goal: Nutrition support tolerated and meeting greater than 85% of estimated needs  Initiation of tube feed after placement of laparoscopic G tube.   Outcome: NOT MET

## 2019-02-14 NOTE — CARE PLAN
Problem: Pain Management  Goal: Pain level will decrease to patient's comfort goal    Intervention: Follow pain managment plan developed in collaboration with patient and Interdisciplinary Team  Morphine administered frequently this shift to decrease pain. Pain has been no higher than a 5.

## 2019-02-14 NOTE — PROGRESS NOTES
"[facility] [place] Progress Note    Service: [facility] [place]  Patient's Name: Justus Barnard  MRN: [id number]  Admit Date:2/11/2019  Today's Date: 2/14/2019   Room #: 2202/01      Identification:  73 y.o. male with right renal mass     Overnight-Interval events:   - none    Subjective/ROS:   Patient seen and examined. Doing well overall. Pain is well controlled. No fevers, chills, nausea or vomiting. Has some bladder pain. No flank pain. No urinary symptoms. Patient is tentatively scheduled for lap G tube this afternoon    Physical Exam:  Current Vitals:   BP (!) 164/66   Pulse 83   Temp 36.7 °C (98.1 °F) (Oral)   Resp 18   Ht 1.753 m (5' 9\")   Wt 54.6 kg (120 lb 5.9 oz)   SpO2 91%   BMI 17.78 kg/m²     02/12 1900 - 02/14 0659  In: 2446 [I.V.:2446]  Out: -     GEN : NAD, A&O X4, underweight  RES:  no acute respiratory distress, wheezes appreciated bilaterally  CV: RRR, No M/R/G  ABD: soft, nondistended, nontender  :   No lopez, suprapubic pain  INC:  CDI, no erythema, exudate, induration.   EXT:  No edema, SCD's in place    Labs:   Lab Results   Component Value Date/Time    WBC 13.7 (H) 02/13/2019 05:12 AM    HEMATOCRIT 45.0 02/13/2019 05:12 AM    SODIUM 137 02/13/2019 05:12 AM    POTASSIUM 3.6 02/13/2019 05:12 AM    CHLORIDE 102 02/13/2019 05:12 AM    CO2 22 02/13/2019 05:12 AM    BUN 16 02/13/2019 05:12 AM    CREATININE 0.60 02/13/2019 05:12 AM    CREATININE 1.0 10/14/2008 05:45 AM    CALCIUM 8.9 02/13/2019 05:12 AM        Lab Results   Component Value Date/Time    GLUCOSE 86 02/13/2019 05:12 AM    GLUCOSE 89 02/11/2019 08:15 PM    GLUCOSE 106 (H) 02/01/2019 01:10 PM    GLUCOSE 82 10/14/2008 05:45 AM       Assessment/Plan  Justus Barnard is a 73 y.o. male with right renal mass likely malignant in nature    Plan:  - CT chest w/ contrast pending, plan to obtain after lap G tube placed  - IR kidney biopsy pending, need to proceed with biopsy if patient is transferred to [facility] as may be TCC rather " than [facility]  - Continue to manage pain  - we will follow        Kenneth Mancuso PA-C

## 2019-02-15 PROBLEM — R62.7 FAILURE TO THRIVE IN ADULT: Status: ACTIVE | Noted: 2019-01-01

## 2019-02-15 NOTE — PROGRESS NOTES
"Surgical Progress Note:    POD #1 S/P lap gtube placement  Doing well. Neg N/V, no FLATUS/ no BM, Complains of abdominal pain since Relistor given.  Denies chest pain or SOB.  Ambulating. Tolerating tube feeds.  Bolsters to be removed in one week and prn    PE:  /68   Pulse 85   Temp 36.3 °C (97.4 °F) (Oral)   Resp 18   Ht 1.753 m (5' 9.02\")   Wt 54.6 kg (120 lb 5.9 oz)   SpO2 98%   BMI 17.77 kg/m²     I/O:   Intake/Output Summary (Last 24 hours) at 02/15/19 1506  Last data filed at 02/15/19 0840   Gross per 24 hour   Intake              800 ml   Output              650 ml   Net              150 ml         Review of Systems   Constitutional: Negative.  Negative for chills and fever.   Respiratory: Negative for shortness of breath.    Cardiovascular: Negative for chest pain.   Gastrointestinal: Negative for nausea and vomiting.        no flatus/no stool   Genitourinary: Negative.      Physical Exam   Constitutional:  appears well-developed.   Neck: Neck supple.   Cardiovascular: Normal rate.    Pulmonary/Chest: Effort normal.   Abdominal: Soft.  exhibits no distension. Appropriate tenderness.   Incisions clean, dry, and intact    Musculoskeletal: Normal range of motion.   Neurological:  alert.   Skin:  Warm and dry.   Extremities: kwon, no edema    Labs:  Recent Labs      02/13/19   0512   WBC  13.7*   RBC  5.61   HEMOGLOBIN  14.3   HEMATOCRIT  45.0   MCV  80.2*   MCH  25.5*   RDW  52.8*   PLATELETCT  382   MPV  9.3   NEUTSPOLYS  75.60*   LYMPHOCYTES  12.30*   MONOCYTES  10.10   EOSINOPHILS  1.20   BASOPHILS  0.40     Recent Labs      02/13/19   0512   SODIUM  137   POTASSIUM  3.6   CHLORIDE  102   CO2  22   GLUCOSE  86   BUN  16         A/P:     - Tolerating tube feeds,   Advance to goal  - Remove bolsters in one week  - IS Q One hour while awake  - Ambulate.  Out of bed QID  - Pain controlled.  Cont PO pain medication  - DVT propholaxis, ok to cont Lovenox, sequentials and ambulate  - Adequate urine " output.  Cont, to monitor    Failure to thrive in adult- (present on admission)   Assessment & Plan    2/14 lap g-tube placement  2/15 tolerating tube feeds         Discussed with Patient, RN and NEERU Mauricio  [facility]  [phone]

## 2019-02-15 NOTE — PROGRESS NOTES
Hospital Medicine Daily Progress Note    Date of Service  2/14/2019    Chief Complaint  Abdominal pain, weakness, poor oral intake    Hospital Course    *Unfortunate 73-year-old male with a history of tobacco use disorder and underlying COPD, recently diagnosed with advanced esophageal cancer and planning for neoadjuvant chemotherapy, he was also found to have a large suspicious right renal mass.  In the last year he is lost 80 pounds 50 of it within the last 3 months-over the last 2 weeks his oral intake has gotten worse and worse and he is having vomiting.  Sometimes his pills are getting stuck when he swallows them.  They have tried numerous laxatives and enemas at home but he has not had BM in several days*      Interval Problem Update  Patient seen just after brought back from PACU  He is in distress secondary to pain  Did not belabor review of systems in the setting  Patient has not had a bowel movement  Reviewed care plan with family at bedside    Consultants/Specialty  Tonia Leblanc MD-general surgery  Urology      Code Status  Currently full    Disposition  TBD    Review of Systems  Review of Systems   Unable to perform ROS: Medical condition   Gastrointestinal: Positive for abdominal pain and constipation.        Physical Exam  Temp:  [36.4 °C (97.6 °F)-36.8 °C (98.2 °F)] 36.8 °C (98.2 °F)  Pulse:  [] 88  Resp:  [16-18] 18  BP: (157-178)/(66-81) 178/75  SpO2:  [91 %-99 %] 97 %    Physical Exam   Constitutional: He is oriented to person, place, and time. He appears well-developed. He appears distressed.   [place], cachectic   HENT:   Head: Normocephalic and atraumatic.   Mouth/Throat: Oropharynx is clear and moist.   Eyes: Pupils are equal, round, and reactive to light. Conjunctivae and EOM are normal. Right eye exhibits no discharge. Left eye exhibits no discharge. No scleral icterus.   Neck: Neck supple.   Cardiovascular: Normal rate and regular rhythm.    Pulmonary/Chest: No respiratory distress. He  has no wheezes. He has no rales. He exhibits no tenderness.   Shallow rapid respirations due to pain   Abdominal: Soft. He exhibits no distension. There is no tenderness.   G-tube in the epigastrium there is no dressing, there is a slight amount of bright red blood under the button  No bowel sounds   Musculoskeletal: He exhibits no edema or tenderness.   Neurological: He is alert and oriented to person, place, and time. No cranial nerve deficit.   Skin: Skin is warm and dry. He is not diaphoretic.   Psychiatric:   Anxious due to pain   Nursing note and vitals reviewed.      Fluids    Intake/Output Summary (Last 24 hours) at 02/14/19 1733  Last data filed at 02/14/19 1658   Gross per 24 hour   Intake             1220 ml   Output                0 ml   Net             1220 ml       Laboratory  Recent Labs      02/11/19 2015 02/13/19   0512   WBC  13.6*  13.7*   RBC  5.71  5.61   HEMOGLOBIN  14.5  14.3   HEMATOCRIT  45.2  45.0   MCV  79.2*  80.2*   MCH  25.4*  25.5*   MCHC  32.1*  31.8*   RDW  52.2*  52.8*   PLATELETCT  412  382   MPV  8.8*  9.3     Recent Labs      02/11/19 2015 02/13/19   0512   SODIUM  135  137   POTASSIUM  4.0  3.6   CHLORIDE  98  102   CO2  25  22   GLUCOSE  89  86   BUN  21  16   CREATININE  0.76  0.60   CALCIUM  9.7  8.9                   Imaging  CT-CHEST (THORAX) WITH   Final Result      1.  Severe emphysema.   2.  LEFT lower lobe bronchial wall thickening and secretions potentially developing pneumonia.   3.  Mid to distal esophageal mass consistent with known neoplasm.   4.  RIGHT kidney mass, partially visualized.   5.  Mesenteric and retroperitoneal tumor extending into the lesser sac, as on recent abdominal CT.            CT-ABDOMEN-PELVIS WITH   Final Result         1. Thickening of the distal esophagus, likely related to known carcinoma.   2.  Gastrohepatic, periportal, aortocaval and retroperitoneal [place] metastases.   3. Large, 8.7 cm right renal mass, consistent with malignancy.  There may be early invasion of the right renal vein.   4. Moderate amount of stool throughout the colon.   5. Emphysema.      DX-CHEST-PORTABLE (1 VIEW)   Final Result      1.  COPD. No acute cardiopulmonary abnormality.   2.  Small hiatal hernia.      IR-RENAL BX RIGHT    (Results Pending)        Assessment/Plan  Panlobular emphysema (HCC)   Assessment & Plan    Patient had CT of the chest to eval for metastatic disease  He has severe emphysema with multiple blebs and a very large central bleb  Continue RT protocol     Constipation due to pain medication therapy   Assessment & Plan    Plan for relistor tomorrow provided he can tolerate this     Cancer associated pain   Assessment & Plan    Some of his new abdominal pain may be constipation related however and will see how things evolve  Currently he has severe postop pain     Malignant cachexia (HCC)   Assessment & Plan    Plan as noted  Cannot initiate tube feeding protocol until we relieve the constipation  We will give him relist or tomorrow as he is currently postop pain     Tobacco abuse   Assessment & Plan    Patient had been cutting down but has been smoking more since his diagnosis  He is on nicotine replacement     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)   Assessment & Plan    With mucopurulent sputum  We will start mucolytics when he is tolerating oral intake better  Continue RT protocol       Esophageal cancer (HCC)   Assessment & Plan    With dysphasia and progressive weight loss of approximately 80 pounds over the last year  50 pounds in the last 3 months  Oral intake severely reduced over the last 2 weeks-this could be possibly due to constipation from pain medications on top of pre-existing issues  He has not a candidate for PEG due to obstruction-according to surgery he has not a candidate for resection in the future  Laparoscopic gastrotomy tube has been placed.     Renal mass, right   Assessment & Plan    Plan  for IR biopsy versus nephrectomy  IR is often reluctant to biopsy renal masses-however urology feels this is the best next step  Regardless either procedure cannot be performed at this facility and he may need to schedule it at [facility] regional outpatient versus transfer after nutritional issues are addressed          VTE prophylaxis: lovenox

## 2019-02-15 NOTE — OR NURSING
Respirations easy.  Productive cough, thick clear sputum. Patient states surgical site is painful when he coughs.  Fentanyl given with good effect.  Tegaderm and gauze clean and dry to belly, G tube site clean and dry.

## 2019-02-15 NOTE — CARE PLAN
Problem: Nutritional:  Goal: Nutrition support tolerated and meeting greater than 85% of estimated needs  Initiation of tube feed after placement of laparoscopic G tube.    Outcome: PROGRESSING AS EXPECTED

## 2019-02-15 NOTE — PROGRESS NOTES
So far pt.tolerating tube feeding.no n/v.  Continues to have abd.pain as before surgery.taking morphine 4 mg every 2 hours.  Iv infusing.

## 2019-02-15 NOTE — PROGRESS NOTES
Tele strip at 1926 shows Sinus Rhythm, HR of 97     MT 0.14  QRS 0.08  QT 0.36     Telemetry Summary     Rhythm Sinus Rhythm  Rate   Ectopy None, per [facility]     Telemetry monitoring strips placed in patient's chart.

## 2019-02-15 NOTE — PROGRESS NOTES
"[facility] [place] Progress Note    Service: [facility] [place]  Patient's Name: Justus Barnard  MRN: [id number]  Admit Date:2/11/2019  Today's Date: 2/14/2019   Room #: 2202/01      Identification:  73 y.o. male with right renal mass     Overnight-Interval events:   - none    Subjective/ROS:   Patient seen and examined. Moderate abdominal pain. Currently not controlled.  No flank pain. No urinary symptoms. Lap G tube placed yesterday, started TPN today. No BM yet. Patient is not a current candidate for chemotherapy.     Physical Exam:  Current Vitals:   /68   Pulse 85   Temp 36.3 °C (97.4 °F) (Oral)   Resp 18   Ht 1.753 m (5' 9.02\")   Wt 54.6 kg (120 lb 5.9 oz)   SpO2 98%   BMI 17.77 kg/m²     02/13 1900 - 02/15 0659  In: 1220 [I.V.:1220]  Out: 500 [Urine:500]    GEN : NAD, A&O X4, cachectic, moderate distress from abdominal pain throughout exam  RES:  no acute respiratory distress, wheezes appreciated bilaterally  :   No lopez  EXT:  No edema    Labs:   Lab Results   Component Value Date/Time    WBC 13.7 (H) 02/13/2019 05:12 AM    HEMATOCRIT 45.0 02/13/2019 05:12 AM    SODIUM 137 02/13/2019 05:12 AM    POTASSIUM 3.6 02/13/2019 05:12 AM    CHLORIDE 102 02/13/2019 05:12 AM    CO2 22 02/13/2019 05:12 AM    BUN 16 02/13/2019 05:12 AM    CREATININE 0.60 02/13/2019 05:12 AM    CREATININE 1.0 10/14/2008 05:45 AM    CALCIUM 8.9 02/13/2019 05:12 AM        Lab Results   Component Value Date/Time    GLUCOSE 86 02/13/2019 05:12 AM    GLUCOSE 89 02/11/2019 08:15 PM    GLUCOSE 106 (H) 02/01/2019 01:10 PM    GLUCOSE 82 10/14/2008 05:45 AM       Assessment/Plan  Justus Barnard is a 73 y.o. male with right renal mass likely malignant in nature    Plan:  - continue to manage pain  - will check back with the patient and family 2/16 on final decision regarding possible transfer to [facility] for IR guided biopsy vs outpatient management of renal mass        Kenneth Mancuso PA-C          "

## 2019-02-15 NOTE — OP REPORT
DATE OF SERVICE:  02/14/2019    PREOPERATIVE DIAGNOSIS:  Esophageal cancer.    POSTOPERATIVE DIAGNOSIS:  Esophageal cancer.    PROCEDURE:  Laparoscopic gastrostomy tube with an 18-Dominican G-tube.    SURGEON:  Luz Leblanc MD    ASSISTANT:  ORIN Akins    ANESTHESIA:  General endotracheal.    ANESTHESIOLOGIST:  Rosales Villela MD    INDICATIONS:  The patient is a 73-year-old gentleman who has been diagnosed   with esophageal cancer with regional disease.  He is having a G-tube placed   for enteral access to improve his nutrition, but is not deemed to be a   surgical candidate in the future.    FINDINGS:  G-tube was placed and the anterior stomach wall insufflated with   saline and still in intraluminal position.    DESCRIPTION OF PROCEDURE:  After the patient was identified and consented, he   was brought to the operating room and placed in the supine position.  The   patient underwent general endotracheal anesthetic clearance.  The patient's   abdomen was prepped and draped in sterile fashion.  The periumbilical area was   anesthetized with 0.25% Marcaine.  A 1-cm incision was made.  The abdominal   wall was lifted up and Veress needle inserted into the abdominal cavity.    After positive drop test, pneumoperitoneum was obtained.  The Veress was   removed and a 5-mm trocar was placed.  Under laparoscopic guidance, the   stomach was insufflated by the anesthesiologist.  T fastener was placed in the   anterior stomach wall.  An 18-gauge thin walled needle was introduced into   stomach.  Wire was passed.  Insertion site was enlarged and dilated.  An   18-Dominican G-tube was passed into the stomach, was insufflated with water into   the balloon.  It was then flushed with saline to verify an intraluminal   position, which was verified.  Pneumoperitoneum was released and the T   fasteners were brought taut.  Once that was completed, the port site was   closed with 4-0 Vicryl.  Op-Site dressing placed on the  wound.  The patient   was extubated and taken to the recovery room in stable condition.  All sponge   and needle counts were correct.       ____________________________________     MD SHILPA MICHAEL / EFE    DD:  02/14/2019 15:47:43  DT:  02/14/2019 17:13:32    D#:  [id number]  Job#:  [id number]    cc: CARLOS TAI MD, Rosales Villela MD

## 2019-02-15 NOTE — ASSESSMENT & PLAN NOTE
Patient had CT of the chest to eval for metastatic disease  He has severe emphysema with multiple blebs and a very large central bleb  Continue RT protocol

## 2019-02-15 NOTE — DIETARY
"Nutrition Support Assessment:  Day 3 of admit.  Justus Barnard is a 73 y.o. male with admitting DX of esophageal cancer, FTT and constipation     Current problem list:  1. Esophageal cancer with dysphagia and progressive weight loss of 80# x 1 year and 50# x 3 months.  2. Malignant cachexia  3. Constipation  4. Renal mass (right)- plan for IR biopsy vs nephrectomy.  5. Panlobular emphysema  6. COPD  7. Tobacco abuse     Assessment:  Estimated Nutritional Needs based on:   Height: 175.3 cm (5' 9.02\")  Weight: 54.6 kg (120 lb 5.9 oz)  Weight to Use in Calculations: 54.6 kg (120 lb 5.9 oz)  Body mass index is 17.77 kg/m².     Calculation/Equation: MSJ: YKU=7273 kcals  Total Calories / day: 0904-5260 kcals (Calories / k-31)  Total Grams Protein / day: 72-83  (Grams Protein / k.3-1.5)  Fluids: 9430-8086 ml (ML fluids/k-35)     Evaluation:   1. Tube feed is appropriate due to dx of dysphagia due to esophageal cancer with noted obstruction. Impact Peptide 1.5 ordered by surgeon with request for RD to determine goal rate. Impact Peptide 1.5 is a high protein peptide based formula that is easy to tolerate and absorb. Once tolerance to TF established, if it is medically feasible, pt may benefit from a high protein, higher fiber formula such as Diabetisource AC. This may help alleviate constipation. Per nurse, pt and family would like to transition TF to boluses prior to D/C home.  2. Pt with a laparoscopic gastrostomy tube.   3. Meds: Relistor, Dulcolax and Reglan  4.  Labs: potassium WNL on  at 3.6. Pt may benefit from close monitoring of labs for refeeding.   5. GI: pt still has not had a BM. Relistor ordered.         Malnutrition Risk: Pt is most likely malnourished AEB significant weight loss noted of 29% x 3 months and inadequate PO intake x at least 2 weeks.         Recommendations/Plan:  1. Advance Impact Peptide 1.5 as tolerated to a goal rate of 45 ml/hour. This will provide 1620 kcals, 101 " grams protein and 832 ml free water.   2. When medically feasible, MD may want to transition pt to higher fiber formula and boluses.  3. Additional fluids per MD  4. Monitor weights  5. RD will continue to monitor.

## 2019-02-15 NOTE — CARE PLAN
Problem: Bowel/Gastric:  Goal: Normal bowel function is maintained or improved  Outcome: NOT MET      Problem: Pain Management  Goal: Pain level will decrease to patient's comfort goal  Outcome: PROGRESSING AS EXPECTED

## 2019-02-16 NOTE — PROGRESS NOTES
Telemetry:    Rate: 70-90s  Rhythm: Sinus rhythm  Ectopy: Occasional pvcs; 3 seconds of psvt    Measurement:  0.14/0.08/0.4

## 2019-02-16 NOTE — PROGRESS NOTES
Telemetry Shift Summary     Rhythm SR-ST  HR Range 82-90  Ectopy rare PVC  Measurements .16/.08/.40

## 2019-02-16 NOTE — DIETARY
NUTRITION SERVICES - Nutrition Support Brief Note    Pt followed by nutrition services for enteral nutrition @ goal rate and tolerated. Pt with laparoscopic G tube placement 2/15. Impact Peptide 1.5 infusing at 25mL/hr until pt has BM. Per MD notes, pt failed Relistor. Golytely given today with no results yet.    RD monitoring.

## 2019-02-16 NOTE — PROGRESS NOTES
Hospital Medicine Daily Progress Note    Date of Service  2/15/2019    Chief Complaint  Abdominal pain, weakness, poor oral intake    Hospital Course    *Unfortunate 73-year-old male with a history of tobacco use disorder and underlying COPD, recently diagnosed with advanced esophageal cancer and planning for neoadjuvant chemotherapy, he was also found to have a large suspicious right renal mass.  In the last year he is lost 80 pounds 50 of it within the last 3 months-over the last 2 weeks his oral intake has gotten worse and worse and he is having vomiting.  Sometimes his pills are getting stuck when he swallows them.  They have tried numerous laxatives and enemas at home but he has not had BM in several days*      Interval Problem Update  Looks stronger already  Ambulating w/ family  Relistor failed, just caused cramping  Reviewed POC w/ patient, family, RN    Consultants/Specialty  Tonia Leblanc MD-general surgery  Urology      Code Status  Currently full    Disposition  TBD    Review of Systems  Review of Systems   Constitutional: Positive for malaise/fatigue (improved some). Negative for chills and fever.   HENT: Negative for sinus pain and sore throat.    Eyes: Negative for discharge and redness.   Respiratory: Positive for cough, sputum production and shortness of breath (baseline).    Cardiovascular: Negative for chest pain.   Gastrointestinal: Positive for abdominal pain (improved- relistor caused cramping) and constipation. Negative for nausea and vomiting.   Genitourinary: Negative for dysuria.   Neurological: Negative for dizziness and headaches.   Psychiatric/Behavioral: The patient is nervous/anxious.         Physical Exam  Temp:  [36.3 °C (97.3 °F)-36.8 °C (98.2 °F)] 36.3 °C (97.3 °F)  Pulse:  [] 83  Resp:  [16-18] 18  BP: (160-176)/(68-78) 175/73  SpO2:  [93 %-98 %] 97 %    Physical Exam   Constitutional: He is oriented to person, place, and time. He appears well-developed. No distress.    [place], cachectic   HENT:   Head: Normocephalic and atraumatic.   Eyes: Pupils are equal, round, and reactive to light. Conjunctivae and EOM are normal. Right eye exhibits no discharge. Left eye exhibits no discharge. No scleral icterus.   Neck: Neck supple.   Cardiovascular: Normal rate and regular rhythm.    Pulmonary/Chest: Effort normal. No respiratory distress.   Decreased but clear   Abdominal: Soft. Bowel sounds are normal. He exhibits no distension. There is tenderness (@ surgcal Gtube site).   More fullness   Musculoskeletal: He exhibits no edema or tenderness.   Neurological: He is alert and oriented to person, place, and time. No cranial nerve deficit.   Skin: Skin is warm and dry. He is not diaphoretic.   Psychiatric: He has a normal mood and affect.   Nursing note and vitals reviewed.      Fluids    Intake/Output Summary (Last 24 hours) at 02/15/19 1643  Last data filed at 02/15/19 0840   Gross per 24 hour   Intake              800 ml   Output              650 ml   Net              150 ml       Laboratory  Recent Labs      02/13/19   0512   WBC  13.7*   RBC  5.61   HEMOGLOBIN  14.3   HEMATOCRIT  45.0   MCV  80.2*   MCH  25.5*   MCHC  31.8*   RDW  52.8*   PLATELETCT  382   MPV  9.3     Recent Labs      02/13/19   0512   SODIUM  137   POTASSIUM  3.6   CHLORIDE  102   CO2  22   GLUCOSE  86   BUN  16   CREATININE  0.60   CALCIUM  8.9                   Imaging  CT-CHEST (THORAX) WITH   Final Result      1.  Severe emphysema.   2.  LEFT lower lobe bronchial wall thickening and secretions potentially developing pneumonia.   3.  Mid to distal esophageal mass consistent with known neoplasm.   4.  RIGHT kidney mass, partially visualized.   5.  Mesenteric and retroperitoneal tumor extending into the lesser sac, as on recent abdominal CT.            CT-ABDOMEN-PELVIS WITH   Final Result         1. Thickening of the distal esophagus, likely related to known carcinoma.   2.  Gastrohepatic, periportal, aortocaval  and retroperitoneal raleigh metastases.   3. Large, 8.7 cm right renal mass, consistent with malignancy. There may be early invasion of the right renal vein.   4. Moderate amount of stool throughout the colon.   5. Emphysema.      DX-CHEST-PORTABLE (1 VIEW)   Final Result      1.  COPD. No acute cardiopulmonary abnormality.   2.  Small hiatal hernia.      IR-RENAL BX RIGHT    (Results Pending)        Assessment/Plan  Panlobular emphysema (HCC)   Assessment & Plan    Patient had CT of the chest to eval for metastatic disease  He has severe emphysema with multiple blebs and a very large central bleb  Continue RT protocol     Constipation due to pain medication therapy   Assessment & Plan    Failed relistor  Start golytely per G tube     Cancer associated pain   Assessment & Plan    Currently controlled  Post op pain better     Malignant cachexia (HCC)   Assessment & Plan    Plan as noted  TF started by surgery, tolerated but no BM  Add golytely, failed relistor     Tobacco abuse   Assessment & Plan    Patient had been cutting down but has been smoking more since his diagnosis  He is on nicotine replacement     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)   Assessment & Plan    improved       Esophageal cancer (HCC)   Assessment & Plan    With dysphasia and progressive weight loss of approximately 80 pounds over the last year  50 pounds in the last 3 months  Oral intake severely reduced over the last 2 weeks-this could be possibly due to constipation from pain medications on top of pre-existing issues  He has not a candidate for PEG due to obstruction-according to surgery he has not a candidate for resection in the future  Laparoscopic gastrotomy tube has been placed.     Renal mass, right   Assessment & Plan    Plan for IR biopsy versus nephrectomy  IR is often reluctant to biopsy renal masses-however urology feels this is the best next step  Regardless either procedure cannot be performed  at this facility and he may need to schedule it at [facility] outpatient versus transfer after nutritional issues are addressed          VTE prophylaxis: lovenox

## 2019-02-16 NOTE — PROGRESS NOTES
Assessment completed. Pt AAOx4. C/o 5/10 pain in the abd area. Denies any nausea, vomiting, SOB, numbness or tingling, or dizziness. PRN pain med given. Lines, pumps, feeding tube checked and running. Safety and comfort measures in place. Pt sitting in bed watching tv. No additional needs at this time.

## 2019-02-16 NOTE — PROGRESS NOTES
golytely increased to 100 cc /hr via gt per .  Having some abd cramping prior to increasing golytely.  Medicated for abd.pain with morphine.  Fentanyl patch also applied.

## 2019-02-16 NOTE — FLOWSHEET NOTE
"   02/16/19 0415   Events/Summary/Plan   Events/Summary/Plan Oral suction device s/u:pt c/o unable to produce sputum:\"it's stuck back there\"-self sx @bedside-MDI given   Interdisciplinary Plan of Care-Goals (Indications)   Bronchodilator Indications Physical Exam / Hyperinflation / Wheezing (bronchospasm)   Interdisciplinary Plan of Care-Outcomes    Bronchodilator Outcome Improvement in Airflow (peak flow, PFT)   Education   Education Yes - Pt. / Family has been Instructed in use of Respiratory Equipment;Yes - Pt. / Family has been Instructed in use of Respiratory Medications and Adverse Reactions   RT Assessment of Delivered Medications   Evaluation of Medication Delivery Daily Yes-- Pt /Family has been Instructed in use of Respiratory Medications and Adverse Reactions   MDI/DPI Group   #MDI/DPI Given MDI/DPI x 1   Chest Exam   Respiration 20   Pulse 82   Work Of Breathing / Effort Moderate   Breath Sounds   RUL Breath Sounds Rhonchi   RML Breath Sounds Diminished   RLL Breath Sounds Diminished;Fine Crackles   CARMELO Breath Sounds Rhonchi   LLL Breath Sounds Diminished   Secretions   Cough Productive   How Sputum Obtained Oropharyngeal   Sputum Amount Moderate   Sputum Color Tan   Sputum Consistency Thick;Thin   Suction Frequency Suctioned Once or Twice Per Encounter   Oximetry   #Pulse Oximetry (Single Determination) Yes   Oxygen   Home O2 Use Prior To Admission? No   Pulse Oximetry 97 %   O2 (LPM) 2   O2 Daily Delivery Respiratory  Silicone Nasal Cannula     "

## 2019-02-16 NOTE — PROGRESS NOTES
Trauma / Surgical Daily Progress Note    Date of Service  2/16/2019    Chief Complaint  73 y.o. male admitted 2/11/2019 with Esophageal cancer (HCC)    Interval Events  Pain issues. Will add fentanyl patch. Inc golytely gtt.     Review of Systems  Review of Systems   Gastrointestinal: Positive for abdominal pain.        Vital Signs  Temp:  [36.3 °C (97.3 °F)-36.7 °C (98.1 °F)] 36.7 °C (98.1 °F)  Pulse:  [82-93] 91  Resp:  [18-20] 18  BP: (156-188)/(63-93) 156/63  SpO2:  [93 %-98 %] 93 %    Physical Exam  Physical Exam   Constitutional: He appears well-developed.   Neck: Neck supple.   Abdominal: Soft. He exhibits no distension. There is no tenderness.   G tube in LUQ  Bandage at umbilicus dry   Musculoskeletal: Normal range of motion.   Neurological: He is alert.   Skin: Skin is warm.       Laboratory  No results found for this or any previous visit (from the past 24 hour(s)).    Fluids    Intake/Output Summary (Last 24 hours) at 02/16/19 0713  Last data filed at 02/16/19 0000   Gross per 24 hour   Intake                0 ml   Output              600 ml   Net             -600 ml       Core Measures & Quality Metrics  Medications reviewed and Labs reviewed  Valdes catheter: No Valdes  Central line in place: Need for access    DVT Prophylaxis: Enoxaparin (Lovenox)  DVT prophylaxis - mechanical: SCDs  Ulcer prophylaxis: Yes        JULIAN Score  ETOH Screening    Assessment/Plan  Failure to thrive in adult- (present on admission)   Assessment & Plan    2/14 lap g-tube placement  2/15 tolerating tube feeds         Discussed patient condition with RN and Patient.  CRITICAL CARE TIME EXCLUDING PROCEDURES: 20   minutes

## 2019-02-16 NOTE — PROGRESS NOTES
Report given to DayRN. POC discussed including RN request with RT to see last night for oral suctioning d/t pt's c/o unable to cough out sputum. Pt resting in bed. Safety precautions in place.

## 2019-02-16 NOTE — CARE PLAN
Problem: Bowel/Gastric:  Goal: Normal bowel function is maintained or improved  Outcome: PROGRESSING SLOWER THAN EXPECTED  Pt on PEG tube, no BM yet as for now. On Golytely thru tube.     Problem: Pain Management  Goal: Pain level will decrease to patient's comfort goal  Outcome: PROGRESSING SLOWER THAN EXPECTED  Patient constantly asked for IV pain med. Encouraged the use of non-pharm measures of pain relief (i.e. heat packs, distractions) and to inform RN if pain is greater than comfort level.

## 2019-02-16 NOTE — CARE PLAN
Problem: Nutritional:  Goal: Nutrition support tolerated and meeting greater than 85% of estimated needs  Initiation of tube feed after placement of laparoscopic G tube.    Outcome: PROGRESSING SLOWER THAN EXPECTED

## 2019-02-16 NOTE — PROGRESS NOTES
Hospital Medicine Daily Progress Note    Date of Service  2/16/2019    Chief Complaint  Abdominal pain, weakness, poor oral intake    Hospital Course    *Unfortunate 73-year-old male with a history of tobacco use disorder and underlying COPD, recently diagnosed with advanced esophageal cancer and planning for neoadjuvant chemotherapy, he was also found to have a large suspicious right renal mass.  In the last year he is lost 80 pounds 50 of it within the last 3 months-over the last 2 weeks his oral intake has gotten worse and worse and he is having vomiting.  Sometimes his pills are getting stuck when he swallows them.  They have tried numerous laxatives and enemas at home but he has not had BM in several days*      Interval Problem Update  No BM  Tolerating tube feeds  Discussed plan of care with patient, family  Seen with RN    Consultants/Specialty  Tonia Leblanc MD-general surgery  Urology      Code Status  Currently full    Disposition  TBD    Review of Systems  Review of Systems   Constitutional: Positive for malaise/fatigue (More mobile). Negative for chills and fever.   HENT: Negative for sinus pain and sore throat.    Eyes: Negative for discharge and redness.   Respiratory: Positive for cough (Same), sputum production (Scant) and shortness of breath (baseline).    Cardiovascular: Negative for chest pain.   Gastrointestinal: Positive for abdominal pain (Some cramping with a relist or otherwise slowly improving) and constipation (See interval). Negative for nausea and vomiting.   Genitourinary: Negative for dysuria.   Musculoskeletal: Positive for back pain (Chronic).   Neurological: Negative for dizziness and headaches.   Psychiatric/Behavioral: The patient is nervous/anxious (Mild).         Physical Exam  Temp:  [36.3 °C (97.3 °F)-36.7 °C (98.1 °F)] 36.4 °C (97.6 °F)  Pulse:  [82-93] 91  Resp:  [18-20] 18  BP: (156-188)/(63-93) 170/72  SpO2:  [90 %-97 %] 90 %    Physical Exam   Constitutional: He is oriented  to person, place, and time. He appears well-developed. No distress.   Color better remains cachectic   HENT:   Head: Normocephalic and atraumatic.   Eyes: Pupils are equal, round, and reactive to light. Conjunctivae and EOM are normal. Right eye exhibits no discharge. Left eye exhibits no discharge. No scleral icterus.   Neck: Neck supple.   Cardiovascular: Normal rate and regular rhythm.    Pulmonary/Chest: Effort normal. No respiratory distress.   Decreased but clear   Abdominal: Soft. Bowel sounds are normal. He exhibits no distension (More fullness left scaphoid). There is tenderness (Minimal).   Bowel sounds improved   Musculoskeletal: He exhibits no edema or tenderness.   Neurological: He is alert and oriented to person, place, and time. No cranial nerve deficit.   Skin: Skin is warm and dry. He is not diaphoretic.   Psychiatric: He has a normal mood and affect.   Nursing note and vitals reviewed.      Fluids    Intake/Output Summary (Last 24 hours) at 02/16/19 1446  Last data filed at 02/16/19 0716   Gross per 24 hour   Intake              225 ml   Output              450 ml   Net             -225 ml       Laboratory                        Imaging  CT-CHEST (THORAX) WITH   Final Result      1.  Severe emphysema.   2.  LEFT lower lobe bronchial wall thickening and secretions potentially developing pneumonia.   3.  Mid to distal esophageal mass consistent with known neoplasm.   4.  RIGHT kidney mass, partially visualized.   5.  Mesenteric and retroperitoneal tumor extending into the lesser sac, as on recent abdominal CT.            CT-ABDOMEN-PELVIS WITH   Final Result         1. Thickening of the distal esophagus, likely related to known carcinoma.   2.  Gastrohepatic, periportal, aortocaval and retroperitoneal raleigh metastases.   3. Large, 8.7 cm right renal mass, consistent with malignancy. There may be early invasion of the right renal vein.   4. Moderate amount of stool throughout the colon.   5.  Emphysema.      DX-CHEST-PORTABLE (1 VIEW)   Final Result      1.  COPD. No acute cardiopulmonary abnormality.   2.  Small hiatal hernia.      IR-RENAL BX RIGHT    (Results Pending)        Assessment/Plan  Panlobular emphysema (HCC)   Assessment & Plan    Patient had CT of the chest to eval for metastatic disease  He has severe emphysema with multiple blebs and a very large central bleb  Continue RT protocol     Constipation due to pain medication therapy   Assessment & Plan    Failed relistor  Good bowel sounds today but no BM as yet  Continue GoLYTELY slowly per G-tube     Cancer associated pain   Assessment & Plan    Currently controlled  Post op pain better     Malignant cachexia (HCC)   Assessment & Plan    Tolerating tube feeds at 25 mL's per hour  Will increase rate after he has BM     Tobacco abuse   Assessment & Plan    Patient had been cutting down but has been smoking more since his diagnosis  He is on nicotine replacement     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)   Assessment & Plan    improved       Esophageal cancer (HCC)   Assessment & Plan    With dysphasia and progressive weight loss of approximately 80 pounds over the last year  50 pounds in the last 3 months  Oral intake severely reduced over the last 2 weeks-this could be possibly due to constipation from pain medications on top of pre-existing issues  He has not a candidate for PEG due to obstruction-according to surgery he has not a candidate for resection in the future  Laparoscopic gastrotomy tube has been placed.     Renal mass, right   Assessment & Plan    Plan for IR biopsy versus nephrectomy  IR is often reluctant to biopsy renal masses-however urology feels this is the best next step  Regardless either procedure cannot be performed at this facility and he may need to schedule it at [facility] outpatient versus transfer after nutritional issues are addressed          VTE prophylaxis: lovenox

## 2019-02-16 NOTE — PROGRESS NOTES
"[facility] [place] Progress Note    Service: [facility] [place]  Patient's Name: Justus Barnard  MRN: [id number]  Admit Date:2/11/2019  Today's Date: 2/14/2019   Room #: 2202/01      Identification:  73 y.o. male with right renal mass     Overnight-Interval events:   - none    Subjective/ROS:   Patient seen and examined. Doing better today. Pain more well controlled. Expressed agreement to proceed with IR guided biopsy of the kidney. No fevers, chill, n/v    Physical Exam:  Current Vitals:   BP (!) 170/72   Pulse 91   Temp 36.4 °C (97.6 °F) (Oral)   Resp 18   Ht 1.753 m (5' 9.02\")   Wt 54.6 kg (120 lb 5.9 oz)   SpO2 90%   BMI 17.77 kg/m²     02/14 1900 - 02/16 0659  In: 225   Out: 1100 [Urine:1100]    GEN : NAD, A&O X4, cachectic, moderate distress from abdominal pain throughout exam  RES:  no acute respiratory distress  :   No lopez  EXT:  No edema    Labs:   Lab Results   Component Value Date/Time    WBC 13.7 (H) 02/13/2019 05:12 AM    HEMATOCRIT 45.0 02/13/2019 05:12 AM    SODIUM 137 02/13/2019 05:12 AM    POTASSIUM 3.6 02/13/2019 05:12 AM    CHLORIDE 102 02/13/2019 05:12 AM    CO2 22 02/13/2019 05:12 AM    BUN 16 02/13/2019 05:12 AM    CREATININE 0.60 02/13/2019 05:12 AM    CREATININE 1.0 10/14/2008 05:45 AM    CALCIUM 8.9 02/13/2019 05:12 AM        Lab Results   Component Value Date/Time    GLUCOSE 86 02/13/2019 05:12 AM    GLUCOSE 89 02/11/2019 08:15 PM    GLUCOSE 106 (H) 02/01/2019 01:10 PM    GLUCOSE 82 10/14/2008 05:45 AM       Assessment/Plan  Justus Barnard is a 73 y.o. male with right renal mass likely malignant in nature    Plan:  - continue to manage pain  - patient has decided he would like to move forward with IR kidney biopsy as esophageal cancer is now secondary concern  - will need transfer to main for biopsy once stable for transfer.   - urology signing off for now, please call once patient stable and going for biopsy       Kenneth Mancuso PA-C          "

## 2019-02-17 NOTE — FLOWSHEET NOTE
02/17/19 0835   Therapy Not Performed   Type of Therapy Not Performed SVN   Reason Therapy Not Performed PRN, No Indication   Chest Exam   Respiration 16   Pulse 73   Breath Sounds   RUL Breath Sounds Clear;Diminished   RML Breath Sounds Diminished   RLL Breath Sounds Diminished   CARMELO Breath Sounds Clear;Diminished   LLL Breath Sounds Diminished   Oxygen   Pulse Oximetry 99 %   O2 (LPM) 3   O2 Daily Delivery Respiratory  Silicone Nasal Cannula

## 2019-02-17 NOTE — PROGRESS NOTES
Pt tolerating gt feeding of impact at 25 cc/hr.  Also has golytely running into gt at 100cc /hr.  No bm yet.  Order is to continue golytely until pt has bm.  Pt.has bowel sounds.  Abd.less painful then yesterday.has only had morphine twice this am.  Has been tolerating oxycodone through gt for pain.has taken oxy q 4 hrs.  Voiding w/o diff.in the br.has been up several times.  Sat in recliner for an hour.

## 2019-02-17 NOTE — CARE PLAN
Problem: Communication  Goal: The ability to communicate needs accurately and effectively will improve  Outcome: PROGRESSING AS EXPECTED  Pt calls appropriately, makes needs known to staff    Problem: Safety  Goal: Will remain free from injury  Outcome: PROGRESSING AS EXPECTED  Remains free from injury at this time, hand held assist or SBA with FWW at this time  Goal: Will remain free from falls  Outcome: PROGRESSING AS EXPECTED  Remains free from falls at this time, fall precautions in place    Problem: Pain Management  Goal: Pain level will decrease to patient's comfort goal  Outcome: PROGRESSING AS EXPECTED  Per pt, pain managed with a combo of oral and IV pain medications at this time

## 2019-02-17 NOTE — PROGRESS NOTES
Report received from Agnes SAVAGE. Pt awake, alert, withdrawn but cooperative. Tube feeding continuing at 25mL/hr with 100mL flushes of golytely hourly. To continue in this fashion until pt has BM. Bowel sounds hypoactive, but present. IV fluids infusing through PICC. Pt reports continued pain of 5/10 but denies needs at this time. Denies nausea. Will continue to monitor.

## 2019-02-17 NOTE — PROGRESS NOTES
Hospital Medicine Daily Progress Note    Date of Service  2/17/2019    Chief Complaint  Abdominal pain, weakness, poor oral intake    Hospital Course    *Unfortunate 73-year-old male with a history of tobacco use disorder and underlying COPD, recently diagnosed with advanced esophageal cancer and planning for neoadjuvant chemotherapy, he was also found to have a large suspicious right renal mass.  In the last year he is lost 80 pounds 50 of it within the last 3 months-over the last 2 weeks his oral intake has gotten worse and worse and he is having vomiting.  Sometimes his pills are getting stuck when he swallows them.  They have tried numerous laxatives and enemas at home but he has not had BM in several days*      Interval Problem Update  Starting to pass small amounts of stool  Says he feels weaker but objectively he is more mobile and moving around  Discussed care plan with RN, after significant bowel movement GoLYTELY can be discontinued.  Probably work on transferring him to [facility] tomorrow for renal biopsy on Tuesday.    Consultants/Specialty  Tonia Leblanc MD-general surgery  Urology  Palliative care    Code Status  Currently full    Disposition  TBD-likely transfer to [facility]    Review of Systems  Review of Systems   Constitutional: Positive for malaise/fatigue (More mobile). Negative for chills and fever.   HENT: Negative for sinus pain and sore throat.    Eyes: Negative for discharge and redness.   Respiratory: Positive for cough (Same), sputum production (Scant) and shortness of breath (baseline).    Cardiovascular: Negative for chest pain.   Gastrointestinal: Positive for abdominal pain (Improving) and constipation (Starting to resolve). Negative for nausea and vomiting.   Genitourinary: Negative for dysuria.   Musculoskeletal: Positive for back pain (Chronic).   Neurological: Positive for weakness (Says he feels worse but objectively looks improved). Negative for dizziness and  headaches.   Psychiatric/Behavioral: The patient is nervous/anxious (Mild).         Physical Exam  Temp:  [36.3 °C (97.4 °F)-36.7 °C (98 °F)] 36.7 °C (98 °F)  Pulse:  [73-86] 79  Resp:  [16-20] 18  BP: (142-171)/(55-75) 156/68  SpO2:  [90 %-99 %] 96 %    Physical Exam   Constitutional: He is oriented to person, place, and time. He appears well-developed. No distress.   Thin, frail   HENT:   Head: Normocephalic and atraumatic.   Eyes: Pupils are equal, round, and reactive to light. Conjunctivae and EOM are normal. Right eye exhibits no discharge. Left eye exhibits no discharge. No scleral icterus.   Neck: Neck supple.   Cardiovascular: Normal rate and regular rhythm.    Pulmonary/Chest: Effort normal. No respiratory distress.   Transient rhonchi left greater than right   Abdominal: Soft. Bowel sounds are normal. He exhibits no distension. There is tenderness (Minimal near G tube).   Musculoskeletal: He exhibits no edema or tenderness.   Neurological: He is alert and oriented to person, place, and time. No cranial nerve deficit.   Skin: Skin is warm and dry. He is not diaphoretic.   Psychiatric:   Mildly anxious   Nursing note and vitals reviewed.      Fluids    Intake/Output Summary (Last 24 hours) at 02/17/19 1332  Last data filed at 02/17/19 0639   Gross per 24 hour   Intake             2765 ml   Output                1 ml   Net             2764 ml       Laboratory      Recent Labs      02/17/19   0919   SODIUM  138   POTASSIUM  2.5*   CHLORIDE  103   CO2  29   GLUCOSE  112*   BUN  16   CREATININE  0.36*   CALCIUM  8.0*                   Imaging  CT-CHEST (THORAX) WITH   Final Result      1.  Severe emphysema.   2.  LEFT lower lobe bronchial wall thickening and secretions potentially developing pneumonia.   3.  Mid to distal esophageal mass consistent with known neoplasm.   4.  RIGHT kidney mass, partially visualized.   5.  Mesenteric and retroperitoneal tumor extending into the lesser sac, as on recent abdominal  CT.            CT-ABDOMEN-PELVIS WITH   Final Result         1. Thickening of the distal esophagus, likely related to known carcinoma.   2.  Gastrohepatic, periportal, aortocaval and retroperitoneal raleigh metastases.   3. Large, 8.7 cm right renal mass, consistent with malignancy. There may be early invasion of the right renal vein.   4. Moderate amount of stool throughout the colon.   5. Emphysema.      DX-CHEST-PORTABLE (1 VIEW)   Final Result      1.  COPD. No acute cardiopulmonary abnormality.   2.  Small hiatal hernia.      IR-RENAL BX RIGHT    (Results Pending)        Assessment/Plan  Panlobular emphysema (HCC)   Assessment & Plan    Patient had CT of the chest to eval for metastatic disease  He has severe emphysema with multiple blebs and a very large central bleb  Continue RT protocol     Constipation due to pain medication therapy   Assessment & Plan    Failed relistor  Starting to have small BM's, stop golytely after significant BM     Cancer associated pain   Assessment & Plan    Currently controlled  Post op pain better     Malignant cachexia (HCC)   Assessment & Plan    Tolerating tube feeds      Tobacco abuse   Assessment & Plan    Patient had been cutting down but has been smoking more since his diagnosis  He is on nicotine replacement     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)   Assessment & Plan    improved       Esophageal cancer (HCC)   Assessment & Plan    With dysphagia and progressive weight loss of approximately 80 pounds over the last year  50 pounds in the last 3 months  On TF     Renal mass, right   Assessment & Plan    Plan for IR biopsy versus nephrectomy  IR is often reluctant to biopsy renal masses-however urology feels this is the best next step  Will need Xfer to [facility] for either Bx or nephrectomy          VTE prophylaxis: lovenox

## 2019-02-17 NOTE — PROGRESS NOTES
Karen from Lab called with critical result of potassium of 2.5 at 0955. Critical lab result read back to Karen.   Dr. Aguero notified of critical lab result at 1005.  Critical lab result read back by .

## 2019-02-18 PROBLEM — E87.6 HYPOKALEMIA: Status: ACTIVE | Noted: 2019-01-01

## 2019-02-18 PROBLEM — Z71.89 ADVANCED CARE PLANNING/COUNSELING DISCUSSION: Status: ACTIVE | Noted: 2019-01-01

## 2019-02-18 NOTE — PROGRESS NOTES
Per report, purple lumen completely clotted off. Confirmed at this time. Alteplase instilled and line labeled appropriately. To re-evaluate at 0200.

## 2019-02-18 NOTE — ASSESSMENT & PLAN NOTE
Severe on CT scan  Continue management of COPD  There has been no exacerbation during his previous stay

## 2019-02-18 NOTE — PROGRESS NOTES
0200 - Alteplase unsuccessful at 30 minutes. Will re-evaluate at 0330.  0330 - Alteplase remains unsuccessful at dissolving clot. Continued inability to draw blood off of purple lumen.

## 2019-02-18 NOTE — DISCHARGE SUMMARY
Discharge Summary    CHIEF COMPLAINT ON ADMISSION  Chief Complaint   Patient presents with   • Constipation     x 1 month   • Loss of Appetite   • Sent by MD     recent DX Esophageal CA       Reason for Admission  Sent by MD; loss of appetite    CODE STATUS  Full Code    HPI & HOSPITAL COURSE    *Unfortunate 73-year-old male with a history of tobacco use disorder and underlying COPD, recently diagnosed with advanced esophageal cancer and planning for neoadjuvant chemotherapy, he was also found to have a large suspicious right renal mass.  In the last year he is lost 80 pounds 50 of it within the last 3 months-over the last 2 weeks his oral intake has gotten worse and worse and he is having vomiting.  Sometimes his pills are getting stuck when he swallows them.  They have tried numerous laxatives and enemas at home but he has not had BM in several days*      Imaging showed a great deal of stool in the proximal colon but nothing in the distal colon which could be assisted with enemas.  He was in severe pain but eventually we were able to get this under control with morphine.  Fentanyl was added for background pain control.  Due to his anatomy he was not a candidate for PEG and required surgical gastrotomy tube.  This was placed without incident he is now receiving tube feeds at goal.  He received GoLYTELY per G-tube and is now having BMs.  Due to his high dose of narcotics however we will leave him on Reglan but reduce him to MiraLAX twice daily per G-tube to maintain BMs.  Patient is able to tolerate clear liquids for pleasure but otherwise should be n.p.o. as even pills were getting stuck.  He should maintain nutrition through G-tube at current rate.     Patient's oncologist Dr. Wendy Page was interested in patient obtaining urology consultation and possible nephrectomy, urology felt that at this point given patient's functional status that IR guided biopsy would be a better option.  Neither of these procedures  can be performed at [facility] so he will require transfer to [facility] for further evaluation and management of his malignancies.  Patient and family have been aware that once his nutritional issues and GI issues have been addressed that this would be the plan of care and are agreeable with this.    Therefore, he is discharged in fair and stable condition to [facility] for higher level of care specifically renal biopsy or nephrectomy.    The patient met 2-midnight criteria for an inpatient stay at the time of discharge.      FOLLOW UP ITEMS POST DISCHARGE  Oncology      DISCHARGE DIAGNOSES  Active Problems:    Renal mass, right POA: Unknown    Esophageal cancer (HCC) POA: Unknown    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) POA: Unknown    Tobacco abuse POA: Unknown    Malignant cachexia (HCC) POA: Unknown    Cancer associated pain POA: Unknown    Constipation due to pain medication therapy POA: Unknown    Panlobular emphysema (HCC) POA: Unknown  Resolved Problems:    * No resolved hospital problems. *      FOLLOW UP  No future appointments.  Luz Leblanc M.D.  [address]  [address]  [facility] [address]  [phone]    On 2/22/2019        MEDICATIONS ON DISCHARGE    Current Facility-Administered Medications:   •  fentaNYL (DURAGESIC) 50 MCG/HR 1 Patch, 1 Patch, Transdermal, Q72HRS, Luz Leblanc M.D., 1 Patch at 02/16/19 0736  •  amLODIPine (NORVASC) tablet 10 mg, 10 mg, Oral, Q DAY, Trina Aguero M.D., 10 mg at 02/18/19 0603  •  polyethylene glycol-electrolytes (GOLYTELY) solution 4 L, 4 L, Oral, CONTINUOUS (1600 Start), Trina Aguero M.D., Stopped at 02/16/19 1615  •  oxyCODONE (ROXICODONE) oral solution 5 mg, 5 mg, Oral, Q4HRS PRN, Luz Leblanc M.D., 5 mg at 02/18/19 0836  •  Respiratory Care per Protocol, , Nebulization, Continuous RT, Isabel Fernandes M.D.  •  enoxaparin (LOVENOX) inj 40 mg, 40 mg, Subcutaneous,  DAILY, Isabel Fernandes M.D., 40 mg at 02/18/19 0601  •  hydrALAZINE (APRESOLINE) injection 10 mg, 10 mg, Intravenous, Q4HRS PRN, Isabel Fernandes M.D., 10 mg at 02/16/19 0442  •  nicotine (NICODERM) 21 MG/24HR 21 mg, 21 mg, Transdermal, Daily-0600, 21 mg at 02/18/19 0600 **AND** Nicotine Replacement Patient Education Materials, , , Once **AND** nicotine polacrilex (NICORETTE) 2 MG piece 2 mg, 2 mg, Oral, Q HOUR PRN, Isabel Fernandes M.D.  •  bisacodyl (DULCOLAX) suppository 10 mg, 10 mg, Rectal, DAILY, Isabel Fernandes M.D., Stopped at 02/18/19 0600  •  albuterol inhaler 2 Puff, 2 Puff, Inhalation, Q4HRS PRN, Isabel Fernandes M.D., 2 Puff at 02/16/19 0415  •  albuterol (PROVENTIL) 2.5mg/0.5ml nebulizer solution 2.5 mg, 2.5 mg, Nebulization, Q6HRS PRN (RT), Isabel Fernandes M.D.  •  ondansetron (ZOFRAN) syringe/vial injection 4 mg, 4 mg, Intravenous, Q6HRS PRN, Isabel Fernandes M.D., 4 mg at 02/14/19 1730  •  morphine (pf) 4 mg/ml injection 4-6 mg, 4-6 mg, Intravenous, Q2HRS PRN, Trina Aguero M.D., 4 mg at 02/18/19 1102  •  metoclopramide (REGLAN) injection 10 mg, 10 mg, Intravenous, Q6HRS, Trina Aguero M.D., 10 mg at 02/18/19 1102     Please note GoLYTELY has been discontinued, MiraLAX twice daily per G-tube has been started.  Unable to otherwise reconcile medications with current functionality of epic.    Allergies  Allergies   Allergen Reactions   • Nkda [No Known Drug Allergy]        DIET  Orders Placed This Encounter   Procedures   • DIET NPO     Standing Status:   Standing     Number of Occurrences:   1     Order Specific Question:   Restrict to:     Answer:   Strict [1]     Patient may have clear liquids for pleasure, unable to separate order from tube feeding order at this time    ACTIVITY  As tolerated    LINES, DRAINS, AND WOUNDS  This is an automated list. Peripheral IVs will be removed prior to discharge.  PICC Double Lumen 02/13/19 Lumen 1: Purple Lumen 2: Red Left  Basilic (Active)   Site Assessment Clean;Dry;Intact 2/18/2019  8:57 AM   Lumen 1 Status Blood return noted 2/18/2019  9:00 AM   Lumen 2 Status Infusing 2/18/2019  8:57 AM   Line Secured at (cm) 4 cm 2/13/2019  5:00 PM   Extremity Circumference (cm) 21 cm 2/13/2019  5:00 PM   Dressing Type Biopatch;Transparent 2/18/2019  8:57 AM   Dressing Status Clean;Dry;Intact 2/18/2019  8:57 AM   Dressing Intervention N/A 2/17/2019  7:20 PM   Dressing Change Due 02/20/19 2/18/2019  8:57 AM   Date Primary Tubing Changed 02/16/19 2/17/2019  7:20 PM   Date Secondary Tubing Changed 02/18/19 2/18/2019  8:57 AM   Date IV Connector(s) Changed 02/16/19 2/17/2019  7:20 PM   NEXT Primary Tubing Change  02/20/19 2/17/2019  7:20 PM   NEXT Secondary Tubing Change  02/18/19 2/17/2019  7:20 PM   NEXT IV Connector(s) Change 02/23/19 2/17/2019  7:20 PM   Line Necessity Assessed Chemotherapy (Continuous Infusion of Vesicant Therapy) 2/17/2019  7:20 PM   $ Double Lumen PICC Charge Double kit used 2/15/2019  8:48 PM         PICC Double Lumen 02/13/19 Lumen 1: Purple Lumen 2: Red Left Basilic (Active)   Site Assessment Clean;Dry;Intact 2/18/2019  8:57 AM   Lumen 1 Status Blood return noted 2/18/2019  9:00 AM   Lumen 2 Status Infusing 2/18/2019  8:57 AM   Line Secured at (cm) 4 cm 2/13/2019  5:00 PM   Extremity Circumference (cm) 21 cm 2/13/2019  5:00 PM   Dressing Type Biopatch;Transparent 2/18/2019  8:57 AM   Dressing Status Clean;Dry;Intact 2/18/2019  8:57 AM   Dressing Intervention N/A 2/17/2019  7:20 PM   Dressing Change Due 02/20/19 2/18/2019  8:57 AM   Date Primary Tubing Changed 02/16/19 2/17/2019  7:20 PM   Date Secondary Tubing Changed 02/18/19 2/18/2019  8:57 AM   Date IV Connector(s) Changed 02/16/19 2/17/2019  7:20 PM   NEXT Primary Tubing Change  02/20/19 2/17/2019  7:20 PM   NEXT Secondary Tubing Change  02/18/19 2/17/2019  7:20 PM   NEXT IV Connector(s) Change 02/23/19 2/17/2019  7:20 PM   Line Necessity Assessed Chemotherapy  (Continuous Infusion of Vesicant Therapy) 2/17/2019  7:20 PM   $ Double Lumen PICC Charge Double kit used 2/15/2019  8:48 PM                MENTAL STATUS ON TRANSFER  Level of Consciousness: Alert  Orientation : Oriented x 4       CONSULTATIONS  Urology  General surgery- Deana signed off  Palliative care consulted and discussed options with the patient however patient & family are overwhelmed with all of the recent diagnoses and issues and was unable to make a decision regarding his CODE STATUS or advanced directives at this time.      PROCEDURES  DATE OF SERVICE:  02/14/2019     PREOPERATIVE DIAGNOSIS:  Esophageal cancer.     POSTOPERATIVE DIAGNOSIS:  Esophageal cancer.     PROCEDURE:  Laparoscopic gastrostomy tube with an 18-Telugu G-tube.     SURGEON:  Luz Leblanc MD     ASSISTANT:  ORIN Akins     ANESTHESIA:  General endotracheal.     ANESTHESIOLOGIST:  Rosales Villela MD     INDICATIONS:  The patient is a 73-year-old gentleman who has been diagnosed   with esophageal cancer with regional disease.  He is having a G-tube placed   for enteral access to improve his nutrition, but is not deemed to be a   surgical candidate in the future.     FINDINGS:  G-tube was placed and the anterior stomach wall insufflated with   saline and still in intraluminal position.     DESCRIPTION OF PROCEDURE:  After the patient was identified and consented, he   was brought to the operating room and placed in the supine position.  The   patient underwent general endotracheal anesthetic clearance.  The patient's   abdomen was prepped and draped in sterile fashion.  The periumbilical area was   anesthetized with 0.25% Marcaine.  A 1-cm incision was made.  The abdominal   wall was lifted up and Veress needle inserted into the abdominal cavity.    After positive drop test, pneumoperitoneum was obtained.  The Veress was   removed and a 5-mm trocar was placed.  Under laparoscopic guidance, the   stomach was insufflated by  the anesthesiologist.  T fastener was placed in the   anterior stomach wall.  An 18-gauge thin walled needle was introduced into   stomach.  Wire was passed.  Insertion site was enlarged and dilated.  An   18-Bengali G-tube was passed into the stomach, was insufflated with water into   the balloon.  It was then flushed with saline to verify an intraluminal   position, which was verified.  Pneumoperitoneum was released and the T   fasteners were brought taut.  Once that was completed, the port site was   closed with 4-0 Vicryl.  Op-Site dressing placed on the wound.  The patient   was extubated and taken to the recovery room in stable condition.  All sponge   and needle counts were correct.        ____________________________________     JULY SAHU MD    _______________________________________    PICC line placed    LABORATORY  Lab Results   Component Value Date    SODIUM 138 02/18/2019    POTASSIUM 4.5 02/18/2019    CHLORIDE 104 02/18/2019    CO2 29 02/18/2019    GLUCOSE 112 (H) 02/18/2019    BUN 17 02/18/2019    CREATININE 0.34 (L) 02/18/2019    CREATININE 1.0 10/14/2008        Lab Results   Component Value Date    WBC 10.7 02/18/2019    HEMOGLOBIN 11.0 (L) 02/18/2019    HEMATOCRIT 34.7 (L) 02/18/2019    PLATELETCT 273 02/18/2019        Total time of the discharge process exceeds 40 minutes.

## 2019-02-18 NOTE — PROGRESS NOTES
Trauma / Surgical Daily Progress Note    Date of Service  2/17/2019    Chief Complaint  73 y.o. male admitted 2/11/2019 with Esophageal cancer (HCC)    Interval Events  Doing better. STooling. Adv TF to goal. Will sign off.     Review of Systems  Review of Systems   Gastrointestinal: Negative for abdominal pain.        Vital Signs  Temp:  [36.3 °C (97.4 °F)-36.7 °C (98 °F)] 36.6 °C (97.9 °F)  Pulse:  [73-86] 82  Resp:  [16-20] 18  BP: (142-173)/(55-90) 173/90  SpO2:  [90 %-100 %] 100 %    Physical Exam  Physical Exam   Constitutional: He appears well-developed.   Neck: Neck supple.   Abdominal: Soft. He exhibits no distension. There is no tenderness.   G tube in LUQ  Bandage at umbilicus dry   Musculoskeletal: Normal range of motion.   Neurological: He is alert.   Skin: Skin is warm.       Laboratory  Recent Results (from the past 24 hour(s))   Comp Metabolic Panel    Collection Time: 02/17/19  9:19 AM   Result Value Ref Range    Sodium 138 135 - 145 mmol/L    Potassium 2.5 (LL) 3.6 - 5.5 mmol/L    Chloride 103 96 - 112 mmol/L    Co2 29 20 - 33 mmol/L    Anion Gap 6.0 0.0 - 11.9    Glucose 112 (H) 65 - 99 mg/dL    Bun 16 8 - 22 mg/dL    Creatinine 0.36 (L) 0.50 - 1.40 mg/dL    Calcium 8.0 (L) 8.4 - 10.2 mg/dL    AST(SGOT) 16 12 - 45 U/L    ALT(SGPT) 14 2 - 50 U/L    Alkaline Phosphatase 83 30 - 99 U/L    Total Bilirubin 0.9 0.1 - 1.5 mg/dL    Albumin 2.0 (L) 3.2 - 4.9 g/dL    Total Protein 4.6 (L) 6.0 - 8.2 g/dL    Globulin 2.6 1.9 - 3.5 g/dL    A-G Ratio 0.8 g/dL   ESTIMATED GFR    Collection Time: 02/17/19  9:19 AM   Result Value Ref Range    GFR If African American >60 >60 mL/min/1.73 m 2    GFR If Non African American >60 >60 mL/min/1.73 m 2   MAGNESIUM    Collection Time: 02/17/19  9:19 AM   Result Value Ref Range    Magnesium 1.7 1.5 - 2.5 mg/dL   Basic Metabolic Panel    Collection Time: 02/17/19  3:30 PM   Result Value Ref Range    Sodium 137 135 - 145 mmol/L    Potassium 3.2 (L) 3.6 - 5.5 mmol/L    Chloride  100 96 - 112 mmol/L    Co2 29 20 - 33 mmol/L    Glucose 115 (H) 65 - 99 mg/dL    Bun 16 8 - 22 mg/dL    Creatinine 0.42 (L) 0.50 - 1.40 mg/dL    Calcium 8.4 8.4 - 10.2 mg/dL    Anion Gap 8.0 0.0 - 11.9   ESTIMATED GFR    Collection Time: 02/17/19  3:30 PM   Result Value Ref Range    GFR If African American >60 >60 mL/min/1.73 m 2    GFR If Non African American >60 >60 mL/min/1.73 m 2       Fluids    Intake/Output Summary (Last 24 hours) at 02/17/19 1825  Last data filed at 02/17/19 1744   Gross per 24 hour   Intake             2795 ml   Output                0 ml   Net             2795 ml       Core Measures & Quality Metrics  Medications reviewed and Labs reviewed  Valdes catheter: No Valdes  Central line in place: Need for access    DVT Prophylaxis: Enoxaparin (Lovenox)  DVT prophylaxis - mechanical: SCDs  Ulcer prophylaxis: Yes        JULIAN Score  ETOH Screening    Assessment/Plan  Failure to thrive in adult   Assessment & Plan    2/14 lap g-tube placement  2/15 tolerating tube feeds         Discussed patient condition with RN and Patient.  CRITICAL CARE TIME EXCLUDING PROCEDURES: 20   minutes

## 2019-02-18 NOTE — DISCHARGE PLANNING
Anticipated Discharge Disposition: [facility] to [facility]    Action: Completed COBRA, acquired signatures, provided to RN    REMSA will p/u at 4PM, advised RN station-Bc      Barriers to Discharge: none    Plan: pt d/cing to [facility] via REM tonight at 4PM

## 2019-02-18 NOTE — FLOWSHEET NOTE
02/18/19 0300   Events/Summary/Plan   Events/Summary/Plan Oral suction wand changed w/tubing-pt not wearing nc @ this time denies SOB   Therapy Not Performed   Type of Therapy Not Performed SVN   Reason Therapy Not Performed PRN, No Indication   Chest Exam   Work Of Breathing / Effort Mild   Respiration 18   Pulse 74   Breath Sounds   RUL Breath Sounds Clear;Diminished   RML Breath Sounds Diminished   RLL Breath Sounds Diminished   CARMELO Breath Sounds Clear;Diminished   LLL Breath Sounds Diminished   Secretions   Cough (no cough at this time, pt able to activate sx device)   Oximetry   #Pulse Oximetry (Single Determination) Yes   Oxygen   Home O2 Use Prior To Admission? No   Pulse Oximetry 92 %   O2 (LPM) 0   O2 Daily Delivery Respiratory  Room Air with O2 Available

## 2019-02-18 NOTE — ASSESSMENT & PLAN NOTE
Patient received palliative radiation therapy but did not tolerate.  Previously was to have follow-up with oncology  Overall poor prognosis, failure to thrive with underlying malignancy, poor functional status  Did not tolerate radiation therapy.  Plan for  DC home with hospice on 4/3  Pain control.

## 2019-02-18 NOTE — PROGRESS NOTES
No change from morning assessment except K riders completed and CMP results called to Dr. Aguero. Golytely via G tube continues with no BM as yet.  Abdomen is soft and bowel sounds are active.  Has been OOB to chair most of the day.

## 2019-02-18 NOTE — DISCHARGE PLANNING
Received Transport Form @ 4052  Spoke to [facility] MONDRAGON    Transport is scheduled for 2/18 @1600 going to [address] [facility].

## 2019-02-18 NOTE — DISCHARGE PLANNING
Anticipated Discharge Disposition: d/c acute to acute     Action: Pat w/ RTC and hospitalist RN indicate a bed is available for pt at T436.2, RN to RN report [phone]    Lsw completed PCS and TCF then faxed to [facility]. Lsw advised [facility] of pending fax.     Barriers to Discharge: transportation    Plan: f/u w/ [facility] and medical team

## 2019-02-18 NOTE — PROGRESS NOTES
Direct admit from [facility], referred by Dr. Aguero. For Esophageal cancer. Admitting MD is Dr. Aguero. ADT order signed and held, to be released upon patient's arrival on the unit. Patient arriving via EMS ground.

## 2019-02-18 NOTE — CARE PLAN
Problem: Bowel/Gastric:  Goal: Normal bowel function is maintained or improved  Outcome: PROGRESSING SLOWER THAN EXPECTED  Golytely infusion to G-tube continues.    Problem: Pain Management  Goal: Pain level will decrease to patient's comfort goal  Outcome: PROGRESSING AS EXPECTED  Current regimen is effective.

## 2019-02-18 NOTE — H&P
Hospital Medicine History & Physical Note    Date of Service  2/18/2019    Primary Care Physician  MINO Hughes.    Consultants  None  Urology and Oncology should be notified of Xfer in AM    Code Status  Full  (Patient has been seen by palliative care)    Chief Complaint  Weight loss, nausea and vomiting    History of Presenting Illness  Unfortunate 73-year-old male with a history of tobacco use disorder and underlying COPD, recently diagnosed with advanced esophageal cancer and planning for neoadjuvant chemotherapy, he was also found to have a large suspicious right renal mass.  In the last year he is lost 80 pounds 50 of it within the last 3 months-over the last 2 weeks his oral intake has gotten worse and worse and he is having vomiting.  Sometimes his pills are getting stuck when he swallows them.  They have tried numerous laxatives and enemas at home but he has not had BM in several days*       Imaging showed a great deal of stool in the proximal colon but nothing in the distal colon which could be assisted with enemas.  He was in severe pain but eventually we were able to get this under control with morphine.  Fentanyl was added for background pain control.  Due to his anatomy he was not a candidate for PEG and required surgical gastrotomy tube.  This was placed without incident he is now receiving tube feeds at goal.  He received GoLYTELY per G-tube and is now having BMs.  Due to his high dose of narcotics however we will leave him on Reglan but reduce him to MiraLAX twice daily per G-tube to maintain BMs.  Patient is able to tolerate clear liquids for pleasure but otherwise should be n.p.o. as even pills were getting stuck.  He should maintain nutrition through G-tube at current rate.      Patient's oncologist Dr. Wendy Page was interested in patient obtaining urology consultation and possible nephrectomy, urology felt that at this point given patient's functional status that IR guided biopsy  would be a better option.  Neither of these procedures can be performed at [facility] so he will require transfer to [facility] for further evaluation and management of his malignancies.  Patient and family have been aware that once his nutritional issues and GI issues have been addressed that this would be the plan of care and are agreeable with this.    Review of Systems  Review of Systems   Constitutional: Positive for malaise/fatigue (Improved). Negative for chills and fever.   HENT: Negative for sinus pain and sore throat.    Eyes: Negative for discharge and redness.   Respiratory: Positive for cough, sputum production (Now darker in color and gray) and wheezing (Mild intermittent). Negative for shortness of breath.    Cardiovascular: Negative for chest pain.   Gastrointestinal: Positive for abdominal pain (Overall controlled) and vomiting. Negative for constipation (Having BMs) and nausea.   Genitourinary: Positive for frequency. Negative for dysuria.   Musculoskeletal: Positive for back pain (Chronic with left sciatica). Negative for joint pain.   Skin: Negative for itching and rash.   Neurological: Positive for weakness (Improved). Negative for dizziness and headaches.   Psychiatric/Behavioral: Negative for depression. The patient is not nervous/anxious.        Past Medical History   has a past medical history of Backpain (01/2019); Bowel habit changes (01/2019); Breath shortness (01/2019); Cancer ([facility]) (2018); Cataract; Dental disorder; Dysphagia; Emphysema of lung (HCC); Glaucoma; Heart burn; Hiatus hernia syndrome; High cholesterol; Hypertension; Indigestion; Psychiatric problem (01/2019); Renal disorder (01/2019); and Stroke ([facility]) (2013). He also has no past medical history of COPD.    Surgical History   has a past surgical history that includes other (1980); cataract extraction with iol (Bilateral, 2005); other orthopedic surgery (1997); gastroscopy (2/1/2019); egd w/endoscopic ultrasound  (2019); egd with asp/bx (2019); and laparoscopy (N/A, 2019).     Family History  Father  of an MI when patient was very young  He is not in touch with his family recently and is not aware of any family history of diabetes or cancer  His mother is alive and well in her 90s.    Social History   reports that he has been smoking Cigarettes.  He has a 7.50 pack-year smoking history. He has never used smokeless tobacco. He reports that he does not drink alcohol or use drugs. Lives with spouse    Allergies  Allergies   Allergen Reactions   • Nkda [No Known Drug Allergy]        Medications  Current Facility-Administered Medications:   •  fentaNYL (DURAGESIC) 50 MCG/HR 1 Patch, 1 Patch, Transdermal, Q72HRS, Luz Leblanc M.D., 1 Patch at 19 0736  •  amLODIPine (NORVASC) tablet 10 mg, 10 mg, Oral, Q DAY, Trina Aguero M.D., 10 mg at 19 0603  •  oxyCODONE (ROXICODONE) oral solution 5 mg, 5 mg, Oral, Q4HRS PRN, Luz Leblanc M.D., 5 mg at 19 0836  •  Respiratory Care per Protocol, , Nebulization, Continuous RT, Isabel Fernandes M.D.  •  enoxaparin (LOVENOX) inj 40 mg, 40 mg, Subcutaneous, DAILY, Isabel Fernandes M.D., 40 mg at 19 0601  •  hydrALAZINE (APRESOLINE) injection 10 mg, 10 mg, Intravenous, Q4HRS PRN, Isabel Fernandes M.D., 10 mg at 19 0442  •  nicotine (NICODERM) 21 MG/24HR 21 mg, 21 mg, Transdermal, Daily-0600, 21 mg at 19 0600 **AND** Nicotine Replacement Patient Education Materials, , , Once **AND** nicotine polacrilex (NICORETTE) 2 MG piece 2 mg, 2 mg, Oral, Q HOUR PRN, Isabel Fernandes M.D.  •  bisacodyl (DULCOLAX) suppository 10 mg, 10 mg, Rectal, DAILY, Isabel Fernandes M.D., Stopped at 19 0600  •  albuterol inhaler 2 Puff, 2 Puff, Inhalation, Q4HRS PRN, Isabel Fernandes M.D., 2 Puff at 19 0415  •  albuterol (PROVENTIL) 2.5mg/0.5ml nebulizer solution 2.5 mg, 2.5 mg, Nebulization, Q6HRS PRN (RT), Isabel Fernandes M.D.  •   ondansetron (ZOFRAN) syringe/vial injection 4 mg, 4 mg, Intravenous, Q6HRS PRN, Isabel Fernandes M.D., 4 mg at 02/14/19 1730  •  morphine (pf) 4 mg/ml injection 4-6 mg, 4-6 mg, Intravenous, Q2HRS PRN, Trina Aguero M.D., 4 mg at 02/18/19 1102  •  metoclopramide (REGLAN) injection 10 mg, 10 mg, Intravenous, Q6HRS, Trina Aguero M.D., 10 mg at 02/18/19 1102   Miralax 1 pkt (17g) per Gtube BID      Physical Exam  161/86, pulse 82, resp 18, sat 91%    Physical Exam   Constitutional: He is oriented to person, place, and time. He appears well-developed. No distress.   Cachectic, renetta   HENT:   Head: Normocephalic and atraumatic.   Mouth/Throat: Oropharynx is clear and moist.   Eyes: Pupils are equal, round, and reactive to light. Conjunctivae and EOM are normal. Right eye exhibits no discharge. Left eye exhibits no discharge. No scleral icterus.   Neck: Neck supple.   Cardiovascular: Normal rate and regular rhythm.    Pulmonary/Chest: Effort normal. No respiratory distress. He has no wheezes. He has no rales. He exhibits no tenderness.   Diminished breath sounds with scattered transient rhonchi able to clear them with coughing   Abdominal: Soft. Bowel sounds are normal. He exhibits no distension and no mass. There is tenderness (Left upper quadrant). There is no rebound and no guarding.   G-tube left upper quadrant   Musculoskeletal: He exhibits no edema or tenderness.   Neurological: He is alert and oriented to person, place, and time. No cranial nerve deficit.   Skin: Skin is warm and dry. He is not diaphoretic.   Psychiatric: He has a normal mood and affect.   Nursing note and vitals reviewed.      Laboratory:  Recent Labs      02/18/19   0430   WBC  10.7   RBC  4.27*   HEMOGLOBIN  11.0*   HEMATOCRIT  34.7*   MCV  81.3*   MCH  25.8*   MCHC  31.7*   RDW  54.8*   PLATELETCT  273   MPV  10.4     Recent Labs      02/17/19   0919  02/17/19   1530  02/18/19   0430   SODIUM  138  137  138   POTASSIUM   2.5*  3.2*  4.5   CHLORIDE  103  100  104   CO2  29  29  29   GLUCOSE  112*  115*  112*   BUN  16  16  17   CREATININE  0.36*  0.42*  0.34*   CALCIUM  8.0*  8.4  7.9*     Recent Labs      19   0919  19   1530  19   0430   ALTSGPT  14   --   13   ASTSGOT  16   --   17   ALKPHOSPHAT  83   --   77   TBILIRUBIN  0.9   --   0.7   GLUCOSE  112*  115*  112*     Recent Labs      19   0430   INR  1.24*             No results for input(s): TROPONINI in the last 72 hours.    Urinalysis:    No results found     Imagin2019 8:43 PM    HISTORY/REASON FOR EXAM:  Abdominal pain, constipation, flank pain.    TECHNIQUE/EXAM DESCRIPTION:   Contiguous axial images were obtained from the diaphragmatic domes to the pubic symphysis following intravenous contrast administration. Coronal and sagittal reformats were generated and reviewed.    100 mL of Omnipaque 350 nonionic contrast was administered without complication.    Low dose optimization technique was utilized for this CT exam including automated exposure control and adjustment of the mA and/or kV according to patient size.    COMPARISON: None.    FINDINGS:    Lower chest: Emphysema. No pleural effusions.    Liver: No mass. No intrahepatic biliary dilatation.    Gallbladder: No mural thickening. No radiopaque gallstones.    Common bile duct: Nondilated.    Pancreas: Unremarkable.    Spleen: No mass.    Adrenals: No mass.    Kidneys: There is a large, partially exophytic, 8.9 x 8.3 x 6.8 cm mass arising from the lower pole of the right kidney. There may be early extension into the right renal vein near the hilum. The left kidney is unremarkable.    Stomach, small bowel, colon: Mural thickening of the distal esophagus/proximal stomach, likely related to known esophageal cancer. Moderate amount of stool within the colon.    Peritoneal cavity: No ascites.    Lymph nodes: Gastrohepatic, periportal, aortocaval and retroperitoneal lymphadenopathy, extending  to just below the level of the aortic bifurcation. Largest nodes measure up to 2 cm short axis.    Aorta: No aneurysm. Atherosclerosis.    Pelvic organs: Unremarkable bladder and prostate.    Musculoskeletal structures: No acute fracture or destructive lesion.   Impression         1. Thickening of the distal esophagus, likely related to known carcinoma.  2.  Gastrohepatic, periportal, aortocaval and retroperitoneal raleigh metastases.  3. Large, 8.7 cm right renal mass, consistent with malignancy. There may be early invasion of the right renal vein.  4. Moderate amount of stool throughout the colon.  5. Emphysema.      2/14/2019 11:38 AM    HISTORY/REASON FOR EXAM:  History of esophageal cancer.  Abnormal recent CT abdomen and pelvis, with RIGHT renal mass.    TECHNIQUE/EXAM DESCRIPTION:  CT scan of the chest with contrast.    Thin-section helical images were obtained from the lung apices through the adrenal glands following the bolus administration of contrast. 75 mL of Omnipaque 350 nonionic contrast was utilized.    Low dose optimization technique was utilized for this CT exam including automated exposure control and adjustment of the mA and/or kV according to patient size.    COMPARISON:  CT abdomen and pelvis 2/11/2019    FINDINGS:  No mediastinal mass or adenopathy.  Focal thickening of distal esophagus consistent with known tumor.  Small sliding-type hiatal hernia.  Lungs show diffuse emphysematous changes, with blebs present primarily in the RIGHT lower lung.  Wall thickening of bronchi in the LEFT lower lobe with secretions present.  No pleural fluid collection or pneumothorax.  No major bony abnormality is seen.  RIGHT kidney mass is partially visualized.  Soft tissue density present in the mesenteric root extending into the lesser sac, likely neoplasm.  Retroperitoneal tumor again noted.   Impression       1.  Severe emphysema.  2.  LEFT lower lobe bronchial wall thickening and secretions potentially  developing pneumonia.  3.  Mid to distal esophageal mass consistent with known neoplasm.  4.  RIGHT kidney mass, partially visualized.  5.  Mesenteric and retroperitoneal tumor extending into the lesser sac, as on recent abdominal CT.             Assessment/Plan:  I anticipate this patient will require at least two midnights for appropriate medical management, necessitating inpatient admission.    Hypokalemia   Assessment & Plan    Resolved, monitor for recurrence     Advanced care planning/counseling discussion   Assessment & Plan    Patient was seen by palliative care, given the overwhelming nature of 2 new probable malignancies patient and family have not had time to deal with all this information.  Unable to fill out POLST or decide on CODE STATUS, continue to monitor for more definitive advance care planning/decision.     Panlobular emphysema (HCC)- (present on admission)   Assessment & Plan    Severe on CT scan  Continue management of COPD  There has been no exacerbation during his previous stay     Constipation due to pain medication therapy- (present on admission)   Assessment & Plan    Resolving, status post GoLYTELY  Continue Reglan in light of ongoing narcotic usage-change to twice daily MiraLAX per G-tube     Cancer associated pain- (present on admission)   Assessment & Plan    Currently controlled with fentanyl, IV morphine  Transition to oral therapy once procedures are completed     Malignant cachexia (HCC)- (present on admission)   Assessment & Plan    On tube feed at recommended rate     Tobacco abuse- (present on admission)   Assessment & Plan    On nicotine replacement     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)- (present on admission)   Assessment & Plan    Continue RT protocol encourage cough and deep breathing     Esophageal cancer (HCC)- (present on admission)   Assessment & Plan    Nonresectable plan for chemotherapy, also needs diagnosis of renal mass  before oncology can proceed  G-tube placed surgically as not able to place endoscopically  He is able to take some liquids by mouth and should be allowed to do so -probably nothing thicker than nectar thick however as pills were starting to get stuck         VTE prophylaxis: Lovenox held for anticipated renal biopsy, he has high risk for thromboembolic phenomena.  Please resume once invasive procedures have been completed.

## 2019-02-18 NOTE — ED NOTES
Med rec completed at  by Med Rec [facility]    Med rec updated and complete  Allergies reviewed  Interviewed pt with family at bedside with permission from pt.  Pt reports that he has been taking his PERCOCET 10-325MG 1 tablet 5 times a day.

## 2019-02-18 NOTE — PROGRESS NOTES
Per Dr. Leblanc since bowels have started working we can increase tube feeding to goal.  Increased to 45cc/hr.

## 2019-02-18 NOTE — PROGRESS NOTES
Tele strip at 1911 shows SR w/ HR of 84    VT 0.14  QRS 0.08  QT 0.40       Telemetry Summary    Rhythm NSR  Rate 60s - 80s  Ectopy Occasional PACs, per [facility]       Telemetry monitoring strips faxed to primary RN. Tele strip summary only. Further assessment and monitoring to be done by primary RN.

## 2019-02-18 NOTE — DISCHARGE PLANNING
Anticipated Discharge Disposition: d/c acute to acute    Action: Completing COBRA, waiting for [facility] transfer time    Barriers to Discharge: transport    Plan: f/u w/ medical team, pt

## 2019-02-18 NOTE — PROGRESS NOTES
Report received from Tricia RN. Pt awake, alert, appropriate and pleasant. Sitting in bed watching TV at this time. Per report, pt had 2 small  loose BMs. Pt tube feed at desired 45mL/hr; tolerating well. IV fluids infusing. Pt noted to be more energetic, ambulating well. Reports slight decrease in pain from previous night. Denies needs at this time. Will continue to monitor.

## 2019-02-18 NOTE — PROGRESS NOTES
Second dose of Altepase instilled in lumen. Dwell time 30 minutes. Able to get brisk blood return off port. Line flushed with 30 mL NS. Otherwise pt sitting in chair. BM today. Pt states he feels better today.

## 2019-02-18 NOTE — PROGRESS NOTES
Hospitalist made aware of continued inability to draw off of purple lumen. Additional order for alteplase received. Will notify oncoming RN of same.

## 2019-02-19 PROBLEM — I10 ESSENTIAL HYPERTENSION: Status: ACTIVE | Noted: 2019-01-01

## 2019-02-19 PROBLEM — I82.622 ACUTE DEEP VEIN THROMBOSIS (DVT) OF LEFT UPPER EXTREMITY (HCC): Status: ACTIVE | Noted: 2019-01-01

## 2019-02-19 PROBLEM — R13.10 DYSPHAGIA: Status: ACTIVE | Noted: 2019-01-01

## 2019-02-19 PROBLEM — D72.829 LEUKOCYTOSIS: Status: ACTIVE | Noted: 2019-01-01

## 2019-02-19 PROBLEM — J44.9 COPD (CHRONIC OBSTRUCTIVE PULMONARY DISEASE) (HCC): Status: ACTIVE | Noted: 2019-01-01

## 2019-02-19 NOTE — PROGRESS NOTES
Admit by Dr Aguero earlier today  Dr Aguero's H+P with exam and plan reviewed  I have personally seen patient and agree with Dr Pepe findings and plans.    Oncology and Urology needs consult in am.

## 2019-02-19 NOTE — RESPIRATORY CARE
COPD EDUCATION by COPD CLINICAL EDUCATOR  2/19/2019 at 7:40 AM by Martha Saavedra     Patient reviewed by COPD education team. Patient does not qualify for COPD program.

## 2019-02-19 NOTE — CARE PLAN
Problem: Bowel/Gastric:  Goal: Normal bowel function is maintained or improved  Outcome: PROGRESSING AS EXPECTED  Pt tolerating tube feeding. Impact peptide at 45/hr.    Problem: Pain Management  Goal: Pain level will decrease to patient's comfort goal  Outcome: PROGRESSING AS EXPECTED  PRN pain meds per MAR.

## 2019-02-19 NOTE — PALLIATIVE CARE
Palliative Care follow-up  PC RN called Pat, she states they will be leaving [place] around noon and will be at bedside this afternoon and can continue GOC discussion that was started at [facility].      Plan:   Meet with pt and family this afternoon to continue GOC discussion.     Thank you for allowing Palliative Care to participate in this patient's care. Please feel free to call [phone] with any questions or concerns.

## 2019-02-19 NOTE — CONSULTS
"Reason for PC Consult: Advance Care Planning    Consulted by:   Dr. Rodriguez    Assessment:  General:   73 year old male admitted for esophageal cancer on 2/18/19. Pt has a history of esophageal cancer, chronic back pain, cataract, dysphagia, emphysema, glaucoma, high cholesterol, HTN, indigestion, psychiatric problems, renal disorder, prior stroke and current smoker. Pt was transferred from [facility] for biopsy, PC consult completed while admitted at [facility] on 2/12/19.     Dyspnea: No, 90% on 4L NC  Last BM: 02/18/19    Pain: No    Depression: No    Dementia: No      Spiritual:  Is [facility] or spirituality important for coping with this illness? No, Pt declined visit from   Has a  or spiritual provider visit been requested? No    Palliative Performance Scale: 50%     Advance Directive: None on File    DPOA: None on File, THO Dory Akil ([phone])   POLST: None on File    Code Status: Full      Outcome:  Prior consult pt and family were to discuss GOC, I.e. Aggressive treatment vs home with hospice as well as AD and POLST form. [facility] expressed [facility]. Pt had PEG tube placed 2/14/19 for nutritional support, pt tolerating feeds.     PC RN visited pt at bedside, pt reports feeling like he is \"in and out of it\". Requested to follow up with family who should be at bedside shortly.     PC RN met with pt and family at bedside, they report they have discussed GOC in depth and would like to wait for the biopsy results before they make any decisions. Pt did complete AD, PC RN answered questions, placed in  office for [facility]. Pt also would like to complete a POLST form. PC RN assisted in completing form, POLST completed, scanned into EMR, original placed on hard chart and should be sent with pt upon discharge. Pt expressed wanting to be DNAR/DNI now, but continue with aggressive treatment.     Family is appreciative of all the support they have " received, they decline any further needs at this time.    PC RN provided contact card to family, encouraged them to call with any questions or concerns.    Active listening, education, validation and emotional support provided.     Updated:   Dr. Rodriguez    Plan:   DNAR/DNI now, AD awaiting BART allen completed. Awaiting biopsy results to continue GOC discussion.     Recommendations: I do not recommend an ethics or hospice consult at this time because biopsy results are pending. .    Thank you for allowing Palliative Care to participate in this patient's care. Please feel free to call [phone] with any questions or concerns.

## 2019-02-19 NOTE — PROGRESS NOTES
Per Dr. Lyle will not do procedure while patient on Lovenox. Advised JANETTE Garcia, she will speak with Dr. Aguero to see if patient can come off of Lovenox and will let us know.

## 2019-02-19 NOTE — PROGRESS NOTES
Pt transferred to [facility] with [facility]. Report to JANETTE Brian.

## 2019-02-19 NOTE — PROGRESS NOTES
Received report from day shift RN. Assumed patient care  AOx4  Pain rated at 8/10, medicated per MAR  4 L of O2 via nasal cannula   Patient has some complaints of nausea, no vomiting, does not request PRN medication at this time  Strict NPO  Impact peptide running through patient's G tube at 45 mL/hr, goal  Patient is voiding without difficulty   Ambulates SBA with steady gait  Call light within reach  Bed locked and in low position   POC discussed with patient  All needs met at this time.

## 2019-02-19 NOTE — PROGRESS NOTES
Telemetry Shift Summary    Rhythm SR  HR Range 60's-70's  Ectopy rare PVC  Measurements .14/.06/.40        Normal Values  Rhythm SR  HR Range    Measurements 0.12-0.20 / 0.06-0.10  / 0.30-0.52

## 2019-02-19 NOTE — CARE PLAN
Problem: Bowel/Gastric:  Goal: Normal bowel function is maintained or improved  Outcome: PROGRESSING AS EXPECTED  Pt tolerating tube feeding. To set up now that pt has transferred once pump arrives.    Problem: Pain Management  Goal: Pain level will decrease to patient's comfort goal  Outcome: PROGRESSING AS EXPECTED  PRN pain meds per MAR.

## 2019-02-19 NOTE — PROGRESS NOTES
Report received from [facility] RN  [facility]    Pt is A&O x 4.  Pain reported at 8/10. Medicated per MAR  Nausea denied  PICC in place to LUE. Arm is notably swollen below and around PICC. Dr Aguero paged to update on pt condition. New orders received.   All IV medication held at Dr Aguero's request until venus duplex is performed.  Surgically place G tube to LLQ. Placement confirmed in surgery. Pt tolerating flushes and medication through tube.   + Urine output  + loose BM PTA at [facility]   + Flatus  Up standby with generalized weakness   Family at bedside  Bed in lowest position and locked.  Pt resting comfortably now.  Review plan of care with patient  Call light within reach  Hourly rounds in place  All needs met at this time

## 2019-02-19 NOTE — CARE PLAN
Problem: Safety  Goal: Will remain free from injury  Outcome: PROGRESSING AS EXPECTED    Goal: Will remain free from falls  Outcome: PROGRESSING AS EXPECTED  Fall precautions in place     Problem: Venous Thromboembolism (VTW)/Deep Vein Thrombosis (DVT) Prevention:  Goal: Patient will participate in Venous Thrombosis (VTE)/Deep Vein Thrombosis (DVT)Prevention Measures  Outcome: PROGRESSING AS EXPECTED  SCDs on

## 2019-02-19 NOTE — DIETARY
"Nutrition Support Assessment:  Day 1 of admit.  Justus Barnard is a 73 y.o. male with admitting DX of Esophageal Cancer.     Current problem list:  1. Advanced care planning/counseling discussion  2. Hypokalemia  3. Panlobular emphysema  4. Esophageal cancer  5. COPD  6. Tobacco abuse  7. Malignant cachexia  8. Cancer associated pain  9. Constipation due to pain medication therapy     Assessment:  Estimated Nutritional Needs based on:   Height: 167.6 cm (5' 6\")  Weight: 63.5 kg (139 lb 15.9 oz)  Weight to Use in Calculations: 63.5 kg (139 lb 15.9 oz)  Ideal Body Weight: 64.4 kg (142 lb)  Percent Ideal Body Weight: 98.6  Body mass index is 22.6 kg/m².     Calculation/Equation: MSJ x 1.2 = 1595 kcals/day  Total Calories / day: 1595 - 1905  (Calories / k - 30)  Total Grams Protein / day: 76 - 95  (Grams Protein / k.2 - 1.5)     Evaluation:   1. Hx of esophageal cancer.  2. Recent G-tube placement.  3. Spoke with pt at bedside; pt appeared elderly.  4. Pt states UBW of 160-175 lbs; per chart review, pt wt on 3/8/18 was 160 lbs.  5. Wt loss of 13% in one year is not significant, but worth noting.  6. Pt does not have a home tube feeding regimen as of yet, as G-tube was placed recently.  7. Fat loss evidenced by hollowness/depression, and loose skin in orbital region.  8. Glucose 133, Creatinine 0.28, Albumin 2.6  9. Pertinent meds:  Lovenox, Reglan, Miralax  10. Pt is strict NPO.  11. Tube feeding order/rate initiated by admitting MD.  12. Standard formula indicated for continuous needs; should pt transition to bolus feeds, provide equivalent formula.      Malnutrition Risk: Pt likely at risk for malnutrition d/t hypermetabolic disease, however unable to diagnose at this time.     Recommendations/Plan:  1. Change TF to Fibersource HN and advance to final goal rate of 65 mL/hr, providing 1872 kcals, 84 grams of protein and 1264 mL of free water per day.  2. Fluids per MD.  3. RD to monitor wt and lab " trends.  4. Consult RD for bolus feedings as needed.    RD will continue to follow.

## 2019-02-19 NOTE — PROGRESS NOTES
Bedside Report received   Assumed care of patient at 0700.    Pt is A&O x 4.  Pain reported at 6/10. Medicated per MAR.  Nausea denied. Fullness/bloating denied.  Tolerating tube feeding (Impact peptide 1.5) @ 45 ml/hour which is goal via G Tube to LLQ.    + Urine output  + BM    + Flatus  Up standby with steady gait  SCD's in place when in bed  Bed in lowest position and locked.  Bed alarm NA per Traci Padron   Pt resting comfortably now.  Review plan of care with patient  Call light within reach  Hourly rounds in place  All needs met at this time    Per IR pt cannot have have planned IR guided biopsy today D/T Lovenox administration. This conflict was discussed with Dr Rodriguez. Per Dr Rodriguez, conflicting doses of Lovenox are to be held. IR was updated by this RN and per IR, pt is on the schedule for a biopsy tomorrow (2/20/19).

## 2019-02-20 PROBLEM — J98.4 PNEUMONITIS: Status: ACTIVE | Noted: 2019-01-01

## 2019-02-20 PROBLEM — E83.42 HYPOMAGNESEMIA: Status: ACTIVE | Noted: 2019-01-01

## 2019-02-20 NOTE — PROGRESS NOTES
Bedside report received.  Assessment complete.  A&O x 4. Patient calls appropriately.  Patient up with x1 assist.    Patient has 5/10 pain. Medication given (see MAR)  Denies N&V. Currently NPO with continuous tube feeding of fibersource at 65 ml/h (goal).  LLQ G-tube MALIK. 1 lap site with gauze and Tegaderm,CDI below umbilicus   + void, + flatus, - BM.  Patient denies SOB.  SCD's off.    Review plan with of care with patient. Call light and personal belongings with in reach. Hourly rounding in place. All needs met at this time.

## 2019-02-20 NOTE — PROGRESS NOTES
[facility] Medicine Daily Progress Note    Date of Service  2/19/2019    Chief Complaint  73 y.o. male admitted 2/18/2019 with esophageal cancer.    Hospital Course  Admitted with esophageal cancer, dysphagia, laparoscopic G-tube placed for nutrition, noted to have DVT left upper extremity, right renal mass, transferred for IR guided biopsy    Interval Problem Update  Esophageal CA -discussed case with oncology  Renal mass -for IR biopsy  DVT -noted on venous duplex  Hypertension -controlled    Consultants/Specialty  Palliative care consulted    Code Status  CODE STATUS clarified with patient he is DNR/DNI    Disposition  TBD    Review of Systems  Review of Systems   Constitutional: Positive for malaise/fatigue. Negative for chills and fever.   HENT: Negative for hearing loss and sore throat.    Eyes: Negative for blurred vision.   Respiratory: Positive for cough. Negative for shortness of breath and wheezing.    Cardiovascular: Negative for chest pain, palpitations and leg swelling.   Gastrointestinal: Positive for abdominal pain. Negative for blood in stool, diarrhea, heartburn, melena, nausea and vomiting.   Genitourinary: Negative for dysuria.   Musculoskeletal: Negative for back pain and neck pain.   Skin: Negative for rash.   Neurological: Positive for weakness. Negative for dizziness and headaches.   Psychiatric/Behavioral: The patient is not nervous/anxious.         Physical Exam  Temp:  [36.5 °C (97.7 °F)-37 °C (98.6 °F)] 36.6 °C (97.9 °F)  Pulse:  [71-86] 71  Resp:  [16-17] 17  BP: (147-158)/(71-80) 147/71  SpO2:  [90 %-98 %] 98 %    Physical Exam   Constitutional: He is oriented to person, place, and time. He appears well-developed and well-nourished.   HENT:   Head: Normocephalic and atraumatic.   Eyes: Pupils are equal, round, and reactive to light. Conjunctivae are normal.   Neck: No tracheal deviation present. No thyromegaly present.   Cardiovascular: Normal rate and regular rhythm.    Pulmonary/Chest:  Effort normal and breath sounds normal.   Abdominal: Soft. Bowel sounds are normal. He exhibits no distension. There is no tenderness.   Musculoskeletal: He exhibits no edema.   Lymphadenopathy:     He has no cervical adenopathy.   Neurological: He is alert and oriented to person, place, and time.   Skin: Skin is warm and dry.   Nursing note and vitals reviewed.      Fluids    Intake/Output Summary (Last 24 hours) at 02/19/19 1738  Last data filed at 02/19/19 1600   Gross per 24 hour   Intake             2000 ml   Output                0 ml   Net             2000 ml       Laboratory  Recent Labs      02/18/19   0430  02/19/19   1225   WBC  10.7  13.1*   RBC  4.27*  4.66*   HEMOGLOBIN  11.0*  12.1*   HEMATOCRIT  34.7*  38.9*   MCV  81.3*  83.5   MCH  25.8*  26.0*   MCHC  31.7*  31.1*   RDW  54.8*  56.2*   PLATELETCT  273  278   MPV  10.4  9.7     Recent Labs      02/17/19   1530  02/18/19   0430  02/19/19   1225   SODIUM  137  138  135   POTASSIUM  3.2*  4.5  4.3   CHLORIDE  100  104  101   CO2  29  29  29   GLUCOSE  115*  112*  133*   BUN  16  17  21   CREATININE  0.42*  0.34*  0.28*   CALCIUM  8.4  7.9*  9.1     Recent Labs      02/18/19   0430   INR  1.24*               Imaging  US-EXTREMITY VENOUS UPPER UNILAT LEFT   Final Result      IR-RENAL BX RIGHT    (Results Pending)   DX-CHEST-2 VIEWS    (Results Pending)        Assessment/Plan  * Esophageal cancer (HCC)- (present on admission)   Assessment & Plan    Laparoscopic G-tube placement  Consult palliative care     Renal mass, right- (present on admission)   Assessment & Plan    For IR biopsy     Essential hypertension- (present on admission)   Assessment & Plan    Amlodipine     Leukocytosis- (present on admission)   Assessment & Plan    Follow CBC  Check chest x-ray and pro calcitonin level     Dysphagia- (present on admission)   Assessment & Plan    Tube feeding  SLP to follow     Acute deep vein thrombosis (DVT) of left upper extremity (HCC)- (present on  admission)   Assessment & Plan    Lovenox     Hypokalemia- (present on admission)   Assessment & Plan    Follow BMP     Advanced care planning/counseling discussion- (present on admission)   Assessment & Plan    Palliative care consult     Constipation due to pain medication therapy- (present on admission)   Assessment & Plan    Bowel protocol     Cancer associated pain- (present on admission)   Assessment & Plan    Fentanyl, IV morphine     Malignant cachexia (HCC)- (present on admission)   Assessment & Plan    Nutrition consult     COPD (chronic obstructive pulmonary disease) (HCC)- (present on admission)   Assessment & Plan    RT protocol     Tobacco abuse- (present on admission)   Assessment & Plan    Nicotine replacement protocol          VTE prophylaxis: Lovenox

## 2019-02-20 NOTE — CARE PLAN
Problem: Bowel/Gastric:  Goal: Normal bowel function is maintained or improved  Outcome: PROGRESSING AS EXPECTED  Pt tolerating tube feeding. Changed yesterday to fibersource 65/hr.    Problem: Pain Management  Goal: Pain level will decrease to patient's comfort goal  Outcome: PROGRESSING AS EXPECTED  PRN pain meds per MAR.

## 2019-02-20 NOTE — CARE PLAN
Problem: Communication  Goal: The ability to communicate needs accurately and effectively will improve  Outcome: PROGRESSING AS EXPECTED  Review plan of care with patient, encourage patient to voice concerns and ask questions     Problem: Pain Management  Goal: Pain level will decrease to patient's comfort goal  Outcome: PROGRESSING AS EXPECTED  Assess pain Q2-4H, administer pain medication when indicated, encourage patient to voice pain

## 2019-02-20 NOTE — PROGRESS NOTES
IR Note:  Attempted to complete right renal biopsy.  Patient prone on CT table.  Patient unable to tolerate positioning.  MD Nixon aware.  Notified JANETTE Aldana and patient taken back to [id number].

## 2019-02-20 NOTE — ASSESSMENT & PLAN NOTE
PEG tube, underlying esophageal malignancy with recurrent nausea/vomiting-x-ray no obstruction. Improved symptoms.   Continue bolus feeding small amounts thru G-tube.  Instruct bolus feeding.   Hold continuous tube feeds  PRN antiemetics with scheduled simethicone  IV fluid support until can consistently tolerate feeds

## 2019-02-20 NOTE — PROGRESS NOTES
Received call from micro about sputum results:  Many WBCs.   Many Gram positive diplococci.   Few Gram negative rods.   Specimen Quality Score: 2+     Narrative     CALL  [facility]  141 tel. [phone],  CALLED  141 tel. [phone] 02/20/2019, 15:26, RB PERF. RESULTS CALLED  TO:[phone] ( [facility])     Updated Dr. Rodriguez with results.

## 2019-02-20 NOTE — ASSESSMENT & PLAN NOTE
With gastrohepatic, periportal, aortocaval and retroperitoneal [place] metastases.  Does not appear that the patient will tolerate chemotherapy or radiation  Patient to be discharged on hospice.

## 2019-02-20 NOTE — OR SURGEON
Immediate Post- Operative Note         Diagnosis: METASTATIC RT RENAL CELL CA    Procedure(s): PATIENT REFUSED RT RENAL BIOPSY      2/20/2019     12:46 PM     Wisam Nixon

## 2019-02-20 NOTE — ASSESSMENT & PLAN NOTE
In the setting of underlying malignancy .  Distal left subclavian to mid vein.   Patient concerned that he had allergic reaction to Eliquis - states he had N/V  The patient wanted to go with hospice. Different  Xa inhibitor - Xarelto to be tried- Dc Lovenox. Monitor for intolerance.

## 2019-02-20 NOTE — PROGRESS NOTES
Report received, poc discussed, assumed care of pt.   Call light in reach, hourly rounding in place.   Pt gets up x 1, unsteady.   TF at 65/hr fibersource - stopped for procedure later today, NPO diet.  + void. LBM 2/20, loose.   Morphine/oxy for pain.  No further needs.  Up to chair.

## 2019-02-20 NOTE — CARE PLAN
Problem: Nutritional:  Goal: Nutrition support tolerated and meeting greater than 85% of estimated needs  Outcome: MET Date Met: 02/20/19  TF @ goal: Fibersource HN @ 65 mL/hr.

## 2019-02-21 NOTE — PROGRESS NOTES
[facility] Medicine Daily Progress Note    Date of Service  2/21/2019    Chief Complaint  73 y.o. male admitted 2/18/2019 with esophageal cancer.    Hospital Course  Admitted with esophageal cancer, dysphagia, laparoscopic G-tube placed for nutrition, noted to have DVT left upper extremity, right renal mass, transferred for IR guided biopsy    Interval Problem Update  Esophageal CA - patient feels he is having more pain today  Renal mass - for biopsy with anesthesia  DVT - noted on venous duplex  Hypertension - controlled  Pneumonitis -sputum culture shows Pseudomonas and Streptococcus    Consultants/Specialty  Palliative care  Urology    Code Status  DNR/DNI    Disposition  TBD    Review of Systems  Review of Systems   Constitutional: Positive for malaise/fatigue. Negative for chills and fever.   HENT: Negative for hearing loss and sore throat.    Eyes: Negative for blurred vision.   Respiratory: Positive for cough. Negative for shortness of breath and wheezing.    Cardiovascular: Negative for chest pain, palpitations and leg swelling.   Gastrointestinal: Positive for abdominal pain. Negative for blood in stool, diarrhea, heartburn, melena, nausea and vomiting.   Genitourinary: Negative for dysuria.   Musculoskeletal: Negative for back pain and neck pain.   Skin: Negative for rash.   Neurological: Positive for weakness. Negative for dizziness and headaches.   Psychiatric/Behavioral: The patient is not nervous/anxious.         Physical Exam  Temp:  [36.2 °C (97.2 °F)-36.7 °C (98 °F)] 36.6 °C (97.8 °F)  Pulse:  [71-81] 79  Resp:  [16] 16  BP: (105-136)/(63-73) 127/73  SpO2:  [90 %-95 %] 92 %    Physical Exam   Constitutional: He is oriented to person, place, and time. He appears well-developed and well-nourished.   HENT:   Head: Normocephalic and atraumatic.   Eyes: Pupils are equal, round, and reactive to light. Conjunctivae are normal.   Neck: No tracheal deviation present. No thyromegaly present.   Cardiovascular:  Normal rate and regular rhythm.    Pulmonary/Chest: Effort normal and breath sounds normal.   Abdominal: Soft. Bowel sounds are normal. He exhibits no distension. There is no tenderness.   Musculoskeletal: He exhibits no edema.   Lymphadenopathy:     He has no cervical adenopathy.   Neurological: He is alert and oriented to person, place, and time.   Skin: Skin is warm and dry.   Nursing note and vitals reviewed.      Fluids    Intake/Output Summary (Last 24 hours) at 02/21/19 1413  Last data filed at 02/21/19 0500   Gross per 24 hour   Intake              480 ml   Output              250 ml   Net              230 ml       Laboratory  Recent Labs      02/19/19   1225  02/20/19   0312  02/21/19   0349   WBC  13.1*  11.6*  12.8*   RBC  4.66*  4.41*  4.68*   HEMOGLOBIN  12.1*  11.6*  12.4*   HEMATOCRIT  38.9*  37.2*  39.7*   MCV  83.5  84.4  84.8   MCH  26.0*  26.3*  26.5*   MCHC  31.1*  31.2*  31.2*   RDW  56.2*  57.1*  58.5*   PLATELETCT  278  275  339   MPV  9.7  9.9  9.7     Recent Labs      02/19/19   1225  02/20/19   0312  02/21/19   0349   SODIUM  135  137  133*   POTASSIUM  4.3  4.0  3.8   CHLORIDE  101  101  98   CO2  29  31  30   GLUCOSE  133*  112*  128*   BUN  21  21  23*   CREATININE  0.28*  0.31*  0.43*   CALCIUM  9.1  8.7  9.1                   Imaging  DX-CHEST-2 VIEWS   Final Result         1.  Hazy bilateral lung base infiltrates, greater on the left, new since prior.   2.  Small bilateral pleural effusions.   3.  Hyperexpansion of lungs favors changes of COPD.      US-EXTREMITY VENOUS UPPER UNILAT LEFT   Final Result      CT-NEEDLE BX-RENAL    (Results Pending)   CT-ABDOMEN-PELVIS WITH    (Results Pending)        Assessment/Plan  * Esophageal cancer (HCC)- (present on admission)   Assessment & Plan    Laparoscopic G-tube placement  Check CT abdomen and pelvis     Renal mass, right- (present on admission)   Assessment & Plan    For IR biopsy with Anesthesia     Hypomagnesemia- (present on admission)    Assessment & Plan    IV Mg given, recheck  level     Pneumonitis- (present on admission)   Assessment & Plan    Consult ID     Essential hypertension- (present on admission)   Assessment & Plan    Amlodipine     Leukocytosis- (present on admission)   Assessment & Plan    Follow CBC     Dysphagia- (present on admission)   Assessment & Plan    Tube feeding  SLP to follow     Acute deep vein thrombosis (DVT) of left upper extremity (HCC)- (present on admission)   Assessment & Plan    Lovenox     Hypokalemia- (present on admission)   Assessment & Plan    Follow BMP     Advanced care planning/counseling discussion- (present on admission)   Assessment & Plan    Palliative care consulted     Constipation due to pain medication therapy- (present on admission)   Assessment & Plan    Bowel protocol     Cancer associated pain- (present on admission)   Assessment & Plan    Increase Fentanyl to 75 mcg  Increase Oxycodone to 15 mg  Change to IV Dilaudid     Malignant cachexia (HCC)- (present on admission)   Assessment & Plan    Nutrition consult     COPD (chronic obstructive pulmonary disease) (HCC)- (present on admission)   Assessment & Plan    RT protocol     Tobacco abuse- (present on admission)   Assessment & Plan    Nicotine replacement protocol          VTE prophylaxis: Lovenox

## 2019-02-21 NOTE — PROGRESS NOTES
[facility] Medicine Daily Progress Note    Date of Service  2/20/2019    Chief Complaint  73 y.o. male admitted 2/18/2019 with esophageal cancer.    Hospital Course  Admitted with esophageal cancer, dysphagia, laparoscopic G-tube placed for nutrition, noted to have DVT left upper extremity, right renal mass, transferred for IR guided biopsy    Interval Problem Update  Esophageal CA - discussed case with Oncology  Renal mass - unable to tolerate laying down on the side due to pain for biopsy  DVT - noted on venous duplex  Hypertension - controlled  Pneumonitis - noted on CXR, procalcitonin negative  Low Magnesium    Lengthy discussion with pt and family, updates given, plan of care discussed. They wish to proceed with biopsy under anesthesia and risks and complications were discussed.    Consultants/Specialty  Palliative care    Code Status  DNR/DNI    Disposition  TBD    Review of Systems  Review of Systems   Constitutional: Positive for malaise/fatigue. Negative for chills and fever.   HENT: Negative for hearing loss and sore throat.    Eyes: Negative for blurred vision.   Respiratory: Positive for cough. Negative for shortness of breath and wheezing.    Cardiovascular: Negative for chest pain, palpitations and leg swelling.   Gastrointestinal: Positive for abdominal pain. Negative for blood in stool, diarrhea, heartburn, melena, nausea and vomiting.   Genitourinary: Negative for dysuria.   Musculoskeletal: Negative for back pain and neck pain.   Skin: Negative for rash.   Neurological: Positive for weakness. Negative for dizziness and headaches.   Psychiatric/Behavioral: The patient is not nervous/anxious.         Physical Exam  Temp:  [36.5 °C (97.7 °F)-36.8 °C (98.2 °F)] 36.5 °C (97.7 °F)  Pulse:  [69-84] 84  Resp:  [15-18] 16  BP: (124-153)/(50-73) 124/61  SpO2:  [93 %-98 %] 93 %    Physical Exam   Constitutional: He is oriented to person, place, and time. He appears well-developed and well-nourished.   HENT:    Head: Normocephalic and atraumatic.   Eyes: Pupils are equal, round, and reactive to light. Conjunctivae are normal.   Neck: No tracheal deviation present. No thyromegaly present.   Cardiovascular: Normal rate and regular rhythm.    Pulmonary/Chest: Effort normal and breath sounds normal.   Abdominal: Soft. Bowel sounds are normal. He exhibits no distension. There is no tenderness.   Musculoskeletal: He exhibits no edema.   Lymphadenopathy:     He has no cervical adenopathy.   Neurological: He is alert and oriented to person, place, and time.   Skin: Skin is warm and dry.   Nursing note and vitals reviewed.      Fluids    Intake/Output Summary (Last 24 hours) at 02/20/19 1613  Last data filed at 02/20/19 0859   Gross per 24 hour   Intake              355 ml   Output              300 ml   Net               55 ml       Laboratory  Recent Labs      02/18/19   0430  02/19/19   1225  02/20/19   0312   WBC  10.7  13.1*  11.6*   RBC  4.27*  4.66*  4.41*   HEMOGLOBIN  11.0*  12.1*  11.6*   HEMATOCRIT  34.7*  38.9*  37.2*   MCV  81.3*  83.5  84.4   MCH  25.8*  26.0*  26.3*   MCHC  31.7*  31.1*  31.2*   RDW  54.8*  56.2*  57.1*   PLATELETCT  273  278  275   MPV  10.4  9.7  9.9     Recent Labs      02/18/19   0430  02/19/19   1225  02/20/19   0312   SODIUM  138  135  137   POTASSIUM  4.5  4.3  4.0   CHLORIDE  104  101  101   CO2  29  29  31   GLUCOSE  112*  133*  112*   BUN  17  21  21   CREATININE  0.34*  0.28*  0.31*   CALCIUM  7.9*  9.1  8.7     Recent Labs      02/18/19   0430   INR  1.24*               Imaging  DX-CHEST-2 VIEWS   Final Result         1.  Hazy bilateral lung base infiltrates, greater on the left, new since prior.   2.  Small bilateral pleural effusions.   3.  Hyperexpansion of lungs favors changes of COPD.      US-EXTREMITY VENOUS UPPER UNILAT LEFT   Final Result      CT-NEEDLE BX-RENAL    (Results Pending)        Assessment/Plan  * Esophageal cancer (HCC)- (present on admission)   Assessment & Plan     Laparoscopic G-tube placement     Renal mass, right- (present on admission)   Assessment & Plan    For IR biopsy with Anesthesia     Hypomagnesemia- (present on admission)   Assessment & Plan    IV Mg, recheck  level     Pneumonitis- (present on admission)   Assessment & Plan    Follow cbc and procalcitonin     Essential hypertension- (present on admission)   Assessment & Plan    Amlodipine     Leukocytosis- (present on admission)   Assessment & Plan    Follow CBC     Dysphagia- (present on admission)   Assessment & Plan    Tube feeding  SLP to follow     Acute deep vein thrombosis (DVT) of left upper extremity (HCC)- (present on admission)   Assessment & Plan    Lovenox     Hypokalemia- (present on admission)   Assessment & Plan    Follow BMP     Advanced care planning/counseling discussion- (present on admission)   Assessment & Plan    Palliative care consulted     Constipation due to pain medication therapy- (present on admission)   Assessment & Plan    Bowel protocol     Cancer associated pain- (present on admission)   Assessment & Plan    Fentanyl, IV morphine     Malignant cachexia (HCC)- (present on admission)   Assessment & Plan    Nutrition consult     COPD (chronic obstructive pulmonary disease) (HCC)- (present on admission)   Assessment & Plan    RT protocol     Tobacco abuse- (present on admission)   Assessment & Plan    Nicotine replacement protocol          VTE prophylaxis: Lovenox

## 2019-02-21 NOTE — CARE PLAN
Problem: Safety  Goal: Will remain free from falls  Outcome: PROGRESSING AS EXPECTED  Bed locked and in lowest position, side rails up x2, call light within reach, patient educated to call for help before exiting bed     Problem: Pain Management  Goal: Pain level will decrease to patient's comfort goal  Outcome: PROGRESSING AS EXPECTED  Assess pain Q2-4H, administer pain medication as indicated, encourage patient to voice pain

## 2019-02-21 NOTE — PROGRESS NOTES
Bedside report received.  Assessment complete.  A&O x 4. Patient calls appropriately.  Patient up with x1 assist.    Patient has 4/10 pain. Medication given (see MAR)  Denies N&V. NPO with continuous tube feeds of Fibersource at 65 (goal).   LLQ G-tube, MALIK.   + void, + flatus, - BM.  Patient denies SOB.  SCD's on.    Review plan with of care with patient. Call light and personal belongings with in reach. Hourly rounding in place. All needs met at this time.

## 2019-02-21 NOTE — PALLIATIVE CARE
Palliative Care follow-up  Pt's AD is only half scanned into the chart, half the document missing. Visited pt at bedside, he does not know where the original is. PC RN checked hard chart, document not there.    Called [facility] ([phone]), they have pt's AD they will bring the copy back so full document can be scanned in.      Updated:   Tiffanie VILLEDA    Plan:   Will scan full document once family brings in AD    Thank you for allowing Palliative Care to participate in this patient's care. Please feel free to call [phone] with any questions or concerns.

## 2019-02-21 NOTE — PROGRESS NOTES
Bedside report completed.  A&O x4.  In no acute distress.   VSS.  SpO2 100% on RA.  Max pull .  Productive cough with yellow/tan thick sputum.    LUE PICC with confirmed DVT, lovenox per MAR.    Ambulating with SB assistance with FWW, steady gait.    NPO.  TFs infusing at 65cc/hr.  Patient denies any nausea at this time.  No residuals.    C/o 6/10 pain, will medicate per MAR.    LLQ gtube, sutured in place.   Last BM 2/21/19, + flatus.  + void.  Call light and personal belongings within reach.   Family at bedside.  POC discussed and all questions answered.  Hourly rounding and fall precautions in place.  No additional needs at this time.

## 2019-02-22 PROBLEM — R41.0 DELIRIUM: Status: ACTIVE | Noted: 2019-01-01

## 2019-02-22 NOTE — PROGRESS NOTES
Infectious Disease Progress Note    Author: Edilma Dougherty M.D. Date & Time of service: 2019  1:19 PM    Chief Complaint:  Pneumonia    Interval History:  73-year-old male with recent diagnosis of advanced esophageal canceradmitted to the hospital due to increasing weight loss, poor oral intake, cough, and abdominal pain.   AF WBC 13.7 decreased cough poor insp effort-about 750 on IS    Labs Reviewed, Medications Reviewed, Radiology Reviewed and Wound Reviewed.    Review of Systems:  Review of Systems   Constitutional: Positive for malaise/fatigue and weight loss. Negative for chills and fever.   HENT: Positive for hearing loss. Negative for ear pain and sore throat.    Eyes: Negative for blurred vision and pain.   Respiratory: Positive for cough and sputum production. Negative for hemoptysis, shortness of breath, wheezing and stridor.         Improved   Cardiovascular: Negative for chest pain and palpitations.   Gastrointestinal: Positive for constipation. Negative for diarrhea, nausea and vomiting.   Genitourinary: Positive for flank pain. Negative for dysuria.   Musculoskeletal: Negative for joint pain.   Skin: Negative for itching and rash.   Neurological: Positive for weakness. Negative for dizziness and headaches.   Psychiatric/Behavioral: The patient is not nervous/anxious.    All other systems reviewed and are negative.      Hemodynamics:  Temp (24hrs), Av.7 °C (98 °F), Min:36.4 °C (97.6 °F), Max:37.1 °C (98.8 °F)  Temperature: 36.7 °C (98.1 °F)  Pulse  Av.7  Min: 69  Max: 86   Blood Pressure : 146/62       Physical Exam:  Physical Exam   Constitutional: He is oriented to person, place, and time. No distress.   Chronically ill and debilitated   HENT:   Head: Normocephalic and atraumatic.   Mouth/Throat: No oropharyngeal exudate.   Poor dentition   Eyes: Pupils are equal, round, and reactive to light. EOM are normal. No scleral icterus.   Neck: Normal range of motion. Neck supple. No  JVD present.   Cardiovascular: Normal rate and normal heart sounds.    No murmur heard.  Pulmonary/Chest: Effort normal. No stridor. No respiratory distress. He has rales.   Decreased BS   Abdominal: Soft. He exhibits no distension. There is no tenderness.   Gtube   Musculoskeletal:   PICC   Neurological: He is alert and oriented to person, place, and time. No cranial nerve deficit.   Skin: Skin is warm. He is not diaphoretic.   Nursing note and vitals reviewed.      Meds:    Current Facility-Administered Medications:   •  HYDROmorphone  •  fentaNYL  •  oxyCODONE  •  [COMPLETED] piperacillin-tazobactam **AND** piperacillin-tazobactam  •  enoxaparin (LOVENOX) injection  •  zolpidem  •  Respiratory Care per Protocol  •  albuterol  •  amLODIPine  •  bisacodyl  •  ondansetron  •  polyethylene glycol/lytes    Labs:  Recent Labs      02/20/19 0312 02/21/19 0349 02/22/19   0416   WBC  11.6*  12.8*  13.7*   RBC  4.41*  4.68*  4.53*   HEMOGLOBIN  11.6*  12.4*  11.9*   HEMATOCRIT  37.2*  39.7*  37.8*   MCV  84.4  84.8  83.4   MCH  26.3*  26.5*  26.3*   RDW  57.1*  58.5*  56.8*   PLATELETCT  275  339  361   MPV  9.9  9.7  9.7   NEUTSPOLYS  65.40  67.00  68.00   LYMPHOCYTES  13.30*  14.20*  12.80*   MONOCYTES  17.70*  16.00*  16.70*   EOSINOPHILS  2.50  1.80  1.20   BASOPHILS  0.40  0.50  0.60     Recent Labs      02/20/19 0312 02/21/19   0349 02/22/19   0416   SODIUM  137  133*  135   POTASSIUM  4.0  3.8  4.1   CHLORIDE  101  98  99   CO2  31  30  30   GLUCOSE  112*  128*  99   BUN  21 23*  19     Recent Labs      02/20/19 0312 02/21/19   0349 02/22/19   0416   CREATININE  0.31*  0.43*  0.41*       Imaging:  Ct-abdomen-pelvis With    Result Date: 2/21/2019 2/21/2019 2:23 PM HISTORY/REASON FOR EXAM: Esophageal cancer. Abdominal pain with right renal mass. TECHNIQUE/EXAM DESCRIPTION: CT scan of the abdomen and pelvis with contrast. Contrast-enhanced helical scanning was obtained from the diaphragmatic domes  through the pubic symphysis following the bolus administration of 80 mL of Omnipaque 350 nonionic contrast without complication. Low dose optimization technique was utilized for this CT exam including automated exposure control and adjustment of the mA and/or kV according to patient size. COMPARISON: To 11/20/2019 FINDINGS: CT Abdomen: There is emphysematous and bullous change of the lungs. There is atelectasis within the lung bases bilaterally. There are small bilateral pleural effusions. There is again seen thickening of the visualized lower esophagus with a hiatal hernia present. There is extensive adenopathy and soft tissue attenuation within the gastroesophageal region extending into the gastrohepatic, periportal and retroperitoneal areas within the upper abdomen at the diaphragmatic hiatus. Soft tissue mass encases the celiac and superior mesenteric arteries. There is extensive adenopathy seen tracking along the retroperitoneal region as well. No abnormal masses within the retroperitoneum and gastrohepatic region measuring up to 2.4 cm in short axis dimension. The liver is normal in appearance. The spleen is normal. There is again seen a large right renal mass involving the lower pole which measures 9 x 7 cm in size. This is similar in appearance to prior exam. There is likely some invasion of the right renal vein. The left kidney is normal. The adrenal glands are normal. The pancreas is normal. There is extensive atherosclerotic change of the aorta. CT Pelvis: No evidence of bowel obstruction. There is moderate constipation. The appendix is not identified. There is diffuse anasarca.     1.  Again seen thickening of the distal esophagus likely related to the patient's known esophageal cancer in that area. There is extensive soft tissue mass/adenopathy within the gastrohepatic, periportal, and retroperitoneal regions which does encase the  celiac and superior mesenteric arteries. This is not significantly  changed over short interval follow-up. 2.  Again seen large right renal mass which measures 9 x 7 cm in size consistent with either renal cell carcinoma or metastatic disease. There is again seen likely some invasion of the right renal vein. 3.  Emphysematous and bullous change of the lungs with bibasilar atelectasis and small bilateral pleural effusions. 4.  Moderate constipation.    Ct-abdomen-pelvis With    Result Date: 2/11/2019 2/11/2019 8:43 PM HISTORY/REASON FOR EXAM:  Abdominal pain, constipation, flank pain. TECHNIQUE/EXAM DESCRIPTION:   Contiguous axial images were obtained from the diaphragmatic domes to the pubic symphysis following intravenous contrast administration. Coronal and sagittal reformats were generated and reviewed. 100 mL of Omnipaque 350 nonionic contrast was administered without complication. Low dose optimization technique was utilized for this CT exam including automated exposure control and adjustment of the mA and/or kV according to patient size. COMPARISON: None. FINDINGS: Lower chest: Emphysema. No pleural effusions. Liver: No mass. No intrahepatic biliary dilatation. Gallbladder: No mural thickening. No radiopaque gallstones. Common bile duct: Nondilated. Pancreas: Unremarkable. Spleen: No mass. Adrenals: No mass. Kidneys: There is a large, partially exophytic, 8.9 x 8.3 x 6.8 cm mass arising from the lower pole of the right kidney. There may be early extension into the right renal vein near the hilum. The left kidney is unremarkable. Stomach, small bowel, colon: Mural thickening of the distal esophagus/proximal stomach, likely related to known esophageal cancer. Moderate amount of stool within the colon. Peritoneal cavity: No ascites. Lymph nodes: Gastrohepatic, periportal, aortocaval and retroperitoneal lymphadenopathy, extending to just below the level of the aortic bifurcation. Largest nodes measure up to 2 cm short axis. Aorta: No aneurysm. Atherosclerosis. Pelvic organs:  Unremarkable bladder and prostate. Musculoskeletal structures: No acute fracture or destructive lesion.     1. Thickening of the distal esophagus, likely related to known carcinoma. 2.  Gastrohepatic, periportal, aortocaval and retroperitoneal [place] metastases. 3. Large, 8.7 cm right renal mass, consistent with malignancy. There may be early invasion of the right renal vein. 4. Moderate amount of stool throughout the colon. 5. Emphysema.    Ct-chest (thorax) With    Result Date: 2/14/2019 2/14/2019 11:38 AM HISTORY/REASON FOR EXAM:  History of esophageal cancer.  Abnormal recent CT abdomen and pelvis, with RIGHT renal mass. TECHNIQUE/EXAM DESCRIPTION: CT scan of the chest with contrast. Thin-section helical images were obtained from the lung apices through the adrenal glands following the bolus administration of contrast. 75 mL of Omnipaque 350 nonionic contrast was utilized. Low dose optimization technique was utilized for this CT exam including automated exposure control and adjustment of the mA and/or kV according to patient size. COMPARISON:  CT abdomen and pelvis 2/11/2019 FINDINGS: No mediastinal mass or adenopathy. Focal thickening of distal esophagus consistent with known tumor. Small sliding-type hiatal hernia. Lungs show diffuse emphysematous changes, with blebs present primarily in the RIGHT lower lung.  Wall thickening of bronchi in the LEFT lower lobe with secretions present. No pleural fluid collection or pneumothorax. No major bony abnormality is seen. RIGHT kidney mass is partially visualized. Soft tissue density present in the mesenteric root extending into the lesser sac, likely neoplasm.  Retroperitoneal tumor again noted.     1.  Severe emphysema. 2.  LEFT lower lobe bronchial wall thickening and secretions potentially developing pneumonia. 3.  Mid to distal esophageal mass consistent with known neoplasm. 4.  RIGHT kidney mass, partially visualized. 5.  Mesenteric and retroperitoneal tumor  extending into the lesser sac, as on recent abdominal CT.     Dx-chest-2 Views    Result Date: 2/20/2019 2/19/2019 8:42 PM HISTORY/REASON FOR EXAM: Cough TECHNIQUE/EXAM DESCRIPTION:  PA and lateral views of the chest. COMPARISON: February 11, 2019 FINDINGS: Left PICC line is seen terminating in the superior vena cava. The cardiac silhouette appears within normal limits. The mediastinal contour appears within normal limits.  The central pulmonary vasculature appears normal. The lungs appear hyperexpanded bilaterally with flattening of the diaphragms.  Hazy bilateral lung base opacities are seen, greater on the left. Blunting of bilateral costophrenic angles is seen, compatible with trace bilateral pleural effusions. The bony structures appear age-appropriate.     1.  Hazy bilateral lung base infiltrates, greater on the left, new since prior. 2.  Small bilateral pleural effusions. 3.  Hyperexpansion of lungs favors changes of COPD.    Dx-chest-portable (1 View)    Result Date: 2/11/2019 2/11/2019 8:02 PM HISTORY/REASON FOR EXAM:  Shortness of Breath. TECHNIQUE/EXAM DESCRIPTION AND NUMBER OF VIEWS: Single portable view of the chest. COMPARISON: 10/12/2008 FINDINGS: LUNGS: Hyperinflation. No focal consolidation. PNEUMOTHORAX: None. LINES AND TUBES: None. MEDIASTINUM: No cardiomegaly. Atherosclerosis. MUSCULOSKELETAL STRUCTURES: No acute fracture. Small hiatal hernia.     1.  COPD. No acute cardiopulmonary abnormality. 2.  Small hiatal hernia.    Us-extremity Venous Upper Unilat Left    Result Date: 2/18/2019   Upper Extremity  Venous Duplex Report  Vascular Laboratory  CONCLUSIONS  Deep venous thrombosis visualized from the distal left subclavian to mid  brachial vein.  The thrombus appears near occlusive from the axillary to  the proximal brachial vein.   Unable to visualize distal brachial and basilic veins due to Picc bandages.  Soft tissue edema.  ROHITH HUNG  Exam Date:     02/18/2019 18:34  Room #:      Inpatient  Priority:     Routine  Ht (in):             Wt (lb):  Ordering Physician:        CARLOS TAI  Referring Physician:  Sonographer:               Lawrence Cain RVT RDCS  Study Type:  Technical Quality:         Adequate  Age:    73    Gender:     M  MRN:    [id number]  :    1945      BSA:  Indications:     Edema, unspecified  CPT Codes:       84477  ICD Codes:       R609  History:         Left upper extremity swelling s/p PICC line placement.  Limitations:  PROCEDURES:  Left upper extremity venous duplex imaging.  The following venous structures were evaluated: internal jugular,  subclavian, axillary, brachial, cephalic and basilic veins.  Serial compression, augmentation maneuvers,  color and spectral Doppler  flow evaluations were performed.  FINDINGS:  Densely echogenic material fills the vein of distal Left subclavian to mid  brachial vein.  Appears near occlusive from Axillary to prox brachial vein.   Unable to visualize distal brachial and basilic veins due to Picc Line  bandages.  Attending nurse given a preliminary report.  Amy Cantu  (Electronically Signed)  Final Date:      2019                   19:34      Micro:  Results     Procedure Component Value Units Date/Time    CULTURE RESPIRATORY W/ GRM STN [[id number]]  (Abnormal) Collected:  19 1150    Order Status:  Completed Specimen:  Respirate from Sputum Updated:  19 1018     Significant Indicator POS (POS)     Source RESP     Site SPUTUM     Respiratory Culture - (A)     Gram Stain Result Many WBCs.  Many Gram positive diplococci.  Few Gram negative rods.  Specimen Quality Score: 2+       Respiratory Culture Pseudomonas aeruginosa  Heavy growth  See previous culture for sensitivity report.  P.aeruginosa may develop resistance during prolonged therapy  with all antibiotics. Isolates that are initially susceptible  may become resistant within  three to four days after  initiation of therapy. Testing of repeat isolates may be  warranted.   (A)      Streptococcus pneumoniae  Moderate growth  See previous culture for sensitivity report.   (A)    Narrative:       CALL  [facility]  141 tel. [phone],  Collected By:[id number] SAMUEL BRICE.    CULTURE RESPIRATORY W/ GRM STN [[id number]]  (Abnormal)  (Susceptibility) Collected:  02/19/19 9938    Order Status:  Completed Specimen:  Respirate from Sputum Updated:  02/21/19 1125     Significant Indicator POS (POS)     Source RESP     Site SPUTUM     Respiratory Culture Rare growth usual upper respiratory jaz (A)     Gram Stain Result Many WBCs.  Few epithelial cells.  Many Gram negative rods.  Many Gram positive cocci.  Specimen Quality Score: 1+       Respiratory Culture Pseudomonas aeruginosa  Heavy growth  P.aeruginosa may develop resistance during prolonged therapy  with all antibiotics. Isolates that are initially susceptible  may become resistant within three to four days after  initiation of therapy. Testing of repeat isolates may be  warranted.   (A)      Streptococcus pneumoniae  Heavy growth  Penicillin sensitive.   (A)    Narrative:       CALL  [facility]  141 tel. [phone],  CALLED  141 tel. [phone] 02/20/2019, 16:10, RB PERF. RESULTS CALLED  TO:[id number] JANETTE Aldana (S. pneumo)  Collected By:[id number] SURESH BRICE  If not obtained at [facility]    Culture & Susceptibility     PSEUDOMONAS AERUGINOSA     Antibiotic Sensitivity Microscan Unit Status    Amikacin Sensitive <=16 mcg/mL Final    Method: SENSITIVITY, KRISTAN    Cefepime Sensitive <=8 mcg/mL Final    Method: SENSITIVITY, KRISTAN    Ceftazidime Sensitive 4 mcg/mL Final    Method: SENSITIVITY, KRISTAN    Ciprofloxacin Sensitive <=1 mcg/mL Final    Method: SENSITIVITY, KRISATN    Gentamicin Sensitive <=4 mcg/mL Final    Method: SENSITIVITY, KRISTAN    Imipenem Sensitive <=1 mcg/mL Final    Method: SENSITIVITY, KRISTAN    Meropenem Sensitive <=1 mcg/mL Final    Method: SENSITIVITY, KRISTAN     Pip/Tazobactam Sensitive <=16 mcg/mL Final    Method: SENSITIVITY, KRISTAN    Tobramycin Sensitive <=4 mcg/mL Final    Method: SENSITIVITY, KRISTAN                       GRAM STAIN [[id number]] Collected:  02/19/19 1749    Order Status:  Completed Specimen:  Respirate Updated:  02/20/19 1600     Significant Indicator .     Source RESP     Site SPUTUM     Gram Stain Result Many WBCs.  Few epithelial cells.  Many Gram negative rods.  Many Gram positive cocci.  Specimen Quality Score: 1+      Narrative:       Collected By:[id number] SURESH BRICE  If not obtained at [facility]    GRAM STAIN [[id number]] Collected:  02/20/19 1150    Order Status:  Completed Specimen:  Respirate Updated:  02/20/19 1534     Significant Indicator .     Source RESP     Site SPUTUM     Gram Stain Result Many WBCs.  Many Gram positive diplococci.  Few Gram negative rods.  Specimen Quality Score: 2+      Narrative:       CALL  [facility]  141 tel. [phone],  CALLED  141 tel. [phone] 02/20/2019, 15:26, RB PERF. 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  Afebrile  Increased leukocytosis  Sputum is positive for both pseudomonas and Strep pneumonia,  CXR and CT both show atelectatic versus infiltrates in bilateral lower lobes   consistent with early pneumonia.    Continue on Zosyn  Encourage incentive spirometry, pulmonary toilet and out of bed as tolerated.     Repeat chest x-ray tomorrow    Leukocytosis, worse  Multifactorial-incl infection, malignancy, atelctasis  monitor    Renal cell mass  Planned for biopsy, duagnostic    Esophageal cancer    At risk for aspiration-due to difficulty swallowing and high dose narcotics  Dysphagia  Keep meds IV for now    Discussed with internal medicine.

## 2019-02-22 NOTE — PROGRESS NOTES
Bedside report received.  Assessment complete.  A&O x 4. Patient calls appropriately.  Patient up with x1 assist.   Patient has 5/10 pain. Declines intervention at this time  Denies N&V. NPO. Receiving Fibersource at 65 ml/h (goal) through G-tube  LLQ G-tube, MALIK  + void, + flatus, + BM.  Patient denies SOB.  SCD's on.    Review plan with of care with patient. Call light and personal belongings with in reach. Hourly rounding in place. All needs met at this time.

## 2019-02-22 NOTE — CARE PLAN
Problem: Knowledge Deficit  Goal: Knowledge of disease process/condition, treatment plan, diagnostic tests, and medications will improve  MD and RN spent some time discussing plan of care for today and interventions.     Problem: Pain Management  Goal: Pain level will decrease to patient's comfort goal  Patient notifying RN with onset of pain. Fentanyl patch placed and patient stating he does not need additional medications at this time.

## 2019-02-22 NOTE — CONSULTS
DATE OF SERVICE:  02/21/2019    INFECTIOUS DISEASE CONSULTATION    REASON FOR CONSULT:  Pneumonia.    CONSULTING PHYSICIAN:  Darryl Rodriguez MD    HISTORY OF PRESENT ILLNESS:  This is a 73-year-old white male with a   longstanding history of tobacco abuse and bullous emphysema who was recently   diagnosed with esophageal cancer and was planned for starting chemotherapy,   who was admitted on 02/18/2019 due to the finding of a large right renal mass.    In the previous year, he has lost approximately 80 pounds, the majority of   that is within the last 3 months.  He has had decreased oral intake and   occasional nausea and vomiting.  He does have a sensation of pills getting   stuck in his throat and he has had some difficulty with swallowing.  He has   recently had increase in sputum production, so sputum cultures were done and   they are growing both strep pneumo and pseudomonas.  He is not currently on   any antibiotics.  He does have significant associated fatigue, cough with productive   sputum, and shortness of breath with activity.  His CT scan of the abdomen was   done due to the abdominal pain and vomiting-+ renal mass and atelectasis vs PNA.    He is planned for renal biopsy.  He is on significant narcotics.  Infectious   disease is consulted for antibiotic recommendations and management.    REVIEW OF SYSTEMS:  All other systems are reviewed and are negative.  He has   no fever or chills.  No sinus pain or headache.  He has no conjunctivitis, no   stridor.  No neck pain.  He is not having constipation.  He does have some   urinary frequency, no dysuria, no hematuria.  He has chronic back pain with   left-sided sciatica, no joint pain or swelling.  He does have significant   lower extremity swelling, which causes discomfort.  No rash or itching.  He   has no dizziness, headaches, or visual changes.  He has flat affect, but   otherwise not nervous or anxious.    ALLERGIES:  No known drug  allergies.    PAST MEDICAL HISTORY:  COPD, chronic back pain, recent diagnosis of advanced   esophageal carcinoma, renal mass, hyperlipidemia, hypertension, and stroke.    FAMILY HISTORY:  When asked about the family history he denied, but per review   of the chart, his father  of myocardial infarction when he was young.    SOCIAL HISTORY:  He is a smoker, does not drink or use illicit drugs.  He is   .  Family is present and supportive.    PHYSICAL EXAMINATION:  GENERAL:  He is a chronically ill, cachectic-appearing male, looks older than   his stated age.  VITAL SIGNS:  He has been afebrile since admission, current temperature is   97.8, blood pressure is 127/73, pulse 79, respiratory rate 16, oxygen   saturation is 92-95% on 2 L nasal cannula.  He weighs 63 kilos.  HEENT:  Normocephalic and atraumatic.  Pupils are equal, round, and reactive   to light.  Oropharynx is clear.  There is no thrush.  NECK:  Supple, no lymphadenopathy, free range of motion.  CARDIOVASCULAR:  Regular rate and rhythm, unable to auscultate any murmurs.    There is no ectopy.  CHEST:  Decreased breath sounds bilaterally with rhonchi at the bases.  There   is no wheezing.  Respirations are unlabored at rest.  He has no chest tenderness.  ABDOMEN:  Soft, nontender and nondistended.  There is no rebound or   guarding.  There is no hepatosplenomegaly.  He has a G-tube in place.  MUSCULOSKELETAL:  He has sarcopenia.  There is no joint swelling.  NEUROLOGIC:  He is awake, alert, and oriented.  Speech is fluent without   dysarthria.  Cranial nerves are intact.  He is moving all extremities.  He   does have approximately 2+ pitting edema in the bilateral lower extremities.  PSYCHIATRY:  He has a flat affect and he is cooperative.    LABORATORY DATA:  White blood cell count of 12.8, H and H of 12.4 and 39.7,   and platelets of 339, he has no left shift.  Sodium 133, potassium 3.8,   chloride 98, bicarbonate 30, glucose 128, BUN 23,  creatinine 0.43, alkaline   phosphatase 89, total protein 0.5, albumin 2.6, and glycosylated hemoglobin is   5.9.  Urinalysis showed 0-2 wbc's and 10-20 rbc's, this was done on 02/11.    Procalcitonin was less than 0.05.  C-reactive protein was elevated at 5.73.    Sputum cultures are positive for pseudomonas and Streptococcus pneumoniae.    The pseudomonas is pansusceptible.    IMAGING:  His EKG showed his QTC of 428.  He had a CT scan of the abdomen and   pelvis, which showed emphysematous and bullous changes in the lungs with   atelectasis at the bases with small bilateral pleural effusions, thickening of   the lower esophagus with hiatal hernia.  He has extensive adenopathy in the   gastroesophageal region extending into the gastrohepatic, periportal, and   retroperitoneal areas, extensive adenopathy also along the retroperitoneal   wall.  He has a 9x7 cm right renal mass, left kidney is normal, pancreas is   normal.  Extensive atherosclerotic changes in the aorta.  Chest x-ray shows   bilateral basilar infiltrates, left greater than right, small effusions,   hyperexpanded lung fields.      ASSESSMENT AND PLAN:  A 73-year-old male who was recently diagnosed with   advanced esophageal cancer.  Plan is to start chemotherapy, found to have   renal mass, admitted to the hospital due to increasing weight loss, poor oral   intake, cough, and abdominal pain.  Since admission, he has developed increasing   productive cough and sputum is positive for both pseudomonas and Streptococcus   pneumonia, unclear at this point whether this represents pneumonia and/or tracheobronchitis.  His chest x-ray and CT both show atelectatic versus infiltrates in bilateral lower lobes   consistent with early pneumonia.  This history could also be consistent with   tracheobronchitis.  He is not currently on any antibiotics.  We will start him   on Zosyn, encourage incentive spirometry, pulmonary toilet and out of bed as   tolerated.  Repeat  chest x-ray in 24-48 hours if infiltrates persist, we will   treat for pneumonia.  Otherwise, we will plan on treating for   tracheobronchitis.  For his renal cell mass, he has planned for biopsy to   determine whether it is renal cell cancer versus metastatic disease from his   esophageal cancer.  He has severe chronic obstructive pulmonary disease,   continue to monitor his pulmonary status closely. He is at risk for respiratory failure and aspiration. He has significant cachexia associated with this malignancy.  There is a concern as well that due to esophageal cancer and difficulty swallowing that he may not tolerate oral antimicrobials, so we will continue on IV.  Discussed with internal medicine.    Thank you and we will follow with you.       ____________________________________     MD OMAR BIRD / NTS    DD:  02/21/2019 15:57:03  DT:  02/21/2019 20:33:13    D#:  [id number]  Job#:  [id number]

## 2019-02-22 NOTE — PROGRESS NOTES
Assumed care of patient. Patient sitting in chair. Tube feeding stopped around 0230 in hopes of IR needle biopsy of kidney. Patient is quite anxious regarding this, his cancer diagnosis, and burdens he feels he places on his family. MD and RN spent time talking with patient. Patient still wants to proceed with all treatments. G tube to LLQ is clamped. Dressing is clean, dry and intact. Fentanyl patch placed on lateral left shoulder. No other needs at this time. Call light within reach.

## 2019-02-22 NOTE — PALLIATIVE CARE
Palliative Care follow-up  Family left AD at bedside, full document scanned into EMR. Original given back to patient for family to take home.    Pt states he is still waiting to discuss GOC with family, he has not made a decision on GOC yet.    Plan:   Continue GOC discussion.     Thank you for allowing Palliative Care to participate in this patient's care. Please feel free to call [phone] with any questions or concerns.

## 2019-02-23 NOTE — PROGRESS NOTES
Assumed care of patient. Patient sitting edge of bed and complaining of extreme burning with voids. Medicated per MAR. Patient s/p kidney biopsy yesterday and he has been experiencing some mild bleeding with urination. No clots noted. Biopsy site to right flank with gauze and tegaderm. No drainage noted to dressing. LLQ G tube with Fibersource running of goal of 65 ml/hr. Patient has no other complaints. Call light within reach.

## 2019-02-23 NOTE — PROGRESS NOTES
Report received by dayshift RN. Assumed care of pt. Assessment complete.  Pt up in chair, with family at bedside. Pt A&Ox4, VSS. Pt in no apparent signs of distress. Gastric tube on LLQ in place with fibersource running at goal rate of 65mL/hr.  Biopsy site on R mid flank, with dressing cdi. Plan of care discussed. Call light within reach, bed in lowest position, and pt has no further questions at this time.

## 2019-02-23 NOTE — PROGRESS NOTES
Hospital Medicine Daily Progress Note    Date of Service  2/22/2019    Chief Complaint  73 y.o. male admitted 2/18/2019 with esophageal cancer.    Hospital Course  Admitted with esophageal cancer, dysphagia, laparoscopic G-tube placed for nutrition, noted to have DVT left upper extremity, right renal mass, transferred for IR guided biopsy    Interval Problem Update  Esophageal CA - pain still not well controlled  Renal mass - for biopsy with anesthesia  DVT - noted on venous duplex  Hypertension - controlled  Pneumonitis - sputum culture showed Pseudomonas and Streptococcus  Delirium - patient describes vivid dreams and episode of confusion last night, denies hallucinations, appears alert and oriented this morning    Consultants/Specialty  Palliative care  Urology    Code Status  DNR/DNI    Disposition  TBD    Review of Systems  Review of Systems   Constitutional: Positive for malaise/fatigue. Negative for chills and fever.   HENT: Negative for hearing loss and sore throat.    Eyes: Negative for blurred vision.   Respiratory: Positive for cough. Negative for shortness of breath and wheezing.    Cardiovascular: Negative for chest pain, palpitations and leg swelling.   Gastrointestinal: Positive for abdominal pain. Negative for blood in stool, diarrhea, heartburn, melena, nausea and vomiting.   Genitourinary: Negative for dysuria.   Musculoskeletal: Negative for back pain and neck pain.   Skin: Negative for rash.   Neurological: Positive for weakness. Negative for dizziness and headaches.   Psychiatric/Behavioral: The patient is not nervous/anxious.         Physical Exam  Temp:  [36.4 °C (97.6 °F)-37.1 °C (98.8 °F)] 36.7 °C (98.1 °F)  Pulse:  [70-79] 75  Resp:  [16-18] 17  BP: (113-150)/(54-79) 146/62  SpO2:  [91 %-99 %] 99 %    Physical Exam   Constitutional: He is oriented to person, place, and time. He appears well-developed and well-nourished.   HENT:   Head: Normocephalic and atraumatic.   Eyes: Pupils are equal,  round, and reactive to light. Conjunctivae are normal.   Neck: No tracheal deviation present. No thyromegaly present.   Cardiovascular: Normal rate and regular rhythm.    Pulmonary/Chest: Effort normal and breath sounds normal.   Abdominal: Soft. Bowel sounds are normal. He exhibits no distension. There is no tenderness.   Musculoskeletal: He exhibits no edema.   Lymphadenopathy:     He has no cervical adenopathy.   Neurological: He is alert and oriented to person, place, and time.   Skin: Skin is warm and dry.   Nursing note and vitals reviewed.      Fluids    Intake/Output Summary (Last 24 hours) at 02/22/19 1701  Last data filed at 02/22/19 0900   Gross per 24 hour   Intake              200 ml   Output                0 ml   Net              200 ml       Laboratory  Recent Labs      02/20/19 0312 02/21/19 0349 02/22/19 0416   WBC  11.6*  12.8*  13.7*   RBC  4.41*  4.68*  4.53*   HEMOGLOBIN  11.6*  12.4*  11.9*   HEMATOCRIT  37.2*  39.7*  37.8*   MCV  84.4  84.8  83.4   MCH  26.3*  26.5*  26.3*   MCHC  31.2*  31.2*  31.5*   RDW  57.1*  58.5*  56.8*   PLATELETCT  275  339  361   MPV  9.9  9.7  9.7     Recent Labs      02/20/19 0312 02/21/19 0349 02/22/19 0416   SODIUM  137  133*  135   POTASSIUM  4.0  3.8  4.1   CHLORIDE  101  98  99   CO2  31  30  30   GLUCOSE  112*  128*  99   BUN  21  23*  19   CREATININE  0.31*  0.43*  0.41*   CALCIUM  8.7  9.1  9.0                   Imaging  CT-ABDOMEN-PELVIS WITH   Final Result      1.  Again seen thickening of the distal esophagus likely related to the patient's known esophageal cancer in that area. There is extensive soft tissue mass/adenopathy within the gastrohepatic, periportal, and retroperitoneal regions which does encase the    celiac and superior mesenteric arteries. This is not significantly changed over short interval follow-up.      2.  Again seen large right renal mass which measures 9 x 7 cm in size consistent with either renal cell carcinoma or  metastatic disease. There is again seen likely some invasion of the right renal vein.      3.  Emphysematous and bullous change of the lungs with bibasilar atelectasis and small bilateral pleural effusions.      4.  Moderate constipation.      DX-CHEST-2 VIEWS   Final Result         1.  Hazy bilateral lung base infiltrates, greater on the left, new since prior.   2.  Small bilateral pleural effusions.   3.  Hyperexpansion of lungs favors changes of COPD.      US-EXTREMITY VENOUS UPPER UNILAT LEFT   Final Result      CT-NEEDLE BX-RENAL    (Results Pending)   CT-NEEDLE BX-RENAL    (Results Pending)        Assessment/Plan  * Esophageal cancer (HCC)- (present on admission)   Assessment & Plan    Laparoscopic G-tube placement  Tube feeding  Pain control     Renal mass, right- (present on admission)   Assessment & Plan    For IR biopsy with Anesthesia     Hypomagnesemia- (present on admission)   Assessment & Plan    follow level     Pneumonitis- (present on admission)   Assessment & Plan    IV Zosyn     Essential hypertension- (present on admission)   Assessment & Plan    Amlodipine     Leukocytosis- (present on admission)   Assessment & Plan    Follow CBC     Dysphagia- (present on admission)   Assessment & Plan    Tube feeding  SLP to follow     Acute deep vein thrombosis (DVT) of left upper extremity (HCC)- (present on admission)   Assessment & Plan    Lovenox     Hypokalemia- (present on admission)   Assessment & Plan    Follow BMP     Advanced care planning/counseling discussion- (present on admission)   Assessment & Plan    Palliative care consulted     Constipation due to pain medication therapy- (present on admission)   Assessment & Plan    Bowel protocol     Cancer associated pain- (present on admission)   Assessment & Plan    Increased Fentanyl to 75 mcg  Increased Oxycodone to 15 mg  IV Dilaudid     Malignant cachexia (HCC)- (present on admission)   Assessment & Plan    Nutrition consult     COPD (chronic  obstructive pulmonary disease) (HCC)- (present on admission)   Assessment & Plan    RT protocol     Tobacco abuse- (present on admission)   Assessment & Plan    Nicotine replacement protocol          VTE prophylaxis: Lovenox

## 2019-02-23 NOTE — PROGRESS NOTES
"At start of shift patient up at edge of bed. Patient complaining of pain intermittent through out the day, medicated per the MAR. Patient complaints of pain, per patient \"it's much worse when I pee\" along with generalized pain in his abdomen and right flank. Patient presenting with bloody urine at start of shift, the color of the urine consistently bloody through out shift. Patient encouraged to elevate his legs due to dependent edema. Dicussed goals with the patient, patient verbalized his goals for the day to have decreased pain on urination and to achieve some sleep. Clustered care to ensure he was able to rest. Per patient pain is still present on urination, will continue to monitor.      "

## 2019-02-23 NOTE — CARE PLAN
Problem: Safety  Goal: Will remain free from injury  Outcome: PROGRESSING AS EXPECTED  Fall precautions in place. Treaded socks on pt. Appropriate signs on doorway. IV pole on same side as bathroom. Bedrails up. Bed in lowest position and locked.  Call light and phone within reach. Patient educated on importance of calling nurses before getting out of bed, verbalizes understanding. Bed and strip alarm on.     Problem: Knowledge Deficit  Goal: Knowledge of disease process/condition, treatment plan, diagnostic tests, and medications will improve  Outcome: PROGRESSING AS EXPECTED  Patient and family educated about POC.  All questions answered in regards to disease process, treatment, medications and diet.  Patient and family verbalized understanding and voiced no further questions at this time.

## 2019-02-23 NOTE — PROGRESS NOTES
"Urology Progress Note    Admitted to [facility] with esophageal cancer, dysphagia, laparoscopic G-tube placed for nutrition, noted to have DVT left upper extremity, right renal mass,.  [place] [facility] [place].       Interval Events: IR performed renal BX yesterday     S:  Constitutional:  Ill appearing Positive for malaise/fatigue. Negative for chills and fever.   Respiratory: Positive for cough. Negative for shortness of breath   Cardiovascular: Negative for chest pain, palpitations and leg swelling.   Gastrointestinal: Positive for abdominal pain. Negative for blood in stool, diarrhea, heartburn, melena, nausea and vomiting.   Genitourinary: Negative for dysuria + for hematuria   Musculoskeletal: Negative for back pain and neck pain.   Skin: Negative for rash.   Neurological: Positive for weakness. Negative for dizziness and headaches.   Psychiatric/Behavioral: The patient is not nervous/anxious.      O:   Blood pressure 135/70, pulse 70, temperature 35.9 °C (96.6 °F), temperature source Temporal, resp. rate 18, height 1.676 m (5' 6\"), weight 63.5 kg (139 lb 15.9 oz), SpO2 97 %.  Recent Labs      02/21/19   0349  02/22/19   0416   SODIUM  133*  135   POTASSIUM  3.8  4.1   CHLORIDE  98  99   CO2  30  30   GLUCOSE  128*  99   BUN  23*  19   CREATININE  0.43*  0.41*   CALCIUM  9.1  9.0     Recent Labs      02/21/19   0349  02/22/19   0416   WBC  12.8*  13.7*   RBC  4.68*  4.53*   HEMOGLOBIN  12.4*  11.9*   HEMATOCRIT  39.7*  37.8*   MCV  84.8  83.4   MCH  26.5*  26.3*   MCHC  31.2*  31.5*   RDW  58.5*  56.8*   PLATELETCT  339  361   MPV  9.7  9.7         Intake/Output Summary (Last 24 hours) at 02/23/19 1451  Last data filed at 02/23/19 1251   Gross per 24 hour   Intake                0 ml   Output             1225 ml   Net            -1225 ml       Constitutional: He is oriented to person, place, and time. He appears ill   Head: Normocephalic and atraumatic. "   Eyes: Pupils are equal, round, and reactive to light. Conjunctivae are normal.   Neck: No tracheal deviation present. No thyromegaly present.   Cardiovascular: Normal rate and regular rhythm.    Pulmonary/Chest: Effort normal and breath sounds normal.   Abdominal: Soft. Bowel sounds are normal. He exhibits no distension. There is no tenderness.   Musculoskeletal: He exhibits no edema.   : hematuria no clots- voiding  Neurological: He is alert and oriented to person, place, and time.   Skin: Skin is warm and dry.   Nursing note and vitals reviewed.    A/P:    Active Hospital Problems    Diagnosis   • Esophageal cancer (HCC) [C15.9]     Priority: High   • Renal mass, right [N28.89]     Priority: High   • Delirium [R41.0]     Priority: Medium   • Pneumonitis [J18.9]     Priority: Medium   • Hypomagnesemia [E83.42]     Priority: Medium   • Acute deep vein thrombosis (DVT) of left upper extremity (HCC) [I82.622]     Priority: Medium   • Dysphagia [R13.10]     Priority: Medium   • Leukocytosis [D72.829]     Priority: Medium   • Essential hypertension [I10]     Priority: Medium   • Advanced care planning/counseling discussion [Z71.89]     Priority: Medium   • Hypokalemia [E87.6]     Priority: Medium   • Cancer associated pain [G89.3]     Priority: Medium   • Constipation due to pain medication therapy [K59.03]     Priority: Medium   • COPD (chronic obstructive pulmonary disease) (HCC) [J44.9]     Priority: Medium   • Malignant cachexia (HCC) [R64]     Priority: Medium   • Tobacco abuse [Z72.0]     Priority: Low       Stable.   Ambulate, IS.  Awaiting BX results to guide therapy .  will monitor for results    Plan of care discussed and directed by TREMAYNE Varma

## 2019-02-23 NOTE — PROGRESS NOTES
Infectious Disease Progress Note    Author: Edilma Dougherty M.D. Date & Time of service: 2019  12:51 PM    Chief Complaint:  Pneumonia    Interval History:  73-year-old male with recent diagnosis of advanced esophageal canceradmitted to the hospital due to increasing weight loss, poor oral intake, cough, and abdominal pain.   AF WBC 13.7 decreased cough poor insp effort-about 750 on IS   AF no CBC had biopsy yesterday-no new complaints    Labs Reviewed, Medications Reviewed, Radiology Reviewed and Wound Reviewed.    Review of Systems:  Review of Systems   Constitutional: Positive for malaise/fatigue and weight loss. Negative for chills and fever.   HENT: Positive for hearing loss. Negative for ear pain and sore throat.    Eyes: Negative for blurred vision and pain.   Respiratory: Negative for cough, hemoptysis, sputum production, shortness of breath, wheezing and stridor.         Improved   Cardiovascular: Negative for chest pain and palpitations.   Gastrointestinal: Positive for constipation. Negative for diarrhea, nausea and vomiting.   Genitourinary: Positive for flank pain. Negative for dysuria.   Musculoskeletal: Negative for joint pain.   Skin: Negative for itching and rash.   Neurological: Positive for weakness. Negative for dizziness and headaches.   Psychiatric/Behavioral: The patient is not nervous/anxious.    All other systems reviewed and are negative.      Hemodynamics:  Temp (24hrs), Av.3 °C (97.4 °F), Min:36 °C (96.8 °F), Max:36.6 °C (97.9 °F)  Temperature: (P) 36.3 °C (97.3 °F)  Pulse  Av.8  Min: 65  Max: 86Heart Rate (Monitored): 73  Blood Pressure : (P) 152/70, NIBP: 146/70       Physical Exam:  Physical Exam   Constitutional: He is oriented to person, place, and time. No distress.   Chronically ill and debilitated   HENT:   Head: Normocephalic and atraumatic.   Mouth/Throat: No oropharyngeal exudate.   Poor dentition   Eyes: Pupils are equal, round, and reactive to light.  EOM are normal. No scleral icterus.   Neck: Normal range of motion. Neck supple. No JVD present.   Cardiovascular: Normal rate and normal heart sounds.    No murmur heard.  Pulmonary/Chest: Effort normal. No stridor. No respiratory distress. He has no wheezes. He has rales.   Decreased BS   Abdominal: Soft. He exhibits no distension. There is no tenderness. There is no rebound and no guarding.   Gtube   Musculoskeletal:   PICC   Neurological: He is alert and oriented to person, place, and time. No cranial nerve deficit.   Skin: Skin is warm. He is not diaphoretic.   Nursing note and vitals reviewed.      Meds:    Current Facility-Administered Medications:   •  HYDROmorphone  •  fentaNYL  •  oxyCODONE  •  [COMPLETED] piperacillin-tazobactam **AND** piperacillin-tazobactam  •  enoxaparin (LOVENOX) injection  •  zolpidem  •  Respiratory Care per Protocol  •  albuterol  •  amLODIPine  •  bisacodyl  •  ondansetron  •  polyethylene glycol/lytes    Labs:  Recent Labs      02/21/19 0349 02/22/19 0416   WBC  12.8*  13.7*   RBC  4.68*  4.53*   HEMOGLOBIN  12.4*  11.9*   HEMATOCRIT  39.7*  37.8*   MCV  84.8  83.4   MCH  26.5*  26.3*   RDW  58.5*  56.8*   PLATELETCT  339  361   MPV  9.7  9.7   NEUTSPOLYS  67.00  68.00   LYMPHOCYTES  14.20*  12.80*   MONOCYTES  16.00*  16.70*   EOSINOPHILS  1.80  1.20   BASOPHILS  0.50  0.60     Recent Labs      02/21/19   0349 02/22/19   0416   SODIUM  133*  135   POTASSIUM  3.8  4.1   CHLORIDE  98  99   CO2  30  30   GLUCOSE  128*  99   BUN  23*  19     Recent Labs      02/21/19   0349  02/22/19   0416   CREATININE  0.43*  0.41*       Imaging:  Ct-abdomen-pelvis With    Result Date: 2/21/2019 2/21/2019 2:23 PM HISTORY/REASON FOR EXAM: Esophageal cancer. Abdominal pain with right renal mass. TECHNIQUE/EXAM DESCRIPTION: CT scan of the abdomen and pelvis with contrast. Contrast-enhanced helical scanning was obtained from the diaphragmatic domes through the pubic symphysis following the  bolus administration of 80 mL of Omnipaque 350 nonionic contrast without complication. Low dose optimization technique was utilized for this CT exam including automated exposure control and adjustment of the mA and/or kV according to patient size. COMPARISON: To 11/20/2019 FINDINGS: CT Abdomen: There is emphysematous and bullous change of the lungs. There is atelectasis within the lung bases bilaterally. There are small bilateral pleural effusions. There is again seen thickening of the visualized lower esophagus with a hiatal hernia present. There is extensive adenopathy and soft tissue attenuation within the gastroesophageal region extending into the gastrohepatic, periportal and retroperitoneal areas within the upper abdomen at the diaphragmatic hiatus. Soft tissue mass encases the celiac and superior mesenteric arteries. There is extensive adenopathy seen tracking along the retroperitoneal region as well. No abnormal masses within the retroperitoneum and gastrohepatic region measuring up to 2.4 cm in short axis dimension. The liver is normal in appearance. The spleen is normal. There is again seen a large right renal mass involving the lower pole which measures 9 x 7 cm in size. This is similar in appearance to prior exam. There is likely some invasion of the right renal vein. The left kidney is normal. The adrenal glands are normal. The pancreas is normal. There is extensive atherosclerotic change of the aorta. CT Pelvis: No evidence of bowel obstruction. There is moderate constipation. The appendix is not identified. There is diffuse anasarca.     1.  Again seen thickening of the distal esophagus likely related to the patient's known esophageal cancer in that area. There is extensive soft tissue mass/adenopathy within the gastrohepatic, periportal, and retroperitoneal regions which does encase the  celiac and superior mesenteric arteries. This is not significantly changed over short interval follow-up. 2.   Again seen large right renal mass which measures 9 x 7 cm in size consistent with either renal cell carcinoma or metastatic disease. There is again seen likely some invasion of the right renal vein. 3.  Emphysematous and bullous change of the lungs with bibasilar atelectasis and small bilateral pleural effusions. 4.  Moderate constipation.    Ct-abdomen-pelvis With    Result Date: 2/11/2019 2/11/2019 8:43 PM HISTORY/REASON FOR EXAM:  Abdominal pain, constipation, flank pain. TECHNIQUE/EXAM DESCRIPTION:   Contiguous axial images were obtained from the diaphragmatic domes to the pubic symphysis following intravenous contrast administration. Coronal and sagittal reformats were generated and reviewed. 100 mL of Omnipaque 350 nonionic contrast was administered without complication. Low dose optimization technique was utilized for this CT exam including automated exposure control and adjustment of the mA and/or kV according to patient size. COMPARISON: None. FINDINGS: Lower chest: Emphysema. No pleural effusions. Liver: No mass. No intrahepatic biliary dilatation. Gallbladder: No mural thickening. No radiopaque gallstones. Common bile duct: Nondilated. Pancreas: Unremarkable. Spleen: No mass. Adrenals: No mass. Kidneys: There is a large, partially exophytic, 8.9 x 8.3 x 6.8 cm mass arising from the lower pole of the right kidney. There may be early extension into the right renal vein near the hilum. The left kidney is unremarkable. Stomach, small bowel, colon: Mural thickening of the distal esophagus/proximal stomach, likely related to known esophageal cancer. Moderate amount of stool within the colon. Peritoneal cavity: No ascites. Lymph nodes: Gastrohepatic, periportal, aortocaval and retroperitoneal lymphadenopathy, extending to just below the level of the aortic bifurcation. Largest nodes measure up to 2 cm short axis. Aorta: No aneurysm. Atherosclerosis. Pelvic organs: Unremarkable bladder and prostate.  Musculoskeletal structures: No acute fracture or destructive lesion.     1. Thickening of the distal esophagus, likely related to known carcinoma. 2.  Gastrohepatic, periportal, aortocaval and retroperitoneal [place] metastases. 3. Large, 8.7 cm right renal mass, consistent with malignancy. There may be early invasion of the right renal vein. 4. Moderate amount of stool throughout the colon. 5. Emphysema.    Ct-chest (thorax) With    Result Date: 2/14/2019 2/14/2019 11:38 AM HISTORY/REASON FOR EXAM:  History of esophageal cancer.  Abnormal recent CT abdomen and pelvis, with RIGHT renal mass. TECHNIQUE/EXAM DESCRIPTION: CT scan of the chest with contrast. Thin-section helical images were obtained from the lung apices through the adrenal glands following the bolus administration of contrast. 75 mL of Omnipaque 350 nonionic contrast was utilized. Low dose optimization technique was utilized for this CT exam including automated exposure control and adjustment of the mA and/or kV according to patient size. COMPARISON:  CT abdomen and pelvis 2/11/2019 FINDINGS: No mediastinal mass or adenopathy. Focal thickening of distal esophagus consistent with known tumor. Small sliding-type hiatal hernia. Lungs show diffuse emphysematous changes, with blebs present primarily in the RIGHT lower lung.  Wall thickening of bronchi in the LEFT lower lobe with secretions present. No pleural fluid collection or pneumothorax. No major bony abnormality is seen. RIGHT kidney mass is partially visualized. Soft tissue density present in the mesenteric root extending into the lesser sac, likely neoplasm.  Retroperitoneal tumor again noted.     1.  Severe emphysema. 2.  LEFT lower lobe bronchial wall thickening and secretions potentially developing pneumonia. 3.  Mid to distal esophageal mass consistent with known neoplasm. 4.  RIGHT kidney mass, partially visualized. 5.  Mesenteric and retroperitoneal tumor extending into the lesser sac, as on  recent abdominal CT.     Dx-chest-2 Views    Result Date: 2/20/2019 2/19/2019 8:42 PM HISTORY/REASON FOR EXAM: Cough TECHNIQUE/EXAM DESCRIPTION:  PA and lateral views of the chest. COMPARISON: February 11, 2019 FINDINGS: Left PICC line is seen terminating in the superior vena cava. The cardiac silhouette appears within normal limits. The mediastinal contour appears within normal limits.  The central pulmonary vasculature appears normal. The lungs appear hyperexpanded bilaterally with flattening of the diaphragms.  Hazy bilateral lung base opacities are seen, greater on the left. Blunting of bilateral costophrenic angles is seen, compatible with trace bilateral pleural effusions. The bony structures appear age-appropriate.     1.  Hazy bilateral lung base infiltrates, greater on the left, new since prior. 2.  Small bilateral pleural effusions. 3.  Hyperexpansion of lungs favors changes of COPD.    Dx-chest-portable (1 View)    Result Date: 2/11/2019 2/11/2019 8:02 PM HISTORY/REASON FOR EXAM:  Shortness of Breath. TECHNIQUE/EXAM DESCRIPTION AND NUMBER OF VIEWS: Single portable view of the chest. COMPARISON: 10/12/2008 FINDINGS: LUNGS: Hyperinflation. No focal consolidation. PNEUMOTHORAX: None. LINES AND TUBES: None. MEDIASTINUM: No cardiomegaly. Atherosclerosis. MUSCULOSKELETAL STRUCTURES: No acute fracture. Small hiatal hernia.     1.  COPD. No acute cardiopulmonary abnormality. 2.  Small hiatal hernia.    Us-extremity Venous Upper Unilat Left    Result Date: 2/18/2019   Upper Extremity  Venous Duplex Report  Vascular Laboratory  CONCLUSIONS  Deep venous thrombosis visualized from the distal left subclavian to mid  brachial vein.  The thrombus appears near occlusive from the axillary to  the proximal brachial vein.   Unable to visualize distal brachial and basilic veins due to Picc bandages.  Soft tissue edema.  ROHITH HUNG  Exam Date:     02/18/2019 18:34  Room #:     Inpatient  Priority:     Routine  Ht  (in):             Wt (lb):  Ordering Physician:        CARLOS TAI  Referring Physician:  Sonographer:               Lawrence Cain RVT RDCS  Study Type:  Technical Quality:         Adequate  Age:    73    Gender:     M  MRN:    [id number]  :    1945      BSA:  Indications:     Edema, unspecified  CPT Codes:       66792  ICD Codes:       R609  History:         Left upper extremity swelling s/p PICC line placement.  Limitations:  PROCEDURES:  Left upper extremity venous duplex imaging.  The following venous structures were evaluated: internal jugular,  subclavian, axillary, brachial, cephalic and basilic veins.  Serial compression, augmentation maneuvers,  color and spectral Doppler  flow evaluations were performed.  FINDINGS:  Densely echogenic material fills the vein of distal Left subclavian to mid  brachial vein.  Appears near occlusive from Axillary to prox brachial vein.   Unable to visualize distal brachial and basilic veins due to Picc Line  bandages.  Attending nurse given a preliminary report.  Amy Cantu  (Electronically Signed)  Final Date:      2019                   19:34      Micro:  Results     Procedure Component Value Units Date/Time    CULTURE RESPIRATORY W/ GRM STN [[id number]]  (Abnormal) Collected:  19 1150    Order Status:  Completed Specimen:  Respirate from Sputum Updated:  19 1018     Significant Indicator POS (POS)     Source RESP     Site SPUTUM     Respiratory Culture - (A)     Gram Stain Result Many WBCs.  Many Gram positive diplococci.  Few Gram negative rods.  Specimen Quality Score: 2+       Respiratory Culture Pseudomonas aeruginosa  Heavy growth  See previous culture for sensitivity report.  P.aeruginosa may develop resistance during prolonged therapy  with all antibiotics. Isolates that are initially susceptible  may become resistant within three to four days after  initiation of  therapy. Testing of repeat isolates may be  warranted.   (A)      Streptococcus pneumoniae  Moderate growth  See previous culture for sensitivity report.   (A)    Narrative:       CALL  [facility]  141 tel. [phone],  Collected By:[id number] SAMUEL BRICE.    CULTURE RESPIRATORY W/ GRM STN [[id number]]  (Abnormal)  (Susceptibility) Collected:  02/19/19 6409    Order Status:  Completed Specimen:  Respirate from Sputum Updated:  02/21/19 1125     Significant Indicator POS (POS)     Source RESP     Site SPUTUM     Respiratory Culture Rare growth usual upper respiratory jaz (A)     Gram Stain Result Many WBCs.  Few epithelial cells.  Many Gram negative rods.  Many Gram positive cocci.  Specimen Quality Score: 1+       Respiratory Culture Pseudomonas aeruginosa  Heavy growth  P.aeruginosa may develop resistance during prolonged therapy  with all antibiotics. Isolates that are initially susceptible  may become resistant within three to four days after  initiation of therapy. Testing of repeat isolates may be  warranted.   (A)      Streptococcus pneumoniae  Heavy growth  Penicillin sensitive.   (A)    Narrative:       CALL  [facility]  141 tel. [phone],  CALLED  141 tel. [phone] 02/20/2019, 16:10, RB PERF. RESULTS CALLED  TO:[phone] JANETTE Aldana (S. pneumo)  Collected By:[id number] SURESH BRICE  If not obtained at [facility]    Culture & Susceptibility     PSEUDOMONAS AERUGINOSA     Antibiotic Sensitivity Microscan Unit Status    Amikacin Sensitive <=16 mcg/mL Final    Method: SENSITIVITY, KRISTAN    Cefepime Sensitive <=8 mcg/mL Final    Method: SENSITIVITY, KRISTAN    Ceftazidime Sensitive 4 mcg/mL Final    Method: SENSITIVITY, KRISTAN    Ciprofloxacin Sensitive <=1 mcg/mL Final    Method: SENSITIVITY, KRISTAN    Gentamicin Sensitive <=4 mcg/mL Final    Method: SENSITIVITY, KRISTAN    Imipenem Sensitive <=1 mcg/mL Final    Method: SENSITIVITY, KRISTAN    Meropenem Sensitive <=1 mcg/mL Final    Method: SENSITIVITY, KRISTAN    Pip/Tazobactam Sensitive <=16 mcg/mL  Final    Method: SENSITIVITY, KRISTAN    Tobramycin Sensitive <=4 mcg/mL Final    Method: SENSITIVITY, KRISTAN                       GRAM STAIN [[id number]] Collected:  02/19/19 1749    Order Status:  Completed Specimen:  Respirate Updated:  02/20/19 1600     Significant Indicator .     Source RESP     Site SPUTUM     Gram Stain Result Many WBCs.  Few epithelial cells.  Many Gram negative rods.  Many Gram positive cocci.  Specimen Quality Score: 1+      Narrative:       Collected By:[id number] SURESH BRICE  If not obtained at [facility]    GRAM STAIN [[id number]] Collected:  02/20/19 1150    Order Status:  Completed Specimen:  Respirate Updated:  02/20/19 1534     Significant Indicator .     Source RESP     Site SPUTUM     Gram Stain Result Many WBCs.  Many Gram positive diplococci.  Few Gram negative rods.  Specimen Quality Score: 2+      Narrative:       CALL  [facility]  141 tel. [phone],  CALLED  141 tel. [phone] 02/20/2019, 15:26, RB PERF. RESULTS CALLED  TO:[phone] ([facility] pneumo)  Collected By:[id number] SAMUEL ECHEVERRIA          Assessment:  Active Hospital Problems    Diagnosis   • *Esophageal cancer ([facility]) [C15.9]   • Renal mass, right [N28.89]   • Pneumonitis [J18.9]   • Acute deep vein thrombosis (DVT) of left upper extremity ([facility]) [I82.622]   • Dysphagia [R13.10]   • Leukocytosis [D72.829]   • Advanced care planning/counseling discussion [Z71.89]   • Cancer associated pain [G89.3]   • Constipation due to pain medication therapy [K59.03]   • COPD (chronic obstructive pulmonary disease) ([facility]) [J44.9]   • 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  Afebrile  Increased leukocytosis at last check  Sputum is positive for both pseudomonas and Strep pneumonia,  CXR and CT both show atelectatic versus infiltrates in bilateral lower lobes   consistent with early pneumonia.    Continue on Zosyn  Encourage incentive spirometry, pulmonary toilet and out of bed as tolerated.    Repeat chest x-ray  today  Anticipated stop date Zosyn 2/27/2019    Leukocytosis  Multifactorial-incl infection, malignancy, atelctasis  monitor    Renal cell mass  s/p for biopsy, diagnostic  FU results    Esophageal cancer    At risk for aspiration-due to difficulty swallowing and high dose narcotics  Dysphagia  Keep meds IV for now

## 2019-02-23 NOTE — ASSESSMENT & PLAN NOTE
Currently stable.  Avoid benzodiazepines and anticholinergics  Frequent orientation  Avoid early morning labs  Avoid vital signs during sleep

## 2019-02-23 NOTE — PROGRESS NOTES
CT Nursing Note:    Patient consented by Dr. Nixon and Dr. Ervin anesthesiologist, all questions answered Dr. Nixon performed right renal mass biopsy.  Cores x 3 in formalin taken by Darryl NEGRO.   Gauze and tegaderm applied to right flank, CDI and soft.  Pt alert  and verbally appropriate post procedure, see Dr. Ervin's anesthesia report for vital signs.  RN and Dr. Ervin  transported pt to PACU with Sao2 monitor.

## 2019-02-23 NOTE — CARE PLAN
Problem: Infection  Goal: Will remain free from infection  IV antibiotics ordered and administered.     Problem: Pain Management  Goal: Pain level will decrease to patient's comfort goal  Patient requesting pain medications as needed, but has been trying not to over take due to nightmares.

## 2019-02-23 NOTE — PROGRESS NOTES
Patient arrived back to room from [facility]. Gauze and tegaderm dressing to right mid flank is clean, dry and intact. Patient awake and alert. Patient stating pain is an 8/10 and has an intense pressure to void. Medicated per MAR and sat up to void in urinal.

## 2019-02-23 NOTE — OR SURGEON
Immediate Post- Operative Note        PostOp Diagnosis: METASTATIC RENAL CA      Procedure(s): CT BX      Estimated Blood Loss: Less than 5 ml        Complications: None            2/22/2019     4:55 PM     Wisam Nixon

## 2019-02-24 PROBLEM — R31.9 HEMATURIA: Status: ACTIVE | Noted: 2019-01-01

## 2019-02-24 NOTE — PROGRESS NOTES
"Urology Progress Note       Admitted to [facility] with esophageal cancer, dysphagia, laparoscopic G-tube placed for nutrition, noted to have DVT left upper extremity, right renal mass,.  He was transferred to [facility] for IR guided biopsy of renal mass. IR performed renal BX 2/21        Interval Events: Pt has had gross hematuria post BX      S:  Constitutional:  Ill appearing Positive for malaise/fatigue. Negative for chills and fever.   Respiratory: Positive for cough. Negative for shortness of breath   Cardiovascular: Negative for chest pain, palpitations and leg swelling.   Gastrointestinal: Positive for abdominal pain. Negative for blood in stool, diarrhea, heartburn, melena, nausea and vomiting.   Genitourinary: + for dysuria + for hematuria without clots  Musculoskeletal: Negative for back pain and neck pain.   Skin: Negative for rash.   Neurological: Positive for weakness. Negative for dizziness and headaches.   Psychiatric/Behavioral: The patient is not nervous/anxious.      O:   Blood pressure 106/57, pulse 73, temperature 36.6 °C (97.8 °F), temperature source Temporal, resp. rate 17, height 1.676 m (5' 6\"), weight 63.5 kg (139 lb 15.9 oz), SpO2 97 %.  Recent Labs      02/22/19   0416   SODIUM  135   POTASSIUM  4.1   CHLORIDE  99   CO2  30   GLUCOSE  99   BUN  19   CREATININE  0.41*   CALCIUM  9.0     Recent Labs      02/22/19   0416   WBC  13.7*   RBC  4.53*   HEMOGLOBIN  11.9*   HEMATOCRIT  37.8*   MCV  83.4   MCH  26.3*   MCHC  31.5*   RDW  56.8*   PLATELETCT  361   MPV  9.7         Intake/Output Summary (Last 24 hours) at 02/24/19 1002  Last data filed at 02/24/19 0800   Gross per 24 hour   Intake             2340 ml   Output             1075 ml   Net             1265 ml       Exam:  Abdomen soft, benign.   Urine: Bloody     A/P:    Active Hospital Problems    Diagnosis   • Esophageal cancer (HCC) [C15.9]     Priority: High   • Renal mass, right [N28.89]     Priority: High   • Delirium " [R41.0]     Priority: Medium   • Pneumonitis [J18.9]     Priority: Medium   • Hypomagnesemia [E83.42]     Priority: Medium   • Acute deep vein thrombosis (DVT) of left upper extremity (HCC) [I82.622]     Priority: Medium   • Dysphagia [R13.10]     Priority: Medium   • Leukocytosis [D72.829]     Priority: Medium   • Essential hypertension [I10]     Priority: Medium   • Advanced care planning/counseling discussion [Z71.89]     Priority: Medium   • Hypokalemia [E87.6]     Priority: Medium   • Cancer associated pain [G89.3]     Priority: Medium   • Constipation due to pain medication therapy [K59.03]     Priority: Medium   • COPD (chronic obstructive pulmonary disease) (HCC) [J44.9]     Priority: Medium   • Malignant cachexia (HCC) [R64]     Priority: Medium   • Tobacco abuse [Z72.0]     Priority: Low       Stable.   Ambulate, IS.  Awaiting BX results to guide therapy   CT today to evaluate for gross hematuria post BX  UN will monitor for BX results     Plan of care discussed and directed by YUNIEL Varma.PMAHSA

## 2019-02-24 NOTE — PROGRESS NOTES
[facility] Medicine Daily Progress Note    Date of Service  2/24/2019    Chief Complaint  73 y.o. male admitted 2/18/2019 with esophageal cancer.    Hospital Course  Admitted with esophageal cancer, dysphagia, laparoscopic G-tube placed for nutrition, noted to have DVT left upper extremity, right renal mass, transferred for IR guided biopsy    Interval Problem Update  Esophageal CA - pain better controlled  Renal mass - biopsy done, path results pending  DVT - noted on venous duplex  Hypertension - controlled  Pneumonitis - sputum culture showed Pseudomonas and Streptococcus  Hematuria - CT showed no evidence of right perinephric hematoma, clot identified in the urinary bladder. Findings discussed with Urology    Lengthy discussion with family, updates given, plan of care discussed.    Consultants/Specialty  Palliative care  Urology  ID    Code Status  DNR/DNI    Disposition  TBD    Review of Systems  Review of Systems   Constitutional: Positive for malaise/fatigue. Negative for chills and fever.   HENT: Negative for hearing loss and sore throat.    Eyes: Negative for blurred vision.   Respiratory: Positive for cough. Negative for shortness of breath and wheezing.    Cardiovascular: Negative for chest pain, palpitations and leg swelling.   Gastrointestinal: Positive for abdominal pain. Negative for blood in stool, diarrhea, heartburn, melena, nausea and vomiting.   Genitourinary: Positive for hematuria. Negative for dysuria and flank pain.   Musculoskeletal: Negative for back pain and neck pain.   Skin: Negative for rash.   Neurological: Positive for weakness. Negative for dizziness and headaches.   Psychiatric/Behavioral: The patient is not nervous/anxious.         Physical Exam  Temp:  [35.9 °C (96.7 °F)-36.7 °C (98 °F)] 36.7 °C (98 °F)  Pulse:  [71-85] 80  Resp:  [17-18] 18  BP: (106-157)/(57-73) 157/73  SpO2:  [92 %-98 %] 92 %    Physical Exam   Constitutional: He is oriented to person, place, and time. He appears  well-developed and well-nourished.   HENT:   Head: Normocephalic and atraumatic.   Eyes: Pupils are equal, round, and reactive to light. Conjunctivae are normal.   Neck: No tracheal deviation present. No thyromegaly present.   Cardiovascular: Normal rate and regular rhythm.    Pulmonary/Chest: Effort normal and breath sounds normal.   Abdominal: Soft. Bowel sounds are normal. He exhibits no distension. There is no tenderness.   Musculoskeletal: He exhibits no edema.   Lymphadenopathy:     He has no cervical adenopathy.   Neurological: He is alert and oriented to person, place, and time.   Skin: Skin is warm and dry.   Nursing note and vitals reviewed.      Fluids    Intake/Output Summary (Last 24 hours) at 02/24/19 1422  Last data filed at 02/24/19 1250   Gross per 24 hour   Intake             2080 ml   Output             1050 ml   Net             1030 ml       Laboratory  Recent Labs      02/22/19   0416   WBC  13.7*   RBC  4.53*   HEMOGLOBIN  11.9*   HEMATOCRIT  37.8*   MCV  83.4   MCH  26.3*   MCHC  31.5*   RDW  56.8*   PLATELETCT  361   MPV  9.7     Recent Labs      02/22/19   0416   SODIUM  135   POTASSIUM  4.1   CHLORIDE  99   CO2  30   GLUCOSE  99   BUN  19   CREATININE  0.41*   CALCIUM  9.0                   Imaging  CT-ABDOMEN-PELVIS WITH   Final Result      No evidence of right perinephric hematoma or hydronephrosis status post right renal biopsy.      Clot identified in the urinary bladder.      Anasarca.      Distal esophageal mass with lymphadenopathy.      CT-NEEDLE BX-RENAL   Final Result      Successful CT-guided core biopsy of right renal mass.      CT-ABDOMEN-PELVIS WITH   Final Result      1.  Again seen thickening of the distal esophagus likely related to the patient's known esophageal cancer in that area. There is extensive soft tissue mass/adenopathy within the gastrohepatic, periportal, and retroperitoneal regions which does encase the    celiac and superior mesenteric arteries. This is not  significantly changed over short interval follow-up.      2.  Again seen large right renal mass which measures 9 x 7 cm in size consistent with either renal cell carcinoma or metastatic disease. There is again seen likely some invasion of the right renal vein.      3.  Emphysematous and bullous change of the lungs with bibasilar atelectasis and small bilateral pleural effusions.      4.  Moderate constipation.      DX-CHEST-2 VIEWS   Final Result         1.  Hazy bilateral lung base infiltrates, greater on the left, new since prior.   2.  Small bilateral pleural effusions.   3.  Hyperexpansion of lungs favors changes of COPD.      US-EXTREMITY VENOUS UPPER UNILAT LEFT   Final Result      CT-NEEDLE BX-RENAL    (Results Pending)        Assessment/Plan  * Esophageal cancer (HCC)- (present on admission)   Assessment & Plan    Laparoscopic G-tube placement  Tube feeding  Pain control     Renal mass, right- (present on admission)   Assessment & Plan    IR biopsy with Anesthesia  Follow up pathology results     Hematuria- (present on admission)   Assessment & Plan    Urology following     Delirium   Assessment & Plan    Avoid benzodiazepines and anticholinergics  Frequent orientation  Avoid early morning labs  Avoid vital signs during sleep  Ambulate if possible     Hypomagnesemia- (present on admission)   Assessment & Plan    follow level     Pneumonitis- (present on admission)   Assessment & Plan    IV Zosyn     Essential hypertension- (present on admission)   Assessment & Plan    Amlodipine     Leukocytosis- (present on admission)   Assessment & Plan    Follow CBC     Dysphagia- (present on admission)   Assessment & Plan    Tube feeding  SLP to follow     Acute deep vein thrombosis (DVT) of left upper extremity (HCC)- (present on admission)   Assessment & Plan    Hold Lovenox     Hypokalemia- (present on admission)   Assessment & Plan    Follow BMP     Advanced care planning/counseling discussion- (present on admission)    Assessment & Plan    Palliative care consulted     Constipation due to pain medication therapy- (present on admission)   Assessment & Plan    Bowel protocol     Cancer associated pain- (present on admission)   Assessment & Plan    Fentanyl, Oxycodone  IV Dilaudid     Malignant cachexia (HCC)- (present on admission)   Assessment & Plan    Nutrition consult     COPD (chronic obstructive pulmonary disease) (HCC)- (present on admission)   Assessment & Plan    RT protocol     Tobacco abuse- (present on admission)   Assessment & Plan    Nicotine replacement protocol          VTE prophylaxis: SCDs

## 2019-02-24 NOTE — CARE PLAN
Problem: Pain Management  Goal: Pain level will decrease to patient's comfort goal  Patient states pain is severe, and is requesting pain medications as needed.     Problem: Mobility  Goal: Risk for activity intolerance will decrease  Patient ambulating with family and staff to bathroom and in hallway.

## 2019-02-24 NOTE — PROGRESS NOTES
Assumed care of patient. Patient sitting up in chair with urinal in place. Patient started passing clots in urine due to bleeding with urination and has mild retention. Will notify MD. G tube to [facility]. Patient is tolerating Fibersource at goal of 65ml/hr. Dressing to right flank is clean, dry and intact from kidney biopsy. Medicated for pain. No other needs at this time. Call light within reach.

## 2019-02-24 NOTE — PROGRESS NOTES
Called by MD Turcios and notified of blood clot in bladder, identified by CT. Bladder scans have consistently been in in the 300's and pt complaining of frequency, urgency, incomplete emptying with dysuria and occasional suprapubic pain.     After proper sterile technique I attempted to place a #24,  6-eye catheter.  Pt meatus would not accepted size #24 fr catheter.  I then attempted to place #22 fr catheter, however this size was also large for meatus.  I was able to successfully place a size #20 fr coude catheter without force. Catheter easily entered meatus however I did encounter some resistance in the urethra that was most likely a stricture. I inserted catheter appox 6cm up to the y port with good return of urine.  Pt expressed some discomfort with passing of catheter.  There was no evidence of any bleeding from the meatus during or after procedure. He denied any pain with Valdes balloon  Inflation.  I then irrigated the bladder with 500 NS and was unable to remove clots. Pt did complain of bladder spasms at this time.  Foely left to down drain.  Follow up bladder scan revealed 20cc of urine in bladder.  Valdes draining bloody clear urine without clots.   Pt started on B&O suppository for bladder spasms and RNS to hand irrigate for clots and needed with 500-100 NSS.

## 2019-02-24 NOTE — PROGRESS NOTES
Assumed care of patient. Patient sitting edge of bed and complaining of extreme burning with voids. Medicated per MAR. Patient s/p kidney biopsy yesterday and he has been experiencing some mild bleeding/clots w/urination. Biopsy site to right flank with gauze and tegaderm. No drainage noted to dressing. LLQ G tube with Fibersource running of goal of 65 ml/hr. Patient has no other complaints. Call light within reach.

## 2019-02-24 NOTE — PROGRESS NOTES
Infectious Disease Progress Note    Author: Edilma Dougherty M.D. Date & Time of service: 2019  10:31 AM    Chief Complaint:  Pneumonia    Interval History:  73-year-old male with recent diagnosis of advanced esophageal canceradmitted to the hospital due to increasing weight loss, poor oral intake, cough, and abdominal pain.   AF WBC 13.7 decreased cough poor insp effort-about 750 on IS   AF no CBC had biopsy yesterday-no new complaints   Af WBC not done c/o hematuria Denies cough, SOB  Labs Reviewed, Medications Reviewed, Radiology Reviewed and Wound Reviewed.    Review of Systems:  Review of Systems   Constitutional: Positive for malaise/fatigue and weight loss. Negative for chills and fever.   HENT: Positive for hearing loss. Negative for ear pain and sore throat.    Eyes: Negative for blurred vision and pain.   Respiratory: Negative for cough, hemoptysis, sputum production, shortness of breath, wheezing and stridor.         Improved   Cardiovascular: Negative for chest pain and palpitations.   Gastrointestinal: Negative for diarrhea, nausea and vomiting.   Genitourinary: Positive for flank pain and hematuria. Negative for dysuria.   Musculoskeletal: Negative for joint pain.   Skin: Negative for itching and rash.   Neurological: Positive for weakness. Negative for dizziness and headaches.   Psychiatric/Behavioral: The patient is not nervous/anxious.    All other systems reviewed and are negative.      Hemodynamics:  Temp (24hrs), Av.3 °C (97.3 °F), Min:35.9 °C (96.6 °F), Max:36.6 °C (97.8 °F)  Temperature: 36.6 °C (97.8 °F)  Pulse  Av.6  Min: 65  Max: 86   Blood Pressure : 106/57       Physical Exam:  Physical Exam   Constitutional: He is oriented to person, place, and time. No distress.   Chronically ill and debilitated   HENT:   Head: Normocephalic and atraumatic.   Mouth/Throat: No oropharyngeal exudate.   Poor dentition   Eyes: Pupils are equal, round, and reactive to light. EOM  are normal. No scleral icterus.   Neck: Normal range of motion. Neck supple. No JVD present.   Cardiovascular: Normal rate and normal heart sounds.    No murmur heard.  Pulmonary/Chest: Effort normal. No stridor. No respiratory distress. He has no wheezes. He has rales.   Decreased BS   Abdominal: Soft. He exhibits no distension. There is no tenderness. There is no rebound and no guarding.   Gtube   Musculoskeletal:   PICC   Neurological: He is alert and oriented to person, place, and time. No cranial nerve deficit.   Skin: Skin is warm. He is not diaphoretic.   Nursing note and vitals reviewed.      Meds:    Current Facility-Administered Medications:   •  HYDROmorphone  •  fentaNYL  •  oxyCODONE  •  [COMPLETED] piperacillin-tazobactam **AND** piperacillin-tazobactam  •  enoxaparin (LOVENOX) injection  •  zolpidem  •  Respiratory Care per Protocol  •  albuterol  •  amLODIPine  •  bisacodyl  •  ondansetron  •  polyethylene glycol/lytes    Labs:  Recent Labs      02/22/19 0416   WBC  13.7*   RBC  4.53*   HEMOGLOBIN  11.9*   HEMATOCRIT  37.8*   MCV  83.4   MCH  26.3*   RDW  56.8*   PLATELETCT  361   MPV  9.7   NEUTSPOLYS  68.00   LYMPHOCYTES  12.80*   MONOCYTES  16.70*   EOSINOPHILS  1.20   BASOPHILS  0.60     Recent Labs      02/22/19   0416   SODIUM  135   POTASSIUM  4.1   CHLORIDE  99   CO2  30   GLUCOSE  99   BUN  19     Recent Labs      02/22/19 0416   CREATININE  0.41*       Imaging:  Ct-abdomen-pelvis With    Result Date: 2/21/2019 2/21/2019 2:23 PM HISTORY/REASON FOR EXAM: Esophageal cancer. Abdominal pain with right renal mass. TECHNIQUE/EXAM DESCRIPTION: CT scan of the abdomen and pelvis with contrast. Contrast-enhanced helical scanning was obtained from the diaphragmatic domes through the pubic symphysis following the bolus administration of 80 mL of Omnipaque 350 nonionic contrast without complication. Low dose optimization technique was utilized for this CT exam including automated exposure control  and adjustment of the mA and/or kV according to patient size. COMPARISON: To 11/20/2019 FINDINGS: CT Abdomen: There is emphysematous and bullous change of the lungs. There is atelectasis within the lung bases bilaterally. There are small bilateral pleural effusions. There is again seen thickening of the visualized lower esophagus with a hiatal hernia present. There is extensive adenopathy and soft tissue attenuation within the gastroesophageal region extending into the gastrohepatic, periportal and retroperitoneal areas within the upper abdomen at the diaphragmatic hiatus. Soft tissue mass encases the celiac and superior mesenteric arteries. There is extensive adenopathy seen tracking along the retroperitoneal region as well. No abnormal masses within the retroperitoneum and gastrohepatic region measuring up to 2.4 cm in short axis dimension. The liver is normal in appearance. The spleen is normal. There is again seen a large right renal mass involving the lower pole which measures 9 x 7 cm in size. This is similar in appearance to prior exam. There is likely some invasion of the right renal vein. The left kidney is normal. The adrenal glands are normal. The pancreas is normal. There is extensive atherosclerotic change of the aorta. CT Pelvis: No evidence of bowel obstruction. There is moderate constipation. The appendix is not identified. There is diffuse anasarca.     1.  Again seen thickening of the distal esophagus likely related to the patient's known esophageal cancer in that area. There is extensive soft tissue mass/adenopathy within the gastrohepatic, periportal, and retroperitoneal regions which does encase the  celiac and superior mesenteric arteries. This is not significantly changed over short interval follow-up. 2.  Again seen large right renal mass which measures 9 x 7 cm in size consistent with either renal cell carcinoma or metastatic disease. There is again seen likely some invasion of the right  renal vein. 3.  Emphysematous and bullous change of the lungs with bibasilar atelectasis and small bilateral pleural effusions. 4.  Moderate constipation.    Ct-abdomen-pelvis With    Result Date: 2/11/2019 2/11/2019 8:43 PM HISTORY/REASON FOR EXAM:  Abdominal pain, constipation, flank pain. TECHNIQUE/EXAM DESCRIPTION:   Contiguous axial images were obtained from the diaphragmatic domes to the pubic symphysis following intravenous contrast administration. Coronal and sagittal reformats were generated and reviewed. 100 mL of Omnipaque 350 nonionic contrast was administered without complication. Low dose optimization technique was utilized for this CT exam including automated exposure control and adjustment of the mA and/or kV according to patient size. COMPARISON: None. FINDINGS: Lower chest: Emphysema. No pleural effusions. Liver: No mass. No intrahepatic biliary dilatation. Gallbladder: No mural thickening. No radiopaque gallstones. Common bile duct: Nondilated. Pancreas: Unremarkable. Spleen: No mass. Adrenals: No mass. Kidneys: There is a large, partially exophytic, 8.9 x 8.3 x 6.8 cm mass arising from the lower pole of the right kidney. There may be early extension into the right renal vein near the hilum. The left kidney is unremarkable. Stomach, small bowel, colon: Mural thickening of the distal esophagus/proximal stomach, likely related to known esophageal cancer. Moderate amount of stool within the colon. Peritoneal cavity: No ascites. Lymph nodes: Gastrohepatic, periportal, aortocaval and retroperitoneal lymphadenopathy, extending to just below the level of the aortic bifurcation. Largest nodes measure up to 2 cm short axis. Aorta: No aneurysm. Atherosclerosis. Pelvic organs: Unremarkable bladder and prostate. Musculoskeletal structures: No acute fracture or destructive lesion.     1. Thickening of the distal esophagus, likely related to known carcinoma. 2.  Gastrohepatic, periportal, aortocaval and  retroperitoneal [place] metastases. 3. Large, 8.7 cm right renal mass, consistent with malignancy. There may be early invasion of the right renal vein. 4. Moderate amount of stool throughout the colon. 5. Emphysema.    Ct-chest (thorax) With    Result Date: 2/14/2019 2/14/2019 11:38 AM HISTORY/REASON FOR EXAM:  History of esophageal cancer.  Abnormal recent CT abdomen and pelvis, with RIGHT renal mass. TECHNIQUE/EXAM DESCRIPTION: CT scan of the chest with contrast. Thin-section helical images were obtained from the lung apices through the adrenal glands following the bolus administration of contrast. 75 mL of Omnipaque 350 nonionic contrast was utilized. Low dose optimization technique was utilized for this CT exam including automated exposure control and adjustment of the mA and/or kV according to patient size. COMPARISON:  CT abdomen and pelvis 2/11/2019 FINDINGS: No mediastinal mass or adenopathy. Focal thickening of distal esophagus consistent with known tumor. Small sliding-type hiatal hernia. Lungs show diffuse emphysematous changes, with blebs present primarily in the RIGHT lower lung.  Wall thickening of bronchi in the LEFT lower lobe with secretions present. No pleural fluid collection or pneumothorax. No major bony abnormality is seen. RIGHT kidney mass is partially visualized. Soft tissue density present in the mesenteric root extending into the lesser sac, likely neoplasm.  Retroperitoneal tumor again noted.     1.  Severe emphysema. 2.  LEFT lower lobe bronchial wall thickening and secretions potentially developing pneumonia. 3.  Mid to distal esophageal mass consistent with known neoplasm. 4.  RIGHT kidney mass, partially visualized. 5.  Mesenteric and retroperitoneal tumor extending into the lesser sac, as on recent abdominal CT.     Dx-chest-2 Views    Result Date: 2/20/2019 2/19/2019 8:42 PM HISTORY/REASON FOR EXAM: Cough TECHNIQUE/EXAM DESCRIPTION:  PA and lateral views of the chest. COMPARISON:  February 11, 2019 FINDINGS: Left PICC line is seen terminating in the superior vena cava. The cardiac silhouette appears within normal limits. The mediastinal contour appears within normal limits.  The central pulmonary vasculature appears normal. The lungs appear hyperexpanded bilaterally with flattening of the diaphragms.  Hazy bilateral lung base opacities are seen, greater on the left. Blunting of bilateral costophrenic angles is seen, compatible with trace bilateral pleural effusions. The bony structures appear age-appropriate.     1.  Hazy bilateral lung base infiltrates, greater on the left, new since prior. 2.  Small bilateral pleural effusions. 3.  Hyperexpansion of lungs favors changes of COPD.    Dx-chest-portable (1 View)    Result Date: 2/11/2019 2/11/2019 8:02 PM HISTORY/REASON FOR EXAM:  Shortness of Breath. TECHNIQUE/EXAM DESCRIPTION AND NUMBER OF VIEWS: Single portable view of the chest. COMPARISON: 10/12/2008 FINDINGS: LUNGS: Hyperinflation. No focal consolidation. PNEUMOTHORAX: None. LINES AND TUBES: None. MEDIASTINUM: No cardiomegaly. Atherosclerosis. MUSCULOSKELETAL STRUCTURES: No acute fracture. Small hiatal hernia.     1.  COPD. No acute cardiopulmonary abnormality. 2.  Small hiatal hernia.    Us-extremity Venous Upper Unilat Left    Result Date: 2/18/2019   Upper Extremity  Venous Duplex Report  Vascular Laboratory  CONCLUSIONS  Deep venous thrombosis visualized from the distal left subclavian to mid  brachial vein.  The thrombus appears near occlusive from the axillary to  the proximal brachial vein.   Unable to visualize distal brachial and basilic veins due to Picc bandages.  Soft tissue edema.  ROHITH HUNG  Exam Date:     02/18/2019 18:34  Room #:     Inpatient  Priority:     Routine  Ht (in):             Wt (lb):  Ordering Physician:        CARLOS TAI  Referring Physician:  Sonographer:               Lawrence Cain RVT                              [facility]  Study Type:  Technical Quality:         Adequate  Age:    73    Gender:     M  MRN:    [id number]  :    1945      BSA:  Indications:     Edema, unspecified  CPT Codes:       36177  ICD Codes:       R609  History:         Left upper extremity swelling s/p PICC line placement.  Limitations:  PROCEDURES:  Left upper extremity venous duplex imaging.  The following venous structures were evaluated: internal jugular,  subclavian, axillary, brachial, cephalic and basilic veins.  Serial compression, augmentation maneuvers,  color and spectral Doppler  flow evaluations were performed.  FINDINGS:  Densely echogenic material fills the vein of distal Left subclavian to mid  brachial vein.  Appears near occlusive from Axillary to prox brachial vein.   Unable to visualize distal brachial and basilic veins due to Picc Line  bandages.  Attending nurse given a preliminary report.  Amy Cantu  (Electronically Signed)  Final Date:      2019                   19:34      Micro:  Results     Procedure Component Value Units Date/Time    CULTURE RESPIRATORY W/ GRM STN [[id number]]  (Abnormal) Collected:  19 1150    Order Status:  Completed Specimen:  Respirate from Sputum Updated:  19 1018     Significant Indicator POS (POS)     Source RESP     Site SPUTUM     Respiratory Culture - (A)     Gram Stain Result Many WBCs.  Many Gram positive diplococci.  Few Gram negative rods.  Specimen Quality Score: 2+       Respiratory Culture Pseudomonas aeruginosa  Heavy growth  See previous culture for sensitivity report.  P.aeruginosa may develop resistance during prolonged therapy  with all antibiotics. Isolates that are initially susceptible  may become resistant within three to four days after  initiation of therapy. Testing of repeat isolates may be  warranted.   (A)      Streptococcus pneumoniae  Moderate growth  See previous culture for sensitivity report.   (A)    Narrative:       CALL  [facility]  141 tel.  [phone],  Collected By:[id number] SAMUEL BRICE.    CULTURE RESPIRATORY W/ GRM STN [[id number]]  (Abnormal)  (Susceptibility) Collected:  02/19/19 1749    Order Status:  Completed Specimen:  Respirate from Sputum Updated:  02/21/19 1125     Significant Indicator POS (POS)     Source RESP     Site SPUTUM     Respiratory Culture Rare growth usual upper respiratory jaz (A)     Gram Stain Result Many WBCs.  Few epithelial cells.  Many Gram negative rods.  Many Gram positive cocci.  Specimen Quality Score: 1+       Respiratory Culture Pseudomonas aeruginosa  Heavy growth  P.aeruginosa may develop resistance during prolonged therapy  with all antibiotics. Isolates that are initially susceptible  may become resistant within three to four days after  initiation of therapy. Testing of repeat isolates may be  warranted.   (A)      Streptococcus pneumoniae  Heavy growth  Penicillin sensitive.   (A)    Narrative:       CALL  D[facility]  141 tel. [phone],  CALLED  141 tel. [phone] 02/20/2019, 16:10, RB PERF. RESULTS CALLED  TO:[phone] JANETTE Aldana (S. pneumo)  Collected By:[id number] SURESH BRICE  If not obtained at [facility]    Culture & Susceptibility     PSEUDOMONAS AERUGINOSA     Antibiotic Sensitivity Microscan Unit Status    Amikacin Sensitive <=16 mcg/mL Final    Method: SENSITIVITY, KRISTAN    Cefepime Sensitive <=8 mcg/mL Final    Method: SENSITIVITY, KRISTAN    Ceftazidime Sensitive 4 mcg/mL Final    Method: SENSITIVITY, KRISTAN    Ciprofloxacin Sensitive <=1 mcg/mL Final    Method: SENSITIVITY, KRISTAN    Gentamicin Sensitive <=4 mcg/mL Final    Method: SENSITIVITY, KRISTAN    Imipenem Sensitive <=1 mcg/mL Final    Method: SENSITIVITY, KRISTAN    Meropenem Sensitive <=1 mcg/mL Final    Method: SENSITIVITY, KRISTAN    Pip/Tazobactam Sensitive <=16 mcg/mL Final    Method: SENSITIVITY, KRISTAN    Tobramycin Sensitive <=4 mcg/mL Final    Method: SENSITIVITY, KRISTAN                       GRAM STAIN [[id number]] Collected:  02/19/19 1749    Order Status:  Completed  Specimen:  Respirate Updated:  02/20/19 1600     Significant Indicator .     Source RESP     Site SPUTUM     Gram Stain Result Many WBCs.  Few epithelial cells.  Many Gram negative rods.  Many Gram positive cocci.  Specimen Quality Score: 1+      Narrative:       Collected By:[id number] SURESH BRICE  If not obtained at [facility]    GRAM STAIN [[id number]] Collected:  02/20/19 1150    Order Status:  Completed Specimen:  Respirate Updated:  02/20/19 1534     Significant Indicator .     Source RESP     Site SPUTUM     Gram Stain Result Many WBCs.  Many Gram positive diplococci.  Few Gram negative rods.  Specimen Quality Score: 2+      Narrative:       CALL  [facility]  141 tel. [phone],  CALLED  141 tel. [phone] 02/20/2019, 15:26, RB PERF. 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  Afebrile  + leukocytosis at last check  Sputum is positive for both pseudomonas and Strep pneumonia,  CXR and CT both show atelectatic versus infiltrates in bilateral lower lobes   consistent with early pneumonia.    Continue on Zosyn  Encourage incentive spirometry, pulmonary toilet and out of bed as tolerated.    Repeat chest x-ray today  Stop date Zosyn 2/27/2019    Leukocytosis  Multifactorial-incl infection, malignancy, atelctasis  monitor    Renal cell mass  Hematuria  s/p for biopsy, diagnostic  FU results  Hematuria  post-procedure-Urology has seen  Plan for CT    Esophageal cancer    At risk for aspiration-due to difficulty swallowing and high dose narcotics  Dysphagia  Keep meds IV for now    DW RN  Will sign off-please reconsult if needed

## 2019-02-24 NOTE — CARE PLAN
Problem: Communication  Goal: The ability to communicate needs accurately and effectively will improve  Outcome: PROGRESSING AS EXPECTED  Pt communicates needs appropriately at this time.     Problem: Safety  Goal: Will remain free from falls  Outcome: PROGRESSING AS EXPECTED  Pt calls appropriately for assistance. Fall precautions in place.

## 2019-02-24 NOTE — PROGRESS NOTES
Patient down to CT with transport in wheelchair. Patient pre medicated for table. Consent for IV injection signed.

## 2019-02-24 NOTE — PROGRESS NOTES
Valdes placed by urology APN. B&O suppository given. Dark red urine returned. Okay to hand irrigate for symptoms of clot.

## 2019-02-25 PROBLEM — C64.1 RENAL CELL CARCINOMA OF RIGHT KIDNEY (HCC): Status: ACTIVE | Noted: 2019-01-01

## 2019-02-25 NOTE — PROGRESS NOTES
"Urology Progress Note    S: Seen and examined. Required manual irrigation of olpez cath yesterday.  Cath draining well today. Denies blood clots.  Denies pain.    O:   Blood pressure 110/90, pulse 91, temperature 36.6 °C (97.8 °F), temperature source Temporal, resp. rate 17, height 1.676 m (5' 6\"), weight 63.5 kg (139 lb 15.9 oz), SpO2 91 %.  Recent Labs      02/24/19   1639   SODIUM  140   POTASSIUM  3.9   CHLORIDE  100   CO2  29   GLUCOSE  130*   BUN  19   CREATININE  0.57   CALCIUM  8.6     Recent Labs      02/24/19   1639   WBC  18.3*   RBC  4.22*   HEMOGLOBIN  11.3*   HEMATOCRIT  37.0*   MCV  87.7   MCH  26.8*   MCHC  30.5*   RDW  61.9*   PLATELETCT  356   MPV  9.6         Intake/Output Summary (Last 24 hours) at 02/25/19 0929  Last data filed at 02/25/19 0837   Gross per 24 hour   Intake             1200 ml   Output             1700 ml   Net             -500 ml       Exam:  Abdomen soft, no TTP.     Urine: Lopez draining well w/ clear output    A/P:    Active Hospital Problems    Diagnosis   • Esophageal cancer (HCC) [C15.9]     Priority: High   • Renal mass, right [N28.89]     Priority: High   • Hematuria [R31.9]     Priority: Medium   • Delirium [R41.0]     Priority: Medium   • Pneumonitis [J18.9]     Priority: Medium   • Hypomagnesemia [E83.42]     Priority: Medium   • Acute deep vein thrombosis (DVT) of left upper extremity (HCC) [I82.622]     Priority: Medium   • Dysphagia [R13.10]     Priority: Medium   • Leukocytosis [D72.829]     Priority: Medium   • Essential hypertension [I10]     Priority: Medium   • Advanced care planning/counseling discussion [Z71.89]     Priority: Medium   • Hypokalemia [E87.6]     Priority: Medium   • Cancer associated pain [G89.3]     Priority: Medium   • Constipation due to pain medication therapy [K59.03]     Priority: Medium   • COPD (chronic obstructive pulmonary disease) (HCC) [J44.9]     Priority: Medium   • Malignant cachexia (HCC) [R64]     Priority: Medium   • Tobacco " abuse [Z72.0]     Priority: Low       Plan:  - monitor urinary output  - monitor for blood clots; perform manual irrigation of lopez catheter as necessary.  - will follow

## 2019-02-25 NOTE — PROGRESS NOTES
Hospital Medicine Daily Progress Note    Date of Service  2/25/2019    Chief Complaint  73 y.o. male admitted 2/18/2019 with esophageal cancer.    Hospital Course  Admitted with esophageal cancer, dysphagia, laparoscopic G-tube placed for nutrition, noted to have DVT left upper extremity, right renal mass, transferred for IR guided biopsy    Interval Problem Update  Esophageal CA - pain better controlled  Renal mass - results show Renal Cell CA, discussed case with Urology  DVT - noted on venous duplex  Hypertension - controlled  Pneumonitis - sputum culture showed Pseudomonas and Streptococcus  Hematuria - Valdes placed, output clear currently    Lengthy discussion with patient, updates given, plan of care discussed.    Consultants/Specialty  Palliative care  Urology  ID  Oncology    Code Status  DNR/DNI    Disposition  TBD    Review of Systems  Review of Systems   Constitutional: Positive for malaise/fatigue. Negative for chills and fever.   HENT: Negative for hearing loss and sore throat.    Eyes: Negative for blurred vision.   Respiratory: Positive for cough. Negative for shortness of breath and wheezing.    Cardiovascular: Negative for chest pain, palpitations and leg swelling.   Gastrointestinal: Positive for abdominal pain. Negative for blood in stool, diarrhea, heartburn, melena, nausea and vomiting.   Genitourinary: Negative for dysuria, flank pain and hematuria.   Musculoskeletal: Negative for back pain and neck pain.   Skin: Negative for rash.   Neurological: Positive for weakness. Negative for dizziness and headaches.   Psychiatric/Behavioral: The patient is not nervous/anxious.         Physical Exam  Temp:  [36.6 °C (97.8 °F)-37 °C (98.6 °F)] 36.6 °C (97.8 °F)  Pulse:  [79-91] 91  Resp:  [17-18] 17  BP: (110-143)/(56-90) 110/90  SpO2:  [91 %-96 %] 91 %    Physical Exam   Constitutional: He is oriented to person, place, and time. He appears well-developed and well-nourished.   HENT:   Head: Normocephalic  and atraumatic.   Eyes: Pupils are equal, round, and reactive to light. Conjunctivae are normal.   Neck: No tracheal deviation present. No thyromegaly present.   Cardiovascular: Normal rate and regular rhythm.    Pulmonary/Chest: Effort normal and breath sounds normal.   Abdominal: Soft. Bowel sounds are normal. He exhibits no distension. There is no tenderness.   Musculoskeletal: He exhibits no edema.   Lymphadenopathy:     He has no cervical adenopathy.   Neurological: He is alert and oriented to person, place, and time.   Skin: Skin is warm and dry.   Nursing note and vitals reviewed.      Fluids    Intake/Output Summary (Last 24 hours) at 02/25/19 1357  Last data filed at 02/25/19 1200   Gross per 24 hour   Intake             1720 ml   Output             1400 ml   Net              320 ml       Laboratory  Recent Labs      02/24/19   1639   WBC  18.3*   RBC  4.22*   HEMOGLOBIN  11.3*   HEMATOCRIT  37.0*   MCV  87.7   MCH  26.8*   MCHC  30.5*   RDW  61.9*   PLATELETCT  356   MPV  9.6     Recent Labs      02/24/19   1639   SODIUM  140   POTASSIUM  3.9   CHLORIDE  100   CO2  29   GLUCOSE  130*   BUN  19   CREATININE  0.57   CALCIUM  8.6                   Imaging  CT-ABDOMEN-PELVIS WITH   Final Result      No evidence of right perinephric hematoma or hydronephrosis status post right renal biopsy.      Clot identified in the urinary bladder.      Anasarca.      Distal esophageal mass with lymphadenopathy.      CT-NEEDLE BX-RENAL   Final Result      Successful CT-guided core biopsy of right renal mass.      CT-ABDOMEN-PELVIS WITH   Final Result      1.  Again seen thickening of the distal esophagus likely related to the patient's known esophageal cancer in that area. There is extensive soft tissue mass/adenopathy within the gastrohepatic, periportal, and retroperitoneal regions which does encase the    celiac and superior mesenteric arteries. This is not significantly changed over short interval follow-up.      2.   Again seen large right renal mass which measures 9 x 7 cm in size consistent with either renal cell carcinoma or metastatic disease. There is again seen likely some invasion of the right renal vein.      3.  Emphysematous and bullous change of the lungs with bibasilar atelectasis and small bilateral pleural effusions.      4.  Moderate constipation.      DX-CHEST-2 VIEWS   Final Result         1.  Hazy bilateral lung base infiltrates, greater on the left, new since prior.   2.  Small bilateral pleural effusions.   3.  Hyperexpansion of lungs favors changes of COPD.      US-EXTREMITY VENOUS UPPER UNILAT LEFT   Final Result      CT-NEEDLE BX-RENAL    (Results Pending)        Assessment/Plan  * Esophageal cancer (HCC)- (present on admission)   Assessment & Plan    Laparoscopic G-tube placement  Tube feeding  Pain control     Renal cell carcinoma of right kidney (HCC)- (present on admission)   Assessment & Plan    Urology to discuss plan of care with Oncology     Hematuria- (present on admission)   Assessment & Plan    Valdes, irrigate clots prn     Delirium   Assessment & Plan    Avoid benzodiazepines and anticholinergics  Frequent orientation  Avoid early morning labs  Avoid vital signs during sleep  Ambulate if possible     Hypomagnesemia- (present on admission)   Assessment & Plan    follow level     Pneumonitis- (present on admission)   Assessment & Plan    IV Zosyn     Essential hypertension- (present on admission)   Assessment & Plan    Amlodipine     Leukocytosis- (present on admission)   Assessment & Plan    Follow CBC     Dysphagia- (present on admission)   Assessment & Plan    Tube feeding  SLP to follow     Acute deep vein thrombosis (DVT) of left upper extremity (HCC)- (present on admission)   Assessment & Plan    Hold Lovenox     Hypokalemia- (present on admission)   Assessment & Plan    Follow BMP     Advanced care planning/counseling discussion- (present on admission)   Assessment & Plan    Palliative  care consulted     Constipation due to pain medication therapy- (present on admission)   Assessment & Plan    Bowel protocol     Cancer associated pain- (present on admission)   Assessment & Plan    Fentanyl, Oxycodone  IV Dilaudid     Malignant cachexia (HCC)- (present on admission)   Assessment & Plan    Nutrition consult     COPD (chronic obstructive pulmonary disease) (HCC)- (present on admission)   Assessment & Plan    RT protocol     Tobacco abuse- (present on admission)   Assessment & Plan    Nicotine replacement protocol          VTE prophylaxis: SCDs

## 2019-02-25 NOTE — CARE PLAN
Problem: Communication  Goal: The ability to communicate needs accurately and effectively will improve  Outcome: PROGRESSING AS EXPECTED  Pt communicates needs appropriately.     Problem: Safety  Goal: Will remain free from falls  Outcome: PROGRESSING AS EXPECTED  Fall precautions in place, calls for assistance appropriately.

## 2019-02-25 NOTE — PROGRESS NOTES
Bedside report received   Patient is sitting up in chair alert and oriented  Valdes in place  LLQ Gtube in place with Fibersource infusing at 65ml/hr  Patient denies needs at this time   Call light within reach

## 2019-02-25 NOTE — PROGRESS NOTES
Assumed care of patient. Patient sitting edge of bed. Medicated per MAR for pain 5/10. Valdes placed by urology today, bloody output, no clots visualized at this time. Biopsy site to right flank with gauze and tegaderm. No drainage noted to dressing. LLQ G tube with Fibersource running of goal of 65 ml/hr. Patient has no other complaints. Call light within reach.

## 2019-02-25 NOTE — CARE PLAN
Problem: Safety  Goal: Will remain free from falls  Outcome: PROGRESSING AS EXPECTED  Patient has non skid socks in place, bed is in lowest position and locked    Problem: Mobility  Goal: Risk for activity intolerance will decrease  Outcome: PROGRESSING AS EXPECTED  Patient ambulates with with 1 person assist with walker

## 2019-02-26 NOTE — PROGRESS NOTES
Bedside report received.  Assessment complete.  A&O x 4. Patient calls appropriately.  Patient up with one assist and FWW.   Patient has 2/10 pain. Declines pain medication at this time  Denies N&V. Tolerating NPO, fibersource tube feeding at 65ml/h diet.  Surgical PEG tube on abdomen is MALIK, CDI, tube feedings and medications through tube.  + void via urologic lopez- urine is dark brown/red tinted, + flatus, + BM.  Patient denies SOB.  SCD's on.  Patient in pleasant mood, up to chair often.  Review plan with of care with patient. Call light and personal belongings with in reach. Hourly rounding in place. All needs met at this time.

## 2019-02-26 NOTE — PROGRESS NOTES
[facility] Hospital Medicine Daily Progress Note    Date of Service  2/26/2019    Chief Complaint  73 y.o. male admitted 2/18/2019 with esophageal cancer.    Hospital Course  Admitted with esophageal cancer, dysphagia, laparoscopic G-tube placed for nutrition, noted to have DVT left upper extremity, right renal mass, transferred for IR guided biopsy    Interval Problem Update  Patient is resting in chair, feels ok no new complains, no hematuria today, pain is stable, not in distress, continue holding lovenox for now until cleared by urologist, pending oncology eval. Possible surgery this week.     Consultants/Specialty  Palliative care  Urology  ID  Oncology    Code Status  DNR/DNI    Disposition  TBD    Review of Systems  Review of Systems   Constitutional: Negative for fever and malaise/fatigue.   HENT: Negative for congestion and nosebleeds.    Eyes: Negative for blurred vision.   Respiratory: Negative for cough and wheezing.    Cardiovascular: Negative for chest pain and palpitations.   Gastrointestinal: Positive for abdominal pain. Negative for heartburn, nausea and vomiting.   Genitourinary: Negative for dysuria and urgency.   Musculoskeletal: Negative for back pain and neck pain.   Skin: Negative for rash.   Neurological: Positive for weakness. Negative for dizziness and headaches.   Psychiatric/Behavioral: The patient is not nervous/anxious.         Physical Exam  Temp:  [36.6 °C (97.8 °F)-37.5 °C (99.5 °F)] 36.6 °C (97.8 °F)  Pulse:  [81-96] 81  Resp:  [18-20] 18  BP: (118-164)/(59-84) 118/59  SpO2:  [92 %-97 %] 92 %    Physical Exam   Constitutional: He is oriented to person, place, and time. He appears well-developed and well-nourished.   HENT:   Head: Normocephalic and atraumatic.   Mouth/Throat: No oropharyngeal exudate.   Eyes: Conjunctivae are normal. No scleral icterus.   Neck: Normal range of motion. Neck supple. No tracheal deviation present. No thyromegaly present.   Cardiovascular: Normal rate and regular  rhythm.    Pulmonary/Chest: Effort normal and breath sounds normal. No respiratory distress. He has no wheezes.   Abdominal: Soft. Bowel sounds are normal. He exhibits no distension. There is no tenderness.   Musculoskeletal: He exhibits no edema.   Neurological: He is alert and oriented to person, place, and time.   Skin: Skin is warm and dry.   Nursing note and vitals reviewed.      Fluids    Intake/Output Summary (Last 24 hours) at 02/26/19 1450  Last data filed at 02/26/19 1300   Gross per 24 hour   Intake             1590 ml   Output             1625 ml   Net              -35 ml       Laboratory  Recent Labs      02/24/19   1639   WBC  18.3*   RBC  4.22*   HEMOGLOBIN  11.3*   HEMATOCRIT  37.0*   MCV  87.7   MCH  26.8*   MCHC  30.5*   RDW  61.9*   PLATELETCT  356   MPV  9.6     Recent Labs      02/24/19   1639   SODIUM  140   POTASSIUM  3.9   CHLORIDE  100   CO2  29   GLUCOSE  130*   BUN  19   CREATININE  0.57   CALCIUM  8.6                   Imaging  CT-ABDOMEN-PELVIS WITH   Final Result      No evidence of right perinephric hematoma or hydronephrosis status post right renal biopsy.      Clot identified in the urinary bladder.      Anasarca.      Distal esophageal mass with lymphadenopathy.      CT-NEEDLE BX-RENAL   Final Result      Successful CT-guided core biopsy of right renal mass.      CT-ABDOMEN-PELVIS WITH   Final Result      1.  Again seen thickening of the distal esophagus likely related to the patient's known esophageal cancer in that area. There is extensive soft tissue mass/adenopathy within the gastrohepatic, periportal, and retroperitoneal regions which does encase the    celiac and superior mesenteric arteries. This is not significantly changed over short interval follow-up.      2.  Again seen large right renal mass which measures 9 x 7 cm in size consistent with either renal cell carcinoma or metastatic disease. There is again seen likely some invasion of the right renal vein.      3.   Emphysematous and bullous change of the lungs with bibasilar atelectasis and small bilateral pleural effusions.      4.  Moderate constipation.      DX-CHEST-2 VIEWS   Final Result         1.  Hazy bilateral lung base infiltrates, greater on the left, new since prior.   2.  Small bilateral pleural effusions.   3.  Hyperexpansion of lungs favors changes of COPD.      US-EXTREMITY VENOUS UPPER UNILAT LEFT   Final Result      CT-NEEDLE BX-RENAL    (Results Pending)        Assessment/Plan  * Esophageal cancer (HCC)- (present on admission)   Assessment & Plan    Laparoscopic G-tube placement  Tube feeding  Pain control.     Renal cell carcinoma of right kidney (HCC)- (present on admission)   Assessment & Plan    Urology to discuss plan of care with Oncology, possible surgery this week if ok with onco.      Hematuria- (present on admission)   Assessment & Plan    Valdes, irrigate clots prn.  Holding lovenox until ok with urology.      Delirium   Assessment & Plan    Avoid benzodiazepines and anticholinergics  Frequent orientation  Avoid early morning labs  Avoid vital signs during sleep  Stable.      Hypomagnesemia- (present on admission)   Assessment & Plan    follow level.     Pneumonitis- (present on admission)   Assessment & Plan    IV Zosyn, will check procalcitonin due to leukocytosis.   Sputum cx +pseudomonas     Essential hypertension- (present on admission)   Assessment & Plan    Amlodipine     Leukocytosis- (present on admission)   Assessment & Plan    Probably reactive continue monitoring no fever.      Dysphagia- (present on admission)   Assessment & Plan    Tube feeding  SLP to follow     Acute deep vein thrombosis (DVT) of left upper extremity (HCC)- (present on admission)   Assessment & Plan    2/18 US showed distal left subclavian to mid  brachial vein, lovenox held for possible surgery.      Hypokalemia- (present on admission)   Assessment & Plan    Follow BMP.     Advanced care planning/counseling  discussion- (present on admission)   Assessment & Plan    Palliative care consulted.     Constipation due to pain medication therapy- (present on admission)   Assessment & Plan    Bowel protocol     Cancer associated pain- (present on admission)   Assessment & Plan    Fentanyl, Oxycodone  IV Dilaudid.  Stable.      Malignant cachexia (HCC)- (present on admission)   Assessment & Plan    Nutrition consult     COPD (chronic obstructive pulmonary disease) (HCC)- (present on admission)   Assessment & Plan    RT protocol     Tobacco abuse- (present on admission)   Assessment & Plan    Needs to quit.           VTE prophylaxis: SCDs

## 2019-02-26 NOTE — DIETARY
"Nutrition support weekly update:  Day 8 of admit.  Current TF via G-tube: Fibersource HN, goal rate 65 ml/hr, providing 1872 kcals, 84 grams protein, 1264 mL free water.  -TF tolerated well and mets estimated needs at this time  -patient having abdominal pain, but this is not related to TF  -pt reports last BM today - liquid with \"globules\" mixed in, per patient report.   -free water flushes with medication, per RN    Assessment:  Weight 63.5 kg - last weight per stand up scale recorded on 2/18     Evaluation:   1. Labs reviewed.   2. Meds: Dulcolax, Miralax  3. +BM today, pt states   4. No pressure ulcers noted per chart review.       Malnutrition risk: Patient with severe malnutrition in the context of chronic disease related malnutrition r/t hypermetabolic disease state, as evidenced by severe muscle and fat loss, need for nutrition support solely.        Recommendations/Plan:  1. Continue TF formula and rate; no changes at this time.  2. Fluids per MD.   3. Please weigh patient using stand up scale.            "

## 2019-02-26 NOTE — PROGRESS NOTES
Assumed care at 1845. Pt resting in bed. A&ox 4  Forgetful and needed reoriented at times  Peg tube in place with fibersource running at 65ml/hr  PICC to LUE. no flush or meds through  NPO for now  Valdes in place with bloody output. Irrigate as needed. No clots  O2 @ 2.5LNC  Pain controlled with oxy 15mg prn  +loose BM tonight  Call light within reach. Hourly rounding in place

## 2019-02-26 NOTE — CARE PLAN
Problem: Safety  Goal: Will remain free from injury  Updated about POC. Reinforce call light use. Pt acknowledged understanding    Problem: Urinary Elimination:  Goal: Ability to reestablish a normal urinary elimination pattern will improve  Valdes in place. Adequate output. No clots noted

## 2019-02-26 NOTE — CARE PLAN
Problem: Knowledge Deficit  Goal: Knowledge of disease process/condition, treatment plan, diagnostic tests, and medications will improve    Intervention: Explain information regarding disease process/condition, treatment plan, diagnostic tests, and medications and document in education  Patient updated on plan of care, questions asked and answered, will continue to update when necessary      Problem: Pain Management  Goal: Pain level will decrease to patient's comfort goal  Outcome: PROGRESSING AS EXPECTED    Intervention: Follow pain managment plan developed in collaboration with patient and Interdisciplinary Team  Patient has PRN PO and IV pain medication available for breakthrough pain. Patient needs medications frequently to make pain tolerable

## 2019-02-26 NOTE — PROGRESS NOTES
"Urology Progress Note    S: Seen and examined.  Denies pain. Valdes in place and draining well.  Denies hematuria.     O:   Blood pressure 137/84, pulse 86, temperature 37.1 °C (98.7 °F), temperature source Temporal, resp. rate 18, height 1.676 m (5' 6\"), weight 63.5 kg (139 lb 15.9 oz), SpO2 95 %.  Recent Labs      02/24/19   1639   SODIUM  140   POTASSIUM  3.9   CHLORIDE  100   CO2  29   GLUCOSE  130*   BUN  19   CREATININE  0.57   CALCIUM  8.6     Recent Labs      02/24/19   1639   WBC  18.3*   RBC  4.22*   HEMOGLOBIN  11.3*   HEMATOCRIT  37.0*   MCV  87.7   MCH  26.8*   MCHC  30.5*   RDW  61.9*   PLATELETCT  356   MPV  9.6         Intake/Output Summary (Last 24 hours) at 02/25/19 0929  Last data filed at 02/25/19 0837   Gross per 24 hour   Intake             1200 ml   Output             1700 ml   Net             -500 ml       Exam:  Abdomen soft, no TTP.     Urine: Valdes draining well with clear output    A/P:    Active Hospital Problems    Diagnosis   • Esophageal cancer (HCC) [C15.9]     Priority: High   • Renal cell carcinoma of right kidney (HCC) [C64.1]     Priority: High   • Hematuria [R31.9]     Priority: Medium   • Delirium [R41.0]     Priority: Medium   • Pneumonitis [J18.9]     Priority: Medium   • Hypomagnesemia [E83.42]     Priority: Medium   • Acute deep vein thrombosis (DVT) of left upper extremity (HCC) [I82.622]     Priority: Medium   • Dysphagia [R13.10]     Priority: Medium   • Leukocytosis [D72.829]     Priority: Medium   • Essential hypertension [I10]     Priority: Medium   • Advanced care planning/counseling discussion [Z71.89]     Priority: Medium   • Hypokalemia [E87.6]     Priority: Medium   • Cancer associated pain [G89.3]     Priority: Medium   • Constipation due to pain medication therapy [K59.03]     Priority: Medium   • COPD (chronic obstructive pulmonary disease) (HCC) [J44.9]     Priority: Medium   • Malignant cachexia (HCC) [R64]     Priority: Medium   • Tobacco abuse [Z72.0]     " Priority: Low       Plan:  - monitor urinary output  - monitor for blood clots; perform manual irrigation of lopez catheter as necessary.  - Oncology to review case, make recommendations on timing of treatment of esophageal and renal cancers.  Can perform nephrectomy this week if desired -- pending recommendations from Med Onc team.  - will follow

## 2019-02-27 NOTE — CARE PLAN
Problem: Safety  Goal: Will remain free from falls  Outcome: PROGRESSING AS EXPECTED  Bed locked and in lowest position, Non-slid slippers on.  Call light and belongings are within reach.    Problem: Pain Management  Goal: Pain level will decrease to patient's comfort goal  Outcome: PROGRESSING AS EXPECTED  Will encourage pt to ambulate, Will medicate per MAR, and will provide non-pharmacologic pain relief measures.

## 2019-02-27 NOTE — PROGRESS NOTES
Bedside report received.  Assessment complete.  A&O x , periodically forgetful. Patient calls appropriately.  Patient up with standby to one person assist, with FWW.   Patient has 6/10 pain. Medicated per MAR  Denies N&V. Pt. Strict NPO.  G-J tube in place, Area is clean, dry, and intact. Running Fibersource HN @ 65 mL/hr (Goal).  + void, pt has urology placed, + flatus, + BM.  SCD's off, pt is up in chair.  Patient pleasant with staff.  Review plan with of care with patient. Call light and personal belongings with in reach. Hourly rounding in place. All needs met at this time.

## 2019-02-27 NOTE — CARE PLAN
Problem: Pain Management  Goal: Pain level will decrease to patient's comfort goal  Outcome: PROGRESSING AS EXPECTED    Intervention: Follow pain managment plan developed in collaboration with patient and Interdisciplinary Team  Patient has PRN PO and IV medications available for pain management. Patient asks for them frequently but states they adequately control his pain. Will continue to monitor and intervene when necessary      Problem: Mobility  Goal: Risk for activity intolerance will decrease  Outcome: PROGRESSING AS EXPECTED    Intervention: Encourage patient to increase activity level in collaboration with Interdisciplinary Team  MD, CNA, and RN all encouraging patient to ambulate more frequently. Patient sits and stands very often with RN. Patient ambulating 1-2 times a day, takes extra motivation from family members as well

## 2019-02-27 NOTE — PROGRESS NOTES
[facility] Medicine Daily Progress Note    Date of Service  2/27/2019    Chief Complaint  73 y.o. male admitted 2/18/2019 with esophageal cancer.    Hospital Course  Admitted with esophageal cancer, dysphagia, laparoscopic G-tube placed for nutrition, noted to have DVT left upper extremity, right renal mass, transferred for IR guided biopsy    Interval Problem Update  Patient is resting in chair, feels ok no new complains, no hematuria today, pain is stable, not in distress, continue holding lovenox for now until cleared by urologist, pending oncology eval. Possible surgery this week.   2/27: Sitting up in bed comfortably.  Pain is fairly controlled.  Discussed with oncology on-call Dr. Arianna Sanchez.  Apparently Dr. Page from oncology already spoke to urology and recommended to proceed with nephrectomy.  Consultants/Specialty  Palliative care  Urology  ID  Oncology    Code Status  DNR/DNI    Disposition  TBD    Review of Systems  Review of Systems   Constitutional: Negative for chills and fever.   HENT: Negative for ear pain, hearing loss, nosebleeds and tinnitus.    Eyes: Negative for blurred vision and double vision.   Respiratory: Negative for cough, hemoptysis and sputum production.    Cardiovascular: Negative for chest pain, palpitations and orthopnea.   Gastrointestinal: Positive for abdominal pain. Negative for heartburn, nausea and vomiting.   Genitourinary: Negative for dysuria, frequency and urgency.   Musculoskeletal: Negative for myalgias and neck pain.   Skin: Negative for rash.   Neurological: Positive for weakness. Negative for dizziness, tingling and headaches.   Endo/Heme/Allergies: Does not bruise/bleed easily.   Psychiatric/Behavioral: Negative for depression, substance abuse and suicidal ideas. The patient is not nervous/anxious.         Physical Exam  Temp:  [36.6 °C (97.8 °F)-36.8 °C (98.2 °F)] 36.8 °C (98.2 °F)  Pulse:  [74-87] 83  Resp:  [18] 18  BP: (125-152)/(61-64) 136/62  SpO2:  [92 %-95  %] 95 %    Physical Exam   Constitutional: He is oriented to person, place, and time. No distress.   HENT:   Head: Normocephalic and atraumatic.   Mouth/Throat: No oropharyngeal exudate.   Eyes: Conjunctivae are normal. Right eye exhibits no discharge. Left eye exhibits no discharge.   Neck: Normal range of motion. Neck supple. No tracheal deviation present. No thyromegaly present.   Cardiovascular: Normal rate and regular rhythm.  Exam reveals no gallop and no friction rub.    Pulmonary/Chest: Effort normal and breath sounds normal. No respiratory distress.   Abdominal: Soft. Bowel sounds are normal. He exhibits no distension. There is no tenderness. There is no rebound.   Musculoskeletal: He exhibits no edema or deformity.   Neurological: He is alert and oriented to person, place, and time.   Skin: Skin is warm and dry. He is not diaphoretic.   Psychiatric: He is withdrawn.   Nursing note and vitals reviewed.      Fluids    Intake/Output Summary (Last 24 hours) at 02/27/19 1202  Last data filed at 02/27/19 1100   Gross per 24 hour   Intake             1240 ml   Output             2225 ml   Net             -985 ml       Laboratory  Recent Labs      02/24/19   1639   WBC  18.3*   RBC  4.22*   HEMOGLOBIN  11.3*   HEMATOCRIT  37.0*   MCV  87.7   MCH  26.8*   MCHC  30.5*   RDW  61.9*   PLATELETCT  356   MPV  9.6     Recent Labs      02/24/19   1639   SODIUM  140   POTASSIUM  3.9   CHLORIDE  100   CO2  29   GLUCOSE  130*   BUN  19   CREATININE  0.57   CALCIUM  8.6                   Imaging  CT-ABDOMEN-PELVIS WITH   Final Result      No evidence of right perinephric hematoma or hydronephrosis status post right renal biopsy.      Clot identified in the urinary bladder.      Anasarca.      Distal esophageal mass with lymphadenopathy.      CT-NEEDLE BX-RENAL   Final Result      Successful CT-guided core biopsy of right renal mass.      CT-ABDOMEN-PELVIS WITH   Final Result      1.  Again seen thickening of the distal  esophagus likely related to the patient's known esophageal cancer in that area. There is extensive soft tissue mass/adenopathy within the gastrohepatic, periportal, and retroperitoneal regions which does encase the    celiac and superior mesenteric arteries. This is not significantly changed over short interval follow-up.      2.  Again seen large right renal mass which measures 9 x 7 cm in size consistent with either renal cell carcinoma or metastatic disease. There is again seen likely some invasion of the right renal vein.      3.  Emphysematous and bullous change of the lungs with bibasilar atelectasis and small bilateral pleural effusions.      4.  Moderate constipation.      DX-CHEST-2 VIEWS   Final Result         1.  Hazy bilateral lung base infiltrates, greater on the left, new since prior.   2.  Small bilateral pleural effusions.   3.  Hyperexpansion of lungs favors changes of COPD.      US-EXTREMITY VENOUS UPPER UNILAT LEFT   Final Result      CT-NEEDLE BX-RENAL    (Results Pending)        Assessment/Plan  * Esophageal cancer (HCC)- (present on admission)   Assessment & Plan    Laparoscopic G-tube placement  Tube feeding  Pain control.     Renal cell carcinoma of right kidney (HCC)- (present on admission)   Assessment & Plan    With gastrohepatic, periportal, aortocaval and retroperitoneal raleigh metastases.  Urology to discuss plan of care with Oncology, possible surgery this week if ok with onco.   Awaiting onc recommendations.   2/27: Apparently Dr. Page from oncology has spoken to urology and recommended to proceed with nephrectomy.  Urology are aware.     Hematuria- (present on admission)   Assessment & Plan    Valdes, irrigate clots prn.  Holding lovenox until ok with urology.      Delirium   Assessment & Plan    Avoid benzodiazepines and anticholinergics  Frequent orientation  Avoid early morning labs  Avoid vital signs during sleep  Stable.      Hypomagnesemia- (present on admission)   Assessment &  Plan    follow level.     Pneumonitis- (present on admission)   Assessment & Plan    IV Zosyn, will check procalcitonin due to leukocytosis.   Sputum cx +pseudomonas  ID following.  Antibiotics through 2/27/2019.     Essential hypertension- (present on admission)   Assessment & Plan    Amlodipine     Leukocytosis- (present on admission)   Assessment & Plan    Probably reactive continue monitoring no fever.      Dysphagia- (present on admission)   Assessment & Plan    Tube feeding  SLP to follow     Acute deep vein thrombosis (DVT) of left upper extremity (HCC)- (present on admission)   Assessment & Plan    2/18 US showed distal left subclavian to mid  brachial vein, lovenox held for possible surgery.      Hypokalemia- (present on admission)   Assessment & Plan    Follow BMP.     Advanced care planning/counseling discussion- (present on admission)   Assessment & Plan    Palliative care consulted.     Constipation due to pain medication therapy- (present on admission)   Assessment & Plan    Bowel protocol     Cancer associated pain- (present on admission)   Assessment & Plan    Fentanyl, Oxycodone  IV Dilaudid.  Stable.      Malignant cachexia (HCC)- (present on admission)   Assessment & Plan    Nutrition consult     COPD (chronic obstructive pulmonary disease) (HCC)- (present on admission)   Assessment & Plan    RT protocol     Tobacco abuse- (present on admission)   Assessment & Plan    Needs to quit.      Plan of care discussed with multidisciplinary team during rounds.    VTE prophylaxis: SCDs

## 2019-02-27 NOTE — PROGRESS NOTES
"Urology Progress Note    S: Seen and examined.  Denies pain. Valdes in place and draining well.  No hematuria, no blood clots.     O:   Blood pressure 136/62, pulse 83, temperature 36.8 °C (98.2 °F), temperature source Temporal, resp. rate 18, height 1.676 m (5' 6\"), weight 63.5 kg (139 lb 15.9 oz), SpO2 95 %.  Recent Labs      02/24/19   1639   SODIUM  140   POTASSIUM  3.9   CHLORIDE  100   CO2  29   GLUCOSE  130*   BUN  19   CREATININE  0.57   CALCIUM  8.6     Recent Labs      02/24/19   1639   WBC  18.3*   RBC  4.22*   HEMOGLOBIN  11.3*   HEMATOCRIT  37.0*   MCV  87.7   MCH  26.8*   MCHC  30.5*   RDW  61.9*   PLATELETCT  356   MPV  9.6         Intake/Output Summary (Last 24 hours) at 02/25/19 0929  Last data filed at 02/25/19 0837   Gross per 24 hour   Intake             1200 ml   Output             1700 ml   Net             -500 ml       Exam:  Abdomen soft, no TTP.     Urine: Valdes draining well with clear output    A/P:    Active Hospital Problems    Diagnosis   • Esophageal cancer (HCC) [C15.9]     Priority: High   • Renal cell carcinoma of right kidney (HCC) [C64.1]     Priority: High   • Hematuria [R31.9]     Priority: Medium   • Delirium [R41.0]     Priority: Medium   • Pneumonitis [J18.9]     Priority: Medium   • Hypomagnesemia [E83.42]     Priority: Medium   • Acute deep vein thrombosis (DVT) of left upper extremity (HCC) [I82.622]     Priority: Medium   • Dysphagia [R13.10]     Priority: Medium   • Leukocytosis [D72.829]     Priority: Medium   • Essential hypertension [I10]     Priority: Medium   • Advanced care planning/counseling discussion [Z71.89]     Priority: Medium   • Hypokalemia [E87.6]     Priority: Medium   • Cancer associated pain [G89.3]     Priority: Medium   • Constipation due to pain medication therapy [K59.03]     Priority: Medium   • COPD (chronic obstructive pulmonary disease) (HCC) [J44.9]     Priority: Medium   • Malignant cachexia (HCC) [R64]     Priority: Medium   • Tobacco abuse " [Z72.0]     Priority: Low       Plan:  - monitor urinary output  - monitor for blood clots in urine output  - Pending onc review, recs.  Management such as nephrectomy pending recs from oncology.  - will follow

## 2019-02-27 NOTE — PROGRESS NOTES
Bedside report received.  Assessment complete.  A&O x 4. Patient calls appropriately.  Patient up with one assist and FWW.   Patient has 7/10 pain. Pain medication given frequently  Denies N&V. Tolerating NPO, tube feeding fibersource at 65ml/h diet.  Surgical peg tube to abdomen is MALIK, CDI.  + void via lopez- hematuria present, + flatus, + BM in AM, NOC RN stated some blood present- will update MD.  Patient denies SOB.  Patient in pleasant mood, biggest complaint is pain, will continue to monitor and motivate to ambulate.  Review plan with of care with patient. Call light and personal belongings with in reach. Hourly rounding in place. All needs met at this time.

## 2019-02-28 NOTE — PROGRESS NOTES
Assumed care of pt for remainder of shift.  AAOx4.  Family present at bedside.  Peg tube to abdomen, both G and J tube clamped.  Strict NPO for procedure this afternoon.  LBM 2/28, + flatus.  Valdes in place, draining yellow urine with sediment noted.  POC reviewed with pt.  Call light within reach, pt educated to call for assistance as needed.  Hourly rounding in place.

## 2019-02-28 NOTE — PROGRESS NOTES
"Pt woke up in a confused state.   After discussing pain levels and comfort, pt asks \"What's going on out there.\"  This RN further questioned the pt. In what he was referring to.  Pt states, \"Isn't it like the walking dead out there? Aren't there zombies all over? Where am I?\"  Pt re-oriented. Pt. Continuing to state \"I just don't get why I'm having these dreams. They feel so real.\"  Pt reassured and reoriented.   Pt. Now resting in bed comfortably.   All needs met at this time.   "

## 2019-02-28 NOTE — PROGRESS NOTES
[facility] Medicine Daily Progress Note    Date of Service  2/28/2019    Chief Complaint  73 y.o. male admitted 2/18/2019 with esophageal cancer.    Hospital Course  Admitted with esophageal cancer, dysphagia, laparoscopic G-tube placed for nutrition, noted to have DVT left upper extremity, right renal mass, transferred for IR guided biopsy    Interval Problem Update  Patient is resting in chair, feels ok no new complains, no hematuria today, pain is stable, not in distress, continue holding lovenox for now until cleared by urologist, pending oncology eval. Possible surgery this week.   2/27: Sitting up in bed comfortably.  Pain is fairly controlled.  Discussed with oncology on-call Dr. Arianna Sanchez.  Apparently Dr. Page from oncology already spoke to urology and recommended to proceed with nephrectomy.  2/28: Sitting up in chair comfortably.  Pain fairly controlled.  Scheduled for right nephrectomy around 4 PM.  No new issues overnight.  Consultants/Specialty  Palliative care  Urology  ID  Oncology    Code Status  DNR/DNI    Disposition  TBD    Review of Systems  Review of Systems   Constitutional: Positive for weight loss. Negative for chills and fever.   HENT: Negative for ear pain, hearing loss and tinnitus.    Eyes: Negative for blurred vision, double vision and photophobia.   Respiratory: Negative for cough, hemoptysis, sputum production and shortness of breath.    Cardiovascular: Negative for chest pain, palpitations, orthopnea and claudication.   Gastrointestinal: Positive for abdominal pain. Negative for diarrhea, heartburn, nausea and vomiting.   Genitourinary: Negative for dysuria, frequency, hematuria and urgency.   Musculoskeletal: Negative for back pain, myalgias and neck pain.   Skin: Negative for rash.   Neurological: Positive for weakness. Negative for dizziness, tingling, tremors and headaches.   Endo/Heme/Allergies: Does not bruise/bleed easily.   Psychiatric/Behavioral: Negative for depression,  substance abuse and suicidal ideas.        Physical Exam  Temp:  [35.8 °C (96.5 °F)-36.9 °C (98.4 °F)] 36.9 °C (98.4 °F)  Pulse:  [67-88] 88  Resp:  [16-20] 20  BP: (120-153)/(54-73) 141/73  SpO2:  [90 %-98 %] 90 %    Physical Exam   Constitutional: He is oriented to person, place, and time. He appears cachectic. He appears ill. No distress.   Thin  Chronically ill looking   HENT:   Head: Normocephalic and atraumatic.   Mouth/Throat: No oropharyngeal exudate.   Eyes: Conjunctivae are normal. No scleral icterus.   Neck: Normal range of motion. Neck supple. No thyromegaly present.   Cardiovascular: Normal rate and regular rhythm.  Exam reveals no gallop and no friction rub.    Pulmonary/Chest: Effort normal. No respiratory distress. He has no wheezes. He has no rales.   Abdominal: Soft. Bowel sounds are normal. He exhibits no distension. There is no tenderness.   Musculoskeletal: He exhibits no edema or deformity.   Neurological: He is alert and oriented to person, place, and time.   Skin: Skin is warm and dry. He is not diaphoretic.   Psychiatric: He is withdrawn.   Nursing note and vitals reviewed.      Fluids    Intake/Output Summary (Last 24 hours) at 02/28/19 1436  Last data filed at 02/28/19 1300   Gross per 24 hour   Intake              780 ml   Output             2400 ml   Net            -1620 ml       Laboratory                        Imaging  CT-ABDOMEN-PELVIS WITH   Final Result      No evidence of right perinephric hematoma or hydronephrosis status post right renal biopsy.      Clot identified in the urinary bladder.      Anasarca.      Distal esophageal mass with lymphadenopathy.      CT-NEEDLE BX-RENAL   Final Result      Successful CT-guided core biopsy of right renal mass.      CT-ABDOMEN-PELVIS WITH   Final Result      1.  Again seen thickening of the distal esophagus likely related to the patient's known esophageal cancer in that area. There is extensive soft tissue mass/adenopathy within the  gastrohepatic, periportal, and retroperitoneal regions which does encase the    celiac and superior mesenteric arteries. This is not significantly changed over short interval follow-up.      2.  Again seen large right renal mass which measures 9 x 7 cm in size consistent with either renal cell carcinoma or metastatic disease. There is again seen likely some invasion of the right renal vein.      3.  Emphysematous and bullous change of the lungs with bibasilar atelectasis and small bilateral pleural effusions.      4.  Moderate constipation.      DX-CHEST-2 VIEWS   Final Result         1.  Hazy bilateral lung base infiltrates, greater on the left, new since prior.   2.  Small bilateral pleural effusions.   3.  Hyperexpansion of lungs favors changes of COPD.      US-EXTREMITY VENOUS UPPER UNILAT LEFT   Final Result      CT-NEEDLE BX-RENAL    (Results Pending)        Assessment/Plan  * Esophageal cancer (HCC)- (present on admission)   Assessment & Plan    Laparoscopic G-tube placement  Tube feeding  Pain control.  Palliative following.     Renal cell carcinoma of right kidney (HCC)- (present on admission)   Assessment & Plan    With gastrohepatic, periportal, aortocaval and retroperitoneal raleigh metastases.  Urology to discuss plan of care with Oncology, possible surgery this week if ok with onco.   Awaiting onc recommendations.   Palliative following.  2/27: Apparently Dr. Page from oncology has spoken to urology and recommended to proceed with nephrectomy.  Urology are aware.  2/28: Scheduled for right nephrectomy at 4 PM today.     Hematuria- (present on admission)   Assessment & Plan    Valdes, irrigate clots prn.  Holding lovenox until ok with urology.      Delirium   Assessment & Plan    Avoid benzodiazepines and anticholinergics  Frequent orientation  Avoid early morning labs  Avoid vital signs during sleep  Stable.      Hypomagnesemia- (present on admission)   Assessment & Plan    follow level.     Pneumonitis-  (present on admission)   Assessment & Plan    IV Zosyn, will check procalcitonin due to leukocytosis.   Sputum cx +pseudomonas  ID following.  Antibiotics through 2/27/2019.     Essential hypertension- (present on admission)   Assessment & Plan    Amlodipine     Leukocytosis- (present on admission)   Assessment & Plan    Probably reactive continue monitoring no fever.      Dysphagia- (present on admission)   Assessment & Plan    Tube feeding  SLP to follow     Acute deep vein thrombosis (DVT) of left upper extremity (HCC)- (present on admission)   Assessment & Plan    2/18 US showed distal left subclavian to mid  brachial vein, lovenox held for possible surgery.      Hypokalemia- (present on admission)   Assessment & Plan    Follow BMP.     Advanced care planning/counseling discussion- (present on admission)   Assessment & Plan    Palliative care consulted.     Constipation due to pain medication therapy- (present on admission)   Assessment & Plan    Bowel protocol     Cancer associated pain- (present on admission)   Assessment & Plan    Fentanyl, Oxycodone  IV Dilaudid.  Stable.      Malignant cachexia (HCC)- (present on admission)   Assessment & Plan    Severe malnutrition.  Dietitian following.     COPD (chronic obstructive pulmonary disease) (HCC)- (present on admission)   Assessment & Plan    RT protocol     Tobacco abuse- (present on admission)   Assessment & Plan    Needs to quit.      Plan of care discussed with multidisciplinary team during rounds.    VTE prophylaxis: SCDs

## 2019-02-28 NOTE — CARE PLAN
Problem: Safety  Goal: Will remain free from injury  Outcome: PROGRESSING AS EXPECTED  Will educate pt. On need to utilize call light for assistance and before getting up.   Bed locked and in lowest position, Non-slid slippers on, bed alarm is on.   Call light is within reach.     Problem: Pain Management  Goal: Pain level will decrease to patient's comfort goal  Outcome: PROGRESSING AS EXPECTED  Will encourage pt to ambulate, Will medicate per MAR, and will provide non-pharmacologic pain relief measures.

## 2019-02-28 NOTE — DOCUMENTATION QUERY
[facility]                                                                         Query Response Note      PATIENT:               ROHITH HUNG  ACCT #:                  [id number]  MRN:                       [id number]  :                       1945  ADMIT DATE:       2019 5:06 PM  DISCH DATE:          RESPONDING  PROVIDER #:        [id number]           RESPONSE TEXT:    Agree with Severe Malnutrition    QUERY TEXT:    Malnutrition Severity [id number]    Severe malnutrition is documented in the  Dietary Progress Note.  Can you clarify if you agree with this assessment?     NOTE:  If an appropriate response is not listed below, please respond with a new note.    The patient's Clinical Indicators include:  Per  Dietary Progress Note: pt w/ severe malnutrition in the context of chronic disease related malnutrition r/t hypermetabolic disease state, as evidenced by severe muscle and fat loss, need for nutrition support soley.    Per  Dietary Progress Note: weight loss of 13% in one year, fat loss evidenced by hollowness / depression, and loose skin in orbital region  Malignant cachexia, BMI 22.6   Albumin 2.0, total protein 4.6  Risk Factors: esophageal cancer, dysphagia  Treatment: g tube, tube feeds, IVF, weights  Query created by: Cheyenne Addison on 2019 7:41 AM        Electronically signed by:  LISA MISTRY MD 2019 2:31 PM

## 2019-02-28 NOTE — PROGRESS NOTES
Bedside report received.  Assessment complete.  A&O x 4. Patient calls appropriately.  Patient up with standby assist, and FWW. Bed alarm on.   Patient has 5/10 pain. Provided extra blankets and heat packs.   Denies N&V. Pt strict NPO.  G-J tube in place, Area is clean, dry, and intact. Running Fibersource HN @ 65 mL/hr (Goal), Tube feed to be stopped at 0000 for surgery 2/28.  + void, urology placed coude catheter, + flatus, + BM, loose.  Patient denies SOB.  Patient up in chair, pleasant with staff.  Review plan with of care with patient. Call light and personal belongings with in reach. Hourly rounding in place. All needs met at this time.

## 2019-02-28 NOTE — PROGRESS NOTES
Bedside report received.  Assessment complete.  A&O x 4 this AM, stated he got confused last night and doesn't know why, but is feeling better this morning. Patient calls appropriately.  Patient up with one assist and FWW.   Patient has 7/10 pain, pain medication given  Denies N&V. Tolerating NPO diet, tube feeding turned off at midnight.  Surgery scheduled for today at 1615 for nephrectomy. PEG site on abdomen is MALIK, CDI  + void via lopez- very dark colored, + flatus, + BM.  Patient denies SOB.  SCD's on.  Patient nervous for procedure today, family will be at bedside, will motivate to get up and out of room.  Review plan with of care with patient. Call light and personal belongings with in reach. Hourly rounding in place. All needs met at this time.

## 2019-03-01 NOTE — THERAPY
"  Speech Language Therapy Clinical Swallow Evaluation completed.  Functional Status: Patient was seen for a clinical bedside swallow evaluation this date. Patient was admitted for n/v has a 7.5 pack smoking history, recently dx with esophageal cancer, R renal mass and has lost 80lbs over the past year. Patient currently s/p nephrectomy only cleared for clear liquids small amounts of PO. Patient upright in chair alert, oriented. Patient consumed PO trials of small amounts of ice chips and thin liquids via tsp. Patient with wet gurgly voice at baseline rales and rattles noted. Patient requiring oral suction to manage secretions. Patient demonstrated immediate and delayed coughing and throat clearing s/p all trials of PO. Use of oral suction was required to assist in full clearance.Patient is demonstrating overt clinical s/sx of aspiration and is at high risk for continued aspiration/aspiration PNA.  At this time recommend NPO and use of G-tube. Patient would benefit from a dx swallow study prior to any PO progression when deemed appropriate by primary SLP.   Recommendations - Diet:  NPO Gtube                           Strategies: to be assessed                          Medication Administration:    Plan of Care: Will benefit from Speech Therapy 3 times per week  Post-Acute Therapy: Discharge to a transitional care facility for continued skilled therapy services.    See \"Rehab Therapy-Acute\" Patient Summary Report for complete documentation.   "

## 2019-03-01 NOTE — PROGRESS NOTES
Assumed care of pt.   AAOx4.  C/o pain at 8/10, medicated per MAR.  Surgical lap sites to right lower abdomen.   Peg tube to abdomen, G tube clamped.  J tube to down drain bag.   To clarify restarting tube feeding today.   BLE 2+ edema noted.  Per nursing communication pt allowed sips.   LBM 2/28, + flatus.  Valdes in place, draining yellow urine with sediment noted.  PICC LUE with clot, not using at this time, awaiting POC.   POC reviewed with pt.  Call light within reach, pt educated to call for assistance as needed.  Hourly rounding in place.

## 2019-03-01 NOTE — OP REPORT
DATE OF SERVICE:  02/28/2019    PREOPERATIVE DIAGNOSES:  1.  Renal cell carcinoma of the right kidney.  2.  Metastatic esophageal carcinoma.    POSTOPERATIVE DIAGNOSES:  1.  Renal cell carcinoma of the right kidney.  2.  Metastatic esophageal carcinoma.    PROCEDURE:  1.  Right radical nephrectomy.  2.  Biopsy of peritoneal implant mass.    SURGEON:  Brandon Peña MD    FIRST ASSISTANT:  Edmond Sesay MD    ANESTHESIOLOGIST:  Emma Osman MD    TYPE OF ANESTHESIA:  General endotracheal tube.    INDICATIONS:  This is a 73-year-old male with a recently diagnosed esophageal   carcinoma was noted to have a right renal mass on staging imaging.  Biopsy   showed renal cell carcinoma.  Due to ongoing back pain and after discussion   with the medical oncology team with Dr. Wendy Page, right nephrectomy was   recommended.  He is taken for that procedure at this time.    FINDINGS:  There was a large retrocaval lymph node at the level of the renal   hilum.  There was a 2x2 cm peritoneal metastatic implant, poorly   differentiated carcinoma, likely esophageal in origin.    DESCRIPTION OF PROCEDURE:  The patient was identified in the holding area.  He   was taken to the operative suite.  General anesthesia was administered by Dr. Osman.  He was carefully positioned in the right lateral decubitus position   with an axillary roll in place.  Right arm supported on pillows.  The table   flexed and the patient secured to the table with tape and towels.  The   gastrostomy tube was opened to gravity drainage for the procedure.  Urethral   Valdes catheter was left in place.    After sterile prep and drape, the umbilicus was elevated between towel clamps.    A Veress needle was introduced into the peritoneal cavity and the peritoneal   cavity filled to 15 mmHg with CO2.  A robotic laparoscopic port sites were   marked as was the camera port site.  A 12 mm camera port was placed just   lateral and cephalad to the umbilicus.   After this, robotic ports were placed   in the right epigastrium in a subcostal location and in the right lower   quadrant.  A far right lower quadrant site was identified and I had to clear   the colon from this location.  Laparoscopic scissors were used to free the   colonic attachments to the side wall and sweep the colon down to allow   placement of that port site.  A 5 mm liver retractor port was placed in the   midline below the xiphoid and a 15 mm assist port was placed below the   umbilicus.  Robot was docked and the procedure was initiated by freeing a 2x2   cm adherent mass to the internal surface of the right lower quadrant abdominal   wall peritoneal surface.  This was carefully dissected away from the nearby   colon and submitted for pathologic analysis and returned poorly-differentiated   carcinoma.  This is likely additional metastatic esophageal cancer.    The colon was then mobilized throughout the ascending colon over to the   portion where the duodenum was kocherized medially; this exposed the vena cava   and retroperitoneum nicely.  The lateral surface of the cava was identified   at the upper aspect of the retroperitoneum with the renal vein identified.    Below the renal vein, the lateral surface of the vena cava was dissected and   the gonadal vein was left down with the vena cava.  I retracted Gerota's   fascia including the ureter laterally across the psoas developing the space   behind Gerota's fascia on the posterior body wall; this allowed me to retract   the kidney laterally and dissected further cephalad.  Inferior to the renal   vein and encompassing the renal artery, there was a large raleigh mass.    Dissection was carried through this raleigh mass identifying 2 main branches of   the renal artery which were clipped with Hem-o-Ananda clips, two on the smaller   inferior branch and three on the larger more cephalad branch.  Following this,   the renal vein was circumferentially dissected  free and divided with an   Endo-MADI stapler.    Lymphatics posterior to the renal hilum were clipped and divided between   Hem-o-Ananda clips.  The adrenal gland was spared and Gerota's fascia above the   kidney and below the adrenal divided with the Endo-MADI stapler.  Dissection   was carried laterally freeing the peritoneal surface anteriorly of Gerota's,   then dividing the fat with electrocautery.  Filmy attachments posteriorly were   bluntly swept free.  This left the specimen attached by the ureter and   inferiorly which was clipped and divided.  The most inferior aspect of the   Gerota's fascia fat was clipped and divided as well.    Specimen was then placed into a specimen sac.  The retroperitoneum was   irrigated and inspected.  No active bleeding was noted.  Evicel was placed   over the area of the lymphatic dissection around the renal hilum.    Ports were then removed under direct vision.  The most inferior lateral port   site was used to extract the specimen within the specimen sac.  This was done   through a muscle splitting incision.  The peritoneum was closed in that   location with a running 3-0 Vicryl.  The fascia closed with a running 0   Vicryl.  A local block was applied using 0.5% Marcaine in that location.    Subcutaneous tissues were irrigated and the skin in that location was closed   with a 4-0 Monocryl subcuticular stitch.  The 12 port and 15 port sites were   inspected and the fascia reapproximated with 0 Vicryl.  The skin at those   sites and the other port sites were then closed with Dermabond.    The patient was then awakened from anesthesia.  He was sent to recovery room   in stable condition.  He suffered no intraoperative complications.  Estimated   blood loss was approximately 100 mL.       ____________________________________     MD SORAYA Gresham / EFE    DD:  02/28/2019 20:06:52  DT:  03/01/2019 00:30:20    D#:  [id number]  Job#:  [id number]

## 2019-03-01 NOTE — PROGRESS NOTES
G tube to DD noted to have TF from J tube draining into it.  Dr. Tejeda paged to update.  Received orders to clamp G tube and continue TF through J tube.    Discussed LUE PICC with clot, still unknown POC at this time.  Continue to not use or flush.

## 2019-03-01 NOTE — OR NURSING
Oxycodone oral solution ordered, however order in place for PO ice chips only and to keep G-tube to gravity.   Urology service paged.   Call back received from Dr. Tyler.   Dr. Tyler states that Medications can be delivered via G-tube, clamp for 30 minutes and then place it back to gravity.

## 2019-03-01 NOTE — CARE PLAN
Problem: Safety  Goal: Will remain free from falls  Outcome: PROGRESSING AS EXPECTED  Bed locked and in lowest position.  Call light and personal belongings are within place.     Problem: Pain Management  Goal: Pain level will decrease to patient's comfort goal  Outcome: PROGRESSING AS EXPECTED  Will medicate per MAR, and will provide non-pharmacologic pain relief measures.

## 2019-03-01 NOTE — PROGRESS NOTES
2 RN skin check    6 surgical lap sites to RLQ, all dermabond, clean, dry, and intact.  G-J tube in LUQ.  Valdes in place.   Redness behind ears, oxygen tubing attached via, face stickers.  Redness on elbows, blanching.   Redness on sacrum, blanching.   Skin breakdown prevention measures in place.

## 2019-03-01 NOTE — OR SURGEON
Immediate Post OP Note    PreOp Diagnosis: renal cell ca    PostOp Diagnosis: same    Procedure(s):  NEPHRECTOMY ROBOTIC  Biopsy of peritoneal mass    Surgeon(s):  TYRA Tesfaye M.D.    Anesthesiologist/Type of Anesthesia:  Anesthesiologist: Emma Osman M.D./General    Surgical Staff:  Circulator: Des Espinoza R.N.  Scrub Person: West Christensen R.N.; Mattie Lopez Armour: Dary Kamara R.N.    Specimens removed if any:  ID Type Source Tests Collected by Time Destination   A : A. abdominal wall nodule Tissue Abdominal PATHOLOGY SPECIMEN Brandon Peña M.D. 2/28/2019 1826        Estimated Blood Loss: 100 cc    Findings: metastatic peritoneal implant - poorly diff carcinoma    Complications: none        2/28/2019 7:57 PM Brandon Peña M.D.

## 2019-03-01 NOTE — PROGRESS NOTES
".  Urology Progress Note    S: Seen and examined.  S/P right nephrectomy, biopsy of peritoneal mass.  Denies pain.  Valdes in place and draining well.     O:   Blood pressure 132/60, pulse 80, temperature 36.7 °C (98.1 °F), temperature source Temporal, resp. rate 17, height 1.676 m (5' 6\"), weight 62.8 kg (138 lb 7.2 oz), SpO2 96 %.              Intake/Output Summary (Last 24 hours) at 02/25/19 0929  Last data filed at 02/25/19 0837   Gross per 24 hour   Intake             1200 ml   Output             1700 ml   Net             -500 ml       Exam:  Abd: Abdomen soft, no TTP.  Incision sites C/D/I.   Urine: Valdes draining well with clear output    A/P:    Active Hospital Problems    Diagnosis   • Esophageal cancer (HCC) [C15.9]     Priority: High   • Renal cell carcinoma of right kidney (HCC) [C64.1]     Priority: High   • Hematuria [R31.9]     Priority: Medium   • Delirium [R41.0]     Priority: Medium   • Pneumonitis [J18.9]     Priority: Medium   • Hypomagnesemia [E83.42]     Priority: Medium   • Acute deep vein thrombosis (DVT) of left upper extremity (HCC) [I82.622]     Priority: Medium   • Dysphagia [R13.10]     Priority: Medium   • Leukocytosis [D72.829]     Priority: Medium   • Essential hypertension [I10]     Priority: Medium   • Advanced care planning/counseling discussion [Z71.89]     Priority: Medium   • Hypokalemia [E87.6]     Priority: Medium   • Cancer associated pain [G89.3]     Priority: Medium   • Constipation due to pain medication therapy [K59.03]     Priority: Medium   • COPD (chronic obstructive pulmonary disease) (HCC) [J44.9]     Priority: Medium   • Malignant cachexia (HCC) [R64]     Priority: Medium   • Tobacco abuse [Z72.0]     Priority: Low       Plan:  - monitor urinary output  - monitor pain control  - will follow  "

## 2019-03-01 NOTE — PROGRESS NOTES
Paged Dr. Tejeda to clarify restarting tube feed and clarify nursing communication regarding giving pt sips of clears.  Received orders to place SLP evaluation to determine safety of sipping clears and to restart Fibersource tube feeding at previous rate of 65.

## 2019-03-01 NOTE — OR NURSING
2008: Pt arrived from OR, monitors applied and report received from MD and RN. Pt alert with general confusion and reports not feeling well, however cannot describe why. Emotional support provided.   Eventually, pt began to report ABD and right arm pain. Pt medicated per anesthesia orders with relief.   2114: Oxycodone oral solution administered via G-tube and tube clamped. Will place back to gravity after 30 mins.   Pt resting comfortably.   2135: Report called to JANETTE Hewitt  Waiting for transport.

## 2019-03-01 NOTE — PROGRESS NOTES
Case discussed with Dr. Page, Medical Oncology, and pt and daughter. Dr. Page has requested right nephrectomy in anticipation of chemotherapy for esophageal cancer. Plan to proceed. Pt agrees with plan and consents to surgery.

## 2019-03-01 NOTE — PROGRESS NOTES
Assessment complete.  A&O x 4. Patient calls appropriately.  Patient has 0/10 pain.   Denies N&V.   6 RLQ surgical lap sites, all dermabond, and clean, dry, and intact  + void, urology placed lopez in place.  G-J tube to down drain bag.  SCD's on.  Patient in bed resting.  Review plan with of care with patient. Call light and personal belongings with in reach. Hourly rounding in place. All needs met at this time.

## 2019-03-02 NOTE — PROGRESS NOTES
[facility] Medicine Daily Progress Note    Date of Service  3/1/2019    Chief Complaint  73 y.o. male admitted 2/18/2019 with esophageal cancer.    Hospital Course  Admitted with esophageal cancer, dysphagia, laparoscopic G-tube placed for nutrition, noted to have DVT left upper extremity, right renal mass, transferred for IR guided biopsy    Interval Problem Update  Patient is resting in chair, feels ok no new complains, no hematuria today, pain is stable, not in distress, continue holding lovenox for now until cleared by urologist, pending oncology eval. Possible surgery this week.   2/27: Sitting up in bed comfortably.  Pain is fairly controlled.  Discussed with oncology on-call Dr. Arianna Sanchez.  Apparently Dr. Page from oncology already spoke to urology and recommended to proceed with nephrectomy.  2/28: Sitting up in chair comfortably.  Pain fairly controlled.  Scheduled for right nephrectomy around 4 PM.  No new issues overnight.  3/1: Patient had no acute overnight events and now is status post right nephrectomy.  Valdes catheter had no evidence of hematuria.  Found to be on 3 L of O2 and now weaning.  The pain is fairly controlled.  Start tube feeds through the J-tube and tolerating well.  Waiting on further oncology recommendations.    Consultants/Specialty  Palliative care  Urology  ID  Oncology    Code Status  DNR/DNI    Disposition  TBD    Review of Systems  Review of Systems   Constitutional: Positive for weight loss. Negative for chills and fever.   HENT: Negative for ear pain, hearing loss and tinnitus.    Eyes: Negative for blurred vision, double vision and photophobia.   Respiratory: Negative for cough, hemoptysis, sputum production and shortness of breath.    Cardiovascular: Negative for chest pain, palpitations, orthopnea and claudication.   Gastrointestinal: Positive for abdominal pain. Negative for diarrhea, heartburn, nausea and vomiting.   Genitourinary: Negative for dysuria, frequency,  hematuria and urgency.   Musculoskeletal: Negative for back pain, myalgias and neck pain.   Skin: Negative for rash.   Neurological: Positive for weakness. Negative for dizziness, tingling, tremors and headaches.   Endo/Heme/Allergies: Does not bruise/bleed easily.   Psychiatric/Behavioral: Negative for depression, substance abuse and suicidal ideas.        Physical Exam  Temp:  [36.1 °C (96.9 °F)-36.7 °C (98.1 °F)] 36.7 °C (98.1 °F)  Pulse:  [79-90] 80  Resp:  [10-21] 17  BP: (132-165)/(60-84) 132/60  SpO2:  [96 %-99 %] 96 %    Physical Exam   Constitutional: He is oriented to person, place, and time. He appears cachectic. He appears ill. No distress.   Thin  Chronically ill looking   HENT:   Head: Normocephalic and atraumatic.   Mouth/Throat: No oropharyngeal exudate.   Eyes: Conjunctivae are normal. No scleral icterus.   Neck: Normal range of motion. Neck supple. No thyromegaly present.   Cardiovascular: Normal rate and regular rhythm.  Exam reveals no gallop and no friction rub.    Pulmonary/Chest: Effort normal. No respiratory distress. He has no wheezes. He has no rales.   Abdominal: Soft. Bowel sounds are normal. He exhibits no distension. There is no tenderness.   Musculoskeletal: He exhibits no edema or deformity.   Neurological: He is alert and oriented to person, place, and time.   Skin: Skin is warm and dry. He is not diaphoretic.   Psychiatric: He is withdrawn.   Nursing note and vitals reviewed.      Fluids    Intake/Output Summary (Last 24 hours) at 03/01/19 1703  Last data filed at 03/01/19 1600   Gross per 24 hour   Intake             1810 ml   Output             3355 ml   Net            -1545 ml       Laboratory                        Imaging  CT-ABDOMEN-PELVIS WITH   Final Result      No evidence of right perinephric hematoma or hydronephrosis status post right renal biopsy.      Clot identified in the urinary bladder.      Anasarca.      Distal esophageal mass with lymphadenopathy.      CT-NEEDLE  BX-RENAL   Final Result      Successful CT-guided core biopsy of right renal mass.      CT-ABDOMEN-PELVIS WITH   Final Result      1.  Again seen thickening of the distal esophagus likely related to the patient's known esophageal cancer in that area. There is extensive soft tissue mass/adenopathy within the gastrohepatic, periportal, and retroperitoneal regions which does encase the    celiac and superior mesenteric arteries. This is not significantly changed over short interval follow-up.      2.  Again seen large right renal mass which measures 9 x 7 cm in size consistent with either renal cell carcinoma or metastatic disease. There is again seen likely some invasion of the right renal vein.      3.  Emphysematous and bullous change of the lungs with bibasilar atelectasis and small bilateral pleural effusions.      4.  Moderate constipation.      DX-CHEST-2 VIEWS   Final Result         1.  Hazy bilateral lung base infiltrates, greater on the left, new since prior.   2.  Small bilateral pleural effusions.   3.  Hyperexpansion of lungs favors changes of COPD.      US-EXTREMITY VENOUS UPPER UNILAT LEFT   Final Result      CT-NEEDLE BX-RENAL    (Results Pending)        Assessment/Plan  * Esophageal cancer (HCC)- (present on admission)   Assessment & Plan    Laparoscopic G-tube placement  Tube feeding  Pain control.  Palliative following.     Renal cell carcinoma of right kidney (HCC)- (present on admission)   Assessment & Plan    With gastrohepatic, periportal, aortocaval and retroperitoneal raleigh metastases.  Urology to discuss plan of care with Oncology, possible surgery this week if ok with onco.   Awaiting onc recommendations.   Palliative following.  2/27: Apparently Dr. Page from oncology has spoken to urology and recommended to proceed with nephrectomy.  Urology are aware.  2/28: Now S/P right nephrectomy     Hematuria- (present on admission)   Assessment & Plan    Valdes, irrigate clots prn.  Holding lovenox  until ok with urology.      Delirium   Assessment & Plan    Avoid benzodiazepines and anticholinergics  Frequent orientation  Avoid early morning labs  Avoid vital signs during sleep  Stable.      Hypomagnesemia- (present on admission)   Assessment & Plan    follow level.     Pneumonitis- (present on admission)   Assessment & Plan    IV Zosyn, will check procalcitonin due to leukocytosis.   Sputum cx +pseudomonas  ID following.  Antibiotics through 2/27/2019. completed     Essential hypertension- (present on admission)   Assessment & Plan    Amlodipine     Leukocytosis- (present on admission)   Assessment & Plan    Probably reactive continue monitoring no fever.      Dysphagia- (present on admission)   Assessment & Plan    Tube feeding  SLP to follow     Acute deep vein thrombosis (DVT) of left upper extremity (HCC)- (present on admission)   Assessment & Plan    2/18 US showed distal left subclavian to mid  brachial vein, lovenox held for possible surgery.      Hypokalemia- (present on admission)   Assessment & Plan    Follow BMP.     Advanced care planning/counseling discussion- (present on admission)   Assessment & Plan    Palliative care consulted.     Constipation due to pain medication therapy- (present on admission)   Assessment & Plan    Bowel protocol     Cancer associated pain- (present on admission)   Assessment & Plan    Fentanyl, Oxycodone  IV Dilaudid.  Stable.      Malignant cachexia (HCC)- (present on admission)   Assessment & Plan    Severe malnutrition.  Dietitian following.     COPD (chronic obstructive pulmonary disease) (HCC)- (present on admission)   Assessment & Plan    RT protocol     Tobacco abuse- (present on admission)   Assessment & Plan    Needs to quit.      Plan of care discussed with multidisciplinary team during rounds.    VTE prophylaxis: SCDs

## 2019-03-02 NOTE — PROGRESS NOTES
Hospital Medicine Daily Progress Note    Date of Service  3/2/2019    Chief Complaint  73 y.o. male admitted 2/18/2019 with esophageal cancer.    Hospital Course  Admitted with esophageal cancer, dysphagia, laparoscopic G-tube placed for nutrition, noted to have DVT left upper extremity, right renal mass, transferred for IR guided biopsy    Interval Problem Update  Patient is resting in chair, feels ok no new complains, no hematuria today, pain is stable, not in distress, continue holding lovenox for now until cleared by urologist, pending oncology eval. Possible surgery this week.   2/27: Sitting up in bed comfortably.  Pain is fairly controlled.  Discussed with oncology on-call Dr. Arianna Sanchez.  Apparently Dr. Page from oncology already spoke to urology and recommended to proceed with nephrectomy.  2/28: Sitting up in chair comfortably.  Pain fairly controlled.  Scheduled for right nephrectomy around 4 PM.  No new issues overnight.  3/1: Patient had no acute overnight events and now is status post right nephrectomy.  Valdes catheter had no evidence of hematuria.  Found to be on 3 L of O2 and now weaning.  The pain is fairly controlled.  Start tube feeds through the J-tube and tolerating well.  Waiting on further oncology recommendations.     3/2: seen and examined, patient states he is having pain and swelling in the tip of his penis area, states he feels like urine catheter is leaking, however I checked and it is now. Nursing also checked it is intact. Continue to monitor     Consultants/Specialty  Palliative care  Urology  ID  Oncology    Code Status  DNR/DNI    Disposition  TBD    Review of Systems  Review of Systems   Constitutional: Positive for weight loss. Negative for chills and fever.   HENT: Negative for ear pain, hearing loss and tinnitus.    Eyes: Negative for blurred vision, double vision and photophobia.   Respiratory: Negative for cough, hemoptysis, sputum production and shortness of breath.     Cardiovascular: Negative for chest pain, palpitations, orthopnea and claudication.   Gastrointestinal: Positive for abdominal pain. Negative for diarrhea, heartburn, nausea and vomiting.   Genitourinary: Negative for dysuria, frequency, hematuria and urgency.        Penis swelling and tenderness   Musculoskeletal: Negative for back pain, myalgias and neck pain.   Skin: Negative for rash.   Neurological: Positive for weakness. Negative for dizziness, tingling, tremors and headaches.   Endo/Heme/Allergies: Does not bruise/bleed easily.   Psychiatric/Behavioral: Negative for depression, substance abuse and suicidal ideas.        Physical Exam  Temp:  [36 °C (96.8 °F)-37.1 °C (98.7 °F)] 36 °C (96.8 °F)  Pulse:  [72-85] 82  Resp:  [16-19] 17  BP: (114-147)/(51-77) 147/60  SpO2:  [90 %-96 %] 93 %    Physical Exam   Constitutional: He is oriented to person, place, and time. He appears cachectic. He appears ill. No distress.   Thin  Chronically ill looking   HENT:   Head: Normocephalic and atraumatic.   Mouth/Throat: No oropharyngeal exudate.   Eyes: Conjunctivae are normal. No scleral icterus.   Neck: Normal range of motion. Neck supple. No thyromegaly present.   Cardiovascular: Normal rate and regular rhythm.  Exam reveals no gallop and no friction rub.    Pulmonary/Chest: Effort normal. No respiratory distress. He has no wheezes. He has no rales.   Abdominal: Soft. Bowel sounds are normal. He exhibits no distension. There is no tenderness.   Genitourinary: Penile erythema and penile tenderness present.   Genitourinary Comments: swelling   Musculoskeletal: He exhibits no edema or deformity.   Neurological: He is alert and oriented to person, place, and time.   Skin: Skin is warm and dry. He is not diaphoretic.   Psychiatric: He is withdrawn.   Nursing note and vitals reviewed.      Fluids    Intake/Output Summary (Last 24 hours) at 03/02/19 1135  Last data filed at 03/02/19 0805   Gross per 24 hour   Intake              1040 ml   Output              650 ml   Net              390 ml       Laboratory                        Imaging  CT-ABDOMEN-PELVIS WITH   Final Result      No evidence of right perinephric hematoma or hydronephrosis status post right renal biopsy.      Clot identified in the urinary bladder.      Anasarca.      Distal esophageal mass with lymphadenopathy.      CT-NEEDLE BX-RENAL   Final Result      Successful CT-guided core biopsy of right renal mass.      CT-ABDOMEN-PELVIS WITH   Final Result      1.  Again seen thickening of the distal esophagus likely related to the patient's known esophageal cancer in that area. There is extensive soft tissue mass/adenopathy within the gastrohepatic, periportal, and retroperitoneal regions which does encase the    celiac and superior mesenteric arteries. This is not significantly changed over short interval follow-up.      2.  Again seen large right renal mass which measures 9 x 7 cm in size consistent with either renal cell carcinoma or metastatic disease. There is again seen likely some invasion of the right renal vein.      3.  Emphysematous and bullous change of the lungs with bibasilar atelectasis and small bilateral pleural effusions.      4.  Moderate constipation.      DX-CHEST-2 VIEWS   Final Result         1.  Hazy bilateral lung base infiltrates, greater on the left, new since prior.   2.  Small bilateral pleural effusions.   3.  Hyperexpansion of lungs favors changes of COPD.      US-EXTREMITY VENOUS UPPER UNILAT LEFT   Final Result      CT-NEEDLE BX-RENAL    (Results Pending)        Assessment/Plan  * Esophageal cancer (HCC)- (present on admission)   Assessment & Plan    Laparoscopic G-tube placement  Tube feeding  Pain control.  Palliative following.     Renal cell carcinoma of right kidney (HCC)- (present on admission)   Assessment & Plan    With gastrohepatic, periportal, aortocaval and retroperitoneal [place] metastases.  Urology to discuss plan of care with  Oncology, possible surgery this week if ok with onco.   Awaiting onc recommendations.   Palliative following.  2/27: Apparently Dr. Page from oncology has spoken to urology and recommended to proceed with nephrectomy.  Urology are aware.  2/28: Now S/P right nephrectomy     Hematuria- (present on admission)   Assessment & Plan    Valdes, irrigate clots prn.  Holding lovenox until ok with urology.      Delirium   Assessment & Plan    Avoid benzodiazepines and anticholinergics  Frequent orientation  Avoid early morning labs  Avoid vital signs during sleep  Stable.      Hypomagnesemia- (present on admission)   Assessment & Plan    follow level.     Pneumonitis- (present on admission)   Assessment & Plan      Antibiotics through 2/27/2019. completed     Essential hypertension- (present on admission)   Assessment & Plan    Amlodipine     Leukocytosis- (present on admission)   Assessment & Plan    Probably reactive continue monitoring no fever.      Dysphagia- (present on admission)   Assessment & Plan    Tube feeding  SLP to follow     Acute deep vein thrombosis (DVT) of left upper extremity (HCC)- (present on admission)   Assessment & Plan    2/18 US showed distal left subclavian to mid  brachial vein, lovenox held for possible surgery.      Hypokalemia- (present on admission)   Assessment & Plan    Follow BMP.     Advanced care planning/counseling discussion- (present on admission)   Assessment & Plan    Palliative care consulted.     Constipation due to pain medication therapy- (present on admission)   Assessment & Plan    Bowel protocol     Cancer associated pain- (present on admission)   Assessment & Plan    Fentanyl, Oxycodone  IV Dilaudid.  Stable.      Malignant cachexia (HCC)- (present on admission)   Assessment & Plan    Severe malnutrition.  Dietitian following.     COPD (chronic obstructive pulmonary disease) (HCC)- (present on admission)   Assessment & Plan    RT protocol     Tobacco abuse- (present on  admission)   Assessment & Plan    Needs to quit.      Plan of care discussed with multidisciplinary team during rounds.    VTE prophylaxis: SCDs

## 2019-03-02 NOTE — PROGRESS NOTES
Assessment complete.  AA&Ox4. O2 saturations in mid 80s on RA. Pt placed back on 1.5L via NC. SpO2 now >90%.   Reporting 7/10 pain. Medicated per MAR.   Lap sites x 3 MALIK.  LLQ G-J tube in place. G tube clamped.  J tube ruinning Fibersource at 65mL/hr.  Strict NPO. Denies N/V.  + void via lopez. LBM 2/27.   Pt up with SBA using FWW.  All needs met at this time. Call light within reach. Pt calls appropriately.

## 2019-03-02 NOTE — CARE PLAN
Problem: Communication  Goal: The ability to communicate needs accurately and effectively will improve  Outcome: PROGRESSING AS EXPECTED  Education provided on importance of using call light to alert staff of patient needs. Pt demonstrates understanding by using call light appropriately.    Problem: Pain Management  Goal: Pain level will decrease to patient's comfort goal  Outcome: PROGRESSING AS EXPECTED  Pain assessed and medicated per MAR.

## 2019-03-02 NOTE — PROGRESS NOTES
Hospitalist paged regarding pt's low UO of 225mL overnight. Pt also bladder scanned resulting in 22mL. New orders received.

## 2019-03-02 NOTE — CARE PLAN
Problem: Communication  Goal: The ability to communicate needs accurately and effectively will improve  Outcome: PROGRESSING AS EXPECTED  Pt educated on the importance of using call light, pt demonstrates understanding by using call light appropriately.     Problem: Pain Management  Goal: Pain level will decrease to patient's comfort goal  Outcome: PROGRESSING AS EXPECTED  Pain assessed and medicated per MAR.

## 2019-03-02 NOTE — PROGRESS NOTES
Pt A&O x's 4, VSS, pt at 95% on 1 L NC, denies any SOB or CP. Pt is complaining of 7/10 pain to abdomen and penis, medicated per MAR. Swelling noted to penis, lopez catheter in place, no indication of leakage or hematuria. Lap sites and LLQ J-Tube noted with Fibersource running at 65 ml/hr, flushes well with no indications of obstruction or leakage. Pt denies any N/V. Pt is up to chair, tolerating well, POC discussed with pt and family, all questions and concerns have been addressed, call light and personal items within reach.

## 2019-03-03 PROBLEM — N48.89 PENILE SWELLING: Status: ACTIVE | Noted: 2019-01-01

## 2019-03-03 NOTE — PROGRESS NOTES
".  Urology Progress Note    S: Seen and examined.  S/P right nephrectomy, biopsy of peritoneal mass.  Valdes in place and draining well.  Reports some swelling to penis, had some pain, but states it has resolved.    O:   Blood pressure 128/61, pulse 83, temperature 36.4 °C (97.6 °F), temperature source Temporal, resp. rate 17, height 1.676 m (5' 6\"), weight 62.8 kg (138 lb 7.2 oz), SpO2 95 %.              Intake/Output Summary (Last 24 hours) at 02/25/19 0929  Last data filed at 02/25/19 0837   Gross per 24 hour   Intake             1200 ml   Output             1700 ml   Net             -500 ml       Exam:  Abd: Abdomen soft, no TTP.  Incision sites C/D/I.   Urine: Valdes draining well with clear output   : swelling of penis.    A/P:    Active Hospital Problems    Diagnosis   • Esophageal cancer (HCC) [C15.9]     Priority: High   • Renal cell carcinoma of right kidney (HCC) [C64.1]     Priority: High   • Hematuria [R31.9]     Priority: Medium   • Delirium [R41.0]     Priority: Medium   • Pneumonitis [J18.9]     Priority: Medium   • Hypomagnesemia [E83.42]     Priority: Medium   • Acute deep vein thrombosis (DVT) of left upper extremity (HCC) [I82.622]     Priority: Medium   • Dysphagia [R13.10]     Priority: Medium   • Leukocytosis [D72.829]     Priority: Medium   • Essential hypertension [I10]     Priority: Medium   • Advanced care planning/counseling discussion [Z71.89]     Priority: Medium   • Hypokalemia [E87.6]     Priority: Medium   • Cancer associated pain [G89.3]     Priority: Medium   • Constipation due to pain medication therapy [K59.03]     Priority: Medium   • COPD (chronic obstructive pulmonary disease) (HCC) [J44.9]     Priority: Medium   • Malignant cachexia (HCC) [R64]     Priority: Medium   • Tobacco abuse [Z72.0]     Priority: Low       Plan:  - monitor urinary output  - monitor pain control  - monitor penile swelling  - will follow  "

## 2019-03-03 NOTE — PROGRESS NOTES
Pt A&O x's 4, VSS, pt at 95% on 1 L NC, denies any SOB or CP. Pt is complaining of 7/10 pain to abdomen and penis, medicated per MAR. Swelling noted to penis, lopez catheter in place, leakage around catheter. Lap sites and LLQ J-Tube noted with Fibersource running at 65 ml/hr, flushes well with no indications of obstruction or leakage. Pt denies any N/V. Pt is up to chair, tolerating well, POC discussed with pt and family, all questions and concerns have been addressed, call light and personal items within reach.

## 2019-03-03 NOTE — PROGRESS NOTES
Assessment complete.  AA&Ox4. SpO2 now >90% on 1L via NC.   Reporting 7/10 pain. Medicated per MAR.   LLQ G-J tube in place. G tube clamped.  J tube running Fibersource at 65mL/hr - goal  Strict NPO. Denies N/V.  + void via lopez. No signs of any leaks. LBM 2/27.   Pt up with SBA using FWW. Pt sitting comfortably in recliner.  All needs met. Call light within reach. Pt calls appropriately.

## 2019-03-03 NOTE — PROGRESS NOTES
".  Urology Progress Note    S: Seen and examined.  S/P right nephrectomy, biopsy of peritoneal mass.  Reports incontinence around lopez catheter, also bladder spasms and the sensation to urinate.  Pending use of B&O suppositories.  Still with penile swelling.      O:   Blood pressure 122/72, pulse 80, temperature 36.2 °C (97.2 °F), temperature source Temporal, resp. rate 18, height 1.676 m (5' 6\"), weight 62.8 kg (138 lb 7.2 oz), SpO2 97 %.              Intake/Output Summary (Last 24 hours) at 02/25/19 0929  Last data filed at 02/25/19 0837   Gross per 24 hour   Intake             1200 ml   Output             1700 ml   Net             -500 ml       Exam:  Abd: Abdomen soft, no TTP.  Incision sites C/D/I.   Urine: Lopez in place with clear yellow output.   : swelling of penis present, no active bleeding, no purulent discharge.    A/P:    Active Hospital Problems    Diagnosis   • Esophageal cancer (HCC) [C15.9]     Priority: High   • Renal cell carcinoma of right kidney (HCC) [C64.1]     Priority: High   • Hematuria [R31.9]     Priority: Medium   • Delirium [R41.0]     Priority: Medium   • Pneumonitis [J18.9]     Priority: Medium   • Hypomagnesemia [E83.42]     Priority: Medium   • Acute deep vein thrombosis (DVT) of left upper extremity (HCC) [I82.622]     Priority: Medium   • Dysphagia [R13.10]     Priority: Medium   • Leukocytosis [D72.829]     Priority: Medium   • Essential hypertension [I10]     Priority: Medium   • Advanced care planning/counseling discussion [Z71.89]     Priority: Medium   • Hypokalemia [E87.6]     Priority: Medium   • Cancer associated pain [G89.3]     Priority: Medium   • Constipation due to pain medication therapy [K59.03]     Priority: Medium   • COPD (chronic obstructive pulmonary disease) (HCC) [J44.9]     Priority: Medium   • Malignant cachexia (HCC) [R64]     Priority: Medium   • Tobacco abuse [Z72.0]     Priority: Low       Plan:  - monitor urinary output  - monitor pain control  - " monitor penile swelling.  Monitor for incontinence, try to keep area dry.  - continue B&O suppositories for bladder spasms.  - will follow

## 2019-03-03 NOTE — ASSESSMENT & PLAN NOTE
resolving  Valdes in place.  Urology evaluated him in the signed off. Follow-up as outpatient.  No acute complaints.  Continue to monitor

## 2019-03-04 PROBLEM — I82.612 SUPERFICIAL VENOUS THROMBOSIS OF ARM, LEFT: Status: ACTIVE | Noted: 2019-01-01

## 2019-03-04 NOTE — PROGRESS NOTES
Assessment complete.  AA&Ox4. SpO2 now >90% on 0.5L via NC.   Pt encouraged to continue hourly IS use. Max pull 500mL at this time.  Reporting 6/10 pain. Medicated per MAR.   LLQ G-J tube in place. G tube clamped.  J tube running Fibersource at 65mL/hr - goal  Strict NPO. Denies N/V.  + void via lopez. No signs of any leaks. + penile redness and swelling.  LBM 2/27. Agreeable to take suppository in AM.  Pt up with SBA using FWW. Pt sitting comfortably in recliner.  All needs met. Call light within reach. Pt calls appropriately.

## 2019-03-04 NOTE — PROGRESS NOTES
Pt A&O x's 4, VSS, pt at 95% on 1 L NC, denies any SOB or CP. Pt is complaining of 7/10 pain to abdomen and penis, medicated per MAR. Swelling noted to penis, lopez catheter in place, no leakage around catheter noted. Lap sites and LLQ J-Tube noted with Fibersource running at 65 ml/hr, flushes well with no indications of obstruction or leakage. Pt denies any N/V, +BM, flatus. Pt is up to chair, tolerating well, POC discussed with pt and family, all questions and concerns have been addressed, call light and personal items within reach.

## 2019-03-04 NOTE — ADDENDUM NOTE
Encounter addended by: Gen Gonzales M.D. on: 3/4/2019 11:37 AM<BR>    Actions taken: Sign clinical note

## 2019-03-04 NOTE — PROGRESS NOTES
[facility] Medicine Daily Progress Note    Date of Service  3/4/2019    Chief Complaint  73 y.o. male admitted 2/18/2019 with esophageal cancer.    Hospital Course  Admitted with esophageal cancer, dysphagia, laparoscopic G-tube placed for nutrition, noted to have DVT left upper extremity, right renal mass, transferred for IR guided biopsy    Interval Problem Update  Patient is resting in chair, feels ok no new complains, no hematuria today, pain is stable, not in distress, continue holding lovenox for now until cleared by urologist, pending oncology eval. Possible surgery this week.   2/27: Sitting up in bed comfortably.  Pain is fairly controlled.  Discussed with oncology on-call Dr. Arianna Sanchez.  Apparently Dr. Page from oncology already spoke to urology and recommended to proceed with nephrectomy.  2/28: Sitting up in chair comfortably.  Pain fairly controlled.  Scheduled for right nephrectomy around 4 PM.  No new issues overnight.  3/1: Patient had no acute overnight events and now is status post right nephrectomy.  Lopez catheter had no evidence of hematuria.  Found to be on 3 L of O2 and now weaning.  The pain is fairly controlled.  Start tube feeds through the J-tube and tolerating well.  Waiting on further oncology recommendations.     3/2: seen and examined, patient states he is having pain and swelling in the tip of his penis area, states he feels like urine catheter is leaking, however I checked and it is now. Nursing also checked it is intact. Continue to monitor     3/3: seen and examined, patient sitting in chair, states nothing was done about his catheter, frustrated. Spoke to urology yesterday, keeping an eye on it.   Otherwise doing well, no complains    3/4: seen and examined, sitting in chair, just got pain medications, LE swelling continues, lopez not leaking anymore. Tolerated lasix well, will give 1 more dose. Dc fluids. VSS, white count slightly up.     Consultants/Specialty  Palliative  care  Urology  ID  Oncology    Code Status  DNR/DNI    Disposition  TBD    Review of Systems  Review of Systems   Constitutional: Positive for weight loss. Negative for chills and fever.   HENT: Negative for ear pain, hearing loss and tinnitus.    Eyes: Negative for blurred vision, double vision and photophobia.   Respiratory: Negative for cough, hemoptysis, sputum production and shortness of breath.    Cardiovascular: Negative for chest pain, palpitations, orthopnea and claudication.   Gastrointestinal: Positive for abdominal pain. Negative for diarrhea, heartburn, nausea and vomiting.   Genitourinary: Positive for dysuria. Negative for frequency, hematuria and urgency.        Penis swelling and tenderness   Musculoskeletal: Negative for back pain, myalgias and neck pain.   Skin: Negative for rash.   Neurological: Positive for weakness. Negative for dizziness, tingling, tremors and headaches.   Endo/Heme/Allergies: Does not bruise/bleed easily.   Psychiatric/Behavioral: Negative for depression, substance abuse and suicidal ideas.        Physical Exam  Temp:  [36.4 °C (97.6 °F)-37 °C (98.6 °F)] 36.9 °C (98.5 °F)  Pulse:  [79-87] 83  Resp:  [16-18] 18  BP: (125-141)/(57-69) 133/66  SpO2:  [93 %-96 %] 93 %    Physical Exam   Constitutional: He is oriented to person, place, and time. He appears cachectic. He appears ill. No distress.   Thin  Chronically ill looking   HENT:   Head: Normocephalic and atraumatic.   Mouth/Throat: No oropharyngeal exudate.   Eyes: Conjunctivae are normal. No scleral icterus.   Neck: Normal range of motion. Neck supple. No thyromegaly present.   Cardiovascular: Normal rate and regular rhythm.  Exam reveals no gallop and no friction rub.    Pulmonary/Chest: Effort normal. No respiratory distress. He has no wheezes. He has no rales.   Abdominal: Soft. Bowel sounds are normal. He exhibits no distension. There is no tenderness.   Genitourinary: Penile erythema and penile tenderness present.    Genitourinary Comments: swelling   Musculoskeletal: He exhibits no edema or deformity.   Neurological: He is alert and oriented to person, place, and time.   Skin: Skin is warm and dry. He is not diaphoretic.   Psychiatric: He is withdrawn.   Nursing note and vitals reviewed.      Fluids    Intake/Output Summary (Last 24 hours) at 03/04/19 1247  Last data filed at 03/04/19 0826   Gross per 24 hour   Intake              260 ml   Output             2325 ml   Net            -2065 ml       Laboratory  Recent Labs      03/04/19   1158   WBC  19.6*   RBC  4.33*   HEMOGLOBIN  11.4*   HEMATOCRIT  35.9*   MCV  82.9   MCH  26.3*   MCHC  31.8*   RDW  55.5*   PLATELETCT  382   MPV  9.4     Recent Labs      03/04/19   1157   SODIUM  136   POTASSIUM  4.3   CHLORIDE  97   CO2  32   GLUCOSE  125*   BUN  30*   CREATININE  0.69   CALCIUM  8.4*                   Imaging  DX-CHEST-PORTABLE (1 VIEW)   Final Result         1. Patchy left basilar opacities, atelectasis versus consolidation.      2. Free intraperitoneal air could relate to recent surgery. Correlate clinically.      CT-ABDOMEN-PELVIS WITH   Final Result      No evidence of right perinephric hematoma or hydronephrosis status post right renal biopsy.      Clot identified in the urinary bladder.      Anasarca.      Distal esophageal mass with lymphadenopathy.      CT-NEEDLE BX-RENAL   Final Result      Successful CT-guided core biopsy of right renal mass.      CT-ABDOMEN-PELVIS WITH   Final Result      1.  Again seen thickening of the distal esophagus likely related to the patient's known esophageal cancer in that area. There is extensive soft tissue mass/adenopathy within the gastrohepatic, periportal, and retroperitoneal regions which does encase the    celiac and superior mesenteric arteries. This is not significantly changed over short interval follow-up.      2.  Again seen large right renal mass which measures 9 x 7 cm in size consistent with either renal cell  carcinoma or metastatic disease. There is again seen likely some invasion of the right renal vein.      3.  Emphysematous and bullous change of the lungs with bibasilar atelectasis and small bilateral pleural effusions.      4.  Moderate constipation.      DX-CHEST-2 VIEWS   Final Result         1.  Hazy bilateral lung base infiltrates, greater on the left, new since prior.   2.  Small bilateral pleural effusions.   3.  Hyperexpansion of lungs favors changes of COPD.      US-EXTREMITY VENOUS UPPER UNILAT LEFT   Final Result      CT-NEEDLE BX-RENAL    (Results Pending)        Assessment/Plan  * Esophageal cancer (HCC)- (present on admission)   Assessment & Plan    Laparoscopic G-tube placement  Tube feeding  Pain control.  Palliative following.     Renal cell carcinoma of right kidney (HCC)- (present on admission)   Assessment & Plan    With gastrohepatic, periportal, aortocaval and retroperitoneal [place] metastases.  Urology on board  Palliative following.  2/28: Now S/P right nephrectomy  Pending oncology recs, attempted to contact oncology physician over the weekend and Dr. Page today, awaiting recommendations     Hematuria- (present on admission)   Assessment & Plan    Valdes, irrigate clots prn.  Holding lovenox until ok with urology.      Delirium   Assessment & Plan    Avoid benzodiazepines and anticholinergics  Frequent orientation  Avoid early morning labs  Avoid vital signs during sleep  Stable.      Hypomagnesemia- (present on admission)   Assessment & Plan    follow level.  resolved     Pneumonitis- (present on admission)   Assessment & Plan      Antibiotics through 2/27/2019. completed     Essential hypertension- (present on admission)   Assessment & Plan    Amlodipine     Leukocytosis- (present on admission)   Assessment & Plan    Probably reactive continue monitoring no fever.      Dysphagia- (present on admission)   Assessment & Plan    Tube feeding  SLP to follow     Superficial venous thrombosis of  arm, left- (present on admission)   Assessment & Plan    2/18 US  Deep venous thrombosis visualized from the distal left subclavian to mid    brachial vein.  The thrombus appears near occlusive from the axillary to    the proximal brachial vein.   He does have a PICC in place, however do not use or pull to avoid dislodging clot    Stared on Eliquis because im not sure if he is willing to give himself lovenox injections per our discussion       Hypokalemia- (present on admission)   Assessment & Plan    Resolved  Follow BMP.     Advanced care planning/counseling discussion- (present on admission)   Assessment & Plan    Palliative care consulted.     Constipation due to pain medication therapy- (present on admission)   Assessment & Plan    Bowel protocol     Cancer associated pain- (present on admission)   Assessment & Plan    Fentanyl, Oxycodone  IV Dilaudid.  Stable.      Malignant cachexia (HCC)- (present on admission)   Assessment & Plan    Severe malnutrition.  Dietitian following.     COPD (chronic obstructive pulmonary disease) (HCC)- (present on admission)   Assessment & Plan    RT protocol     Penile swelling   Assessment & Plan    Valdes in place, leaking, urology aware, continue to monitor.  Pain control  Dc fluids     Tobacco abuse- (present on admission)   Assessment & Plan    Needs to quit.      Plan of care discussed with multidisciplinary team during rounds.    VTE prophylaxis: SCDs

## 2019-03-04 NOTE — CARE PLAN
Problem: Communication  Goal: The ability to communicate needs accurately and effectively will improve  Outcome: PROGRESSING AS EXPECTED  Education provided on importance of using call light to alert staff of patient needs. Pt demonstrates understanding by using call light appropriately.    Problem: Urinary Elimination:  Goal: Ability to reestablish a normal urinary elimination pattern will improve  Outcome: PROGRESSING AS EXPECTED  Adequate urine output via lopez.

## 2019-03-04 NOTE — PROGRESS NOTES
"Urology Progress Note    Patient seen and examined    Overnight Events: None    S: No fevers, chills, nausea or vomiting.  Bladder spasms improved with suppositories. No leaking around tube. Slight improvement with penile swelling.    O:   Blood pressure 133/66, pulse 83, temperature 36.9 °C (98.5 °F), temperature source Temporal, resp. rate 18, height 1.676 m (5' 6\"), weight 62.8 kg (138 lb 7.2 oz), SpO2 93 %.              Intake/Output Summary (Last 24 hours) at 03/04/19 1120  Last data filed at 03/04/19 0826   Gross per 24 hour   Intake              550 ml   Output             2500 ml   Net            -1950 ml       Exam:  Abdomen soft, benign.  Incisions clean, dry, intact.   Urine: lopez with clear urina.   : minor improvement with penile swelling.      A/P:    Active Hospital Problems    Diagnosis   • Esophageal cancer (HCC) [C15.9]     Priority: High   • Renal cell carcinoma of right kidney (HCC) [C64.1]     Priority: High   • Hematuria [R31.9]     Priority: Medium   • Delirium [R41.0]     Priority: Medium   • Pneumonitis [J18.9]     Priority: Medium   • Hypomagnesemia [E83.42]     Priority: Medium   • Acute deep vein thrombosis (DVT) of left upper extremity (HCC) [I82.622]     Priority: Medium   • Dysphagia [R13.10]     Priority: Medium   • Leukocytosis [D72.829]     Priority: Medium   • Essential hypertension [I10]     Priority: Medium   • Advanced care planning/counseling discussion [Z71.89]     Priority: Medium   • Hypokalemia [E87.6]     Priority: Medium   • Cancer associated pain [G89.3]     Priority: Medium   • Constipation due to pain medication therapy [K59.03]     Priority: Medium   • COPD (chronic obstructive pulmonary disease) (HCC) [J44.9]     Priority: Medium   • Malignant cachexia (HCC) [R64]     Priority: Medium   • Tobacco abuse [Z72.0]     Priority: Low   • Penile swelling [N48.89]       Stable.   Monitor penile swelling  Monitor urinary output  Pain control per hospitalists  B&O " suppositories for bladder spasms  Urology will follow

## 2019-03-05 PROBLEM — I82.409 DEEP VENOUS THROMBOSIS (HCC): Status: ACTIVE | Noted: 2019-01-01

## 2019-03-05 NOTE — THERAPY
"Occupational Therapy Evaluation completed.   Functional Status:  Min A LB dressing, CGA UB dressing, CGA seated grooming, Min A functional mobility  Plan of Care: Will benefit from Occupational Therapy 2 times per week  Discharge Recommendations:  Equipment: Will Continue to Assess for Equipment Needs. Post-acute therapy: dependent on POC decisions.     See \"Rehab Therapy-Acute\" Patient Summary Report for complete documentation.    Pt is a 74 y/o male presenting to acute w/ esophageal cancer, dysphagia, laparoscopic G-tube placed for nutrition, noted to have DVT in L UE, R renal mass, transferred for IR guided biopsy. Pt presented w/ general deconditioning, weakness, and impaired activity tolerance impacting functional independence. Pt is motivated to maintain his quality of life at this time. WIll continue to follow for Acute OT services. DC recommendations will depend on POC/GOC decisions per palliative care.     "

## 2019-03-05 NOTE — PROGRESS NOTES
"/57   Pulse 84   Temp 37.1 °C (98.8 °F) (Temporal)   Resp 18   Ht 1.676 m (5' 6\")   Wt 62.8 kg (138 lb 7.2 oz)   SpO2 92%   BMI 22.35 kg/m²     Patient is A&Ox4.   Reports pain to abdomen and penis, medicated per DEPLHINE VASQUEZ, CMS intact, reports tingling to left hand.   Mobilizes with SBA, calls appropriately.   On 1L O2 NC, denies SOB, chest pain.   Hypoactive BS x 4. Strict NPO. Denies N&V.   Fibersource TF running at 65 ml/h through J-tube (G-tube clamped).   Abdominal lap sites x 6 with dermabond, open to air, intact.   + flatus, + BM. Pt is voiding.   LUE PICC currently not in use per MD order due to DVT in LUE.   PIV in RFA SL   Updated on POC. Belongings and call light within reach. All needs met at this time.   "

## 2019-03-05 NOTE — THERAPY
"Physical Therapy Evaluation completed.   Bed Mobility:  Supine to Sit:  (up in a chair)  Transfers: Sit to Stand: Minimal Assist  Gait: Level Of Assist: Minimal Assist with Front-Wheel Walker       Plan of Care: Will benefit from Physical Therapy 2 times per week  Discharge Recommendations: Equipment: Will Continue to Assess for Equipment Needs. Post-acute therapy: dependent upon POC/GOC decisions     See \"Rehab Therapy-Acute\" Patient Summary Report for complete documentation.       Pt is a 72 y/o male presenting to acute setting with esophageal cancer, dysphagia, laparoscopic G-tube placed for nutrition, noted to have DVT left upper extremity, right renal mass, transferred for IR guided biopsy. Pt generally debilitated and requires assistance to perform transfers and maintain upright posture and balance due to weakness. PT to continue working with Pt in acute setting to increase strength and function and improve activity tolerance. Per EMR, palliative continues to wait for further POC/GOC decisions to be made by Pt and family, PT to alter mobility POC in conjunction with this decision. Pt resides in [place] with family, DC plan unclear as POC/GOC have yet to be decided as previously mentioned.  "

## 2019-03-05 NOTE — DIETARY
Nutrition support weekly update:  Day 15 of admit.  Justus Barnard is a 73 y.o. male with admitting DX of esophageal cancer.  Tube feeding initiated on 2/19. Current TF via G-tube is Fibersource HN @ 65 mL/hr, providing 1872 kcal, 84 gm protein, and 1264 mL of free water per day.    Assessment:  Weight from 2/27 was 62.8 kg (unclear if bed scale vs stand up scale wt).  No new recent wt documented - please obtain new measured wt in order to better assess wt trends and overall nutritional status.     Evaluation:   1. Pt has been tolerating TF @ goal.    2. Pt was last evaluated by SLP on 3/1 - SLP recommends continuing NPO/G-tube at this time.   3. Palliative following.   4. Labs: Glucose: 125, BUN: 30.  5. Meds: Dulcolax, Fentanyl, Miralax.  6. Last BM: 3/4.   7. Current feeding remains appropriate at this time.     Malnutrition risk: Pt is at risk for malnutrition related to hypermetabolic disease (esophageal cancer).     Recommendations/Plan:  1. Continue current TF formula and goal rate.   2. Fluids per MD.  3. Safest PO diet as appropriate per SLP.   4. Monitor weight - please obtain new measured wt.    RD following.

## 2019-03-05 NOTE — CARE PLAN
Problem: Safety  Goal: Will remain free from injury  Outcome: PROGRESSING AS EXPECTED  Safety precautions in place. Bed in locked/low position. 2 side rails up. Treaded socks. Call light in reach, calls appropriately. Hourly rounding practiced.    Problem: Venous Thromboembolism (VTW)/Deep Vein Thrombosis (DVT) Prevention:  Goal: Patient will participate in Venous Thrombosis (VTE)/Deep Vein Thrombosis (DVT)Prevention Measures  Outcome: PROGRESSING AS EXPECTED  Pt mobilizing with SBA. Pt also on Eliquis for VTE.

## 2019-03-05 NOTE — PROGRESS NOTES
"Urology Progress Note    Patient seen and examined    Overnight Events: None    S: No fevers, chills, nausea or vomiting.  Still some swelling of penis. Voiding.     O:   Blood pressure 136/59, pulse 75, temperature 36.7 °C (98 °F), temperature source Temporal, resp. rate 17, height 1.676 m (5' 6\"), weight 62.8 kg (138 lb 7.2 oz), SpO2 99 %.  Recent Labs      03/04/19   1157   SODIUM  136   POTASSIUM  4.3   CHLORIDE  97   CO2  32   GLUCOSE  125*   BUN  30*   CREATININE  0.69   CALCIUM  8.4*     Recent Labs      03/04/19   1158  03/05/19   0212   WBC  19.6*  18.1*   RBC  4.33*  4.12*   HEMOGLOBIN  11.4*  10.8*   HEMATOCRIT  35.9*  34.8*   MCV  82.9  84.5   MCH  26.3*  26.2*   MCHC  31.8*  31.0*   RDW  55.5*  57.0*   PLATELETCT  382  413   MPV  9.4  9.5         Intake/Output Summary (Last 24 hours) at 03/05/19 1058  Last data filed at 03/05/19 1005   Gross per 24 hour   Intake             1900 ml   Output             1700 ml   Net              200 ml       Exam:  Abdomen soft, benign.  Incisions clean, dry, intact.   Urine: clear      A/P:    Active Hospital Problems    Diagnosis   • Esophageal cancer (HCC) [C15.9]     Priority: High   • Renal cell carcinoma of right kidney (HCC) [C64.1]     Priority: High   • Hematuria [R31.9]     Priority: Medium   • Delirium [R41.0]     Priority: Medium   • Pneumonitis [J18.9]     Priority: Medium   • Hypomagnesemia [E83.42]     Priority: Medium   • Deep venous thrombosis (HCC) [I82.409]     Priority: Medium   • Dysphagia [R13.10]     Priority: Medium   • Leukocytosis [D72.829]     Priority: Medium   • Essential hypertension [I10]     Priority: Medium   • Advanced care planning/counseling discussion [Z71.89]     Priority: Medium   • Hypokalemia [E87.6]     Priority: Medium   • Cancer associated pain [G89.3]     Priority: Medium   • Constipation due to pain medication therapy [K59.03]     Priority: Medium   • COPD (chronic obstructive pulmonary disease) (HCC) [J44.9]     Priority: " Medium   • Malignant cachexia (HCC) [R64]     Priority: Medium   • Tobacco abuse [Z72.0]     Priority: Low   • Penile swelling [N48.89]       Penile swelling has improved slightly  Urology signing off  F/U with Dr Peña after discharge

## 2019-03-05 NOTE — PROGRESS NOTES
"Assumed care of patient from night shift RN.  Patient is alert and oriented times 4, states pain of 5/10, medicated per MAR.  VSS /68   Pulse 78   Temp 36.7 °C (98 °F) (Temporal)   Resp 13   Ht 1.676 m (5' 6\")   Wt 62.8 kg (138 lb 7.2 oz)   SpO2 96%   BMI 22.35 kg/m²   PIV in the RFA, patent and saline locked.  PICC to the LUE with DVT, orders not to flush or remove.  On RA with saturations in the mid 90s.  J/G tube to the L abdomen, G tube running Fibersource at 65 (goal).  Last BM 3/5, urinating without difficulty.  Strict NPO at this time.  6 lap sites to the abdomen, well approximated with dermabond.  Patient is a standby assist with a FWW, demonstrates steady gait, minimal assistance needed.  POC discussed for the day, bed is locked and in the lowest position, call light is within reach.  All needs are met at this time, hourly rounding is in place.  "

## 2019-03-05 NOTE — PROGRESS NOTES
[facility] Medicine Daily Progress Note    Date of Service  3/5/2019    Chief Complaint  73 y.o. male admitted 2/18/2019 with esophageal cancer.    Hospital Course  Admitted with esophageal cancer, dysphagia, laparoscopic G-tube placed for nutrition, noted to have DVT left upper extremity, right renal mass, transferred for IR guided biopsy.  Oncology was consulted.    Interval Problem Update  Patient is resting in chair, feels ok no new complains, no hematuria today, pain is stable, not in distress, continue holding lovenox for now until cleared by urologist, pending oncology eval. Possible surgery this week.   2/27: Sitting up in bed comfortably.  Pain is fairly controlled.  Discussed with oncology on-call Dr. Arianna Sanchez.  Apparently Dr. Page from oncology already spoke to urology and recommended to proceed with nephrectomy.  2/28: Sitting up in chair comfortably.  Pain fairly controlled.  Scheduled for right nephrectomy around 4 PM.  No new issues overnight.  3/1: Patient had no acute overnight events and now is status post right nephrectomy.  Lopez catheter had no evidence of hematuria.  Found to be on 3 L of O2 and now weaning.  The pain is fairly controlled.  Start tube feeds through the J-tube and tolerating well.  Waiting on further oncology recommendations.     3/2: seen and examined, patient states he is having pain and swelling in the tip of his penis area, states he feels like urine catheter is leaking, however I checked and it is now. Nursing also checked it is intact. Continue to monitor     3/3: seen and examined, patient sitting in chair, states nothing was done about his catheter, frustrated. Spoke to urology yesterday, keeping an eye on it.   Otherwise doing well, no complains    3/4: seen and examined, sitting in chair, just got pain medications, LE swelling continues, lopez not leaking anymore. Tolerated lasix well, will give 1 more dose. Dc fluids. VSS, white count slightly up.     3/5 I  evaluated this patient at bedside this morning.  He was sitting in chair and reported that he is feeling better.  White blood cell count is trending down and hemoglobin remained stable.  Urology evaluated him and made recommendations.      Consultants/Specialty   Palliative care  Urology  ID  Oncology    Code Status  DNR/DNI    Disposition  TBD    Review of Systems  Review of Systems   Constitutional: Positive for weight loss. Negative for chills and fever.   HENT: Negative for ear pain, hearing loss and tinnitus.    Eyes: Negative for blurred vision, double vision and photophobia.   Respiratory: Negative for cough, hemoptysis, sputum production and shortness of breath.    Cardiovascular: Positive for leg swelling. Negative for chest pain, palpitations, orthopnea and claudication.   Gastrointestinal: Positive for abdominal pain (Improving). Negative for diarrhea, heartburn, nausea and vomiting.   Genitourinary: Positive for dysuria. Negative for frequency, hematuria and urgency.        Penis swelling slightly improved   Musculoskeletal: Negative for back pain, myalgias and neck pain.   Skin: Negative for rash.   Neurological: Positive for weakness. Negative for dizziness, tingling, tremors and headaches.   Endo/Heme/Allergies: Does not bruise/bleed easily.   Psychiatric/Behavioral: Negative for depression, substance abuse and suicidal ideas.        Physical Exam  Temp:  [36.7 °C (98 °F)-37.1 °C (98.8 °F)] 36.7 °C (98 °F)  Pulse:  [78-89] 78  Resp:  [13-18] 13  BP: (108-133)/(57-68) 122/68  SpO2:  [92 %-96 %] 96 %    Physical Exam   Constitutional: He is oriented to person, place, and time. He appears cachectic. He appears ill. No distress.   Sitting in chair without any acute distress   HENT:   Head: Normocephalic and atraumatic.   Mouth/Throat: No oropharyngeal exudate.   Eyes: Conjunctivae are normal. No scleral icterus.   Neck: Normal range of motion. Neck supple. No thyromegaly present.   Cardiovascular: Normal  rate and regular rhythm.  Exam reveals no gallop and no friction rub.    Pulmonary/Chest: Effort normal. No respiratory distress. He has no wheezes. He has no rales.   Abdominal: Soft. Bowel sounds are normal. He exhibits no distension. There is no tenderness.   G-tube in place   Genitourinary: Penile erythema and penile tenderness (Swelling and mild tenderness) present.   Genitourinary Comments: swelling   Musculoskeletal: He exhibits edema (Bilateral lower extremities). He exhibits no deformity.   Neurological: He is alert and oriented to person, place, and time.   Skin: Skin is warm and dry. He is not diaphoretic.   Nursing note and vitals reviewed.      Fluids    Intake/Output Summary (Last 24 hours) at 03/05/19 0781  Last data filed at 03/05/19 0510   Gross per 24 hour   Intake             1900 ml   Output             1725 ml   Net              175 ml       Laboratory  Recent Labs      03/04/19   1158  03/05/19   0212   WBC  19.6*  18.1*   RBC  4.33*  4.12*   HEMOGLOBIN  11.4*  10.8*   HEMATOCRIT  35.9*  34.8*   MCV  82.9  84.5   MCH  26.3*  26.2*   MCHC  31.8*  31.0*   RDW  55.5*  57.0*   PLATELETCT  382  413   MPV  9.4  9.5     Recent Labs      03/04/19   1157   SODIUM  136   POTASSIUM  4.3   CHLORIDE  97   CO2  32   GLUCOSE  125*   BUN  30*   CREATININE  0.69   CALCIUM  8.4*                   Imaging  DX-CHEST-PORTABLE (1 VIEW)   Final Result         1. Patchy left basilar opacities, atelectasis versus consolidation.      2. Free intraperitoneal air could relate to recent surgery. Correlate clinically.      CT-ABDOMEN-PELVIS WITH   Final Result      No evidence of right perinephric hematoma or hydronephrosis status post right renal biopsy.      Clot identified in the urinary bladder.      Anasarca.      Distal esophageal mass with lymphadenopathy.      CT-NEEDLE BX-RENAL   Final Result      Successful CT-guided core biopsy of right renal mass.      CT-ABDOMEN-PELVIS WITH   Final Result      1.  Again seen  thickening of the distal esophagus likely related to the patient's known esophageal cancer in that area. There is extensive soft tissue mass/adenopathy within the gastrohepatic, periportal, and retroperitoneal regions which does encase the    celiac and superior mesenteric arteries. This is not significantly changed over short interval follow-up.      2.  Again seen large right renal mass which measures 9 x 7 cm in size consistent with either renal cell carcinoma or metastatic disease. There is again seen likely some invasion of the right renal vein.      3.  Emphysematous and bullous change of the lungs with bibasilar atelectasis and small bilateral pleural effusions.      4.  Moderate constipation.      DX-CHEST-2 VIEWS   Final Result         1.  Hazy bilateral lung base infiltrates, greater on the left, new since prior.   2.  Small bilateral pleural effusions.   3.  Hyperexpansion of lungs favors changes of COPD.      US-EXTREMITY VENOUS UPPER UNILAT LEFT   Final Result      CT-NEEDLE BX-RENAL    (Results Pending)        Assessment/Plan  * Esophageal cancer (HCC)- (present on admission)   Assessment & Plan    Laparoscopic G-tube and placement  Continue tube feeding  Continue to provide him pain control.  Palliative is following this patient.     Renal cell carcinoma of right kidney (HCC)- (present on admission)   Assessment & Plan    With gastrohepatic, periportal, aortocaval and retroperitoneal r[place] metastases.  Urology is following this patient.  Palliative following.  2/28: Now S/P right nephrectomy  Oncology consulted pending recommendations.     Hematuria- (present on admission)   Assessment & Plan    Valdes, irrigate clots prn.  Continue Eliquis for DVT.  Urology is following this patient appreciate their recommendations.     Delirium   Assessment & Plan    Currently stable.  Avoid benzodiazepines and anticholinergics  Frequent orientation  Avoid early morning labs  Avoid vital signs during sleep        Hypomagnesemia- (present on admission)   Assessment & Plan    Resolved     Pneumonitis- (present on admission)   Assessment & Plan    He remains afebrile.  Antibiotics through 2/27/2019. completed     Essential hypertension- (present on admission)   Assessment & Plan    Continue amlodipine     Leukocytosis- (present on admission)   Assessment & Plan    Still elevated but trending down.  Probably reactive continue monitoring no fever.      Dysphagia- (present on admission)   Assessment & Plan    Continue tube feeding  Appreciated speech therapy recommendations     Deep venous thrombosis (HCC)- (present on admission)   Assessment & Plan    2/18 US  Deep venous thrombosis visualized from the distal left subclavian to mid    brachial vein.  The thrombus appears near occlusive from the axillary to    the proximal brachial vein.   He does have a PICC in place, however do not use or pull to avoid dislodging clot  Continue Eliquis.       Hypokalemia- (present on admission)   Assessment & Plan    Resolved  Labs in morning     Advanced care planning/counseling discussion- (present on admission)   Assessment & Plan    Palliative care evaluated him and made recommendation     Constipation due to pain medication therapy- (present on admission)   Assessment & Plan    Continue bowel protocol     Cancer associated pain- (present on admission)   Assessment & Plan    Continue fentanyl, Oxycodone  IV Dilaudid as needed for severe pain       Malignant cachexia (HCC)- (present on admission)   Assessment & Plan    Severe malnutrition.  Dietitian is following.     COPD (chronic obstructive pulmonary disease) (HCC)- (present on admission)   Assessment & Plan    Continue RT protocol     Penile swelling   Assessment & Plan    Valdes in place.  Urology is following him.  Continue to monitor     Tobacco abuse- (present on admission)   Assessment & Plan    Needs to quit.        I discussed plan of care during multidisciplinary rounds.    VTE  prophylaxis: Eliquis

## 2019-03-05 NOTE — CARE PLAN
Problem: Pain Management  Goal: Pain level will decrease to patient's comfort goal  Outcome: PROGRESSING AS EXPECTED  Medicated per MAR for pain of 5/10    Problem: Mobility  Goal: Risk for activity intolerance will decrease  Outcome: PROGRESSING AS EXPECTED  Patient encouraged to ambulate with staff as tolerated.

## 2019-03-06 NOTE — PROGRESS NOTES
[facility] Medicine Daily Progress Note    Date of Service  3/6/2019    Chief Complaint  73 y.o. male admitted 2/18/2019 with esophageal cancer.    Hospital Course  Admitted with esophageal cancer, dysphagia, laparoscopic G-tube placed for nutrition, noted to have DVT left upper extremity, right renal mass, transferred for IR guided biopsy.  Oncology was consulted.    Interval Problem Update  Patient is resting in chair, feels ok no new complains, no hematuria today, pain is stable, not in distress, continue holding lovenox for now until cleared by urologist, pending oncology eval. Possible surgery this week.   2/27: Sitting up in bed comfortably.  Pain is fairly controlled.  Discussed with oncology on-call Dr. Arianna Sanchez.  Apparently Dr. Page from oncology already spoke to urology and recommended to proceed with nephrectomy.  2/28: Sitting up in chair comfortably.  Pain fairly controlled.  Scheduled for right nephrectomy around 4 PM.  No new issues overnight.  3/1: Patient had no acute overnight events and now is status post right nephrectomy.  Lopez catheter had no evidence of hematuria.  Found to be on 3 L of O2 and now weaning.  The pain is fairly controlled.  Start tube feeds through the J-tube and tolerating well.  Waiting on further oncology recommendations.     3/2: seen and examined, patient states he is having pain and swelling in the tip of his penis area, states he feels like urine catheter is leaking, however I checked and it is now. Nursing also checked it is intact. Continue to monitor     3/3: seen and examined, patient sitting in chair, states nothing was done about his catheter, frustrated. Spoke to urology yesterday, keeping an eye on it.   Otherwise doing well, no complains    3/4: seen and examined, sitting in chair, just got pain medications, LE swelling continues, lopez not leaking anymore. Tolerated lasix well, will give 1 more dose. Dc fluids. VSS, white count slightly up.     3/5 I  evaluated this patient at bedside this morning.  He was sitting in chair and reported that he is feeling better.  White blood cell count is trending down and hemoglobin remained stable.  Urology evaluated him and made recommendations.    3/6 I evaluated and examined this patient at bedside this morning.  He reported that he is feeling better and expressed that he had episode of vomiting this morning..  White blood cell count is trending down.  Hemoglobin remained stable.  I discussed with oncologist Dr. Page and he will request radiation oncology consult.  He underwent chest x-ray this morning.    Consultants/Specialty   Palliative care  Urology  ID  Oncology    Code Status  DNR/DNI    Disposition  TBD    Review of Systems  Review of Systems   Constitutional: Positive for weight loss. Negative for chills and fever.   HENT: Negative for ear pain, hearing loss and tinnitus.    Eyes: Negative for blurred vision, double vision and photophobia.   Respiratory: Negative for cough, hemoptysis, sputum production and shortness of breath.    Cardiovascular: Positive for leg swelling. Negative for chest pain, palpitations, orthopnea and claudication.   Gastrointestinal: Positive for abdominal pain (Improving), nausea and vomiting. Negative for diarrhea and heartburn.   Genitourinary: Positive for dysuria. Negative for frequency, hematuria and urgency.        Penis swelling improved   Musculoskeletal: Negative for back pain, myalgias and neck pain.   Skin: Negative for rash.   Neurological: Positive for weakness. Negative for dizziness, tingling, tremors and headaches.   Endo/Heme/Allergies: Does not bruise/bleed easily.   Psychiatric/Behavioral: Negative for depression, substance abuse and suicidal ideas.        Physical Exam  Temp:  [36.2 °C (97.1 °F)-36.7 °C (98 °F)] 36.7 °C (98 °F)  Pulse:  [75-93] 93  Resp:  [16-18] 16  BP: (123-136)/(56-61) 125/61  SpO2:  [90 %-99 %] 90 %    Physical Exam   Constitutional: He is oriented  to person, place, and time. He appears cachectic. He appears ill. No distress.   Laying in bed without any acute distress.   HENT:   Head: Normocephalic and atraumatic.   Mouth/Throat: No oropharyngeal exudate.   Eyes: Conjunctivae are normal. No scleral icterus.   Neck: Normal range of motion. Neck supple. No thyromegaly present.   Cardiovascular: Normal rate and regular rhythm.  Exam reveals no gallop and no friction rub.    Pulmonary/Chest: Effort normal. No respiratory distress. He has no wheezes. He has no rales.   Abdominal: Soft. Bowel sounds are normal. He exhibits no distension. There is no tenderness.   G-tube in place   Genitourinary: Penile erythema and penile tenderness (Swelling and mild tenderness) present.   Genitourinary Comments: swelling   Musculoskeletal: He exhibits edema (Bilateral lower extremities). He exhibits no deformity.   Neurological: He is alert and oriented to person, place, and time.   Skin: Skin is warm and dry. He is not diaphoretic.   Nursing note and vitals reviewed.      Fluids    Intake/Output Summary (Last 24 hours) at 03/06/19 0723  Last data filed at 03/06/19 0410   Gross per 24 hour   Intake             1560 ml   Output             1225 ml   Net              335 ml       Laboratory  Recent Labs      03/04/19   1158  03/05/19   0212   WBC  19.6*  18.1*   RBC  4.33*  4.12*   HEMOGLOBIN  11.4*  10.8*   HEMATOCRIT  35.9*  34.8*   MCV  82.9  84.5   MCH  26.3*  26.2*   MCHC  31.8*  31.0*   RDW  55.5*  57.0*   PLATELETCT  382  413   MPV  9.4  9.5     Recent Labs      03/04/19   1157   SODIUM  136   POTASSIUM  4.3   CHLORIDE  97   CO2  32   GLUCOSE  125*   BUN  30*   CREATININE  0.69   CALCIUM  8.4*                   Imaging  DX-CHEST-PORTABLE (1 VIEW)   Final Result         1. Patchy left basilar opacities, atelectasis versus consolidation.      2. Free intraperitoneal air could relate to recent surgery. Correlate clinically.      CT-ABDOMEN-PELVIS WITH   Final Result      No  evidence of right perinephric hematoma or hydronephrosis status post right renal biopsy.      Clot identified in the urinary bladder.      Anasarca.      Distal esophageal mass with lymphadenopathy.      CT-NEEDLE BX-RENAL   Final Result      Successful CT-guided core biopsy of right renal mass.      CT-ABDOMEN-PELVIS WITH   Final Result      1.  Again seen thickening of the distal esophagus likely related to the patient's known esophageal cancer in that area. There is extensive soft tissue mass/adenopathy within the gastrohepatic, periportal, and retroperitoneal regions which does encase the    celiac and superior mesenteric arteries. This is not significantly changed over short interval follow-up.      2.  Again seen large right renal mass which measures 9 x 7 cm in size consistent with either renal cell carcinoma or metastatic disease. There is again seen likely some invasion of the right renal vein.      3.  Emphysematous and bullous change of the lungs with bibasilar atelectasis and small bilateral pleural effusions.      4.  Moderate constipation.      DX-CHEST-2 VIEWS   Final Result         1.  Hazy bilateral lung base infiltrates, greater on the left, new since prior.   2.  Small bilateral pleural effusions.   3.  Hyperexpansion of lungs favors changes of COPD.      US-EXTREMITY VENOUS UPPER UNILAT LEFT   Final Result      CT-NEEDLE BX-RENAL    (Results Pending)        Assessment/Plan  * Esophageal cancer (HCC)- (present on admission)   Assessment & Plan    No new acute complaints.  Tube feed at goal.  Laparoscopic G-tube and placement  Continue to provide him pain control.  Palliative is following this patient.     Renal cell carcinoma of right kidney (HCC)- (present on admission)   Assessment & Plan    With gastrohepatic, periportal, aortocaval and retroperitoneal r[place] metastases.  Urology signed off and he would like to see him as outpatient.  I discussed with oncologist Dr. Page and he will request  radiation oncology consult.  Palliative following.  2/28: Now S/P right nephrectomy       Hematuria- (present on admission)   Assessment & Plan    Valdes, irrigate clots prn.  Continue Eliquis for DVT.  Urology is following this patient appreciate their recommendations.     Delirium   Assessment & Plan    Currently stable.  Avoid benzodiazepines and anticholinergics  Frequent orientation  Avoid early morning labs  Avoid vital signs during sleep       Hypomagnesemia- (present on admission)   Assessment & Plan    Resolved     Pneumonitis- (present on admission)   Assessment & Plan    He remains afebrile.  Antibiotics through 2/27/2019. completed     Essential hypertension- (present on admission)   Assessment & Plan    Continue amlodipine     Leukocytosis- (present on admission)   Assessment & Plan    Still elevated but trending down.  Probably reactive continue monitoring no fever.      Dysphagia- (present on admission)   Assessment & Plan    He is tolerating tube feed  Appreciated speech therapy recommendations     Deep venous thrombosis (HCC)- (present on admission)   Assessment & Plan    2/18 US  Deep venous thrombosis visualized from the distal left subclavian to mid    brachial vein.  The thrombus appears near occlusive from the axillary to    the proximal brachial vein.   He does have a PICC in place, however do not use or pull to avoid dislodging clot  Continue Eliquis.       Hypokalemia- (present on admission)   Assessment & Plan    Resolved  Labs in morning     Advanced care planning/counseling discussion- (present on admission)   Assessment & Plan    Palliative care evaluated him and made recommendation     Constipation due to pain medication therapy- (present on admission)   Assessment & Plan    Continue bowel protocol     Cancer associated pain- (present on admission)   Assessment & Plan    Continue fentanyl, Oxycodone  IV Dilaudid as needed for severe pain   Pain is under control.       Malignant cachexia  (formerly [facility])- (present on admission)   Assessment & Plan    Severe malnutrition.  Dietitian is following.     COPD (chronic obstructive pulmonary disease) (formerly [facility])- (present on admission)   Assessment & Plan    Continue RT protocol     Penile swelling   Assessment & Plan    Valdes in place.  Urology evaluated him in the signed off. Follow-up as outpatient.  Continue to monitor     Tobacco abuse- (present on admission)   Assessment & Plan    Needs to quit.          I discussed plan of care during multidisciplinary rounds.    VTE prophylaxis: Eliquis

## 2019-03-06 NOTE — CARE PLAN
Problem: Bowel/Gastric:  Goal: Normal bowel function is maintained or improved  Outcome: PROGRESSING AS EXPECTED  Pt educated to call for assistance when needing to use the restroom.

## 2019-03-06 NOTE — CARE PLAN
Problem: Safety  Goal: Will remain free from injury  Outcome: PROGRESSING AS EXPECTED  Pt room free of clutter and well lit.

## 2019-03-06 NOTE — CONSULTS
RADIATION ONCOLOGY CONSULT    DATE OF SERVICE: 3/6/2019    IDENTIFICATION:   1. 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 Barnard today in consultation at the request of Dr. Page for metastatic esophageal.  Patient is a 73-year-old gentleman who over the last year has lost 80 pounds nonpurposefully.  50 pounds of that weight has been within the last 3 months.  Patient states he has been experiencing increasing dysphasia and obstruction.  In addition he has had severe constipation.  As a part of his workup and upper endoscopy revealed a Mckeon's esophagus starting at roughly 28 cm from the incisors turning into an ulcerative mass at roughly 30 cm from the incisors to 38 cm.  Endoscopically this was consistent with at least a T3 lesion there was no associated ultrasound evidence of adenopathy.  CT scan showed thickening of the distal esophagus consistent with these findings as well as gastrohepatic, periportal, aortocaval, and retroperitoneal lymphadenopathy.  In addition he was found to have a nearly 9 cm right renal mass concerning for a second primary.  Biopsy of the esophageal lesion was consistent with a poorly differentiated carcinoma.  Patient was planning on systemic chemotherapy for his advanced disease.  First it was decided to proceed with nephrectomy to assess this renal lesion.  His nephrectomy was done that showed a renal cell carcinoma with clear cell features and rhabdoid histology.  Tumor and composite was 9.8 cm.  Clear surgical margins were obtained.  Tumor was grade 4.  There is a separate abdominal wall nodule that was biopsied consistent with a metastatic adenocarcinoma similar to his esophageal primary.  During this hospitalization for his nephrectomy he also had a G-tube  placed it was felt the anatomy would not tolerate a J-tube placement.  He is planning on systemic chemotherapy but I been asked to see him for consideration of palliative radiotherapy to the esophagus as this is his main area of complaint and he will require some healing before he can undergo systemic therapy.    PAST MEDICAL HISTORY:   Past Medical History:   Diagnosis Date   • Backpain 01/2019   • Bowel habit changes 01/2019    Constipation   • Breath shortness 01/2019    With exertion   • Cancer (HCC) 2018    Renal mass, esophagel CA   • Cataract     Bilateral IOL implants   • Dental disorder     Upper and lower dentures   • Dysphagia    • Emphysema of lung (HCC)    • Glaucoma    • Heart burn    • Hiatus hernia syndrome    • High cholesterol    • Hypertension    • Indigestion    • Psychiatric problem 01/2019    depression/anxiety, no current meds   • Renal disorder 01/2019    Recent kidney mass dx   • Stroke (HCC) 2013    No residual weakness       PAST SURGICAL HISTORY:  Past Surgical History:   Procedure Laterality Date   • NEPHRECTOMY ROBOTIC Right 2/28/2019    Procedure: NEPHRECTOMY ROBOTIC;  Surgeon: Brandon Peña M.D.;  Location: [facility];  Service: Urology   • LAPAROSCOPY N/A 2/14/2019    Procedure: LAPAROSCOPY - WITH G-TUBE PLACEMENT;  Surgeon: Luz Leblanc M.D.;  Location: [facility];  Service: General   • GASTROSCOPY  2/1/2019    Procedure: GASTROSCOPY;  Surgeon: Gen Gonzales M.D.;  Location: [facility];  Service: EUS   • EGD W/ENDOSCOPIC ULTRASOUND  2/1/2019    Procedure: EGD W/ENDOSCOPIC ULTRASOUND - UPPER, RADIAL, POSS LINEAR;  Surgeon: Gen Gonzales M.D.;  Location: [facility];  Service: EUS   • EGD WITH ASP/BX  2/1/2019    Procedure: EGD WITH ASP/BX - FNA OF TUMOR OF ESOPHAGUS;  Surgeon: Gen Gonzales M.D.;  Location: [facility];  Service: EUS   • CATARACT EXTRACTION WITH IOL Bilateral 2005   • OTHER ORTHOPEDIC SURGERY  1997     Lower back surgery L4,L5   • OTHER  1980    Benign cyst removed on back of neck       CURRENT MEDICATIONS:  Current Facility-Administered Medications   Medication Dose Route Frequency Provider Last Rate Last Dose   • acetaminophen (TYLENOL) tablet 650 mg  650 mg Enteral Tube Q6HRS PRN Jes Avilez M.D.       • apixaban (ELIQUIS) tablet 5 mg  5 mg Enteral Tube BID Jes Avilez M.D.   5 mg at 03/06/19 0550   • bacitracin ointment   Topical TID Villa Flor M.D.       • amLODIPine (NORVASC) tablet 10 mg  10 mg Enteral Tube Q DAY Darryl Rodriguez M.D.   10 mg at 03/06/19 0550   • opium-belladonna (B&O SUPPRETTES) suppository 30 mg  30 mg Rectal Q8HRS PRN YUNIEL Diaz.P.R.NEnzo   30 mg at 03/04/19 1351   • Pharmacy Consult: Enteral tube insertion - review meds/change route/product selection   Other PHARMACY TO DOSE Darryl Rodriguez M.D.       • oxyCODONE (ROXICODONE) oral solution 15 mg  15 mg Enteral Tube Q4HRS PRN Darryl Rodriguez M.D.   15 mg at 03/06/19 1205   • HYDROmorphone pf (DILAUDID) injection 1 mg  1 mg Intravenous Q2HRS PRN Darryl Rodriguez M.D.   1 mg at 03/05/19 2242   • fentaNYL (DURAGESIC) 75 MCG/HR 1 Patch  1 Patch Transdermal Q72HRS Darryl Rodriguez M.D.   1 Patch at 03/06/19 0942   • zolpidem (AMBIEN) tablet 10 mg  10 mg Enteral Tube QHS Darryl Rodriguez M.D.   Stopped at 02/25/19 2100   • nicotine (NICODERM) 21 MG/24HR 21 mg  21 mg Transdermal Daily-0600 Trina Aguero M.D.   21 mg at 03/06/19 0550    And   • nicotine polacrilex (NICORETTE) 2 MG piece 2 mg  2 mg Oral Q HOUR PRN Trina Aguero M.D.       • Respiratory Care per Protocol   Nebulization Continuous RT Trina Aguero M.D.       • albuterol (PROVENTIL) 2.5mg/0.5ml nebulizer solution 2.5 mg  2.5 mg Nebulization Q6HRS PRN (RT) Trina Aguero M.D.   2.5 mg at 03/03/19 1318   • bisacodyl (DULCOLAX) suppository 10 mg  10 mg Rectal DAILY Trina Aguero M.D.   10 mg at 03/04/19 0637   •  ondansetron (ZOFRAN) syringe/vial injection 4 mg  4 mg Intravenous Q6HRS PRN Trina Aguero M.D.   4 mg at 03/04/19 2154   • polyethylene glycol/lytes (MIRALAX) PACKET 1 Packet  1 Packet Enteral Tube BID Trina Aguero M.D.   1 Packet at 03/05/19 1724       ALLERGIES:    Nkda [no known drug allergy]    FAMILY HISTORY:    History reviewed. No pertinent family history.    SOCIAL HISTORY:    Social History   Substance Use Topics   • Smoking status: Current Every Day Smoker     Packs/day: 0.50     Years: 15.00     Types: Cigarettes   • Smokeless tobacco: Never Used   • Alcohol use No     Patient is retired from Work  Lives with: Wife    REVIEW OF SYSTEMS:  A complete review of systems was completed in patient's chart on 3/6/2019.  All are negative with relationship to this diagnosis with the exception of:  He states the severe pain in his back that was present prior to his nephrectomy is improving and now feels only like postsurgical pain.  He has a G-tube placed but his other area of discomfort has been in the esophageal region.  He denies any other constitutional symptoms.    PHYSICAL EXAM:    Vitals:    03/05/19 1620 03/05/19 2105 03/06/19 0410 03/06/19 0800   BP: 123/56 126/61 125/61 127/65   Pulse: 75 81 93 83   Resp: 18 18 16 16   Temp: 36.2 °C (97.1 °F) 36.5 °C (97.7 °F) 36.7 °C (98 °F) 36.9 °C (98.5 °F)   TempSrc: Temporal Temporal Temporal Temporal   SpO2:  97% 90% 95%   Weight:       Height:       Location: Abdomen  Description: Burning, Aching      2= Ambulatory and capable of all self care, but unable to carry out any work activities.  Up and about more than 50% of waking hours.      GENERAL: No apparent distress.  Appears to be healing well from his nephrectomy  HEENT:  Pupils are equal, round, and reactive to light.  Extraocular muscles   are intact. Sclerae nonicteric.  Conjunctivae pink.  Oral cavity, tongue   protrudes midline.   NECK:  Supple without evidence of thyromegaly.  NODES:  No  peripheral adenopathy of the neck, supraclavicular fossa or axillae   bilaterally.  LUNGS:  Clear to ascultation bilaterally   HEART:  Regular rate and rhythm.  No murmur appreciated  ABDOMEN:  Soft. No evidence of hepatosplenomegaly.  Positive bowel sounds.  EXTREMITIES:  Without Edema.  NEUROLOGIC:  Cranial nerves II through XII were intact. Normal stance and gait motor and sensory grossly within normal limits      IMPRESSION:   1. 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  Right sided renal cell carcinoma with clear cell histology and rhabdoid features status post right sided nephrectomy, pathologic T2 a N0      RECOMMENDATIONS:   I discussed the diagnosis, prognosis, and treatment options over a 1 hr 5 min time period, 95% of that time dedicated to ongoing treatment management.  I discussed with the patient and distinguished his 2 separate primary lesions.  I discussed at the foundation of his treatment would be systemic therapy especially for his esophageal cancer as this is been shown to be metastatic pathologically.  At this time however he will require some time after surgery before he can start systemic therapy.  I have been asked to see him for consideration of palliative radiotherapy directed at the esophagus.  I would anticipate 10 fractions of radiotherapy to a total dose of 3000 cGy.  After discussing all the pros and cons patients of elected to wait till his family is here later this afternoon to make his final determination.  The alternative would be to simply wait for his systemic chemotherapy to start.  Endoscopically the lesion did not sound amenable to stent placement.    Thank you for the opportunity to participate in his care.  If any questions or comments, please do not hesitate in calling.

## 2019-03-07 NOTE — PROGRESS NOTES
[facility] Medicine Daily Progress Note    Date of Service  3/7/2019    Chief Complaint  73 y.o. male admitted 2/18/2019 with esophageal cancer.    Hospital Course  Admitted with esophageal cancer, dysphagia, laparoscopic G-tube placed for nutrition, noted to have DVT left upper extremity, right renal mass, transferred for IR guided biopsy.  Oncology was consulted.    Interval Problem Update  Patient is resting in chair, feels ok no new complains, no hematuria today, pain is stable, not in distress, continue holding lovenox for now until cleared by urologist, pending oncology eval. Possible surgery this week.   2/27: Sitting up in bed comfortably.  Pain is fairly controlled.  Discussed with oncology on-call Dr. Arianna Sanchez.  Apparently Dr. Page from oncology already spoke to urology and recommended to proceed with nephrectomy.  2/28: Sitting up in chair comfortably.  Pain fairly controlled.  Scheduled for right nephrectomy around 4 PM.  No new issues overnight.  3/1: Patient had no acute overnight events and now is status post right nephrectomy.  Lopez catheter had no evidence of hematuria.  Found to be on 3 L of O2 and now weaning.  The pain is fairly controlled.  Start tube feeds through the J-tube and tolerating well.  Waiting on further oncology recommendations.     3/2: seen and examined, patient states he is having pain and swelling in the tip of his penis area, states he feels like urine catheter is leaking, however I checked and it is now. Nursing also checked it is intact. Continue to monitor     3/3: seen and examined, patient sitting in chair, states nothing was done about his catheter, frustrated. Spoke to urology yesterday, keeping an eye on it.   Otherwise doing well, no complains    3/4: seen and examined, sitting in chair, just got pain medications, LE swelling continues, lopez not leaking anymore. Tolerated lasix well, will give 1 more dose. Dc fluids. VSS, white count slightly up.     3/5 I  evaluated this patient at bedside this morning.  He was sitting in chair and reported that he is feeling better.  White blood cell count is trending down and hemoglobin remained stable.  Urology evaluated him and made recommendations.    3/6 I evaluated and examined this patient at bedside this morning.  He reported that he is feeling better and expressed that he had episode of vomiting this morning..  White blood cell count is trending down.  Hemoglobin remained stable.  I discussed with oncologist Dr. Page and he will request radiation oncology consult.  He underwent chest x-ray this morning.    3/7 I evaluated and examined this patient at bedside.  He explains that he has been having cough  Radiation oncologist Dr. Mathew and oncologist Dr. Page evaluated this patient and made recommendations.  White blood cell count is trending down.  He remains afebrile.  I discussed plan of care with him and answered all his questions.    Consultants/Specialty   Palliative care  Urology  ID  Oncology    Code Status  DNR/DNI    Disposition  TBD    Review of Systems  Review of Systems   Constitutional: Positive for weight loss. Negative for chills and fever.   HENT: Negative for ear pain, hearing loss and tinnitus.    Eyes: Negative for blurred vision, double vision and photophobia.   Respiratory: Positive for cough. Negative for hemoptysis, sputum production and shortness of breath.    Cardiovascular: Positive for leg swelling. Negative for chest pain, palpitations, orthopnea and claudication.   Gastrointestinal: Positive for abdominal pain (Improving), nausea and vomiting. Negative for diarrhea and heartburn.   Genitourinary: Positive for dysuria. Negative for frequency, hematuria and urgency.        Penis swelling improved   Musculoskeletal: Negative for back pain, myalgias and neck pain.   Skin: Positive for rash (Dryness on bilateral lower extremity).   Neurological: Positive for weakness. Negative for dizziness,  tingling, tremors and headaches.   Endo/Heme/Allergies: Does not bruise/bleed easily.   Psychiatric/Behavioral: Negative for depression, substance abuse and suicidal ideas.        Physical Exam  Temp:  [36 °C (96.8 °F)-36.9 °C (98.5 °F)] 36.4 °C (97.6 °F)  Pulse:  [74-83] 74  Resp:  [16-18] 16  BP: (126-143)/(55-66) 143/62  SpO2:  [91 %-95 %] 91 %    Physical Exam   Constitutional: He is oriented to person, place, and time. He appears cachectic. He appears ill. No distress.   Laying in bed without any acute distress.   HENT:   Head: Normocephalic and atraumatic.   Mouth/Throat: No oropharyngeal exudate.   Eyes: Conjunctivae are normal. No scleral icterus.   Neck: Normal range of motion. Neck supple. No thyromegaly present.   Cardiovascular: Normal rate and regular rhythm.  Exam reveals no gallop and no friction rub.    Pulmonary/Chest: Effort normal. No respiratory distress. He has no wheezes. He has no rales.   Abdominal: Soft. Bowel sounds are normal. He exhibits no distension. There is no tenderness.   G-tube in place   Genitourinary: Penile erythema and penile tenderness (Swelling and mild tenderness) present.   Genitourinary Comments: swelling   Musculoskeletal: He exhibits edema (Bilateral lower extremities). He exhibits no deformity.   Neurological: He is alert and oriented to person, place, and time.   Skin: Skin is warm and dry. He is not diaphoretic. There is erythema (Mild erythema on bilateral lower extremities).   Nursing note and vitals reviewed.      Fluids    Intake/Output Summary (Last 24 hours) at 03/07/19 0737  Last data filed at 03/07/19 0420   Gross per 24 hour   Intake              520 ml   Output              625 ml   Net             -105 ml       Laboratory  Recent Labs      03/05/19   0212  03/06/19   0739  03/07/19   0355   WBC  18.1*  17.8*  16.1*   RBC  4.12*  4.31*  4.02*   HEMOGLOBIN  10.8*  11.3*  10.5*   HEMATOCRIT  34.8*  36.4*  33.6*   MCV  84.5  84.5  83.6   MCH  26.2*  26.2*   26.1*   MCHC  31.0*  31.0*  31.3*   RDW  57.0*  56.7*  55.6*   PLATELETCT  413  391  420   MPV  9.5  9.4  9.8     Recent Labs      03/04/19   1157  03/06/19   0739  03/07/19   0355   SODIUM  136  134*  135   POTASSIUM  4.3  4.8  4.5   CHLORIDE  97  94*  96   CO2  32  35*  32   GLUCOSE  125*  138*  120*   BUN  30*  29*  28*   CREATININE  0.69  0.79  0.76   CALCIUM  8.4*  8.4*  8.5                   Imaging  DX-CHEST-2 VIEWS   Final Result         Patchy left basilar opacity, worse than prior exam, concerning for worsening infection.      DX-CHEST-PORTABLE (1 VIEW)   Final Result         1. Patchy left basilar opacities, atelectasis versus consolidation.      2. Free intraperitoneal air could relate to recent surgery. Correlate clinically.      CT-ABDOMEN-PELVIS WITH   Final Result      No evidence of right perinephric hematoma or hydronephrosis status post right renal biopsy.      Clot identified in the urinary bladder.      Anasarca.      Distal esophageal mass with lymphadenopathy.      CT-NEEDLE BX-RENAL   Final Result      Successful CT-guided core biopsy of right renal mass.      CT-ABDOMEN-PELVIS WITH   Final Result      1.  Again seen thickening of the distal esophagus likely related to the patient's known esophageal cancer in that area. There is extensive soft tissue mass/adenopathy within the gastrohepatic, periportal, and retroperitoneal regions which does encase the    celiac and superior mesenteric arteries. This is not significantly changed over short interval follow-up.      2.  Again seen large right renal mass which measures 9 x 7 cm in size consistent with either renal cell carcinoma or metastatic disease. There is again seen likely some invasion of the right renal vein.      3.  Emphysematous and bullous change of the lungs with bibasilar atelectasis and small bilateral pleural effusions.      4.  Moderate constipation.      DX-CHEST-2 VIEWS   Final Result         1.  Hazy bilateral lung base  infiltrates, greater on the left, new since prior.   2.  Small bilateral pleural effusions.   3.  Hyperexpansion of lungs favors changes of COPD.      US-EXTREMITY VENOUS UPPER UNILAT LEFT   Final Result      CT-NEEDLE BX-RENAL    (Results Pending)        Assessment/Plan  * Esophageal cancer (HCC)- (present on admission)   Assessment & Plan    No new acute complaints.  Oncologist and radiation oncologist evaluated him and made recommendations.  Tube feed at goal.  Laparoscopic G-tube and placement  Continue to provide him pain control.  Palliative is following this patient.     Renal cell carcinoma of right kidney (HCC)- (present on admission)   Assessment & Plan    With gastrohepatic, periportal, aortocaval and retroperitoneal r[place] metastases.  Urology signed off and he would like to see him as outpatient.  I discussed with oncologist Dr. Page and he evaluated him and made recommendations.  Radiation oncologist  evaluated him and made recommendations.  Palliative following.  2/28: Now S/P right nephrectomy       Hematuria- (present on admission)   Assessment & Plan    Valdes, irrigate clots prn.  Continue Eliquis for DVT.  Urology signed off.     Delirium   Assessment & Plan    Currently stable.  Avoid benzodiazepines and anticholinergics  Frequent orientation  Avoid early morning labs  Avoid vital signs during sleep       Hypomagnesemia- (present on admission)   Assessment & Plan    Resolved     Pneumonitis- (present on admission)   Assessment & Plan    He remains afebrile.  Antibiotics through 2/27/2019. completed     Essential hypertension- (present on admission)   Assessment & Plan    Continue amlodipine     Leukocytosis- (present on admission)   Assessment & Plan    Still elevated but trending down.  He remains afebrile.     Dysphagia- (present on admission)   Assessment & Plan    He is tolerating tube feed  Appreciated speech therapy recommendations     Deep venous thrombosis (HCC)- (present on  admission)   Assessment & Plan    2/18 US  Deep venous thrombosis visualized from the distal left subclavian to mid    brachial vein.  The thrombus appears near occlusive from the axillary to    the proximal brachial vein.   He does have a PICC in place, however do not use or pull to avoid dislodging clot  Continue Eliquis.       Hypokalemia- (present on admission)   Assessment & Plan    Resolved  Labs in morning     Advanced care planning/counseling discussion- (present on admission)   Assessment & Plan    Palliative care evaluated him and made recommendation     Constipation due to pain medication therapy- (present on admission)   Assessment & Plan    Continue bowel protocol     Cancer associated pain- (present on admission)   Assessment & Plan    Continue fentanyl, Oxycodone  IV Dilaudid as needed for severe pain   Pain is under control.       Malignant cachexia (HCC)- (present on admission)   Assessment & Plan    Severe malnutrition.  Dietitian is following.     COPD (chronic obstructive pulmonary disease) (HCC)- (present on admission)   Assessment & Plan    Continue RT protocol     Penile swelling   Assessment & Plan    Valdes in place.  Urology evaluated him in the signed off. Follow-up as outpatient.  Continue to monitor     Tobacco abuse- (present on admission)   Assessment & Plan    Needs to quit.          I discussed plan of care during multidisciplinary rounds.    VTE prophylaxis: Eliquis

## 2019-03-07 NOTE — FLOWSHEET NOTE
SVN     03/07/19 1033   Events/Summary/Plan   Events/Summary/Plan SVN    Interdisciplinary Plan of Care-Goals (Indications)   Bronchodilator Indications History / Diagnosis   Education   Education Yes - Pt. / Family has been Instructed in Trach Care   RT Assessment of Delivered Medications   Evaluation of Medication Delivery Daily Yes-- Pt /Family has been Instructed in use of Respiratory Medications and Adverse Reactions   SVN Group   #SVN Performed Yes   Given By: Mouthpiece   Date SVN Last Changed 03/03/19   Date SVN Next Change Due (Q 7 Days) 03/10/19   Respiratory WDL   Respiratory (WDL) X   Chest Exam   Work Of Breathing / Effort Mild   Respiration 16   Pulse 83   Heart Rate (Monitored) 83   Breath Sounds   Pre/Post Intervention Pre Intervention Assessment   RUL Breath Sounds Clear   RML Breath Sounds Diminished   RLL Breath Sounds Diminished   CARMELO Breath Sounds Clear   LLL Breath Sounds Diminished   Secretions   Cough Non Productive   How Sputum Obtained Spontaneous   Oximetry   Continuous Oximetry Yes   Oxygen   Pulse Oximetry 95 %   O2 (LPM) 2   O2 Daily Delivery Respiratory  Silicone Nasal Cannula

## 2019-03-07 NOTE — PROGRESS NOTES
Assessment complete.  AA&Ox4. SpO2 >90% on RA.   Reporting 6/10 pain. Medicated per MAR.   Left G-J tube in place. G tube clamped.  J tube running Fibersource @ 65mL/hr - goal.  NPO. Denies N/V.  + void. LBM 3/4.   2+ pitting edema and redness noted to BLE.  Slight penile redness noted.  Pt up with SBA using FWW.  All needs met at this time. Call light within reach. Pt calls appropriately.

## 2019-03-07 NOTE — CARE PLAN
Problem: Safety  Goal: Will remain free from injury  Outcome: PROGRESSING AS EXPECTED  Bed alarm in use    Problem: Mobility  Goal: Risk for activity intolerance will decrease  Outcome: PROGRESSING AS EXPECTED  Patient encouraged to ambulate with staff as tolerated

## 2019-03-07 NOTE — PROGRESS NOTES
"Assumed care of patient from night shift RN.  Patient is alert and oriented times 4, denies pain at this time.  VSS /62   Pulse 74   Temp 36.4 °C (97.6 °F) (Temporal)   Resp 16   Ht 1.676 m (5' 6\")   Wt 62.8 kg (138 lb 7.2 oz)   SpO2 91%   BMI 22.35 kg/m²   PIV in the RFA, patent and saline locked.  PICC to the LUE, current DVT, orders to not touch or flush.  On RA with saturations in the mid 90s.  Last BM 3/6, urinating without difficulty.  Strict NPO, tolerating well.  G/J tube to the LLQ running Fibersource at 65mL/hr (goal).  Tolerating well.  Denies n/v.  6 surgical incisions to the abdomen, well approximated with dermabond and MALIK.  Excoriation to the penis, bacitracin in use.  Redness to the bilateral lower extremities.  Edematous.  Crepitus noted to the R chest, MD aware.  Fentanyl patch to the L shoulder.  Patient is a standby assist with the FWW, demonstrates steady gait, minimal assistance needed.  POC discussed for the day, bed is locked and in the lowest position, call light is within reach.  All needs are met at this time, hourly rounding is in place.    "

## 2019-03-07 NOTE — CONSULTS
DATE OF SERVICE:  03/06/2019    REASON FOR CONSULTATION:  1.  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  2.  Clear cell carcinoma of the right kidney, status post nephrectomy.    HISTORY OF PRESENT ILLNESS:  The patient is a very pleasant 73-year-old   gentleman who was initially seen by me in our office for adenocarcinoma of the   lower esophagus; however, the patient was also found to have a right-sided   kidney mass, which was causing him significant pain requiring hospitalization.    Subsequently, the patient underwent a right-sided nephrectomy with Dr. Peña, which revealed the T2Nx lesion, also at the time of surgery, the   patient was found to have a lump in his abdominal wall, which was biopsied and   was consistent with metastatic carcinoma of the esophagus.  Hem/onc   consultation was called for further management of his condition.  Patient has   a feeding tube in place and is getting stronger.  Pain is improved.  He denies   any headaches.  His family members are at the bedside.    PAST MEDICAL HISTORY:  Hypertension, chronic back pain, renal cell carcinoma,   clear cell subtype esophageal cancer.    PAST SURGICAL HISTORY:  Neck surgery in 1989, back surgery in 1997, cataract   surgery, right nephrectomy.    PERSONAL AND SOCIAL HISTORY:  Smoker.  .  No history of alcohol abuse.    REVIEW OF SYSTEMS:  GENERAL AND CONSTITUTIONAL:  Is complaining of fatigue, but weight is stable.    No fevers.  HEAD AND NECK, EARS, NOSE AND THROAT:  Denies any headaches or any visual   symptoms.  RESPIRATORY:  Occasional cough, no hemoptysis.  CARDIOVASCULAR:  No chest pain or palpitations.  GASTROINTESTINAL:  See history of present illness.  Has as dysphagia and has a   feeding tube in place.  No diarrhea.  GENITOURINARY:  See history of present illness.  No dysuria or hematuria.  MUSCULOSKELETAL:  Has chronic back and joint pains, which have not changed in   the recent  past.  NEUROLOGIC:  Denies any headaches or any visual symptoms.  PSYCHIATRIC:  Anxious, but denies any depression.  ENDOCRINE:  Memory is fairly good.  Rest of review of systems is negative per CMS/AMA criteria unless as mentioned   in history of present or past illness.    PHYSICAL EXAMINATION:  GENERAL:  The patient is alert and oriented x3.  He is slightly cachectic.  VITAL SIGNS:  Temperature 36.9, pulse 83, respirations rate 16, /65.  HEENT:  Pupils are equal.  There is no icterus.  Conjunctivae normal.  Oral   mucosa reveals no mucositis.  NECK:  Supple, with no lymphadenopathy.  LUNGS:  Clear to auscultation with good bilateral air entry.  HEART:  Reveals no S3, S4.  ABDOMEN:  Reveals the surgical site is well healed.  There is a feeding tube   in place.  There is no significant tenderness.  BACK:  Reveals mild kyphosis, but no scoliosis.  EXTREMITIES:  There is trace bilateral lower extremity edema.  SKIN:  Dry, but no rashes noted.  NEUROLOGIC:  Reveals that he is able to move all 4 extremities.  Power is   equal bilaterally.  PSYCHIATRIC:  Normal affect, orientation, and mood.    LABORATORY DATA:  WBC 17,800, hemoglobin 11.3, hematocrit 36.4, platelets   391,000.  Creatinine 0.79.    ASSESSMENT AND PLAN:  1.  Esophageal cancer.  Unfortunately, the patient was found to have   metastatic disease.  I discussed the findings of pathology with the patient,   his wife, and daughter with them in detail.  I explained to them that our   goals of therapy have not changed and his treatment would be palliative, but   would not be considered curative.  They have a good understanding of that.  I   will be sending out his specimen for HER-2/jhonatan testing and we will decide on   out future course of action at that time.  2.  Renal cell carcinoma.  The patient's surgical margins were negative.  I   will keep a very close watch since the patient did have some retroperitoneal   adenopathy.  Further management will depend  upon what happens over the coming   months.  Otherwise, his pain in the abdomen is much better.    Thank you for allowing me to see this very interesting patient.       ____________________________________     MD DOMI Irwin / EFE    DD:  03/06/2019 16:29:27  DT:  03/06/2019 21:22:33    D#:  [id number]  Job#:  [id number]

## 2019-03-08 PROBLEM — R21 RASH: Status: ACTIVE | Noted: 2019-01-01

## 2019-03-08 NOTE — PROGRESS NOTES
Patient with increased redness and new onset petechiae in the bilateral lower extremities.  Unsure if could be related to Eliquis.  Paged Dr Avilez with updates.  MD notified, advised to continue to medicate with appropriate pain medication, he will assess and decide if changing Eliquis would be appropriate.

## 2019-03-08 NOTE — THERAPY
"Occupational Therapy Treatment completed with focus on ADLs, ADL transfers and patient education.  Functional Status:  CGA LB dressing, CGA UB dressing, CGA functional mobility w/ FWW  Plan of Care: Will benefit from Occupational Therapy 2 times per week  Discharge Recommendations:  Equipment Will Continue to Assess for Equipment Needs. Post-acute therapy Recommend inpatient transitional care services for continued occupational therapy services.     See \"Rehab Therapy-Acute\" Patient Summary Report for complete documentation.       Pt demo'd improved activity tolerance and functional independence on this date. Ed pt on energy conservation techniques in relation to LB dressing. Pt demo'd ability to tailor sit to don pants, required light assist to tie waist band and for balance. Will continue to follow for Acute OT services.   "

## 2019-03-08 NOTE — PROGRESS NOTES
[facility] Medicine Daily Progress Note    Date of Service  3/8/2019    Chief Complaint  73 y.o. male admitted 2/18/2019 with esophageal cancer.    Hospital Course  Admitted with esophageal cancer, dysphagia, laparoscopic G-tube placed for nutrition, noted to have DVT left upper extremity, right renal mass, transferred for IR guided biopsy.  Oncology was consulted.    Interval Problem Update  Patient is resting in chair, feels ok no new complains, no hematuria today, pain is stable, not in distress, continue holding lovenox for now until cleared by urologist, pending oncology eval. Possible surgery this week.   2/27: Sitting up in bed comfortably.  Pain is fairly controlled.  Discussed with oncology on-call Dr. Arianna Sanchez.  Apparently Dr. Page from oncology already spoke to urology and recommended to proceed with nephrectomy.  2/28: Sitting up in chair comfortably.  Pain fairly controlled.  Scheduled for right nephrectomy around 4 PM.  No new issues overnight.  3/1: Patient had no acute overnight events and now is status post right nephrectomy.  Lopez catheter had no evidence of hematuria.  Found to be on 3 L of O2 and now weaning.  The pain is fairly controlled.  Start tube feeds through the J-tube and tolerating well.  Waiting on further oncology recommendations.     3/2: seen and examined, patient states he is having pain and swelling in the tip of his penis area, states he feels like urine catheter is leaking, however I checked and it is now. Nursing also checked it is intact. Continue to monitor     3/3: seen and examined, patient sitting in chair, states nothing was done about his catheter, frustrated. Spoke to urology yesterday, keeping an eye on it.   Otherwise doing well, no complains    3/4: seen and examined, sitting in chair, just got pain medications, LE swelling continues, lopez not leaking anymore. Tolerated lasix well, will give 1 more dose. Dc fluids. VSS, white count slightly up.     3/5 I  evaluated this patient at bedside this morning.  He was sitting in chair and reported that he is feeling better.  White blood cell count is trending down and hemoglobin remained stable.  Urology evaluated him and made recommendations.    3/6 I evaluated and examined this patient at bedside this morning.  He reported that he is feeling better and expressed that he had episode of vomiting this morning..  White blood cell count is trending down.  Hemoglobin remained stable.  I discussed with oncologist Dr. Page and he will request radiation oncology consult.  He underwent chest x-ray this morning.    3/7 I evaluated and examined this patient at bedside.  He explains that he has been having cough  Radiation oncologist Dr. Mathew and oncologist Dr. Page evaluated this patient and made recommendations.  White blood cell count is trending down.  He remains afebrile.  I discussed plan of care with him and answered all his questions.    3/8 I evaluated him at the bedside.  He found to have bilateral lower extremity rash.  He underwent radiation oncology evaluation yesterday.  White blood cell count is trending down.  His hemoglobin remained stable.    Consultants/Specialty   Palliative care  Urology  ID  Oncology    Code Status  DNR/DNI    Disposition  TBD    Review of Systems  Review of Systems   Constitutional: Positive for weight loss. Negative for chills and fever.   HENT: Negative for ear pain, hearing loss and tinnitus.    Eyes: Negative for blurred vision, double vision and photophobia.   Respiratory: Positive for cough. Negative for hemoptysis, sputum production and shortness of breath.    Cardiovascular: Positive for leg swelling. Negative for chest pain, palpitations, orthopnea and claudication.   Gastrointestinal: Positive for abdominal pain (Improving), nausea and vomiting. Negative for diarrhea and heartburn.   Genitourinary: Positive for dysuria. Negative for frequency, hematuria and urgency.        Penis  swelling improved   Musculoskeletal: Negative for back pain, myalgias and neck pain.   Skin: Positive for rash (Dryness on bilateral lower extremity).   Neurological: Positive for weakness. Negative for dizziness, tingling, tremors and headaches.   Endo/Heme/Allergies: Does not bruise/bleed easily.   Psychiatric/Behavioral: Negative for depression, substance abuse and suicidal ideas.        Physical Exam  Temp:  [36.2 °C (97.1 °F)-36.7 °C (98 °F)] 36.7 °C (98 °F)  Pulse:  [77-92] 92  Resp:  [16-18] 18  BP: (124-155)/(65-81) 134/81  SpO2:  [90 %-95 %] 91 %    Physical Exam   Constitutional: He is oriented to person, place, and time. He appears cachectic. He appears ill. No distress.   Laying in bed without any acute distress.   HENT:   Head: Normocephalic and atraumatic.   Mouth/Throat: No oropharyngeal exudate.   Eyes: Conjunctivae are normal. No scleral icterus.   Neck: Normal range of motion. Neck supple. No thyromegaly present.   Cardiovascular: Normal rate and regular rhythm.  Exam reveals no gallop and no friction rub.    Pulmonary/Chest: Effort normal. No respiratory distress. He has no wheezes. He has no rales.   Abdominal: Soft. Bowel sounds are normal. He exhibits no distension. There is no tenderness.   G-tube in place   Genitourinary: Penile erythema and penile tenderness (Swelling and mild tenderness) present.   Genitourinary Comments: swelling   Musculoskeletal: He exhibits edema (Bilateral lower extremities). He exhibits no deformity.   Neurological: He is alert and oriented to person, place, and time.   Skin: Skin is warm and dry. He is not diaphoretic. There is erythema (Mild erythema on bilateral lower extremities (slightly worsened from yesterday)).   Nursing note and vitals reviewed.      Fluids    Intake/Output Summary (Last 24 hours) at 03/08/19 0727  Last data filed at 03/08/19 0600   Gross per 24 hour   Intake              780 ml   Output             1075 ml   Net             -295 ml        Laboratory  Recent Labs      03/06/19   0739  03/07/19   0355   WBC  17.8*  16.1*   RBC  4.31*  4.02*   HEMOGLOBIN  11.3*  10.5*   HEMATOCRIT  36.4*  33.6*   MCV  84.5  83.6   MCH  26.2*  26.1*   MCHC  31.0*  31.3*   RDW  56.7*  55.6*   PLATELETCT  391  420   MPV  9.4  9.8     Recent Labs      03/06/19   0739  03/07/19   0355   SODIUM  134*  135   POTASSIUM  4.8  4.5   CHLORIDE  94*  96   CO2  35*  32   GLUCOSE  138*  120*   BUN  29*  28*   CREATININE  0.79  0.76   CALCIUM  8.4*  8.5                   Imaging  DX-CHEST-2 VIEWS   Final Result         Patchy left basilar opacity, worse than prior exam, concerning for worsening infection.      DX-CHEST-PORTABLE (1 VIEW)   Final Result         1. Patchy left basilar opacities, atelectasis versus consolidation.      2. Free intraperitoneal air could relate to recent surgery. Correlate clinically.      CT-ABDOMEN-PELVIS WITH   Final Result      No evidence of right perinephric hematoma or hydronephrosis status post right renal biopsy.      Clot identified in the urinary bladder.      Anasarca.      Distal esophageal mass with lymphadenopathy.      CT-NEEDLE BX-RENAL   Final Result      Successful CT-guided core biopsy of right renal mass.      CT-ABDOMEN-PELVIS WITH   Final Result      1.  Again seen thickening of the distal esophagus likely related to the patient's known esophageal cancer in that area. There is extensive soft tissue mass/adenopathy within the gastrohepatic, periportal, and retroperitoneal regions which does encase the    celiac and superior mesenteric arteries. This is not significantly changed over short interval follow-up.      2.  Again seen large right renal mass which measures 9 x 7 cm in size consistent with either renal cell carcinoma or metastatic disease. There is again seen likely some invasion of the right renal vein.      3.  Emphysematous and bullous change of the lungs with bibasilar atelectasis and small bilateral pleural effusions.       4.  Moderate constipation.      DX-CHEST-2 VIEWS   Final Result         1.  Hazy bilateral lung base infiltrates, greater on the left, new since prior.   2.  Small bilateral pleural effusions.   3.  Hyperexpansion of lungs favors changes of COPD.      US-EXTREMITY VENOUS UPPER UNILAT LEFT   Final Result      CT-NEEDLE BX-RENAL    (Results Pending)        Assessment/Plan  * Esophageal cancer (HCC)- (present on admission)   Assessment & Plan    Oncologist and radiation oncologist evaluated him and made recommendations.  Tube feed at goal.  Laparoscopic G-tube and placement  Continue to provide him pain control.  Palliative is following this patient.     Renal cell carcinoma of right kidney (HCC)- (present on admission)   Assessment & Plan    With gastrohepatic, periportal, aortocaval and retroperitoneal [place] metastases.  Urology signed off and he would like to see him as outpatient.  I discussed with oncologist Dr. Page and he evaluated him and made recommendations.  Radiation oncologist  evaluated him and he underwent evaluation on March 7, 2019  Palliative following.  2/28: Now S/P right nephrectomy       Hematuria- (present on admission)   Assessment & Plan    Valdes, irrigate clots prn.  Continue Eliquis for DVT.  Urology signed off.     Delirium   Assessment & Plan    Currently stable.  Avoid benzodiazepines and anticholinergics  Frequent orientation  Avoid early morning labs  Avoid vital signs during sleep       Hypomagnesemia- (present on admission)   Assessment & Plan    Resolved     Pneumonitis- (present on admission)   Assessment & Plan    He remains afebrile.  Antibiotics through 2/27/2019. completed     Essential hypertension- (present on admission)   Assessment & Plan    Continue amlodipine     Leukocytosis- (present on admission)   Assessment & Plan    Continue trending down.  He remains afebrile.     Dysphagia- (present on admission)   Assessment & Plan    He is tolerating tube  feed  Appreciated speech therapy recommendations     Deep venous thrombosis (HCC)- (present on admission)   Assessment & Plan    2/18 US  Deep venous thrombosis visualized from the distal left subclavian to mid    brachial vein.  The thrombus appears near occlusive from the axillary to    the proximal brachial vein.   He does have a PICC in place, however do not use or pull to avoid dislodging clot  Continue Eliquis.       Hypokalemia- (present on admission)   Assessment & Plan    Resolved  Labs in morning     Advanced care planning/counseling discussion- (present on admission)   Assessment & Plan    Palliative care evaluated him and made recommendation     Constipation due to pain medication therapy- (present on admission)   Assessment & Plan    Continue bowel protocol     Cancer associated pain- (present on admission)   Assessment & Plan    Continue fentanyl, Oxycodone  IV Dilaudid as needed for severe pain   Pain is under control.       Malignant cachexia (HCC)- (present on admission)   Assessment & Plan    Severe malnutrition.  Dietitian is following.     COPD (chronic obstructive pulmonary disease) (HCC)- (present on admission)   Assessment & Plan    Continue RT protocol     Rash   Assessment & Plan    He found to have bilateral-lower extremities.  It could be an allergic rash due to his socks.  I ordered Benadryl and requested RN to change his socks.  For lower extremity I ordered Lasix 20 mg 1 dose.  Continue to monitor.     Penile swelling   Assessment & Plan    Valdes in place.  Urology evaluated him in the signed off. Follow-up as outpatient.  Continue to monitor     Tobacco abuse- (present on admission)   Assessment & Plan    Needs to quit.          I discussed plan of care during multidisciplinary rounds.    VTE prophylaxis: Eliquis

## 2019-03-08 NOTE — PROGRESS NOTES
Assessment complete.  AA&Ox4. SpO2 >90% on RA.   Reporting 7/10 pain. Medicated per MAR.   Left G-J tube in place. G tube clamped.  J tube running Fibersource @ 65mL/hr - goal.  NPO. Denies N/V.  + void. LBM 3/6.  2+ pitting edema and redness noted to BLE.   More redness noted to LLE. Will continue to monitor overnight. Morning Eliquis to be held in AM for possible reaction.  Pt up with SBA using FWW.  All needs met at this time. Call light within reach. Pt calls appropriately.

## 2019-03-08 NOTE — PROGRESS NOTES
Bedside Report received   Assumed care of patient at 0700.    Pt is A&O x 4.  Pain reported at 0/10.   Nausea denied  Tolerating tube feeding via J Tube. Fibersource at 65ml/hour which is goal.   Surgical lap sites x 6 to abd. Wounds are healed and approximated.   J/G tube in place to LLQ.   + Urine output  + BM    + Flatus  Up x 1 assistance with front wheel walker  SCD's in place and on   Bed in lowest position and locked.  Bed alarm refused despite education on fall risk. Pt demonstrates appropriate use of call light to call for assistance.   Pt resting comfortably now.  Review plan of care with patient  Call light within reach  Hourly rounds in place  All needs met at this time

## 2019-03-08 NOTE — CARE PLAN
Problem: Respiratory:  Goal: Respiratory status will improve  Outcome: PROGRESSING SLOWER THAN EXPECTED    Intervention: Assess and monitor pulmonary status  Pulse Ox in place.   Intervention: Administer and titrate oxygen therapy  O2 therapy in place via nasal cannula. Therapy is titrated to maintain O2 above 90%.  Intervention: Educate and encourage coughing and deep breathing  Pt is prompted to cough and deep breath. Yankuar is in place at bedside to assist with secretions.   Intervention: Educate and encourage incentive spirometry usage  IS at bedside. Pt uses independently without requiring RN prompting. IS pulled at 1500 for this shift.  Intervention: Collaborate with respiratory therapist and Interdisciplinary Team on treatment measures to improve respiratory function  RT in place.       Problem: Mobility  Goal: Risk for activity intolerance will decrease  Outcome: PROGRESSING SLOWER THAN EXPECTED    Intervention: Encourage patient to increase activity level in collaboration with Interdisciplinary Team  PT and OT involved with patient care and mobilization. This shift, pt has ambulated in halls and been up to the chair with some fatigue noted. Frequent rest periods are provided.       Problem: Psychosocial Needs:  Goal: Level of anxiety will decrease  Outcome: PROGRESSING AS EXPECTED      Problem: Skin Integrity  Goal: Risk for impaired skin integrity will decrease  Outcome: PROGRESSING AS EXPECTED

## 2019-03-08 NOTE — THERAPY
"Physical Therapy Treatment completed.   Bed Mobility:  Supine to Sit:  (NT, in chair pre/post)  Transfers: Sit to Stand: Contact Guard Assist (with FWW)  Gait: Level Of Assist: Contact Guard Assist with Front-Wheel Walker       Plan of Care: Will benefit from Physical Therapy 2 times per week  Discharge Recommendations: Equipment: Will Continue to Assess for Equipment Needs.     Pt with improving activty tolerance, remains limited by fatigue; discussed aerobic exercise role in mitigated cancer related fatigue; discussed circulatory exercises to assist with new edema in bilateral LEs (well documented in chart possible allergic reaction, does appear so with petechial rash going up to mid shin currently); from a PT perspective, anticpiate pt will require placement once medically appropriate to d/c from hospital however anticipate location to be dictated by goals of care/chemo treatments; will follow.      See \"Rehab Therapy-Acute\" Patient Summary Report for complete documentation.       "

## 2019-03-08 NOTE — CARE PLAN
Problem: Knowledge Deficit  Goal: Knowledge of disease process/condition, treatment plan, diagnostic tests, and medications will improve  Outcome: PROGRESSING AS EXPECTED  POC discussed with patient. Questions and concerns addressed.    Problem: Pain Management  Goal: Pain level will decrease to patient's comfort goal  Outcome: PROGRESSING AS EXPECTED  Pain assessed and medicated per MAR.

## 2019-03-09 PROBLEM — L53.9 ERYTHEMA: Status: ACTIVE | Noted: 2019-01-01

## 2019-03-09 NOTE — PROGRESS NOTES
RN clarified with MD non-use of PICC line r/t DVT in arm. MD indicated to RN that it was felt DVT could be dislodged by removing line given location, so line will remain despite in ability to access.

## 2019-03-09 NOTE — PROGRESS NOTES
"Patient confused this am to time, re-orientated. Patients pain well controlled on Fentanyl patch \"right shoulder\", and oxycodone. Bilateral LE's with 3+ edema, erythremia and petechia, no  Improvement no worsening. MD is aware, patient elevating feet at rest. G/J tube in place for nutrition, G-tube clamped, feeding through J-tube Fiber source. /73   Pulse 85   Temp 37.1 °C (98.7 °F) (Temporal)   Resp 19   Ht 1.676 m (5' 6\")   Wt 62.8 kg (138 lb 7.2 oz)   SpO2 93%   BMI 22.35 kg/m²     "

## 2019-03-09 NOTE — PROGRESS NOTES
[facility] Medicine Daily Progress Note    Date of Service  3/9/2019    Chief Complaint  73 y.o. male admitted 2/18/2019 with esophageal cancer.    Hospital Course  Admitted with esophageal cancer, dysphagia, laparoscopic G-tube placed for nutrition, noted to have DVT left upper extremity, right renal mass, transferred for IR guided biopsy.  Oncology was consulted.    Interval Problem Update  Patient is resting in chair, feels ok no new complains, no hematuria today, pain is stable, not in distress, continue holding lovenox for now until cleared by urologist, pending oncology eval. Possible surgery this week.   2/27: Sitting up in bed comfortably.  Pain is fairly controlled.  Discussed with oncology on-call Dr. Arianna Sanchez.  Apparently Dr. Page from oncology already spoke to urology and recommended to proceed with nephrectomy.  2/28: Sitting up in chair comfortably.  Pain fairly controlled.  Scheduled for right nephrectomy around 4 PM.  No new issues overnight.  3/1: Patient had no acute overnight events and now is status post right nephrectomy.  Lopez catheter had no evidence of hematuria.  Found to be on 3 L of O2 and now weaning.  The pain is fairly controlled.  Start tube feeds through the J-tube and tolerating well.  Waiting on further oncology recommendations.     3/2: seen and examined, patient states he is having pain and swelling in the tip of his penis area, states he feels like urine catheter is leaking, however I checked and it is now. Nursing also checked it is intact. Continue to monitor     3/3: seen and examined, patient sitting in chair, states nothing was done about his catheter, frustrated. Spoke to urology yesterday, keeping an eye on it.   Otherwise doing well, no complains    3/4: seen and examined, sitting in chair, just got pain medications, LE swelling continues, lopez not leaking anymore. Tolerated lasix well, will give 1 more dose. Dc fluids. VSS, white count slightly up.     3/5 I  evaluated this patient at bedside this morning.  He was sitting in chair and reported that he is feeling better.  White blood cell count is trending down and hemoglobin remained stable.  Urology evaluated him and made recommendations.    3/6 I evaluated and examined this patient at bedside this morning.  He reported that he is feeling better and expressed that he had episode of vomiting this morning..  White blood cell count is trending down.  Hemoglobin remained stable.  I discussed with oncologist Dr. Page and he will request radiation oncology consult.  He underwent chest x-ray this morning.    3/7 I evaluated and examined this patient at bedside.  He explains that he has been having cough  Radiation oncologist Dr. Mathew and oncologist Dr. Page evaluated this patient and made recommendations.  White blood cell count is trending down.  He remains afebrile.  I discussed plan of care with him and answered all his questions.    3/8 I evaluated him at the bedside.  He found to have bilateral lower extremity rash.  He underwent radiation oncology evaluation yesterday.  White blood cell count is trending down.  His hemoglobin remained stable.    3/9 I examined him at the bedside.  Physical therapy and occupational therapy evaluated him yesterday.  He expressed that his lower extremity swelling has decreased but the redness has increased significantly.  Patient family is concerned and I discussed plan of care with them.  I discussed with Dr. Page and requested RN to remove his PICC line.  Also due to concern of possible lower extremity rash due to Eliquis I discontinued Eliquis and started him on Lovenox.  Due to possible cellulitis on bilateral lower extremities I started him on ceftriaxone.     Consultants/Specialty   Palliative care  Urology  ID  Oncology    Code Status  DNR/DNI    Disposition  TBD    Review of Systems  Review of Systems   Constitutional: Positive for weight loss. Negative for chills and fever.    HENT: Negative for ear pain, hearing loss and tinnitus.    Eyes: Negative for blurred vision, double vision and photophobia.   Respiratory: Negative for cough, hemoptysis, sputum production and shortness of breath.    Cardiovascular: Positive for leg swelling. Negative for chest pain, palpitations, orthopnea and claudication.   Gastrointestinal: Negative for abdominal pain, diarrhea, heartburn, nausea and vomiting.   Genitourinary: Negative for dysuria, frequency, hematuria and urgency.        Penis swelling improved   Musculoskeletal: Negative for back pain, myalgias and neck pain.   Skin: Positive for rash (Worsening on bilateral lower extreme).   Neurological: Positive for weakness. Negative for dizziness, tingling, tremors and headaches.   Endo/Heme/Allergies: Does not bruise/bleed easily.   Psychiatric/Behavioral: Negative for depression, substance abuse and suicidal ideas.        Physical Exam  Temp:  [36.3 °C (97.3 °F)-36.6 °C (97.8 °F)] 36.6 °C (97.8 °F)  Pulse:  [81-90] 81  Resp:  [17-18] 17  BP: (133-159)/(63-78) 152/78  SpO2:  [91 %-96 %] 91 %    Physical Exam   Constitutional: He is oriented to person, place, and time. He appears cachectic. He appears ill. No distress.   Laying in bed without any acute distress.   HENT:   Head: Normocephalic and atraumatic.   Mouth/Throat: No oropharyngeal exudate.   Eyes: Conjunctivae are normal. No scleral icterus.   Neck: Normal range of motion. Neck supple. No thyromegaly present.   Cardiovascular: Normal rate and regular rhythm.  Exam reveals no gallop and no friction rub.    Pulmonary/Chest: Effort normal. No respiratory distress. He has no wheezes. He has no rales.   Abdominal: Soft. Bowel sounds are normal. He exhibits no distension. There is no tenderness.   G-tube in place   Genitourinary: Penile erythema and penile tenderness (Swelling and mild tenderness) present.   Genitourinary Comments: swelling   Musculoskeletal: He exhibits edema (Bilateral lower  extremities (slightly improved from yesterday)). He exhibits no deformity.   Neurological: He is alert and oriented to person, place, and time.   Skin: Skin is warm and dry. He is not diaphoretic. There is erythema (Worsen bilateral lower extremities edema).   Nursing note and vitals reviewed.      Fluids    Intake/Output Summary (Last 24 hours) at 03/09/19 0725  Last data filed at 03/09/19 0500   Gross per 24 hour   Intake             1820 ml   Output             1425 ml   Net              395 ml       Laboratory  Recent Labs      03/06/19   0739  03/07/19   0355   WBC  17.8*  16.1*   RBC  4.31*  4.02*   HEMOGLOBIN  11.3*  10.5*   HEMATOCRIT  36.4*  33.6*   MCV  84.5  83.6   MCH  26.2*  26.1*   MCHC  31.0*  31.3*   RDW  56.7*  55.6*   PLATELETCT  391  420   MPV  9.4  9.8     Recent Labs      03/06/19 0739  03/07/19   0355   SODIUM  134*  135   POTASSIUM  4.8  4.5   CHLORIDE  94*  96   CO2  35*  32   GLUCOSE  138*  120*   BUN  29*  28*   CREATININE  0.79  0.76   CALCIUM  8.4*  8.5                   Imaging  DX-CHEST-2 VIEWS   Final Result         Patchy left basilar opacity, worse than prior exam, concerning for worsening infection.      DX-CHEST-PORTABLE (1 VIEW)   Final Result         1. Patchy left basilar opacities, atelectasis versus consolidation.      2. Free intraperitoneal air could relate to recent surgery. Correlate clinically.      CT-ABDOMEN-PELVIS WITH   Final Result      No evidence of right perinephric hematoma or hydronephrosis status post right renal biopsy.      Clot identified in the urinary bladder.      Anasarca.      Distal esophageal mass with lymphadenopathy.      CT-NEEDLE BX-RENAL   Final Result      Successful CT-guided core biopsy of right renal mass.      CT-ABDOMEN-PELVIS WITH   Final Result      1.  Again seen thickening of the distal esophagus likely related to the patient's known esophageal cancer in that area. There is extensive soft tissue mass/adenopathy within the  gastrohepatic, periportal, and retroperitoneal regions which does encase the    celiac and superior mesenteric arteries. This is not significantly changed over short interval follow-up.      2.  Again seen large right renal mass which measures 9 x 7 cm in size consistent with either renal cell carcinoma or metastatic disease. There is again seen likely some invasion of the right renal vein.      3.  Emphysematous and bullous change of the lungs with bibasilar atelectasis and small bilateral pleural effusions.      4.  Moderate constipation.      DX-CHEST-2 VIEWS   Final Result         1.  Hazy bilateral lung base infiltrates, greater on the left, new since prior.   2.  Small bilateral pleural effusions.   3.  Hyperexpansion of lungs favors changes of COPD.      US-EXTREMITY VENOUS UPPER UNILAT LEFT   Final Result      CT-NEEDLE BX-RENAL    (Results Pending)        Assessment/Plan  * Esophageal cancer (HCC)- (present on admission)   Assessment & Plan    Oncologist and radiation oncologist evaluated him and made recommendations.  Tube feed at goal.  Laparoscopic G-tube and placement  Continue to provide him pain control.  He is going to have radiation therapy.  Palliative is following this patient.     Renal cell carcinoma of right kidney (HCC)- (present on admission)   Assessment & Plan    With gastrohepatic, periportal, aortocaval and retroperitoneal r[place] metastases.  Urology signed off and he would like to see him as outpatient.  I discussed with oncologist Dr. Page and he evaluated him and made recommendations.  Radiation oncologist  evaluated him and he underwent evaluation on March 7, 2019.  Plan is to perform radiation therapy.  Palliative following.  2/28: Now S/P right nephrectomy  Today I discussed plan of care with Dr. Page at the bedside.     Hematuria- (present on admission)   Assessment & Plan    Valdes, irrigate clots prn.  Continue Eliquis for DVT.  Urology signed off.     Delirium    Assessment & Plan    Currently stable.  Avoid benzodiazepines and anticholinergics  Frequent orientation  Avoid early morning labs  Avoid vital signs during sleep       Hypomagnesemia- (present on admission)   Assessment & Plan    Resolved     Pneumonitis- (present on admission)   Assessment & Plan    He remains afebrile.  Antibiotics through 2/27/2019. completed     Essential hypertension- (present on admission)   Assessment & Plan    Continue amlodipine     Leukocytosis- (present on admission)   Assessment & Plan    Continue trending down.  He remains afebrile.     Dysphagia- (present on admission)   Assessment & Plan    He is tolerating tube feed  Appreciated speech therapy recommendations     Deep venous thrombosis (HCC)- (present on admission)   Assessment & Plan    2/18 US  Deep venous thrombosis visualized from the distal left subclavian to mid    brachial vein.  The thrombus appears near occlusive from the axillary to    the proximal brachial vein.   Remove his PICC line after discussing with Dr. Page is recommended that we can remove his PICC line.  I discontinued Eliquis and started him on therapeutic Lovenox due to possible allergic reaction.         Hypokalemia- (present on admission)   Assessment & Plan    Resolved  Labs in morning     Advanced care planning/counseling discussion- (present on admission)   Assessment & Plan    Palliative care evaluated him and made recommendation     Constipation due to pain medication therapy- (present on admission)   Assessment & Plan    Continue bowel protocol     Cancer associated pain- (present on admission)   Assessment & Plan    Continue fentanyl, Oxycodone  IV Dilaudid as needed for severe pain   Pain is under control.       Malignant cachexia (HCC)- (present on admission)   Assessment & Plan    Severe malnutrition.  Dietitian is following.     COPD (chronic obstructive pulmonary disease) (HCC)- (present on admission)   Assessment & Plan    Continue RT  protocol     Rash   Assessment & Plan    He found to have bilateral-lower extremities.  It could be an allergic rash due to his socks.  I ordered Benadryl and requested RN to change his socks.  For lower extremity I ordered Lasix 20 mg 1 dose.  Due to his worsening bilateral lower extremity edema I ordered ceftriaxone for possible cellulitis.  Also there is a concern that as he is on Eliquis he may get allergic reaction secondary to it.  I discontinued Eliquis and started him on therapeutic Lovenox.   I discussed with Dr. Page regarding the PICC line and decided to remove his PICC line after discussing with him.  Continue to monitor.     Penile swelling   Assessment & Plan    Valdes in place.  Urology evaluated him in the signed off. Follow-up as outpatient.  Continue to monitor     Tobacco abuse- (present on admission)   Assessment & Plan    Needs to quit.          I discussed plan of care with Dr. Page and RN at the bedside.    VTE prophylaxis: Lovenox

## 2019-03-09 NOTE — PROGRESS NOTES
Oncology/Hematology Progress Note               Author: Wendy Camilo Date & Time created: 3/9/2019  3:35 PM   Diagnosis clear cell carcinoma of the kidney and esophageal cancer metastatic  Interval History:  3/9/2019-the patient is getting stronger.  He is being fed through his feeding tube.  He has developed a rash on his lower extremities.  He is on Eliquis for his DVT.    Review of Systems:  Review of Systems   Constitutional: Positive for malaise/fatigue.   Cardiovascular: Negative for chest pain, palpitations and orthopnea.   Genitourinary: Negative for dysuria and hematuria.   Musculoskeletal: Positive for back pain and joint pain.   Skin: Positive for rash. Negative for itching.   Neurological: Positive for weakness.       Physical Exam:  Physical Exam   Constitutional: He is oriented to person, place, and time. He appears well-developed. No distress.   Cardiovascular: Normal rate.  Exam reveals no gallop.    No murmur heard.  Pulmonary/Chest: Effort normal and breath sounds normal. No respiratory distress. He has no wheezes. He exhibits no tenderness.   Neurological: He is alert and oriented to person, place, and time. No cranial nerve deficit. Coordination normal.   Skin: Skin is warm. Rash noted. He is not diaphoretic.       Labs:          Recent Labs      19   SODIUM  135   POTASSIUM  4.5   CHLORIDE  96   CO2  32   BUN  28*   CREATININE  0.76   CALCIUM  8.5     Recent Labs      19   GLUCOSE  120*     Recent Labs      19   RBC  4.02*   HEMOGLOBIN  10.5*   HEMATOCRIT  33.6*   PLATELETCT  420     Recent Labs      19   WBC  16.1*   NEUTSPOLYS  76.20*   LYMPHOCYTES  10.50*   MONOCYTES  9.90   EOSINOPHILS  2.10   BASOPHILS  0.30     Recent Labs      19   SODIUM  135   POTASSIUM  4.5   CHLORIDE  96   CO2  32   GLUCOSE  120*   BUN  28*   CREATININE  0.76   CALCIUM  8.5     Hemodynamics:  Temp (24hrs), Av.6 °C (97.9 °F), Min:36.3 °C (97.3 °F),  Max:37.1 °C (98.7 °F)  Temperature: 37.1 °C (98.7 °F)  Pulse  Av.1  Min: 65  Max: 101   Blood Pressure : 160/73     Respiratory:    Respiration: 19, Pulse Oximetry: 93 %     Work Of Breathing / Effort: Mild  RUL Breath Sounds: Clear, RML Breath Sounds: Rhonchi, RLL Breath Sounds: Rhonchi, CARMELO Breath Sounds: Clear, LLL Breath Sounds: Rhonchi  Fluids:    Intake/Output Summary (Last 24 hours) at 19 1535  Last data filed at 19 1256   Gross per 24 hour   Intake             1410 ml   Output              850 ml   Net              560 ml        GI/Nutrition:  Orders Placed This Encounter   Procedures   • Diet NPO     Standing Status:   Standing     Number of Occurrences:   1     Order Specific Question:   Restrict to:     Answer:   Strict [1]     Medical Decision Making, by Problem:  Active Hospital Problems    Diagnosis   • *Esophageal cancer (HCC) [C15.9]   • Renal cell carcinoma of right kidney (HCC) [C64.1]   • Hematuria [R31.9]   • Delirium [R41.0]   • Pneumonitis [J18.9]   • Hypomagnesemia [E83.42]   • Deep venous thrombosis (HCC) [I82.409]   • Dysphagia [R13.10]   • Leukocytosis [D72.829]   • Essential hypertension [I10]   • Advanced care planning/counseling discussion [Z71.89]   • Hypokalemia [E87.6]   • Cancer associated pain [G89.3]   • Constipation due to pain medication therapy [K59.03]   • COPD (chronic obstructive pulmonary disease) (HCC) [J44.9]   • Malignant cachexia (HCC) [R64]   • Tobacco abuse [Z72.0]   • Rash [R21]   • Penile swelling [N48.89]       Plan:  Esophageal cancer-this is metastatic and patient will see palliative radiation.  Patient understands that he has a stage IV malignancy and any treatment would be palliative and hopefully with extended lifespan.  I plan on holding of his chemotherapy until his performance status is much better.  Renal cell carcinoma-this has been resected we will continue to observe him.  DVT-he is doing well and is on Eliquis  ?  Cellulitis-he is on  antibiotics.  I will sign off the patient.  Reconsult as needed.  He will follow-up with me in the office in 2-3 weeks.  Thank you  [employer]

## 2019-03-09 NOTE — PROGRESS NOTES
Assessment complete.  AA&Ox4. SpO2 >90% on 0.5L via NC.   Pain tolerable at this time per pt.  Lap sites x 6 with dermabond. MALIK.  G-J tube to LLQ. G tube clamped.  J tube running Fibersource at 65mL/hr - goal.   Tolerating well diet. Denies N/V.  + void. LBM 3/6.   Redness and 3+ pitting edema noted to BLE.  Pt sitting comfortably in recliner with LE elevated.  All needs met at this time. Call light within reach. Pt calls appropriately.

## 2019-03-09 NOTE — CARE PLAN
Problem: Discharge Barriers/Planning  Goal: Patient's continuum of care needs will be met  Outcome: PROGRESSING AS EXPECTED  POC discussed. Questions and concerns addressed.    Problem: Pain Management  Goal: Pain level will decrease to patient's comfort goal  Outcome: PROGRESSING AS EXPECTED  Pain assessed and medicated per MAR.

## 2019-03-10 PROBLEM — R60.0 LOWER EXTREMITY EDEMA: Status: ACTIVE | Noted: 2019-01-01

## 2019-03-10 NOTE — PROGRESS NOTES
"Pt AA&Ox4. Pain rating at 3. Denies pain intervention at this time. No c/o n/v, n/t or SOB. Pt having productive cough. yaunker at bedside. Pt up in chair this am. BLE with 4+ pitting dependent edema. 1+ pulses. Legs reddened with petechia and hot to touch. ronchi heard in lower bases in lungs. Pt on room air sats in 90's. Lap sites x 4 dermabond and geraldo. No signs of infection noted. LLQ with G/J tube with fibersource HN running at 65ml/hr. G/J tube flushed with 30cc of free water this am. +BS, +BM. Pt up with one person hand held assist. Shuffled gait. Bed alarm on. Call light within reach. Plan of care discussed. Hourly rounding in place. Blood pressure 131/59, pulse 84, temperature 36.4 °C (97.5 °F), temperature source Temporal, resp. rate 16, height 1.676 m (5' 6\"), weight 62.8 kg (138 lb 7.2 oz), SpO2 90 %.      "

## 2019-03-10 NOTE — CARE PLAN
Problem: Communication  Goal: The ability to communicate needs accurately and effectively will improve  Outcome: PROGRESSING AS EXPECTED  Patient updated on POC, all questions answered at this time.    Problem: Safety  Goal: Will remain free from injury  Outcome: PROGRESSING AS EXPECTED  Patient is a moderate fall risk per HD assessment. Bed alarm in place, call light within reach, bed in locked and lowest position.

## 2019-03-10 NOTE — PROGRESS NOTES
Lab called and asked to come draw labs as patient does not have line. Labs are correct in computer as lab collect.

## 2019-03-10 NOTE — PROGRESS NOTES
[facility] Medicine Daily Progress Note    Date of Service  3/10/2019    Chief Complaint  73 y.o. male admitted 2/18/2019 with esophageal cancer.    Hospital Course  Admitted with esophageal cancer, dysphagia, laparoscopic G-tube placed for nutrition, noted to have DVT left upper extremity, right renal mass, transferred for IR guided biopsy.  Oncology was consulted.    Interval Problem Update  Patient is resting in chair, feels ok no new complains, no hematuria today, pain is stable, not in distress, continue holding lovenox for now until cleared by urologist, pending oncology eval. Possible surgery this week.   2/27: Sitting up in bed comfortably.  Pain is fairly controlled.  Discussed with oncology on-call Dr. Arianna Sanchez.  Apparently Dr. Page from oncology already spoke to urology and recommended to proceed with nephrectomy.  2/28: Sitting up in chair comfortably.  Pain fairly controlled.  Scheduled for right nephrectomy around 4 PM.  No new issues overnight.  3/1: Patient had no acute overnight events and now is status post right nephrectomy.  Lopez catheter had no evidence of hematuria.  Found to be on 3 L of O2 and now weaning.  The pain is fairly controlled.  Start tube feeds through the J-tube and tolerating well.  Waiting on further oncology recommendations.     3/2: seen and examined, patient states he is having pain and swelling in the tip of his penis area, states he feels like urine catheter is leaking, however I checked and it is now. Nursing also checked it is intact. Continue to monitor     3/3: seen and examined, patient sitting in chair, states nothing was done about his catheter, frustrated. Spoke to urology yesterday, keeping an eye on it.   Otherwise doing well, no complains    3/4: seen and examined, sitting in chair, just got pain medications, LE swelling continues, lopez not leaking anymore. Tolerated lasix well, will give 1 more dose. Dc fluids. VSS, white count slightly up.     3/5 I  evaluated this patient at bedside this morning.  He was sitting in chair and reported that he is feeling better.  White blood cell count is trending down and hemoglobin remained stable.  Urology evaluated him and made recommendations.    3/6 I evaluated and examined this patient at bedside this morning.  He reported that he is feeling better and expressed that he had episode of vomiting this morning..  White blood cell count is trending down.  Hemoglobin remained stable.  I discussed with oncologist Dr. Page and he will request radiation oncology consult.  He underwent chest x-ray this morning.    3/7 I evaluated and examined this patient at bedside.  He explains that he has been having cough  Radiation oncologist Dr. Mathew and oncologist Dr. Page evaluated this patient and made recommendations.  White blood cell count is trending down.  He remains afebrile.  I discussed plan of care with him and answered all his questions.    3/8 I evaluated him at the bedside.  He found to have bilateral lower extremity rash.  He underwent radiation oncology evaluation yesterday.  White blood cell count is trending down.  His hemoglobin remained stable.    3/9 I examined him at the bedside.  Physical therapy and occupational therapy evaluated him yesterday.  He expressed that his lower extremity swelling has decreased but the redness has increased significantly.  Patient family is concerned and I discussed plan of care with them.  I discussed with Dr. Page and requested RN to remove his PICC line.  Also due to concern of possible lower extremity rash due to Eliquis I discontinued Eliquis and started him on Lovenox.  Due to possible cellulitis on bilateral lower extremities I started him on ceftriaxone.     3/10 I evaluated and examined him at the bedside.  He expressed that his lower extremity swelling has been improving.  I discussed plan of care with him.  I changed antibiotic to Ancef.  I discussed plan of care with and I  answered all his questions.  He is going to have radiation therapy starting from Tuesday.  I started him on p.o. Lasix 20 mg.  I discussed with Dr. Page regarding plan of care.      Consultants/Specialty   Palliative care  Urology  ID  Oncology    Code Status  DNR/DNI    Disposition  TBD    Review of Systems  Review of Systems   Constitutional: Positive for weight loss. Negative for chills and fever.   HENT: Negative for ear pain, hearing loss and tinnitus.    Eyes: Negative for blurred vision, double vision and photophobia.   Respiratory: Negative for cough, hemoptysis, sputum production and shortness of breath.    Cardiovascular: Positive for leg swelling. Negative for chest pain, palpitations, orthopnea and claudication.   Gastrointestinal: Negative for abdominal pain, diarrhea, heartburn, nausea and vomiting.   Genitourinary: Negative for dysuria, frequency, hematuria and urgency.        Penis swelling improved   Musculoskeletal: Negative for back pain, myalgias and neck pain.   Skin: Positive for rash (Worsening on bilateral lower extremities (improving)).   Neurological: Positive for weakness. Negative for dizziness, tingling, tremors and headaches.   Endo/Heme/Allergies: Does not bruise/bleed easily.   Psychiatric/Behavioral: Negative for depression, substance abuse and suicidal ideas.        Physical Exam  Temp:  [36.5 °C (97.7 °F)-37.1 °C (98.7 °F)] 36.8 °C (98.2 °F)  Pulse:  [83-88] 86  Resp:  [16-19] 17  BP: (112-160)/(59-73) 142/59  SpO2:  [91 %-93 %] 93 %    Physical Exam   Constitutional: He is oriented to person, place, and time. He appears cachectic. He appears ill. No distress.   Sitting in chair.   HENT:   Head: Normocephalic and atraumatic.   Mouth/Throat: No oropharyngeal exudate.   Eyes: Conjunctivae are normal. No scleral icterus.   Neck: Normal range of motion. Neck supple. No thyromegaly present.   Cardiovascular: Normal rate and regular rhythm.  Exam reveals no gallop and no friction rub.     Pulmonary/Chest: Effort normal. No respiratory distress. He has no wheezes. He has no rales.   Abdominal: Soft. Bowel sounds are normal. He exhibits no distension. There is no tenderness.   G-tube in place   Genitourinary: Penile erythema and penile tenderness (Swelling and mild tenderness) present.   Genitourinary Comments: swelling   Musculoskeletal: He exhibits edema (Bilateral lower extremities (slightly improved from yesterday)). He exhibits no deformity.   Neurological: He is alert and oriented to person, place, and time.   Skin: Skin is warm and dry. He is not diaphoretic. There is erythema (Slightly improved erythema. ).   Nursing note and vitals reviewed.      Fluids    Intake/Output Summary (Last 24 hours) at 03/10/19 0802  Last data filed at 03/09/19 1957   Gross per 24 hour   Intake              260 ml   Output              450 ml   Net             -190 ml       Laboratory  Recent Labs      03/09/19   1618   WBC  18.6*   RBC  4.13*   HEMOGLOBIN  10.8*   HEMATOCRIT  34.7*   MCV  84.0   MCH  26.2*   MCHC  31.1*   RDW  56.7*   PLATELETCT  433   MPV  9.2     Recent Labs      03/09/19   1618   SODIUM  135   POTASSIUM  4.0   CHLORIDE  97   CO2  31   GLUCOSE  132*   BUN  29*   CREATININE  0.75   CALCIUM  8.6                   Imaging  DX-CHEST-2 VIEWS   Final Result         Patchy left basilar opacity, worse than prior exam, concerning for worsening infection.      DX-CHEST-PORTABLE (1 VIEW)   Final Result         1. Patchy left basilar opacities, atelectasis versus consolidation.      2. Free intraperitoneal air could relate to recent surgery. Correlate clinically.      CT-ABDOMEN-PELVIS WITH   Final Result      No evidence of right perinephric hematoma or hydronephrosis status post right renal biopsy.      Clot identified in the urinary bladder.      Anasarca.      Distal esophageal mass with lymphadenopathy.      CT-NEEDLE BX-RENAL   Final Result      Successful CT-guided core biopsy of right renal mass.       CT-ABDOMEN-PELVIS WITH   Final Result      1.  Again seen thickening of the distal esophagus likely related to the patient's known esophageal cancer in that area. There is extensive soft tissue mass/adenopathy within the gastrohepatic, periportal, and retroperitoneal regions which does encase the    celiac and superior mesenteric arteries. This is not significantly changed over short interval follow-up.      2.  Again seen large right renal mass which measures 9 x 7 cm in size consistent with either renal cell carcinoma or metastatic disease. There is again seen likely some invasion of the right renal vein.      3.  Emphysematous and bullous change of the lungs with bibasilar atelectasis and small bilateral pleural effusions.      4.  Moderate constipation.      DX-CHEST-2 VIEWS   Final Result         1.  Hazy bilateral lung base infiltrates, greater on the left, new since prior.   2.  Small bilateral pleural effusions.   3.  Hyperexpansion of lungs favors changes of COPD.      US-EXTREMITY VENOUS UPPER UNILAT LEFT   Final Result      CT-NEEDLE BX-RENAL    (Results Pending)        Assessment/Plan  * Esophageal cancer (HCC)- (present on admission)   Assessment & Plan    Oncologist and radiation oncologist evaluated him and made recommendations.  Tube feed at goal.  Laparoscopic G-tube and placement  Continue to provide him pain control.  He is going to have radiation therapy.  Palliative is following this patient.  Discussed plan of care with him.     Renal cell carcinoma of right kidney (HCC)- (present on admission)   Assessment & Plan    With gastrohepatic, periportal, aortocaval and retroperitoneal raleigh metastases.  Urology signed off and he would like to see him as outpatient.  I discussed with oncologist Dr. Page regarding plan of care.  Radiation oncologist  evaluated him and he underwent evaluation on March 7, 2019.  Plan is to perform radiation therapy from Tuesday onward.  Palliative  following.  2/28: Now S/P right nephrectomy       Hematuria- (present on admission)   Assessment & Plan    No acute complaints of hematuria.  Hemoglobin remained stable.  Urology signed off.     Delirium   Assessment & Plan    Currently stable.  Avoid benzodiazepines and anticholinergics  Frequent orientation  Avoid early morning labs  Avoid vital signs during sleep       Hypomagnesemia- (present on admission)   Assessment & Plan    Resolved     Pneumonitis- (present on admission)   Assessment & Plan    He remains afebrile.  Antibiotics through 2/27/2019. completed     Essential hypertension- (present on admission)   Assessment & Plan    Continue amlodipine     Leukocytosis- (present on admission)   Assessment & Plan    Continue trending down.  He remains afebrile.     Dysphagia- (present on admission)   Assessment & Plan    He is tolerating tube feed  Appreciated speech therapy recommendations     Deep venous thrombosis (HCC)- (present on admission)   Assessment & Plan    2/18 US  Deep venous thrombosis visualized from the distal left subclavian to mid    brachial vein.  The thrombus appears near occlusive from the axillary to    the proximal brachial vein.   Remove his PICC line after discussing with Dr. Page is recommended that we can remove his PICC line on March 9, 2019.  I discontinued Eliquis and started him on therapeutic Lovenox due to possible allergic reaction on March 9, 2019         Hypokalemia- (present on admission)   Assessment & Plan    Resolved  Labs in morning     Advanced care planning/counseling discussion- (present on admission)   Assessment & Plan    Palliative care evaluated him and made recommendation     Constipation due to pain medication therapy- (present on admission)   Assessment & Plan    Continue bowel protocol     Cancer associated pain- (present on admission)   Assessment & Plan    IV Dilaudid as needed for severe pain   Pain is under control.  Continue to monitor adverse effect of  pain medications.     Malignant cachexia (HCC)- (present on admission)   Assessment & Plan    Severe malnutrition.  Dietitian is following.     COPD (chronic obstructive pulmonary disease) (HCC)- (present on admission)   Assessment & Plan    Continue RT protocol     Lower extremity edema   Assessment & Plan    He found to have lower extremity edema and he has been receiving IV Lasix.  I started him on p.o. Lasix 20 mg daily.  I/O monitoring     Rash   Assessment & Plan    He found to have bilateral-lower extremities.  It could be an allergic rash due to his socks.  I ordered Benadryl and requested RN to change his socks.  For lower extremity I ordered Lasix 20 mg 1 dose.  Due to his worsening bilateral lower extremity edema I change his antibiotic to Ancef on March 10, 2019.  Also there is a concern that as he is on Eliquis he may get allergic reaction secondary to it.  I discontinued Eliquis and started him on therapeutic Lovenon on March 9, 2019  Continue to monitor.     Penile swelling   Assessment & Plan    Valdes in place.  Urology evaluated him in the signed off. Follow-up as outpatient.  Continue to monitor     Tobacco abuse- (present on admission)   Assessment & Plan    Needs to quit.          I discussed plan of care with Dr. Page.    VTE prophylaxis: Lovenox

## 2019-03-10 NOTE — PROGRESS NOTES
"Pt A&O x 4.     Vitals: /61   Pulse 88   Temp 36.5 °C (97.7 °F) (Temporal)   Resp 16   Ht 1.676 m (5' 6\")   Wt 62.8 kg (138 lb 7.2 oz)   SpO2 92%   BMI 22.35 kg/m²      Pt rates pain 7 out of 10. Patient declines additional interventions at this time.      Neuro: VASQUEZ. Intermittent numbness/tingling in BLE.     Cardiac: Denies new onset of chest pain.     Vascular: Pulses 1+ BUE, BLE. 4+ pitting, dependent edema in BLE.     Respiratory: Lungs sound diminished with rhonchi to auscultation. On room air.  on, satting in 90's. Denies SOB.     GI: Abdomen soft, nontender. Hypoactive bowel sounds, + flatus, + BM. Denies nausea/ vomiting. Patient is currently NPO. LLQ G-J tube, Fibersource HN running continuously at goal rate, 65 ml/hr, tolerating well.     : Pt voiding adequately.      MSK: Pt up to bathroom with one assist, tolerating well.     Integumentary: Abd lap sites, Dermabonded, all CDI, no drainage noted. LLQ G-J tube, site CDI. BLE reddened with petechia.      Labs noted.     Fall precautions in place: Bed locked in lowest position, Upper bed rails up, treaded socks in place, personal belongings within reach, call light within reach, appropriate mobility signs in place, + bed alarm. Pt calls appropriately.      Pt updated on POC.    "

## 2019-03-11 NOTE — CARE PLAN
Problem: Communication  Goal: The ability to communicate needs accurately and effectively will improve  Outcome: PROGRESSING AS EXPECTED  Patient updated on POC, all questions answered at this time.    Problem: Infection  Goal: Will remain free from infection  Outcome: PROGRESSING AS EXPECTED  IV abx being administered per MAR.

## 2019-03-11 NOTE — CARE PLAN
Problem: Safety  Goal: Will remain free from injury  Outcome: PROGRESSING AS EXPECTED  Mobility assessed. Pt up 1x assist with FWW. Providing assistance. Non skid socks in use. Call light and personal belongings within reach.     Problem: Bowel/Gastric:  Goal: Normal bowel function is maintained or improved  Outcome: PROGRESSING AS EXPECTED  +bowel sounds upon ausculation. Pt tolerating TF. 30cc flushes provided.    Problem: Skin Integrity  Goal: Risk for impaired skin integrity will decrease  Outcome: PROGRESSING AS EXPECTED  Skin assessment complete. Quinten cream in use. Waffle cushion in place.

## 2019-03-11 NOTE — DISCHARGE PLANNING
Care Transition Team Assessment    This RN CM met with pt, pt's wife and daughter at bedside for assessment.  The pt lives in [place], [place] in a one story house with (three steps at the entrance) with his wife.  The pt has great family support and friends.  Prior to this admission the pt was completely independent with all ADL/IADL's.  The pt had no financial concerns picking up medications or attending doctor appointments.    The pt is a retired [employer] and filled out the application through the [facility] to become service connected on Feb. 20th.  In addition, the pt's PCP was Dr. Yanez in the [place] [place] [facility]; however, the clinic has been federally shut down due to a raid by the federal government per the family.  The pt stated he now gets his pain management through his oncologist.  The pt is expected to need SNF placement upon discharge depending on how his radiation treatment goes and the GOC/POC are determined throughout the next two weeks on the oncology floor.       Information Source  Orientation : Oriented x 4  Information Given By: Patient, Spouse, Other (Comments) (daughter)  Who is responsible for making decisions for patient? : Patient    Readmission Evaluation  Is this a readmission?: No    Elopement Risk  Legal Hold: No  Ambulatory or Self Mobile in Wheelchair: Yes  Disoriented: No  Psychiatric Symptoms: None  History of Wandering: No  Elopement this Admit: No  Vocalizing Wanting to Leave: No  Displays Behaviors, Body Language Wanting to Leave: No-Not at Risk for Elopement  Elopement Risk: Not at Risk for Elopement    Interdisciplinary Discharge Planning  Primary Care Physician: Dr. Yanez  Lives with - Patient's Self Care Capacity: Spouse  Patient or legal guardian wants to designate a caregiver (see row info): No  Housing / Facility: 1 Story House (3 steps to get into house)  Prior Services: Home-Independent    Discharge Preparedness  What is your plan after discharge?: Uncertain - pending medical  team collaboration  What are your discharge supports?: Spouse, Child  Prior Functional Level: Independent with Activities of Daily Living  Difficulity with ADLs: None  Difficulity with IADLs: None    Functional Assesment  Prior Functional Level: Independent with Activities of Daily Living    Finances  Financial Barriers to Discharge: No  Prescription Coverage: Yes    Vision / Hearing Impairment  Vision Impairment : No  Hearing Impairment : No    Values / Beliefs / Concerns  Values / Beliefs Concerns : No         Domestic Abuse  Have you ever been the victim of abuse or violence?: No  Physical Abuse or Sexual Abuse: No  Verbal Abuse or Emotional Abuse: No  Possible Abuse Reported to:: Not Applicable    Psychological Assessment  History of Substance Abuse: Other (comment) (Tobacco)  Substance Abuse Comments: tobacco  History of Psychiatric Problems: No  Non-compliant with Treatment: No  Newly Diagnosed Illness: No    Discharge Risks or Barriers  Discharge risks or barriers?: No PCP, Other (comment) (PCP office was fedrally shut down recently)    Anticipated Discharge Information  Anticipated discharge disposition: SNF, Other (comment) (depends on radiation tx progress)

## 2019-03-11 NOTE — PROGRESS NOTES
[facility] Medicine Daily Progress Note    Date of Service  3/11/2019    Chief Complaint  73 y.o. male admitted 2/18/2019 with esophageal cancer.    Hospital Course  Admitted with esophageal cancer, dysphagia, laparoscopic G-tube placed for nutrition, noted to have DVT left upper extremity, right renal mass, transferred for IR guided biopsy.  Oncology was consulted.    Interval Problem Update  Patient is resting in chair, feels ok no new complains, no hematuria today, pain is stable, not in distress, continue holding lovenox for now until cleared by urologist, pending oncology eval. Possible surgery this week.   2/27: Sitting up in bed comfortably.  Pain is fairly controlled.  Discussed with oncology on-call Dr. Arianna Sanchez.  Apparently Dr. Page from oncology already spoke to urology and recommended to proceed with nephrectomy.  2/28: Sitting up in chair comfortably.  Pain fairly controlled.  Scheduled for right nephrectomy around 4 PM.  No new issues overnight.  3/1: Patient had no acute overnight events and now is status post right nephrectomy.  Lopez catheter had no evidence of hematuria.  Found to be on 3 L of O2 and now weaning.  The pain is fairly controlled.  Start tube feeds through the J-tube and tolerating well.  Waiting on further oncology recommendations.     3/2: seen and examined, patient states he is having pain and swelling in the tip of his penis area, states he feels like urine catheter is leaking, however I checked and it is now. Nursing also checked it is intact. Continue to monitor     3/3: seen and examined, patient sitting in chair, states nothing was done about his catheter, frustrated. Spoke to urology yesterday, keeping an eye on it.   Otherwise doing well, no complains    3/4: seen and examined, sitting in chair, just got pain medications, LE swelling continues, lopez not leaking anymore. Tolerated lasix well, will give 1 more dose. Dc fluids. VSS, white count slightly up.     3/5 I  evaluated this patient at bedside this morning.  He was sitting in chair and reported that he is feeling better.  White blood cell count is trending down and hemoglobin remained stable.  Urology evaluated him and made recommendations.    3/6 I evaluated and examined this patient at bedside this morning.  He reported that he is feeling better and expressed that he had episode of vomiting this morning..  White blood cell count is trending down.  Hemoglobin remained stable.  I discussed with oncologist Dr. Page and he will request radiation oncology consult.  He underwent chest x-ray this morning.    3/7 I evaluated and examined this patient at bedside.  He explains that he has been having cough  Radiation oncologist Dr. Mathew and oncologist Dr. Page evaluated this patient and made recommendations.  White blood cell count is trending down.  He remains afebrile.  I discussed plan of care with him and answered all his questions.    3/8 I evaluated him at the bedside.  He found to have bilateral lower extremity rash.  He underwent radiation oncology evaluation yesterday.  White blood cell count is trending down.  His hemoglobin remained stable.    3/9 I examined him at the bedside.  Physical therapy and occupational therapy evaluated him yesterday.  He expressed that his lower extremity swelling has decreased but the redness has increased significantly.  Patient family is concerned and I discussed plan of care with them.  I discussed with Dr. Page and requested RN to remove his PICC line.  Also due to concern of possible lower extremity rash due to Eliquis I discontinued Eliquis and started him on Lovenox.  Due to possible cellulitis on bilateral lower extremities I started him on ceftriaxone.     3/10 I evaluated and examined him at the bedside.  He expressed that his lower extremity swelling has been improving.  I discussed plan of care with him.  I changed antibiotic to Ancef.  I discussed plan of care with and I  answered all his questions.  He is going to have radiation therapy starting from Tuesday.  I started him on p.o. Lasix 20 mg.  I discussed with Dr. Page regarding plan of care.    3/11 I evaluated him at the bedside.  He expressed that his lower extremities erythema has been improving.  Plan is to start radiation therapy from tomorrow.  He remains afebrile.    Consultants/Specialty   Palliative care  Urology  ID  Oncology    Code Status  DNR/DNI    Disposition  TBD    Review of Systems  Review of Systems   Constitutional: Positive for weight loss. Negative for chills and fever.   HENT: Negative for ear pain, hearing loss and tinnitus.    Eyes: Negative for blurred vision, double vision and photophobia.   Respiratory: Negative for cough, hemoptysis, sputum production and shortness of breath.    Cardiovascular: Positive for leg swelling (Improving). Negative for chest pain, palpitations, orthopnea and claudication.   Gastrointestinal: Negative for abdominal pain, diarrhea, heartburn, nausea and vomiting.   Genitourinary: Negative for dysuria, frequency, hematuria and urgency.        Penis swelling improved   Musculoskeletal: Negative for back pain, myalgias and neck pain.   Skin: Positive for rash (Worsening on bilateral lower extremities (improved from yesterday)).   Neurological: Positive for weakness. Negative for dizziness, tingling, tremors and headaches.   Endo/Heme/Allergies: Does not bruise/bleed easily.   Psychiatric/Behavioral: Negative for depression, substance abuse and suicidal ideas.        Physical Exam  Temp:  [36.4 °C (97.5 °F)-37.2 °C (99 °F)] 37.2 °C (99 °F)  Pulse:  [74-84] 74  Resp:  [16-17] 16  BP: (117-136)/(58-59) 117/58  SpO2:  [90 %-97 %] 97 %    Physical Exam   Constitutional: He is oriented to person, place, and time. He appears cachectic. He appears ill. No distress.   Sitting in chair.   HENT:   Head: Normocephalic and atraumatic.   Mouth/Throat: No oropharyngeal exudate.   Eyes:  Conjunctivae are normal. No scleral icterus.   Neck: Normal range of motion. Neck supple. No thyromegaly present.   Cardiovascular: Normal rate and regular rhythm.  Exam reveals no gallop and no friction rub.    Pulmonary/Chest: Effort normal. No respiratory distress. He has no wheezes. He has no rales.   Abdominal: Soft. Bowel sounds are normal. He exhibits no distension. There is no tenderness.   G-tube in place   Genitourinary: Penile erythema and penile tenderness (Swelling and mild tenderness) present.   Genitourinary Comments: swelling   Musculoskeletal: He exhibits edema (Bilateral lower extremities (improved)). He exhibits no deformity.   Neurological: He is alert and oriented to person, place, and time.   Skin: Skin is warm and dry. He is not diaphoretic. There is erythema (Improving).   Nursing note and vitals reviewed.      Fluids    Intake/Output Summary (Last 24 hours) at 03/11/19 0702  Last data filed at 03/11/19 0551   Gross per 24 hour   Intake              875 ml   Output             1325 ml   Net             -450 ml       Laboratory  Recent Labs      03/09/19   1618  03/10/19   1154   WBC  18.6*  17.7*   RBC  4.13*  4.00*   HEMOGLOBIN  10.8*  10.4*   HEMATOCRIT  34.7*  33.8*   MCV  84.0  84.5   MCH  26.2*  26.0*   MCHC  31.1*  30.8*   RDW  56.7*  56.6*   PLATELETCT  433  402   MPV  9.2  9.3     Recent Labs      03/09/19   1618  03/10/19   1154   SODIUM  135  136   POTASSIUM  4.0  4.3   CHLORIDE  97  98   CO2  31  30   GLUCOSE  132*  126*   BUN  29*  32*   CREATININE  0.75  0.83   CALCIUM  8.6  8.0*                   Imaging  DX-CHEST-2 VIEWS   Final Result         Patchy left basilar opacity, worse than prior exam, concerning for worsening infection.      DX-CHEST-PORTABLE (1 VIEW)   Final Result         1. Patchy left basilar opacities, atelectasis versus consolidation.      2. Free intraperitoneal air could relate to recent surgery. Correlate clinically.      CT-ABDOMEN-PELVIS WITH   Final Result       No evidence of right perinephric hematoma or hydronephrosis status post right renal biopsy.      Clot identified in the urinary bladder.      Anasarca.      Distal esophageal mass with lymphadenopathy.      CT-NEEDLE BX-RENAL   Final Result      Successful CT-guided core biopsy of right renal mass.      CT-ABDOMEN-PELVIS WITH   Final Result      1.  Again seen thickening of the distal esophagus likely related to the patient's known esophageal cancer in that area. There is extensive soft tissue mass/adenopathy within the gastrohepatic, periportal, and retroperitoneal regions which does encase the    celiac and superior mesenteric arteries. This is not significantly changed over short interval follow-up.      2.  Again seen large right renal mass which measures 9 x 7 cm in size consistent with either renal cell carcinoma or metastatic disease. There is again seen likely some invasion of the right renal vein.      3.  Emphysematous and bullous change of the lungs with bibasilar atelectasis and small bilateral pleural effusions.      4.  Moderate constipation.      DX-CHEST-2 VIEWS   Final Result         1.  Hazy bilateral lung base infiltrates, greater on the left, new since prior.   2.  Small bilateral pleural effusions.   3.  Hyperexpansion of lungs favors changes of COPD.      US-EXTREMITY VENOUS UPPER UNILAT LEFT   Final Result      CT-NEEDLE BX-RENAL    (Results Pending)        Assessment/Plan  * Esophageal cancer (HCC)- (present on admission)   Assessment & Plan    Oncologist and radiation oncologist evaluated him and made recommendations.  Tube feed at goal.  Laparoscopic G-tube and placement  Continue to provide him pain control.  He is going to have radiation therapy starting from tomorrow.  I discussed plan of care with him.     Renal cell carcinoma of right kidney (HCC)- (present on admission)   Assessment & Plan    With gastrohepatic, periportal, aortocaval and retroperitoneal raleigh  metastases.  Urology signed off and he would like to see him as outpatient.  I discussed with oncologist Dr. Page regarding plan of care.  Radiation oncologist  evaluated him and he underwent evaluation on March 7, 2019.  Plan is to perform radiation therapy from tomorrow.  Palliative following.  2/28: Now S/P right nephrectomy       Hematuria- (present on admission)   Assessment & Plan    No acute complaints of hematuria.  Hemoglobin remained stable.  Urology signed off.     Delirium   Assessment & Plan    Currently stable.  Avoid benzodiazepines and anticholinergics  Frequent orientation  Avoid early morning labs  Avoid vital signs during sleep       Hypomagnesemia- (present on admission)   Assessment & Plan    Resolved     Pneumonitis- (present on admission)   Assessment & Plan    He remains afebrile.  Antibiotics through 2/27/2019. completed     Essential hypertension- (present on admission)   Assessment & Plan    Continue amlodipine     Leukocytosis- (present on admission)   Assessment & Plan    Continue trending down.  He remains afebrile.     Dysphagia- (present on admission)   Assessment & Plan    He is tolerating tube feed  Appreciated speech therapy recommendations     Deep venous thrombosis (HCC)- (present on admission)   Assessment & Plan    2/18 US  Deep venous thrombosis visualized from the distal left subclavian to mid    brachial vein.  The thrombus appears near occlusive from the axillary to    the proximal brachial vein.   Remove his PICC line after discussing with Dr. Page is recommended that we can remove his PICC line on March 9, 2019.  I discontinued Eliquis and started him on therapeutic Lovenox due to possible allergic reaction on March 9, 2019.  Due to his renal issues I do not started him on Xarelto.         Hypokalemia- (present on admission)   Assessment & Plan    Resolved  Labs in morning     Advanced care planning/counseling discussion- (present on admission)   Assessment &  Plan    Palliative care evaluated him and made recommendation     Constipation due to pain medication therapy- (present on admission)   Assessment & Plan    Continue bowel protocol     Cancer associated pain- (present on admission)   Assessment & Plan    IV Dilaudid as needed for severe pain   Pain is under control.  Continue to monitor adverse effect of pain medications.     Malignant cachexia (HCC)- (present on admission)   Assessment & Plan    Severe malnutrition.  Dietitian is following.     COPD (chronic obstructive pulmonary disease) (HCC)- (present on admission)   Assessment & Plan    Continue RT protocol     Lower extremity edema   Assessment & Plan    He found to have lower extremity edema and he has been receiving IV Lasix.  I started him on p.o. Lasix 20 mg daily.  Edema has been improving.  I/O monitoring     Rash   Assessment & Plan    He found to have bilateral-lower extremities.  It could be an allergic rash due to his socks.  I ordered Benadryl and requested RN to change his socks.  Due to his worsening bilateral lower extremity edema I change his antibiotic to Ancef on March 10, 2019.  Also there is a concern that as he is on Eliquis he may get allergic reaction secondary to it.  I discontinued Eliquis and started him on therapeutic Lovenon on March 9, 2019  Continue to monitor.  He remains afebrile.     Penile swelling   Assessment & Plan    Valdes in place.  Urology evaluated him in the signed off. Follow-up as outpatient.  No acute complaints.  Continue to monitor     Tobacco abuse- (present on admission)   Assessment & Plan    Needs to quit.          I discussed plan of care during multidisciplinary rounds    VTE prophylaxis: Lovenox

## 2019-03-11 NOTE — PROGRESS NOTES
Pt A&O x 4.  Reporting 5/10 pain. Pt medicated for pain per MAR.  Pt up to commode 1x assist. +BM. +void.  Abd lap stabs well approximated with dermabond-MALIK.  BLE with 2+ pitting edema, erythema. Pt states edema is improving.   Pt up in recliner. Elevating feet on pillows.  GJ tube in place. Fibersource running at 65mL/hr-goal through J tube. G tube clamped.  +bowel sounds. Denies n/v and nt/t.  Redness noted on sacrum. +blanching. Quinten cream applied.  POC discussed with pt. All needs addressed at this time.

## 2019-03-12 PROBLEM — E87.6 HYPOKALEMIA: Status: RESOLVED | Noted: 2019-01-01 | Resolved: 2019-01-01

## 2019-03-12 NOTE — THERAPY
"Pt demonstrating decreased endurance and strength. Pt limkted by pain in feet and legs 2'edema. Pt w/tight achillies, requiring stretching. Pt w/slow antalgic gait, requiring verbal cuing. Pt would benefit from further acute PT txs to progress towards goals and independence. Recommend post acute placement at an inpatient transitional care facility to address deficits.    Physical Therapy Treatment completed.   Bed Mobility:  Supine to Sit:  (NT--in chair pre session)  Transfers: Sit to Stand: Contact Guard Assist  Gait: Level Of Assist: Contact Guard Assist with Front-Wheel Walker       Plan of Care: Will benefit from Physical Therapy 2 times per week    See \"Rehab Therapy-Acute\" Patient Summary Report for complete documentation.       "

## 2019-03-12 NOTE — PROGRESS NOTES
[facility] Medicine Daily Progress Note    Date of Service  3/12/2019    Chief Complaint  73 y.o. male admitted 2/18/2019 with esophageal cancer.    Hospital Course  Admitted with esophageal cancer, dysphagia, laparoscopic G-tube placed for nutrition, noted to have DVT left upper extremity, right renal mass, transferred for IR guided biopsy.  Oncology was consulted.    Interval Problem Update  Anxious about starting xrt  Complaining of bilateral foot pain that resolved when his socks were removed  axox3  Ros otherwise negative    Consultants/Specialty   Palliative care  Urology  ID  Oncology    Code Status  DNR/DNI    Disposition  TBD    Review of Systems  Review of Systems   Constitutional: Positive for weight loss. Negative for chills and fever.   HENT: Negative for ear pain, hearing loss and tinnitus.    Eyes: Negative for blurred vision, double vision and photophobia.   Respiratory: Negative for cough, hemoptysis, sputum production and shortness of breath.    Cardiovascular: Positive for leg swelling (Improving). Negative for chest pain, palpitations, orthopnea and claudication.   Gastrointestinal: Negative for abdominal pain, diarrhea, heartburn, nausea and vomiting.   Genitourinary: Negative for dysuria, frequency, hematuria and urgency.        Penis swelling improved   Musculoskeletal: Negative for back pain, myalgias and neck pain.   Skin: Positive for rash (Worsening on bilateral lower extremities (improved from yesterday)).   Neurological: Positive for weakness. Negative for dizziness, tingling, tremors and headaches.   Endo/Heme/Allergies: Does not bruise/bleed easily.   Psychiatric/Behavioral: Negative for depression, substance abuse and suicidal ideas.        Physical Exam  Temp:  [36.7 °C (98 °F)-37.2 °C (98.9 °F)] 37.2 °C (98.9 °F)  Pulse:  [77-91] 78  Resp:  [18-26] 18  BP: (129-155)/(58-74) 129/58  SpO2:  [96 %-99 %] 98 %    Physical Exam   Constitutional: He is oriented to person, place, and time. He  appears cachectic. He appears ill. No distress.   Sitting in chair.   HENT:   Head: Normocephalic and atraumatic.   Mouth/Throat: No oropharyngeal exudate.   Eyes: Conjunctivae are normal. No scleral icterus.   Neck: Normal range of motion. Neck supple. No thyromegaly present.   Cardiovascular: Normal rate and regular rhythm.  Exam reveals no gallop and no friction rub.    Pulmonary/Chest: Effort normal. No respiratory distress. He has no wheezes. He has no rales.   Abdominal: Soft. Bowel sounds are normal. He exhibits no distension. There is no tenderness.   G-tube in place   Genitourinary: Penile tenderness: Swelling and mild tenderness.   Genitourinary Comments: swelling   Musculoskeletal: He exhibits edema. He exhibits no deformity.   Neurological: He is alert and oriented to person, place, and time.   Skin: Skin is warm and dry. He is not diaphoretic. There is erythema.   Nursing note and vitals reviewed.      Fluids    Intake/Output Summary (Last 24 hours) at 03/12/19 1206  Last data filed at 03/12/19 0850   Gross per 24 hour   Intake              390 ml   Output             1150 ml   Net             -760 ml       Laboratory  Recent Labs      03/09/19   1618  03/10/19   1154  03/12/19   0317   WBC  18.6*  17.7*  14.9*   RBC  4.13*  4.00*  3.83*   HEMOGLOBIN  10.8*  10.4*  10.1*   HEMATOCRIT  34.7*  33.8*  32.4*   MCV  84.0  84.5  84.6   MCH  26.2*  26.0*  26.4*   MCHC  31.1*  30.8*  31.2*   RDW  56.7*  56.6*  56.5*   PLATELETCT  433  402  451*   MPV  9.2  9.3  9.8     Recent Labs      03/09/19   1618  03/10/19   1154  03/12/19   0317   SODIUM  135  136  135   POTASSIUM  4.0  4.3  4.2   CHLORIDE  97  98  98   CO2  31  30  32   GLUCOSE  132*  126*  118*   BUN  29*  32*  29*   CREATININE  0.75  0.83  0.76   CALCIUM  8.6  8.0*  8.3*                   Imaging  DX-CHEST-2 VIEWS   Final Result         Patchy left basilar opacity, worse than prior exam, concerning for worsening infection.      DX-CHEST-PORTABLE (1  VIEW)   Final Result         1. Patchy left basilar opacities, atelectasis versus consolidation.      2. Free intraperitoneal air could relate to recent surgery. Correlate clinically.      CT-ABDOMEN-PELVIS WITH   Final Result      No evidence of right perinephric hematoma or hydronephrosis status post right renal biopsy.      Clot identified in the urinary bladder.      Anasarca.      Distal esophageal mass with lymphadenopathy.      CT-NEEDLE BX-RENAL   Final Result      Successful CT-guided core biopsy of right renal mass.      CT-ABDOMEN-PELVIS WITH   Final Result      1.  Again seen thickening of the distal esophagus likely related to the patient's known esophageal cancer in that area. There is extensive soft tissue mass/adenopathy within the gastrohepatic, periportal, and retroperitoneal regions which does encase the    celiac and superior mesenteric arteries. This is not significantly changed over short interval follow-up.      2.  Again seen large right renal mass which measures 9 x 7 cm in size consistent with either renal cell carcinoma or metastatic disease. There is again seen likely some invasion of the right renal vein.      3.  Emphysematous and bullous change of the lungs with bibasilar atelectasis and small bilateral pleural effusions.      4.  Moderate constipation.      DX-CHEST-2 VIEWS   Final Result         1.  Hazy bilateral lung base infiltrates, greater on the left, new since prior.   2.  Small bilateral pleural effusions.   3.  Hyperexpansion of lungs favors changes of COPD.      US-EXTREMITY VENOUS UPPER UNILAT LEFT   Final Result      CT-NEEDLE BX-RENAL    (Results Pending)        Assessment/Plan  * Esophageal cancer (HCC)- (present on admission)   Assessment & Plan    Oncologist and radiation oncologist evaluated him and made recommendations.  Tube feed at goal.  Laparoscopic G-tube and placement  Continue supportive care  Continue xrt  I discussed plan of care with him.     Renal cell  carcinoma of right kidney (HCC)- (present on admission)   Assessment & Plan    With gastrohepatic, periportal, aortocaval and retroperitoneal raleigh metastases.  Urology signed off and he would like to see him as outpatient.  I discussed with oncologist Dr. Page regarding plan of care.  Radiation oncologist  evaluated him and he underwent evaluation on March 7, 2019  RXT to begin today  Palliative following.  S/P right nephrectomy       Hematuria- (present on admission)   Assessment & Plan    No acute complaints of hematuria.  Hemoglobin remained stable.  Urology signed off.     Delirium   Assessment & Plan    Currently stable.  Avoid benzodiazepines and anticholinergics  Frequent orientation  Avoid early morning labs  Avoid vital signs during sleep       Hypomagnesemia- (present on admission)   Assessment & Plan    Resolved     Pneumonitis- (present on admission)   Assessment & Plan    He remains afebrile.  Antibiotics through 2/27/2019. completed     Essential hypertension- (present on admission)   Assessment & Plan    Continue amlodipine     Leukocytosis- (present on admission)   Assessment & Plan    Continues to improve  He remains afebrile.     Dysphagia- (present on admission)   Assessment & Plan    He is tolerating tube feed  Appreciated speech therapy recommendations     Deep venous thrombosis (HCC)- (present on admission)   Assessment & Plan    2/18 US  Deep venous thrombosis visualized from the distal left subclavian to mid    brachial vein.  The thrombus appears near occlusive from the axillary to    the proximal brachial vein.     Eliquis was topped and he was started  on therapeutic Lovenox due to possible allergic reaction on March 9, 2019.   Due to his renal issues he was not started on Xarelto.         Advanced care planning/counseling discussion- (present on admission)   Assessment & Plan    Palliative care following     Constipation due to pain medication therapy- (present on admission)    Assessment & Plan    Continue bowel protocol     Cancer associated pain- (present on admission)   Assessment & Plan    IV Dilaudid as needed for severe pain   Pain is under control.  Continue to monitor adverse effect of pain medications.     Malignant cachexia (HCC)- (present on admission)   Assessment & Plan    Severe malnutrition.  Dietitian is following.     COPD (chronic obstructive pulmonary disease) (HCC)- (present on admission)   Assessment & Plan    Continue RT protocol     Lower extremity edema   Assessment & Plan    Improving with lasix     Rash   Assessment & Plan    He found to have bilateral-lower extremities.  It could be an allergic rash due to his socks.  improving  Continue to monitor.  He remains afebrile.     Penile swelling   Assessment & Plan    resolving  Valdes in place.  Urology evaluated him in the signed off. Follow-up as outpatient.  No acute complaints.  Continue to monitor     Tobacco abuse- (present on admission)   Assessment & Plan    Cessation discussed and recommended           VTE prophylaxis: Lovenox

## 2019-03-12 NOTE — PROGRESS NOTES
Patient arrived for first treatment.  Prior to coming down to radiation therapy the nurses on patients floor gave him pre meds.  Patient was unable to do treatment today due to pain.  Dr Mathew was called in to treatment room, and patient will try again tomorrow.

## 2019-03-12 NOTE — DIETARY
Nutrition support weekly update:  Day 22 of admit.  Justus Barnard is a 73 y.o. male with admitting DX of Esophageal Cancer.      Tube feeding initiated on 2/19. Current TF via J-tube: Fibersource HN @ 65 mL/hr, providing 1872 kcals, 84 grams of protein and 1264 mL of free water per day.    Assessment:  Weight:  62.8 kg no new wt for pt since 2/27  Re-estimate of nutritional needs is not indicated at this time.     Evaluation:   1. Hx of esophageal cancer.   2. G-J tube upon admit; G-tube clamped.  3. Pt started radiation therapy today.  4. Glucose 118, BUN 29, Alk phos 125, Pre-Albumin 13.0 (trending up from 5.0 2/26), CRP 3.47 (trending down from 19.63 2/26)  5. Pertinent meds:  Dulcolax, Lovenox, Lasix, Miralax  6. Current feeding is meeting pt's estimated nutrition needs.    Malnutrition risk: Pt is at risk for malnutrition related to hypermetabolic disease (esophageal cancer).     Recommendations/Plan:  1. Continue with same TF formula and rate.   2. Fluids per MD.  3. RD to monitor wt and lab trends - please obtain new wt for pt.    RD will continue to follow.

## 2019-03-12 NOTE — THERAPY
"Attempted to see pt for Occupational Therapy treatment today. Pt not available for tx in the process of going to his first radiation treatment per RN and dtr. Pt slightly anxious. Asked of pt could don his own socks? Pt stating. \"My feet are cold\". Pt stated he could not don his socks and asked RN to don socks for him. Educated dtr on the role of OT. Will attempt to see pt later today post radiation treatment as/if possible. Dtr stated, \"Maybe tomorrow would be better\". RN informed and agreed.   "

## 2019-03-12 NOTE — PROGRESS NOTES
Report received from RN, assumed care at 0700  Pt is A0X4, and responds appropriately   Pt declines any SOB, chest pain, new onset of numbness/ tingiling  Pt rates pain at 3/10, on a scale of 1-10, pt resting comfortably at this time and declines pain medication needs   Pt is voiding adequatly and without hesitancy  Pt has + flatus, + bowel sounds, + BM on 3/11/2019  Pt ambulates with a x1 assist    Pt is NPO, pt denies any nausea/vomiting  Pt has LUQ G-J tube in place, fibersource tube feed running at 65 ml/hr which is goal through J-tube, G-tube is clamped   Pt has abdomenal lap sites, approximated with dermabond and open to air, no drainge noted at this   Pt has 2+ pitting edema in bilateral lower extremites, extremites noted to be red from knees to feet, pulses noted with doopler   Redness noted to sacrum, blanching      Plan of care discussed, all questions answered. Explained importance of calling before getting OOB and pt verbalizes understanding. Explained importance of oral care. Call light is within reach, treaded slipper socks on, bed in lowest/ locked position, hourly rounding in place, all needs met at this time

## 2019-03-12 NOTE — PROGRESS NOTES
Pt off floor to radiation treatment, pt medicated per MAR for pain before he left, family at bedside

## 2019-03-12 NOTE — PROGRESS NOTES
"Pt A&O x 4.     Vitals: /74   Pulse 83   Temp 36.7 °C (98 °F) (Temporal)   Resp 18   Ht 1.676 m (5' 6\")   Wt 62.8 kg (138 lb 7.2 oz)   SpO2 96%   BMI 22.35 kg/m²      Pt rates pain 5 out of 10. Medicated per MAR.      Neuro: VASQUEZ. Intermittent numbness/tingling in BLE.     Cardiac: Denies new onset of chest pain.     Vascular: Pulses 1+ BUE, BLE. 1+ edema in BLE.     Respiratory: Lungs sound diminished with rhonchi to auscultation. On 2L of O2 via nasal cannula.  on, satting in 90's. Denies SOB.     GI: Abdomen soft, nontender. Normoactive bowel sounds, + flatus, + BM. Denies nausea/ vomiting. Patient is currently NPO. LLQ G-J tube, Fibersource HN running continuously at goal rate, 65 ml/hr, tolerating well.     : Pt voiding adequately.      MSK: Pt up to bathroom with one assist, tolerating well.     Integumentary: Abd lap sites, Dermabonded, all CDI, no drainage noted. LLQ G-J tube, site CDI. BLE reddened with petechia.      Labs noted.     Fall precautions in place: Bed locked in lowest position, Upper bed rails up, treaded socks in place, personal belongings within reach, call light within reach, appropriate mobility signs in place, + bed alarm. Pt calls appropriately.      Pt updated on POC.  "

## 2019-03-12 NOTE — CARE PLAN
Problem: Communication  Goal: The ability to communicate needs accurately and effectively will improve  Outcome: PROGRESSING AS EXPECTED  Patient updated on POC, all questions answered at this time.    Problem: Safety  Goal: Will remain free from injury  Outcome: PROGRESSING AS EXPECTED  Patient calls appropriately for assistance. Bed alarm in place.

## 2019-03-13 NOTE — THERAPY
"Occupational Therapy Evaluation completed.   Functional Status:  SBA LB dressing, CGA simulated standing grooming, CGA functional mobility w/ FWW, SBA sit>stand  Plan of Care: Will benefit from Occupational Therapy 2 times per week  Discharge Recommendations:  Equipment: Will Continue to Assess for Equipment Needs. Post-acute therapy Recommend home health or outpatient transitional care services for continued occupational therapy services    See \"Rehab Therapy-Acute\" Patient Summary Report for complete documentation.      Pt received first radiation on this date likely impacting activity tolerance. Pt continues to be motivated to return to PLOF and reports he wishes to return to work. Pt donned socks w/ SBA using pant leg as compensatory method to get into tailor sitting. Pt stood w/ SBA from low surface and performed functional mobility house hold distances. Facilitated reaching out of base of support for simulated grooming task. Pt required CGA. Pt required 1 rest break, reported increased fatigue and nausea. Pt ended session in chair, attempting to void. Offered to assist pt to perform toileting in standing, however pt reported he was not up for it. Will continue to follow for Acute OT services.   "

## 2019-03-13 NOTE — PROGRESS NOTES
Patient received treatment in Radiation Therapy. Patient received treatment number 1 of 10 planned.

## 2019-03-13 NOTE — CARE PLAN
Problem: Safety  Goal: Will remain free from injury  Outcome: PROGRESSING AS EXPECTED  Patient educated to call for assistance before getting out of bed.  Call light within reach.  Bed in lowest position.      Problem: Pain Management  Goal: Pain level will decrease to patient's comfort goal  Outcome: PROGRESSING AS EXPECTED  Patient medicated for pain prior to radiation.

## 2019-03-13 NOTE — PROGRESS NOTES
Transport here to take patient to radiation.  Per night RN, patient was unable to tolerate radiation yesterday related to pain.  RN spoke with radiation and asked to take patient at a later time, as patient has not been pre-medicated and patient's BP is 182/82.  RN would like to discuss with MD first.  Radiation to call for patient in 1 hour.      RN paged MD and spoke with Dr. Dodge.  Updated on patient's BP.  OK to give oxycodone 15mg now and 1 mg dilaudid prior to transporting to radiation.  Awaiting orders for elevated BP.

## 2019-03-13 NOTE — DISCHARGE PLANNING
Anticipated Discharge Disposition: TBD    Action: SNF choice being held per family/pt request.  Family and pt wish to complete radiation treatment first to see what the POC will be after the pt completes his radiation.     Barriers to Discharge: none    Plan: Case management following along as needed.

## 2019-03-13 NOTE — PROGRESS NOTES
PT armband un-scannable. Ordered armband twice. Called admitting to inquire of location of armbands ordered. Unable to get in contact with anyone whom can provide an armband. Will pass onto day RN.

## 2019-03-13 NOTE — PROGRESS NOTES
Received report from evening RN.  Assumed care of patient.  Patient A&Ox4.  C/O pain 5/10, generalized.  LLQ GJ tube in place.  Up with 1 assist and FWW.  BLE edema pitting, 3+.  Redness to BLE.  Floated on pillows.  RLQ transverse incision, approximated.  Abdominal lap stab sites approximated.  Diminished breath sounds, on 2L oxygen by nasal canula.  +void.  No BM.  +flatus.  No nausea or vomiting.  LUE PICC line, SL.  Plan of care discussed.  Call light within reach.  Bed in lowest position.

## 2019-03-13 NOTE — PROGRESS NOTES
[facility] Medicine Daily Progress Note    Date of Service  3/13/2019    Chief Complaint  73 y.o. male admitted 2/18/2019 with esophageal cancer.    Hospital Course  Admitted with esophageal cancer, dysphagia, laparoscopic G-tube placed for nutrition, noted to have DVT left upper extremity, right renal mass, transferred for IR guided biopsy.  Oncology was consulted.    Interval Problem Update  3/13: Sitting up in bed comfortably.  Was in pain prior to radiation therapy which was treated with oxycodone and diet Dilaudid and patient was able to go for his radiation session.  No acute distress noted.    Consultants/Specialty   Palliative care  Urology  ID  Oncology    Code Status  DNR/DNI    Disposition  TBD    Review of Systems  Review of Systems   Constitutional: Positive for malaise/fatigue and weight loss. Negative for chills and fever.   HENT: Negative for hearing loss and tinnitus.    Eyes: Negative for blurred vision and double vision.   Respiratory: Negative for cough, hemoptysis and sputum production.    Cardiovascular: Positive for leg swelling (Improving). Negative for chest pain, palpitations, orthopnea and claudication.   Gastrointestinal: Negative for abdominal pain, diarrhea, heartburn, nausea and vomiting.   Genitourinary: Negative for dysuria, frequency and urgency.        Penis swelling improved   Musculoskeletal: Negative for back pain, myalgias and neck pain.   Skin: Positive for rash (Worsening on bilateral lower extremities (improved from yesterday)).   Neurological: Positive for weakness. Negative for dizziness, tingling and headaches.   Endo/Heme/Allergies: Does not bruise/bleed easily.   Psychiatric/Behavioral: Positive for depression. Negative for substance abuse and suicidal ideas.        Physical Exam  Temp:  [36.2 °C (97.2 °F)-36.7 °C (98 °F)] 36.6 °C (97.9 °F)  Pulse:  [77-93] 84  Resp:  [16-18] 16  BP: (138-182)/(61-82) 150/61  SpO2:  [97 %-99 %] 97 %    Physical Exam   Constitutional: He  is oriented to person, place, and time. He appears cachectic. He appears ill. No distress.   Sitting in chair.   HENT:   Head: Normocephalic and atraumatic.   Mouth/Throat: No oropharyngeal exudate.   Eyes: Conjunctivae are normal. No scleral icterus.   Neck: Normal range of motion. Neck supple. No thyromegaly present.   Cardiovascular: Normal rate and regular rhythm.  Exam reveals no gallop and no friction rub.    Pulmonary/Chest: Effort normal. No respiratory distress. He has no wheezes.   Abdominal: Soft. Bowel sounds are normal. He exhibits no distension. There is no tenderness.   G-tube in place   Genitourinary: Penile tenderness: Swelling and mild tenderness.   Genitourinary Comments: swelling   Musculoskeletal: He exhibits edema (+1 edema on legs b/l ). He exhibits no deformity.   Neurological: He is alert and oriented to person, place, and time.   Skin: Skin is warm and dry. He is not diaphoretic. There is erythema.   Psychiatric: His affect is blunt. He is withdrawn.   Nursing note and vitals reviewed.      Fluids    Intake/Output Summary (Last 24 hours) at 03/13/19 1511  Last data filed at 03/13/19 1145   Gross per 24 hour   Intake              590 ml   Output             1345 ml   Net             -755 ml       Laboratory  Recent Labs      03/12/19   0317   WBC  14.9*   RBC  3.83*   HEMOGLOBIN  10.1*   HEMATOCRIT  32.4*   MCV  84.6   MCH  26.4*   MCHC  31.2*   RDW  56.5*   PLATELETCT  451*   MPV  9.8     Recent Labs      03/12/19   0317   SODIUM  135   POTASSIUM  4.2   CHLORIDE  98   CO2  32   GLUCOSE  118*   BUN  29*   CREATININE  0.76   CALCIUM  8.3*                   Imaging  DX-CHEST-2 VIEWS   Final Result         Patchy left basilar opacity, worse than prior exam, concerning for worsening infection.      DX-CHEST-PORTABLE (1 VIEW)   Final Result         1. Patchy left basilar opacities, atelectasis versus consolidation.      2. Free intraperitoneal air could relate to recent surgery. Correlate  clinically.      CT-ABDOMEN-PELVIS WITH   Final Result      No evidence of right perinephric hematoma or hydronephrosis status post right renal biopsy.      Clot identified in the urinary bladder.      Anasarca.      Distal esophageal mass with lymphadenopathy.      CT-NEEDLE BX-RENAL   Final Result      Successful CT-guided core biopsy of right renal mass.      CT-ABDOMEN-PELVIS WITH   Final Result      1.  Again seen thickening of the distal esophagus likely related to the patient's known esophageal cancer in that area. There is extensive soft tissue mass/adenopathy within the gastrohepatic, periportal, and retroperitoneal regions which does encase the    celiac and superior mesenteric arteries. This is not significantly changed over short interval follow-up.      2.  Again seen large right renal mass which measures 9 x 7 cm in size consistent with either renal cell carcinoma or metastatic disease. There is again seen likely some invasion of the right renal vein.      3.  Emphysematous and bullous change of the lungs with bibasilar atelectasis and small bilateral pleural effusions.      4.  Moderate constipation.      DX-CHEST-2 VIEWS   Final Result         1.  Hazy bilateral lung base infiltrates, greater on the left, new since prior.   2.  Small bilateral pleural effusions.   3.  Hyperexpansion of lungs favors changes of COPD.      US-EXTREMITY VENOUS UPPER UNILAT LEFT   Final Result      CT-NEEDLE BX-RENAL    (Results Pending)        Assessment/Plan  * Esophageal cancer (HCC)- (present on admission)   Assessment & Plan    Oncologist and radiation oncologist evaluated him and made recommendations.  Tube feed at goal.  Laparoscopic G-tube and placement  Continue supportive care  Continue xrt  I discussed plan of care with him.     Renal cell carcinoma of right kidney (HCC)- (present on admission)   Assessment & Plan    With gastrohepatic, periportal, aortocaval and retroperitoneal [place] metastases.  Urology  signed off and he would like to see him as outpatient.  I discussed with oncologist Dr. Page regarding plan of care.  Radiation oncologist  evaluated him and he underwent evaluation on March 7, 2019  RXT to begin today  Palliative following.  S/P right nephrectomy       Hematuria- (present on admission)   Assessment & Plan    No acute complaints of hematuria.  Hemoglobin remained stable.  Urology signed off.     Delirium   Assessment & Plan    Currently stable.  Avoid benzodiazepines and anticholinergics  Frequent orientation  Avoid early morning labs  Avoid vital signs during sleep       Hypomagnesemia- (present on admission)   Assessment & Plan    Resolved     Pneumonitis- (present on admission)   Assessment & Plan    He remains afebrile.  Antibiotics through 2/27/2019. completed     Essential hypertension- (present on admission)   Assessment & Plan    Continue amlodipine     Leukocytosis- (present on admission)   Assessment & Plan    Continues to improve  He remains afebrile.     Dysphagia- (present on admission)   Assessment & Plan    He is tolerating tube feed  Appreciated speech therapy recommendations     Deep venous thrombosis (HCC)- (present on admission)   Assessment & Plan    2/18 US  Deep venous thrombosis visualized from the distal left subclavian to mid    brachial vein.  The thrombus appears near occlusive from the axillary to    the proximal brachial vein.     Eliquis was topped and he was started  on therapeutic Lovenox due to possible allergic reaction on March 9, 2019.   Due to his renal issues he was not started on Xarelto.         Advanced care planning/counseling discussion- (present on admission)   Assessment & Plan    Palliative care following     Constipation due to pain medication therapy- (present on admission)   Assessment & Plan    Continue bowel protocol     Cancer associated pain- (present on admission)   Assessment & Plan    IV Dilaudid as needed for severe pain   Pain is  under control.  Continue to monitor adverse effect of pain medications.     Malignant cachexia (HCC)- (present on admission)   Assessment & Plan    Severe malnutrition.  Dietitian is following.     COPD (chronic obstructive pulmonary disease) (HCC)- (present on admission)   Assessment & Plan    Continue RT protocol     Lower extremity edema   Assessment & Plan    Improving with lasix     Rash   Assessment & Plan    He found to have bilateral-lower extremities.  It could be an allergic rash due to his socks.  improving  Continue to monitor.  He remains afebrile.     Penile swelling   Assessment & Plan    resolving  Valdes in place.  Urology evaluated him in the signed off. Follow-up as outpatient.  No acute complaints.  Continue to monitor     Tobacco abuse- (present on admission)   Assessment & Plan    Cessation discussed and recommended     Plan of care discussed with multidisciplinary team during rounds.      VTE prophylaxis: Lovenox

## 2019-03-14 NOTE — PROGRESS NOTES
"Patient up to chair.  C/O pain 4/10.  Patient states he is \"not in a good place\" this morning, since he wasn't able to participate in radiation.  RN spoke with MD, and order placed for xanax for tomorrow AM before radiation treatment.  Patient updated and agreeable to trying in the AM.  Tube feeds infusing in J tube.  BLE pitting edema, 3+.  Plan of care discussed.  Call light within reach.  Bed in lowest position.    "

## 2019-03-14 NOTE — CARE PLAN
Problem: Pain Management  Goal: Pain level will decrease to patient's comfort goal  Outcome: PROGRESSING SLOWER THAN EXPECTED  Pt educated on medication available in MAR. Pt states that he is afraid of getting behind on pain especially pain caused by radiation. This RN educated pt on parameters placed on medication administration. This RN and pt will discuss medicating for pain prior to leaving unit for radiation with day shift RN. Pt states that this plan would greatly reduce his anxiety and pain. Pt required re-explanation and re-education throughout discussion.     Problem: Skin Integrity  Goal: Risk for impaired skin integrity will decrease  Outcome: PROGRESSING AS EXPECTED  2 RN skin check completed this shift D/T impaired skin integrity. All findings noted in progress note. Mepilex in place to areas of redness.

## 2019-03-14 NOTE — PROGRESS NOTES
· 2 RN skin check complete with JANETTE Ricketts.  · Mepilex in place to upper back and sacrum.  · Skin assessed under all devices.  · Confirmed redness noted on upper spine and sacrum.  · The following interventions in place: Mepilex to upper back and butt, pt prompted to turn self Q2, and mobilize throughout shift.

## 2019-03-14 NOTE — PROGRESS NOTES
Bedside Report received   Assumed care of patient at 1900.    Pt is A&O x 4.  Pain reported at 5/10. Medicated per MAR. Educated pt on MAR and the use of pain medication. Pt required re-education and re-explanation frequently. Pt currently verbalizes understanding of medication use.   Nausea denied  Pt is NPO at this time, but is tolerating tube feeding (Fibersource @ 65 ml/hour which is goal) via G/J tube in LLQ.  Surgical lap sites x 6 to abd. All sites are healed/approximated and MALIK with no drainage noted.   Swelling noted to bilateral feet. 1+ pitting edema in feet only. No edema noted above ankles. Feet are elevated at this time.   + Urine output  + BM    + Flatus  Up x 1 assist   SCD's refused despite education on DVT risk.   Family updated on POC by pt  Bed in lowest position and locked.  Pt resting comfortably now.  Review plan of care with patient  Call light within reach  Hourly rounds in place  All needs met at this time

## 2019-03-14 NOTE — CARE PLAN
Problem: Safety  Goal: Will remain free from injury  Outcome: PROGRESSING AS EXPECTED  Patient educated to call for assistance before getting out of bed.  Call light within reach.  Bed in lowest position.      Problem: Venous Thromboembolism (VTW)/Deep Vein Thrombosis (DVT) Prevention:  Goal: Patient will participate in Venous Thrombosis (VTE)/Deep Vein Thrombosis (DVT)Prevention Measures  Outcome: PROGRESSING AS EXPECTED  Patient given lovenox per MAR.

## 2019-03-14 NOTE — THERAPY
"Attempted to see pt 2x this morning for PT tx. On fist attempt, pt had just left floor for radiation treatment. On second attempt, pt stated he was \"feeling down\" about not being able to complete radiation treatment this morning and to \"leave him alone today\". Will reattempt as able.    Shira, PTA  [phone]  "

## 2019-03-14 NOTE — PALLIATIVE CARE
"Palliative Care follow-up  PC RN visited pt at bedside, no family present. Pt sitting up in chair with tube feedings running. PC RN inquired how pt was doing, pt responded \"Terrible, I didn't pass the test\". PC RN inquired about what test, pt responded \"Can we not do this today. I don't want to talk\". PC RN asked if he would like PC RN to return tomorrow, pt stated \"Maybe\".    Plan:   Continue to provide support, continue GOC discussion as clinical picture unfolds.     Thank you for allowing Palliative Care to participate in this patient's care. Please feel free to call [phone] with any questions or concerns.  "

## 2019-03-14 NOTE — THERAPY
"Speech Language Therapy dysphagia treatment completed.   Functional Status: Patient currently NPO with supplemental TF via G tube. Patient with daughter present at bedside who appeared supportive. Patient noted to have wet/gurgly vocal quality prior to PO trials and reporting that he is using oral suction to remove oral secretions and is afraid to swallow them d/t esophageal dysphagia and N/V. Patient educated on dysphagia, s/sx of aspiration, risks for pneumonia, SLP role, and SLP recs. Patient and daughter verbalized understanding of education, although patient required extensive reinforcement. Patient consumed PO trials of single ice chips and NTL via 1/2 tsp x3. Patient presented with intermittent wet/gurlgy vocal quality, delayed weak coughing, and frequent oral suctioning via [place] after minimal PO trials, which is concerning for penetration/aspiration. Patient also refused further PO trials d/t fear of \"it coming back up.\" At this time, recommend patient continue NPO with G tube. RN aware. SLP is following.     Recommendations: At this time, recommend patient continue strict NPO with G tube.   Plan of Care: Will benefit from Speech Therapy 3 times per week  Post-Acute Therapy: Recommend inpatient transitional care services for continued speech therapy services.    See \"Rehab Therapy-Acute\" Patient Summary Report for complete documentation.     "

## 2019-03-15 PROBLEM — E43 SEVERE PROTEIN-CALORIE MALNUTRITION (HCC): Status: ACTIVE | Noted: 2019-01-01

## 2019-03-15 NOTE — PROGRESS NOTES
A&Ox4.   VSS.   Pt denies pain at this time. Resting calmly.   G tube in place and patent. Flushed 30ml.    Tube feeding held by night nurse but restarted today at 65 ml; goal.   x3 old lap sites CDI. MALIK.   Bilateral pedal edema. +3 pitting. Cap refil ,3 seconds. Skin pink, warm, dry, intact.   Prophylactic mepilex in place on upper back and sacrum.   NPO. Denies n/v. + bowel sounds, - flatus, LBM 3/13.  Saturating >90% on 1L.   Pt ambulates SBA with walker. Steady gait.   Bed alarm off. Pt refused after education.   Updated on plan of care. Safety education provided. Bed locked in low. Call light within reach. Rounding in place.

## 2019-03-15 NOTE — PROGRESS NOTES
Patient received treatment in Radiation Therapy. Patient received treatment number 2 of 10 planned.

## 2019-03-15 NOTE — PROGRESS NOTES
Pt is stating that cannot sleep and is beginning to feel delirious. Pt states that he can only sleep on his back. Pt was educated on the need to keep HOB above 30 degrees when tube feeding is infusing. Pt was educated on need for tube feeding to maintain nutrition. PT verbalizes understanding of all teaching. However, pt requesting temporary stopping of tube feeding so that he can sleep. Pt states that he will refuse tube feeding if it cannot be paused.   Tube feeding paused at this time per pt request.

## 2019-03-15 NOTE — PROGRESS NOTES
2RN skin check. Balaji skin score <17.  Blanchable redness to mid back near spine, mepilex in place.  Blanchable redness to sacrum, mepilex in place.   Lap sites to abdomen, dermabond, no redness, well approximated.  G/J tube L abdomen. No breakdown or redness noted around site.  Bilat feet edematous, no redness noted. Flaky and peeling.  Scrotum/perineum moist and red. Placed interdry and powder.    Interventions in place:  Turning, waffle cushion on bed and on reclining chair, prompting turns & pt turns self, silicone O2 tubing, interdry, powder.

## 2019-03-15 NOTE — CARE PLAN
Problem: Bowel/Gastric:  Goal: Normal bowel function is maintained or improved  Outcome: PROGRESSING AS EXPECTED  Tube feed nutrition running at goal. Tolerating well. Denies nausea, +flattus.     Problem: Skin Integrity  Goal: Risk for impaired skin integrity will decrease  Outcome: PROGRESSING AS EXPECTED  Waffle cushion in use, silicone nasal cannula, up to chair, encouraged frequent turning, pt turns self. 2RN Skin check for kirby score <17

## 2019-03-15 NOTE — PROGRESS NOTES
Patient sitting in chair most of the day, able to turn self and shift weight.  Mepilex placed on sacrum over red, fragile area and upper back on bony prominence.  Patient educated that sitting in one position can lead to skin breakdown.  Patient demonstrated understanding but continues to sleep and sit in chair throughout shift.

## 2019-03-15 NOTE — PROGRESS NOTES
Bedside Report received   Assumed care of patient at 1900.    Pt is A&O x 4.  Pain reported at 4/10. Medication declined at this time.  Nausea denied  Pt is NPO at this time, but is tolerating tube feeding (Fibersource @ 65 ml/hour which is goal) via G/J tube in LLQ.  Surgical lap sites x 6 to abd. All sites are healed/approximated and MALIK with no drainage noted.   Swelling noted to bilateral feet. 1+ pitting edema in feet only. No edema noted above ankles. Feet are elevated at this time.   + Urine output  + BM    + Flatus  Up x 1 assist   SCD's refused despite education on DVT risk.   Bed in lowest position and locked.  Pt resting comfortably now.  Review plan of care with patient  Call light within reach  Hourly rounds in place  All needs met at this time

## 2019-03-15 NOTE — PROGRESS NOTES
Bed alarm refused despite pt education on fall risk.  Pt is A&O x4 and demonstrates appropriate use of call light to call for assistance.  CLIP, hourly rounding in place.

## 2019-03-15 NOTE — PROGRESS NOTES
Patient brought down to Radiation Therapy and received treatment 9 out of 30. Patient tolerated procedure well. Will continue treatment as planned.

## 2019-03-15 NOTE — PROGRESS NOTES
[facility] Medicine Daily Progress Note    Date of Service  3/14/2019    Chief Complaint  73 y.o. male admitted 2/18/2019 with esophageal cancer.    Hospital Course  Admitted with esophageal cancer, dysphagia, laparoscopic G-tube placed for nutrition, noted to have DVT left upper extremity, right renal mass, transferred for IR guided biopsy.  Oncology was consulted.    Interval Problem Update  3/13: Sitting up in bed comfortably.  Was in pain prior to radiation therapy which was treated with oxycodone and diet Dilaudid and patient was able to go for his radiation session.  No acute distress noted.  3/14: Did not get his radiation therapy session today due to questionable pain/anxiety!..  Xanax was added as needed to be given prior to radiation sessions.  Patient is sitting up in bed comfortably.  No acute distress noted.  Avoiding eye contact.  Flat affect and depressed looking.  Consultants/Specialty   Palliative care  Urology  ID  Oncology    Code Status  DNR/DNI    Disposition  TBD    Review of Systems  Review of Systems   Constitutional: Positive for malaise/fatigue and weight loss. Negative for chills and fever.   HENT: Negative for ear pain, hearing loss and tinnitus.    Eyes: Negative for blurred vision, double vision, photophobia and pain.   Respiratory: Negative for cough, hemoptysis, sputum production and shortness of breath.    Cardiovascular: Positive for leg swelling (Improving). Negative for chest pain, palpitations, orthopnea and claudication.   Gastrointestinal: Negative for abdominal pain, diarrhea, heartburn, nausea and vomiting.   Genitourinary: Negative for dysuria, frequency and urgency.        Penis swelling improved   Musculoskeletal: Negative for back pain, myalgias and neck pain.   Skin: Positive for rash (Worsening on bilateral lower extremities (improved from yesterday)).   Neurological: Positive for weakness. Negative for dizziness, tingling, tremors, sensory change and headaches.    Endo/Heme/Allergies: Does not bruise/bleed easily.   Psychiatric/Behavioral: Positive for depression. Negative for substance abuse and suicidal ideas. The patient is nervous/anxious.         Physical Exam  Temp:  [36.7 °C (98 °F)-36.8 °C (98.3 °F)] 36.8 °C (98.3 °F)  Pulse:  [79-85] 79  Resp:  [15-18] 15  BP: (120-145)/(60-70) 138/62  SpO2:  [93 %-97 %] 93 %    Physical Exam   Constitutional: He is oriented to person, place, and time. He appears cachectic. He appears ill. No distress.   Sitting in chair.   HENT:   Head: Normocephalic and atraumatic.   Mouth/Throat: No oropharyngeal exudate.   Eyes: Conjunctivae are normal. Right eye exhibits no discharge. Left eye exhibits no discharge.   Neck: Normal range of motion. Neck supple. No thyromegaly present.   Cardiovascular: Normal rate and regular rhythm.  Exam reveals no gallop and no friction rub.    Pulmonary/Chest: Effort normal. No respiratory distress. He has no wheezes. He has no rales.   Abdominal: Soft. Bowel sounds are normal. He exhibits no distension. There is no tenderness.   G-tube in place   Genitourinary: Penile tenderness: Swelling and mild tenderness.   Genitourinary Comments: swelling   Musculoskeletal: He exhibits edema (+1 edema on legs b/l ). He exhibits no deformity.   Neurological: He is alert and oriented to person, place, and time.   Skin: Skin is warm and dry. He is not diaphoretic. There is erythema.   Psychiatric: His affect is blunt. He is slowed and withdrawn. He exhibits a depressed mood.   Nursing note and vitals reviewed.      Fluids    Intake/Output Summary (Last 24 hours) at 03/14/19 1714  Last data filed at 03/14/19 0800   Gross per 24 hour   Intake             1460 ml   Output              325 ml   Net             1135 ml       Laboratory  Recent Labs      03/12/19   0317  03/14/19   0324   WBC  14.9*  14.1*   RBC  3.83*  3.39*   HEMOGLOBIN  10.1*  8.9*   HEMATOCRIT  32.4*  28.6*   MCV  84.6  84.4   MCH  26.4*  26.3*   MCHC   31.2*  31.1*   RDW  56.5*  55.0*   PLATELETCT  451*  431   MPV  9.8  10.1     Recent Labs      03/12/19   0317  03/14/19   0324   SODIUM  135  136   POTASSIUM  4.2  4.0   CHLORIDE  98  99   CO2  32  30   GLUCOSE  118*  89   BUN  29*  31*   CREATININE  0.76  0.64   CALCIUM  8.3*  7.9*                   Imaging  DX-CHEST-2 VIEWS   Final Result         Patchy left basilar opacity, worse than prior exam, concerning for worsening infection.      DX-CHEST-PORTABLE (1 VIEW)   Final Result         1. Patchy left basilar opacities, atelectasis versus consolidation.      2. Free intraperitoneal air could relate to recent surgery. Correlate clinically.      CT-ABDOMEN-PELVIS WITH   Final Result      No evidence of right perinephric hematoma or hydronephrosis status post right renal biopsy.      Clot identified in the urinary bladder.      Anasarca.      Distal esophageal mass with lymphadenopathy.      CT-NEEDLE BX-RENAL   Final Result      Successful CT-guided core biopsy of right renal mass.      CT-ABDOMEN-PELVIS WITH   Final Result      1.  Again seen thickening of the distal esophagus likely related to the patient's known esophageal cancer in that area. There is extensive soft tissue mass/adenopathy within the gastrohepatic, periportal, and retroperitoneal regions which does encase the    celiac and superior mesenteric arteries. This is not significantly changed over short interval follow-up.      2.  Again seen large right renal mass which measures 9 x 7 cm in size consistent with either renal cell carcinoma or metastatic disease. There is again seen likely some invasion of the right renal vein.      3.  Emphysematous and bullous change of the lungs with bibasilar atelectasis and small bilateral pleural effusions.      4.  Moderate constipation.      DX-CHEST-2 VIEWS   Final Result         1.  Hazy bilateral lung base infiltrates, greater on the left, new since prior.   2.  Small bilateral pleural effusions.   3.   Hyperexpansion of lungs favors changes of COPD.      US-EXTREMITY VENOUS UPPER UNILAT LEFT   Final Result      CT-NEEDLE BX-RENAL    (Results Pending)        Assessment/Plan  * Esophageal cancer (HCC)- (present on admission)   Assessment & Plan    Oncologist and radiation oncologist evaluated him and made recommendations.  Tube feed at goal.  Laparoscopic G-tube and placement  Continue supportive care  Receiving palliative radiation therapy  3/14: Was unable to go through his radiation therapy today due to anxiety!  Low-dose Xanax was added to be given prior to radiation sessions.     Renal cell carcinoma of right kidney (HCC)- (present on admission)   Assessment & Plan    With gastrohepatic, periportal, aortocaval and retroperitoneal raleigh metastases.  Urology signed off and he would like to see him as outpatient.  I discussed with oncologist Dr. Page regarding plan of care.  Radiation oncologist  evaluated him and he underwent evaluation on March 7, 2019  Palliative following.  S/P right nephrectomy       Hematuria- (present on admission)   Assessment & Plan    No acute complaints of hematuria.  Hemoglobin remained stable.  Urology signed off.     Delirium   Assessment & Plan    Currently stable.  Avoid benzodiazepines and anticholinergics  Frequent orientation  Avoid early morning labs  Avoid vital signs during sleep       Hypomagnesemia- (present on admission)   Assessment & Plan    Resolved     Pneumonitis- (present on admission)   Assessment & Plan    He remains afebrile.  Antibiotics through 2/27/2019. completed     Essential hypertension- (present on admission)   Assessment & Plan    Continue amlodipine     Leukocytosis- (present on admission)   Assessment & Plan    Continues to improve  He remains afebrile.     Dysphagia- (present on admission)   Assessment & Plan    He is tolerating tube feed  Appreciated speech therapy recommendations     Deep venous thrombosis (HCC)- (present on admission)    Assessment & Plan    2/18 US  Deep venous thrombosis visualized from the distal left subclavian to mid    brachial vein.  The thrombus appears near occlusive from the axillary to    the proximal brachial vein.     Eliquis was topped and he was started  on therapeutic Lovenox due to possible allergic reaction on March 9, 2019.   Due to his renal issues he was not started on Xarelto.         Advanced care planning/counseling discussion- (present on admission)   Assessment & Plan    Palliative care following     Constipation due to pain medication therapy- (present on admission)   Assessment & Plan    Continue bowel protocol     Cancer associated pain- (present on admission)   Assessment & Plan    IV Dilaudid as needed for severe pain   Pain is under control.  Continue to monitor adverse effect of pain medications.     Malignant cachexia (HCC)- (present on admission)   Assessment & Plan    Severe malnutrition.  Dietitian is following.     COPD (chronic obstructive pulmonary disease) (HCC)- (present on admission)   Assessment & Plan    Continue RT protocol     Lower extremity edema   Assessment & Plan    Improving with lasix     Rash   Assessment & Plan    He found to have bilateral-lower extremities.  It could be an allergic rash due to his socks.  improving  Continue to monitor.  He remains afebrile.     Penile swelling   Assessment & Plan    resolving  Valdes in place.  Urology evaluated him in the signed off. Follow-up as outpatient.  No acute complaints.  Continue to monitor     Tobacco abuse- (present on admission)   Assessment & Plan    Cessation discussed and recommended     Plan of care discussed with multidisciplinary team during rounds.      VTE prophylaxis: Lovenox

## 2019-03-15 NOTE — CARE PLAN
Problem: Safety  Goal: Will remain free from injury  Outcome: PROGRESSING AS EXPECTED  Pt is currently refusing a bed alarm at this time. Pt was educated on fall risk and the use of call light to call for assistance prior to mobilizing. Pt is currently demonstrating appropriate use of call light and has made no unsafe attempts at mobilization.     Problem: Psychosocial Needs:  Goal: Level of anxiety will decrease  Outcome: PROGRESSING SLOWER THAN EXPECTED  Pt states that he believes he was moved from T401 to T410 to be further away from nursing staff. This RN educated the pt on rationale behind room change as the previous room was very noisy and, on a previous shift, my pt had requested a quieter room. The pt verbalized understanding, but seemed very aloof so this RN sat at bedside and provided therapeutic communication for 30 minutes.

## 2019-03-15 NOTE — PROGRESS NOTES
[facility] Medicine Daily Progress Note    Date of Service  3/15/2019    Chief Complaint  73 y.o. male admitted 2/18/2019 with esophageal cancer.    Hospital Course  Admitted with esophageal cancer, dysphagia, laparoscopic G-tube placed for nutrition, noted to have DVT left upper extremity, right renal mass, transferred for IR guided biopsy.  Oncology was consulted.    Interval Problem Update  3/13: Sitting up in bed comfortably.  Was in pain prior to radiation therapy which was treated with oxycodone and diet Dilaudid and patient was able to go for his radiation session.  No acute distress noted.  3/14: Did not get his radiation therapy session today due to questionable pain/anxiety!..  Xanax was added as needed to be given prior to radiation sessions.  Patient is sitting up in bed comfortably.  No acute distress noted.  Avoiding eye contact.  Flat affect and depressed looking.  3/15: Less anxious today.  1 dose of Xanax given prior to his radiotherapy helped to keep him calm and proceed with his treatment.  No acute distress noted.  No new issues overnight.  Consultants/Specialty   Palliative care  Urology  ID  Oncology    Code Status  DNR/DNI    Disposition  TBD    Review of Systems  Review of Systems   Constitutional: Positive for malaise/fatigue and weight loss. Negative for chills and fever.   HENT: Negative for ear discharge, ear pain, hearing loss and tinnitus.    Eyes: Negative for blurred vision, double vision and photophobia.   Respiratory: Negative for cough, hemoptysis, sputum production and shortness of breath.    Cardiovascular: Positive for leg swelling (Improving). Negative for chest pain, palpitations, orthopnea and claudication.   Gastrointestinal: Negative for abdominal pain, constipation, diarrhea, heartburn, nausea and vomiting.   Genitourinary: Negative for dysuria, frequency, hematuria and urgency.        Penis swelling improved   Musculoskeletal: Negative for back pain, joint pain, myalgias  and neck pain.   Skin: Positive for rash. Negative for itching.   Neurological: Positive for weakness. Negative for dizziness, tingling, tremors, sensory change, speech change and headaches.   Endo/Heme/Allergies: Does not bruise/bleed easily.   Psychiatric/Behavioral: Positive for depression. Negative for substance abuse and suicidal ideas. The patient is nervous/anxious.         Physical Exam  Temp:  [36.6 °C (97.8 °F)-37.1 °C (98.7 °F)] 37.1 °C (98.7 °F)  Pulse:  [75-85] 78  Resp:  [17-18] 18  BP: (112-135)/(50-61) 113/57  SpO2:  [93 %-99 %] 97 %    Physical Exam   Constitutional: He is oriented to person, place, and time. He appears cachectic. He appears ill. No distress.   Sitting in chair.   HENT:   Head: Normocephalic and atraumatic.   Mouth/Throat: No oropharyngeal exudate.   Eyes: Conjunctivae are normal. Right eye exhibits no discharge. Left eye exhibits no discharge.   Neck: Normal range of motion. Neck supple. No tracheal deviation present. No thyromegaly present.   Cardiovascular: Normal rate and regular rhythm.  Exam reveals no gallop and no friction rub.    Pulmonary/Chest: Effort normal. No respiratory distress. He has no wheezes.   Abdominal: Soft. Bowel sounds are normal. He exhibits no distension. There is no tenderness.   G-tube in place   Genitourinary: Penile tenderness: Swelling and mild tenderness.   Genitourinary Comments: swelling   Musculoskeletal: He exhibits edema (+1 edema on legs b/l ). He exhibits no deformity.   Neurological: He is alert and oriented to person, place, and time.   Skin: Skin is warm and dry. He is not diaphoretic. There is erythema.   Stasis dermatitis b/l    Psychiatric: His affect is blunt. He is slowed and withdrawn. He exhibits a depressed mood.   Nursing note and vitals reviewed.      Fluids    Intake/Output Summary (Last 24 hours) at 03/15/19 1644  Last data filed at 03/15/19 1600   Gross per 24 hour   Intake             2050 ml   Output             1450 ml    Net              600 ml       Laboratory  Recent Labs      03/14/19   0324   WBC  14.1*   RBC  3.39*   HEMOGLOBIN  8.9*   HEMATOCRIT  28.6*   MCV  84.4   MCH  26.3*   MCHC  31.1*   RDW  55.0*   PLATELETCT  431   MPV  10.1     Recent Labs      03/14/19   0324   SODIUM  136   POTASSIUM  4.0   CHLORIDE  99   CO2  30   GLUCOSE  89   BUN  31*   CREATININE  0.64   CALCIUM  7.9*                   Imaging  DX-CHEST-2 VIEWS   Final Result         Patchy left basilar opacity, worse than prior exam, concerning for worsening infection.      DX-CHEST-PORTABLE (1 VIEW)   Final Result         1. Patchy left basilar opacities, atelectasis versus consolidation.      2. Free intraperitoneal air could relate to recent surgery. Correlate clinically.      CT-ABDOMEN-PELVIS WITH   Final Result      No evidence of right perinephric hematoma or hydronephrosis status post right renal biopsy.      Clot identified in the urinary bladder.      Anasarca.      Distal esophageal mass with lymphadenopathy.      CT-NEEDLE BX-RENAL   Final Result      Successful CT-guided core biopsy of right renal mass.      CT-ABDOMEN-PELVIS WITH   Final Result      1.  Again seen thickening of the distal esophagus likely related to the patient's known esophageal cancer in that area. There is extensive soft tissue mass/adenopathy within the gastrohepatic, periportal, and retroperitoneal regions which does encase the    celiac and superior mesenteric arteries. This is not significantly changed over short interval follow-up.      2.  Again seen large right renal mass which measures 9 x 7 cm in size consistent with either renal cell carcinoma or metastatic disease. There is again seen likely some invasion of the right renal vein.      3.  Emphysematous and bullous change of the lungs with bibasilar atelectasis and small bilateral pleural effusions.      4.  Moderate constipation.      DX-CHEST-2 VIEWS   Final Result         1.  Hazy bilateral lung base infiltrates,  greater on the left, new since prior.   2.  Small bilateral pleural effusions.   3.  Hyperexpansion of lungs favors changes of COPD.      US-EXTREMITY VENOUS UPPER UNILAT LEFT   Final Result      CT-NEEDLE BX-RENAL    (Results Pending)        Assessment/Plan  * Esophageal cancer (HCC)- (present on admission)   Assessment & Plan    Oncologist and radiation oncologist evaluated him and made recommendations.  Tube feed at goal.  Laparoscopic G-tube and placement  Continue supportive care  Receiving palliative radiation therapy  3/14: Was unable to go through his radiation therapy today due to anxiety!  Low-dose Xanax was added to be given prior to radiation sessions.  3/15: Xanax was given shortly prior to radiation therapy session which helped patient to be calm and patient proceeded with his therapy.     Failure to thrive in adult- (present on admission)   Assessment & Plan    Patient receiving palliative radiation therapy for now.  PT/OT eval completed yet.     Renal cell carcinoma of right kidney (HCC)- (present on admission)   Assessment & Plan    With gastrohepatic, periportal, aortocaval and retroperitoneal raleigh metastases.  Urology signed off and he would like to see him as outpatient.  I discussed with oncologist Dr. Page regarding plan of care.  Radiation oncologist  evaluated him and he underwent evaluation on March 7, 2019  Palliative following.  S/P right nephrectomy       Hematuria- (present on admission)   Assessment & Plan    No acute complaints of hematuria.  Hemoglobin remained stable.  Urology signed off.     Delirium   Assessment & Plan    Currently stable.  Avoid benzodiazepines and anticholinergics  Frequent orientation  Avoid early morning labs  Avoid vital signs during sleep       Hypomagnesemia- (present on admission)   Assessment & Plan    Resolved     Pneumonitis- (present on admission)   Assessment & Plan    He remains afebrile.  Antibiotics through 2/27/2019. completed      Essential hypertension- (present on admission)   Assessment & Plan    Continue amlodipine     Leukocytosis- (present on admission)   Assessment & Plan    Continues to improve  He remains afebrile.     Dysphagia- (present on admission)   Assessment & Plan    He is tolerating tube feed  Appreciated speech therapy recommendations     Deep venous thrombosis (HCC)- (present on admission)   Assessment & Plan    2/18 US  Deep venous thrombosis visualized from the distal left subclavian to mid    brachial vein.  The thrombus appears near occlusive from the axillary to    the proximal brachial vein.     Eliquis was topped and he was started  on therapeutic Lovenox due to possible allergic reaction on March 9, 2019.   Due to his renal issues he was not started on Xarelto.         Advanced care planning/counseling discussion- (present on admission)   Assessment & Plan    Palliative care following     Constipation due to pain medication therapy- (present on admission)   Assessment & Plan    Continue bowel protocol     Cancer associated pain- (present on admission)   Assessment & Plan    IV Dilaudid as needed for severe pain   Pain is under control.  Continue to monitor adverse effect of pain medications.     Malignant cachexia (HCC)- (present on admission)   Assessment & Plan    Severe malnutrition.  Dietitian is following.     COPD (chronic obstructive pulmonary disease) (HCC)- (present on admission)   Assessment & Plan    Continue RT protocol     Severe protein-calorie malnutrition (HCC)- (present on admission)   Assessment & Plan    Allow supplements between meals.  Dietitian following.     Lower extremity edema   Assessment & Plan    Improving with lasix     Rash   Assessment & Plan    He found to have bilateral-lower extremities.  It could be an allergic rash due to his socks.  improving  Continue to monitor.  He remains afebrile.     Penile swelling   Assessment & Plan    resolving  Valdes in place.  Urology evaluated  him in the signed off. Follow-up as outpatient.  No acute complaints.  Continue to monitor     Tobacco abuse- (present on admission)   Assessment & Plan    Cessation discussed and recommended     Plan of care discussed with multidisciplinary team during rounds.      VTE prophylaxis: Lovenox

## 2019-03-15 NOTE — PROGRESS NOTES
· 2 RN skin check complete with JANETTE Bowers.  · Pt's Balaji Scale was 17   · Mepilex in place to upper back and sacrum.  · Skin assessed under devices and all bony prominences.  · Blanchable redness noted to sacrum and upper back along spine. Edema noted to bilateral feet and penis.  · The following interventions in place: mepilex x 2, prompting pt turns, ROM, moisturizer and elevation of edematous extremities.

## 2019-03-16 PROBLEM — J98.4 PNEUMONITIS: Status: RESOLVED | Noted: 2019-01-01 | Resolved: 2019-01-01

## 2019-03-16 PROBLEM — D72.829 LEUKOCYTOSIS: Status: RESOLVED | Noted: 2019-01-01 | Resolved: 2019-01-01

## 2019-03-16 PROBLEM — R31.9 HEMATURIA: Status: RESOLVED | Noted: 2019-01-01 | Resolved: 2019-01-01

## 2019-03-16 PROBLEM — R41.0 DELIRIUM: Status: RESOLVED | Noted: 2019-01-01 | Resolved: 2019-01-01

## 2019-03-16 PROBLEM — Z71.89 ADVANCED CARE PLANNING/COUNSELING DISCUSSION: Status: RESOLVED | Noted: 2019-01-01 | Resolved: 2019-01-01

## 2019-03-16 PROBLEM — R64 MALIGNANT CACHEXIA (HCC): Status: RESOLVED | Noted: 2019-01-01 | Resolved: 2019-01-01

## 2019-03-16 PROBLEM — N48.89 PENILE SWELLING: Status: RESOLVED | Noted: 2019-01-01 | Resolved: 2019-01-01

## 2019-03-16 PROBLEM — R62.7 FAILURE TO THRIVE IN ADULT: Status: RESOLVED | Noted: 2019-01-01 | Resolved: 2019-01-01

## 2019-03-16 PROBLEM — E83.42 HYPOMAGNESEMIA: Status: RESOLVED | Noted: 2019-01-01 | Resolved: 2019-01-01

## 2019-03-16 NOTE — CARE PLAN
Problem: Safety  Goal: Will remain free from falls  Outcome: PROGRESSING AS EXPECTED  Call light and personal belongings are within reach,  Patient educated on the need to call for assistance.     Problem: Skin Integrity  Goal: Risk for impaired skin integrity will decrease  Outcome: PROGRESSING AS EXPECTED  Skin breakdown preventions are in place.

## 2019-03-16 NOTE — PROGRESS NOTES
Patient refusing bed and chair alarm.   This RN educated patient on risk.  Patient verbalized understanding.   Call light and personal belongings are within reach.  Patient is A&Ox4, and calls appropriately.   Hourly rounding in place.

## 2019-03-16 NOTE — PROGRESS NOTES
2 RN skin check for Balaji score.    Red/purple discoloration around mid spine, blanching. Mepilex in place.  Redness to sacrum, blanching. Mepilex in place.  Lap sites to RLQ, dermabond, well approximated and open to air.  G-J tube to LUQ abdomen, site is clean, dry, and intact.  BLE edematous, flaking and peeling. No redness noted. Benadryl topical cream applied.  Scrotum and Perineum are red and blanching. Interdry sheets in place. Area cleaned, Bacitracin Applied.      Patient repositioning, waffle cushion, Silicone oxygen tubing all in place.

## 2019-03-16 NOTE — PROGRESS NOTES
Hospital Medicine Daily Progress Note    Date of Service  3/16/2019    Chief Complaint  73 y.o. male admitted 2/18/2019 with esophageal cancer.    Hospital Course  Admitted with esophageal cancer, dysphagia, laparoscopic G-tube placed for nutrition, noted to have DVT left upper extremity, right renal mass, transferred for IR guided biopsy.  Oncology was consulted.    Interval Problem Update  Patient seen and evaluated on rounds  Discussed with nursing staff on rounds  Spouse, daughter at bedside, plan of care discussed with them and all questions and concerns answered    Persistent constipation, no pain at this time, improved with current regimen    G-tube in place, receiving enteral nutrition    Rash is improved, on subcutaneous Lovenox at this time    Continuing radiation treatments    Rhonchi, bilateral, difficult expectoration, 4 times daily duonebs and 3% normal saline ordered for expectoration      Consultants/Specialty   Palliative care  Urology  ID  Oncology  Radiation oncology    Code Status  DNR/DNI    Disposition  Anticipating postacute placement to SNF  PT/OT evaluations requested    Review of Systems  Review of Systems   Constitutional: Positive for malaise/fatigue and weight loss. Negative for chills and fever.   HENT: Negative for ear discharge, ear pain, hearing loss and tinnitus.    Eyes: Negative for blurred vision, double vision and photophobia.   Respiratory: Negative for cough, hemoptysis, sputum production and shortness of breath.    Cardiovascular: Positive for leg swelling (Improving). Negative for chest pain, palpitations, orthopnea and claudication.   Gastrointestinal: Negative for abdominal pain, constipation, diarrhea, heartburn, nausea and vomiting.   Genitourinary: Negative for dysuria, frequency, hematuria and urgency.   Musculoskeletal: Negative for back pain, joint pain, myalgias and neck pain.   Skin: Positive for rash. Negative for itching.   Neurological: Positive for weakness.  Negative for dizziness, tingling, tremors, sensory change, speech change and headaches.   Endo/Heme/Allergies: Does not bruise/bleed easily.   Psychiatric/Behavioral: Positive for depression. Negative for substance abuse and suicidal ideas. The patient is nervous/anxious.         Physical Exam  Temp:  [36.4 °C (97.6 °F)-36.9 °C (98.5 °F)] 36.9 °C (98.5 °F)  Pulse:  [76-83] 79  Resp:  [15-17] 15  BP: (116-127)/(48-54) 116/50  SpO2:  [96 %-97 %] 97 %    Physical Exam   Constitutional: He is oriented to person, place, and time. He appears cachectic. He appears ill. No distress.   Sitting in chair.  Body mass index is 21.39 kg/m².   HENT:   Head: Normocephalic and atraumatic.   Mouth/Throat: No oropharyngeal exudate.   Eyes: Conjunctivae are normal. Right eye exhibits no discharge. Left eye exhibits no discharge.   Neck: Normal range of motion. Neck supple. No tracheal deviation present. No thyromegaly present.   Cardiovascular: Normal rate and regular rhythm.  Exam reveals no gallop and no friction rub.    Pulmonary/Chest: Effort normal. No respiratory distress. He has no wheezes.   Abdominal: Soft. Bowel sounds are normal. He exhibits no distension. There is no tenderness.   G-tube in place   Genitourinary: Penile tenderness: Swelling and mild tenderness.   Genitourinary Comments: swelling   Musculoskeletal: He exhibits edema (+1 edema on legs b/l ). He exhibits no deformity.   Neurological: He is alert and oriented to person, place, and time. No cranial nerve deficit.   Skin: Skin is warm and dry. He is not diaphoretic.   Stasis dermatitis b/l    Psychiatric: His affect is blunt. He is slowed and withdrawn. He exhibits a depressed mood.   Nursing note and vitals reviewed.      Fluids    Intake/Output Summary (Last 24 hours) at 03/16/19 2672  Last data filed at 03/16/19 0439   Gross per 24 hour   Intake             1140 ml   Output              450 ml   Net              690 ml       Laboratory  Recent Labs       03/14/19   0324   WBC  14.1*   RBC  3.39*   HEMOGLOBIN  8.9*   HEMATOCRIT  28.6*   MCV  84.4   MCH  26.3*   MCHC  31.1*   RDW  55.0*   PLATELETCT  431   MPV  10.1     Recent Labs      03/14/19   0324   SODIUM  136   POTASSIUM  4.0   CHLORIDE  99   CO2  30   GLUCOSE  89   BUN  31*   CREATININE  0.64   CALCIUM  7.9*                   Imaging  DX-CHEST-2 VIEWS   Final Result         Patchy left basilar opacity, worse than prior exam, concerning for worsening infection.      DX-CHEST-PORTABLE (1 VIEW)   Final Result         1. Patchy left basilar opacities, atelectasis versus consolidation.      2. Free intraperitoneal air could relate to recent surgery. Correlate clinically.      CT-ABDOMEN-PELVIS WITH   Final Result      No evidence of right perinephric hematoma or hydronephrosis status post right renal biopsy.      Clot identified in the urinary bladder.      Anasarca.      Distal esophageal mass with lymphadenopathy.      CT-NEEDLE BX-RENAL   Final Result      Successful CT-guided core biopsy of right renal mass.      CT-ABDOMEN-PELVIS WITH   Final Result      1.  Again seen thickening of the distal esophagus likely related to the patient's known esophageal cancer in that area. There is extensive soft tissue mass/adenopathy within the gastrohepatic, periportal, and retroperitoneal regions which does encase the    celiac and superior mesenteric arteries. This is not significantly changed over short interval follow-up.      2.  Again seen large right renal mass which measures 9 x 7 cm in size consistent with either renal cell carcinoma or metastatic disease. There is again seen likely some invasion of the right renal vein.      3.  Emphysematous and bullous change of the lungs with bibasilar atelectasis and small bilateral pleural effusions.      4.  Moderate constipation.      DX-CHEST-2 VIEWS   Final Result         1.  Hazy bilateral lung base infiltrates, greater on the left, new since prior.   2.  Small bilateral  pleural effusions.   3.  Hyperexpansion of lungs favors changes of COPD.      US-EXTREMITY VENOUS UPPER UNILAT LEFT   Final Result           Assessment/Plan  * Esophageal cancer (HCC)- (present on admission)   Assessment & Plan    Patient receiving radiation therapy    We will follow-up outpatient with oncology team    Overall poor prognosis, failure to thrive with underlying malignancy, poor functional status    PT/OT evaluations, may need postacute placement     Deep venous thrombosis (HCC)- (present on admission)   Assessment & Plan    In the setting of underlying malignancy  Patient concerned that he had allergic reaction to Eliquis    Continue Lovenox 1 mg/kg twice daily, superior to other agents with underlying malignancy     Cancer associated pain- (present on admission)   Assessment & Plan    IV Dilaudid as needed for severe pain  Monitoring for adverse effects related to the use of narcotics  Pain is under control  Wean off IV Dilaudid over the coming days     Renal cell carcinoma of right kidney (HCC)- (present on admission)   Assessment & Plan    With gastrohepatic, periportal, aortocaval and retroperitoneal raleigh metastases.  Outpatient neurology follow-up, outpatient oncology follow-up     Severe protein-calorie malnutrition (HCC)- (present on admission)   Assessment & Plan    Optimize nutrition     Lower extremity edema- (present on admission)   Assessment & Plan    Improving with lasix     Rash- (present on admission)   Assessment & Plan    Nearly resolved, monitor, was present bilateral lower extremities     Essential hypertension- (present on admission)   Assessment & Plan    Continue amlodipine     Dysphagia- (present on admission)   Assessment & Plan    Continue nutrition through PEG tube, underlying esophageal malignancy     Constipation due to pain medication therapy- (present on admission)   Assessment & Plan    Continue bowel protocol     Tobacco abuse- (present on admission)   Assessment &  Plan    Patient counseled and educated regarding cessation     COPD (chronic obstructive pulmonary disease) (HCC)- (present on admission)   Assessment & Plan    Continue RT protocol         VTE prophylaxis: Lovenox

## 2019-03-16 NOTE — PROGRESS NOTES
2 RN skin assessment completed.    Bilateral ears red, intact, blanching.  Gray foam for oxygen tubing ordered.  Bilateral elbows red, intact, blanching.    BUE with multiple bruises.   LUQ G-J tube.  Abdominal lap sites, healed.   Mid spine with pressure injury.  Red/purple, blanching.  Non-adhesive foam placed.  Sacrum red, blanching.  Mepilex replaced.    Penile/scrotal swelling 2+, beto-care given, zandra cream applied to groin.    Bilateral heels red, blanching, calloused, intact. Heels floated with pillows. Benadryl cream in use for BLE (BLE flaky, peeling).       Waffle cushion placed in recliner chair and on mattress.

## 2019-03-16 NOTE — PROGRESS NOTES
Bedside report received.  Assessment complete.  A&O x 3, need some redirecting and reminders. Patient calls appropriately.  Patient up with standby assist and FWW.   Patient has 5/10 pain. Pt states he will wait for next medication dose.  Denies N&V. Pt strict NPO. Running Fibersource HV at 65 mL/hr, which is goal.  G-J tube in LLQ.  6 surgical lap sites, all approximated and healed.   + void, + flatus, + BM.  Patient sitting up in chair.  Review plan with of care with patient. Call light and personal belongings with in reach. Hourly rounding in place. All needs met at this time.

## 2019-03-17 NOTE — PROGRESS NOTES
[facility] Medicine Daily Progress Note    Date of Service  3/17/2019    Chief Complaint  73 y.o. male admitted 2/18/2019 with esophageal cancer.    Hospital Course  Admitted with esophageal cancer, dysphagia, laparoscopic G-tube placed for nutrition, noted to have DVT left upper extremity, right renal mass, transferred for IR guided biopsy.  Oncology was consulted.    Interval Problem Update  Patient seen and evaluated on rounds  Discussed with nursing staff on rounds  Family at bedside, discussed with them     Constipation improved, Had BMs    Abdominal pain from laxatives, these were weaned     Nausea / dry heaves, improved with zofran, compazine / phenergan added to regimen     G-tube in place, receiving enteral nutrition    Rash is improved, on subcutaneous Lovenox at this time    Continuing radiation treatments    Improved rhonchi       Consultants/Specialty   Palliative care  Urology  ID  Oncology  Radiation oncology    Code Status  DNR/DNI    Disposition  Anticipating postacute placement to SNF  PT/OT evaluations requested    Review of Systems  Review of Systems   Constitutional: Positive for malaise/fatigue and weight loss. Negative for chills and fever.   HENT: Negative for ear discharge, ear pain, hearing loss and tinnitus.    Eyes: Negative for blurred vision, double vision and photophobia.   Respiratory: Negative for cough, hemoptysis, sputum production and shortness of breath.    Cardiovascular: Positive for leg swelling (Improving). Negative for chest pain, palpitations, orthopnea and claudication.   Gastrointestinal: Positive for constipation and nausea. Negative for abdominal pain, diarrhea, heartburn and vomiting.   Genitourinary: Negative for dysuria, frequency, hematuria and urgency.   Musculoskeletal: Negative for back pain, joint pain, myalgias and neck pain.   Skin: Positive for rash. Negative for itching.   Neurological: Positive for weakness. Negative for dizziness, tingling, tremors, sensory  change, speech change and headaches.   Endo/Heme/Allergies: Does not bruise/bleed easily.   Psychiatric/Behavioral: Positive for depression. Negative for substance abuse and suicidal ideas. The patient is nervous/anxious.         Physical Exam  Temp:  [36.3 °C (97.4 °F)-37.4 °C (99.4 °F)] 36.5 °C (97.7 °F)  Pulse:  [75-82] 82  Resp:  [15-18] 17  BP: (117-145)/(57-64) 127/59  SpO2:  [90 %-98 %] 95 %    Physical Exam   Constitutional: He is oriented to person, place, and time. He appears cachectic. He appears ill. No distress.   Sitting in chair.  Body mass index is 21.39 kg/m².   HENT:   Head: Normocephalic and atraumatic.   Mouth/Throat: No oropharyngeal exudate.   Eyes: Conjunctivae are normal. Right eye exhibits no discharge. Left eye exhibits no discharge.   Neck: Normal range of motion. Neck supple. No tracheal deviation present. No thyromegaly present.   Cardiovascular: Normal rate and regular rhythm.  Exam reveals no gallop and no friction rub.    Pulmonary/Chest: Effort normal. No respiratory distress. He has no wheezes.   Rhonchi improved   Abdominal: Soft. Bowel sounds are normal. He exhibits no distension. There is no tenderness.   G-tube in place   Genitourinary: Penile tenderness: Swelling and mild tenderness.   Genitourinary Comments: swelling   Musculoskeletal: He exhibits edema (+1 edema on legs b/l ). He exhibits no deformity.   Neurological: He is alert and oriented to person, place, and time. No cranial nerve deficit.   Skin: Skin is warm and dry. He is not diaphoretic.   Stasis dermatitis b/l    Psychiatric: His affect is blunt. He is slowed and withdrawn. He exhibits a depressed mood.   Nursing note and vitals reviewed.      Fluids    Intake/Output Summary (Last 24 hours) at 03/17/19 1057  Last data filed at 03/17/19 0851   Gross per 24 hour   Intake             2232 ml   Output             1375 ml   Net              857 ml       Laboratory  Recent Labs      03/17/19   0322   WBC  11.1*   RBC   3.02*   HEMOGLOBIN  8.0*   HEMATOCRIT  26.0*   MCV  86.1   MCH  26.5*   MCHC  30.8*   RDW  57.4*   PLATELETCT  368   MPV  9.6     Recent Labs      03/17/19   0323   SODIUM  139   POTASSIUM  4.4   CHLORIDE  101   CO2  34*   GLUCOSE  128*   BUN  28*   CREATININE  0.67   CALCIUM  7.5*                   Imaging  DX-CHEST-2 VIEWS   Final Result         Patchy left basilar opacity, worse than prior exam, concerning for worsening infection.      DX-CHEST-PORTABLE (1 VIEW)   Final Result         1. Patchy left basilar opacities, atelectasis versus consolidation.      2. Free intraperitoneal air could relate to recent surgery. Correlate clinically.      CT-ABDOMEN-PELVIS WITH   Final Result      No evidence of right perinephric hematoma or hydronephrosis status post right renal biopsy.      Clot identified in the urinary bladder.      Anasarca.      Distal esophageal mass with lymphadenopathy.      CT-NEEDLE BX-RENAL   Final Result      Successful CT-guided core biopsy of right renal mass.      CT-ABDOMEN-PELVIS WITH   Final Result      1.  Again seen thickening of the distal esophagus likely related to the patient's known esophageal cancer in that area. There is extensive soft tissue mass/adenopathy within the gastrohepatic, periportal, and retroperitoneal regions which does encase the    celiac and superior mesenteric arteries. This is not significantly changed over short interval follow-up.      2.  Again seen large right renal mass which measures 9 x 7 cm in size consistent with either renal cell carcinoma or metastatic disease. There is again seen likely some invasion of the right renal vein.      3.  Emphysematous and bullous change of the lungs with bibasilar atelectasis and small bilateral pleural effusions.      4.  Moderate constipation.      DX-CHEST-2 VIEWS   Final Result         1.  Hazy bilateral lung base infiltrates, greater on the left, new since prior.   2.  Small bilateral pleural effusions.   3.   Hyperexpansion of lungs favors changes of COPD.      US-EXTREMITY VENOUS UPPER UNILAT LEFT   Final Result           Assessment/Plan  * Esophageal cancer (HCC)- (present on admission)   Assessment & Plan    Patient receiving radiation therapy    We will follow-up outpatient with oncology team    Overall poor prognosis, failure to thrive with underlying malignancy, poor functional status    PT/OT evaluations, may need postacute placement    Will need to complete XRT during hospital stay prior to discharge given debility      Deep venous thrombosis (HCC)- (present on admission)   Assessment & Plan    In the setting of underlying malignancy  Patient concerned that he had allergic reaction to Eliquis    Continue Lovenox 1 mg/kg twice daily, superior to other agents with underlying malignancy     Cancer associated pain- (present on admission)   Assessment & Plan    IV Dilaudid as needed for severe pain  Monitoring for adverse effects related to the use of narcotics  Pain is under control  Wean off IV Dilaudid over the coming days     Renal cell carcinoma of right kidney (HCC)- (present on admission)   Assessment & Plan    With gastrohepatic, periportal, aortocaval and retroperitoneal raleigh metastases.  Outpatient neurology follow-up, outpatient oncology follow-up     Severe protein-calorie malnutrition (HCC)- (present on admission)   Assessment & Plan    Optimize nutrition     Lower extremity edema- (present on admission)   Assessment & Plan    Improving with lasix     Rash- (present on admission)   Assessment & Plan    Nearly resolved, monitor, was present bilateral lower extremities     Essential hypertension- (present on admission)   Assessment & Plan    Continue amlodipine     Dysphagia- (present on admission)   Assessment & Plan    Continue nutrition through PEG tube, underlying esophageal malignancy     Constipation due to pain medication therapy- (present on admission)   Assessment & Plan    Continue bowel  protocol     Tobacco abuse- (present on admission)   Assessment & Plan    Patient counseled and educated regarding cessation     COPD (chronic obstructive pulmonary disease) (HCC)- (present on admission)   Assessment & Plan    Continue RT protocol         VTE prophylaxis: Lovenox

## 2019-03-17 NOTE — PROGRESS NOTES
Bedside report completed.  A&O x4.    VSS.  SpO2 98% on 0.5L NC   Ambulating with SB assistance with FWW, steady gait.   NPO.  Fibersource HN at 65 ml/hr.  + nausea, - emesis.  Medicated with Zofran per MAR.     C/o 6/10 abdominal cramping and leg tightness from swelling, medicated per MAR.  LUQ G-J tube.   Last BM 3/17/19, + flatus.  + void.  Call light and personal belongings within reach.  SCDs refused, patient up in recliner and feet elevated. Bed alarm refused. Waffle cushion in place.  POC discussed and all questions answered.  Hourly rounding and fall precautions in place.  No additional needs at this time.

## 2019-03-17 NOTE — PROGRESS NOTES
Bedside report received.  Assessment complete.  A&O x 3, need some redirecting and reminders. Patient calls appropriately.  Patient up with standby assist and FWW, and wheelchair follow.  Patient has 5/10 pain. Pt. Medicated by day shift  Denies N&V. Pt strict NPO. Running Fibersource HV at 65 mL/hr, which is goal.  G-J tube in LLQ.  6 surgical lap sites, all approximated and healed.   BLE have some flakiness.   + void, + flatus, - BM.  Patient sitting up in chair.  Review plan with of care with patient. Call light and personal belongings with in reach. Hourly rounding in place. All needs met at this time.

## 2019-03-17 NOTE — PROGRESS NOTES
Bedside report completed.  A&O x4.   VSS.  SpO2 97% on 1L NC.  Max pull , weak effort and lack of motivation.  Encouraged use and ambulation.    Ambulating x1 assistance with FWW, steady gait.  NPO.  Fibersource HN infusing at 65 ml/hr, goal.  Denies nausea at this time.    C/o 8/10 pain in abdomen, back and BLE, medicated per MAR.  LUQ G-J tube.   Last BM 3/3/19 + flatus.  + void.   2+ penile/scrotal swelling.  2+ BLE pitting edema.  Feet elevated.   Call light and personal belongings within reach.  SCDs refused despite education of importance.  Family at bedside. Bed alarm refused, patient calls appropriately. Waffle cushion placed on recliner chair and on mattress.  Educated on importance of shifting weight due to increased risk for pressure injuries, verbalizes understanding.  POC discussed and all questions answered.  Hourly rounding and fall precautions in place.  No additional needs at this time.

## 2019-03-17 NOTE — PROGRESS NOTES
2 RN skin assessment completed.    Posterior head with small red, blanching spot.   Upper neck at base of skull with medium red, blanching spot.  Left ear pinna with small intact scab.   Bilateral elbows intact, blanching.    BUE with multiple bruises amd fragile skin.   LUQ G-J tube.  Abdominal lap sites, healed.   Mid spine with pressure injury.  Red/purple, blanching.  Non-adhesive foam in place.  Sacrum red, blanching.  Mepilex in place.    Penile/scrotal swelling 2+, beto-care given, zandra cream applied to groin.    BLE swollen, flaky, intact.  Moisturizer applied.    Bilateral heels red, blanching, calloused, intact. Heels floated with pillows.        Waffle cushion in place on recliner and in bed. Pillows in use for support and positioning.  Patient and family educated to shift weight from bony prominences at least every 2 hours.

## 2019-03-17 NOTE — CARE PLAN
Problem: Safety  Goal: Will remain free from falls  Outcome: PROGRESSING AS EXPECTED  Call light and personal belongings are within reach.   Patient educated on risk.    Problem: Skin Integrity  Goal: Risk for impaired skin integrity will decrease  Outcome: PROGRESSING AS EXPECTED  Skin breakdown prevention measures in place.   Patient educated and encouraged to reposition.

## 2019-03-17 NOTE — PROGRESS NOTES
2 RN skin check for Balaji score.    Red/purple discoloration around mid spine, blanching. Mepilex in place.  Redness to sacrum, blanching. Mepilex changed and in place.  Lap sites to RLQ, dermabond, well approximated and open to air.  G-J tube to LUQ abdomen, site is clean, dry, and intact.  BLE edematous, flaking and peeling. No redness noted. Benadryl topical cream applied.  Scrotum and Perineum are red and blanching. Area cleaned.    Patient repositioning, waffle cushion, Mepilex, Silicone oxygen tubing all in place.

## 2019-03-17 NOTE — CARE PLAN
Problem: Bronchoconstriction:  Goal: Improve in air movement and diminished wheezing  Outcome: PROGRESSING AS EXPECTED  Respiratory Therapy Update    Interdisciplinary Plan of Care-Goals (Indications)  Bronchodilator Indications: History / Diagnosis (03/17/19 1513)  Bronchopulmonary Hygiene Indications: Difficulty with Secretion Clearance (03/17/19 1513)  Interdisciplinary Plan of Care-Outcomes   Bronchodilator Outcome: Diminished Wheezing and Volume of Air Movement Increased (03/17/19 1513)  Bronchopulmonary Hygiene Outcome: Patient Subjective Impression of Less Retention and Improved Clearance (03/17/19 1513)    #PEP/CPT (Manual) Initial: Initial (03/03/19 1320)     #SVN Performed: Yes (03/17/19 1513)    Cough: Congested;Moist (03/17/19 1513)  Sputum Amount: Small (03/17/19 1513)  Sputum Color: Green (03/17/19 1513)  Sputum Consistency: Thick (03/17/19 1513)               FiO2%: 21 % (03/16/19 1606)  O2 (LPM): 0.5 (03/17/19 1513)  O2 Daily Delivery Respiratory : Silicone Nasal Cannula (03/17/19 1513)    Breath Sounds  Pre/Post Intervention: Pre Intervention Assessment (03/17/19 1513)  RUL Breath Sounds: Expiratory Wheezes (03/17/19 1513)  RML Breath Sounds: Expiratory Wheezes (03/17/19 1513)  RLL Breath Sounds: Expiratory Wheezes (03/17/19 1513)  CARMELO Breath Sounds: Expiratory Wheezes (03/17/19 1513)  LLL Breath Sounds: Expiratory Wheezes (03/17/19 1513)    Events/Summary/Plan: SVN (03/17/19 1513)

## 2019-03-18 NOTE — PROGRESS NOTES
[facility] Medicine Daily Progress Note    Date of Service  3/18/2019    Chief Complaint  73 y.o. male admitted 2/18/2019 with esophageal cancer.    Hospital Course  Admitted with esophageal cancer, dysphagia, laparoscopic G-tube placed for nutrition, noted to have DVT left upper extremity, right renal mass, transferred for IR guided biopsy.  Oncology was consulted.    Interval Problem Update  Patient seen and evaluated on rounds  Discussed with nursing staff on rounds  G-tube in place, receiving enteral nutrition  Rash is improved, on subcutaneous Lovenox at this time  Continuing radiation treatments  Pain is controlled   Improved rhonchi  Off O2 at this time   Malignancy related leukocytosis        Consultants/Specialty   Palliative care  Urology  ID  Oncology  Radiation oncology    Code Status  DNR/DNI    Disposition  Anticipating postacute placement to SNF  PT/OT evaluations requested    Review of Systems  Review of Systems   Constitutional: Positive for malaise/fatigue and weight loss. Negative for chills and fever.   HENT: Negative for ear discharge, ear pain, hearing loss and tinnitus.    Eyes: Negative for blurred vision, double vision and photophobia.   Respiratory: Negative for cough, hemoptysis, sputum production and shortness of breath.    Cardiovascular: Positive for leg swelling (Improving). Negative for chest pain, palpitations, orthopnea and claudication.   Gastrointestinal: Positive for constipation and nausea. Negative for abdominal pain, diarrhea, heartburn and vomiting.   Genitourinary: Negative for dysuria, frequency, hematuria and urgency.   Musculoskeletal: Negative for back pain, joint pain, myalgias and neck pain.   Skin: Positive for rash. Negative for itching.   Neurological: Positive for weakness. Negative for dizziness, tingling, tremors, sensory change, speech change and headaches.   Endo/Heme/Allergies: Does not bruise/bleed easily.   Psychiatric/Behavioral: Positive for depression.  Negative for substance abuse and suicidal ideas. The patient is nervous/anxious.         Physical Exam  Temp:  [36.8 °C (98.3 °F)-37.4 °C (99.4 °F)] 37.4 °C (99.4 °F)  Pulse:  [80-91] 86  Resp:  [15-18] 15  BP: (114-146)/(51-65) 131/61  SpO2:  [91 %-95 %] 91 %    Physical Exam   Constitutional: He is oriented to person, place, and time. He appears cachectic. He appears ill. No distress.   Sitting in chair.  Body mass index is 21.39 kg/m².   HENT:   Head: Normocephalic and atraumatic.   Mouth/Throat: No oropharyngeal exudate.   Eyes: Conjunctivae are normal. Right eye exhibits no discharge. Left eye exhibits no discharge.   Neck: Normal range of motion. Neck supple. No tracheal deviation present. No thyromegaly present.   Cardiovascular: Normal rate and regular rhythm.  Exam reveals no gallop and no friction rub.    Pulmonary/Chest: Effort normal. No respiratory distress. He has no wheezes.   Rhonchi improved   Abdominal: Soft. Bowel sounds are normal. He exhibits no distension. There is no tenderness.   G-tube in place   Genitourinary: Penile tenderness: Swelling and mild tenderness.   Genitourinary Comments: swelling   Musculoskeletal: He exhibits edema (+1 edema on legs b/l ). He exhibits no deformity.   Neurological: He is alert and oriented to person, place, and time. No cranial nerve deficit.   Skin: Skin is warm and dry. He is not diaphoretic.   Stasis dermatitis b/l    Psychiatric: His affect is blunt. He is slowed and withdrawn. He exhibits a depressed mood.   Nursing note and vitals reviewed.      Fluids    Intake/Output Summary (Last 24 hours) at 03/18/19 1523  Last data filed at 03/18/19 0800   Gross per 24 hour   Intake             1750 ml   Output               60 ml   Net             1690 ml       Laboratory  Recent Labs      03/17/19   0322  03/18/19   0307   WBC  11.1*  14.1*   RBC  3.02*  3.09*   HEMOGLOBIN  8.0*  8.2*   HEMATOCRIT  26.0*  26.9*   MCV  86.1  87.1   MCH  26.5*  26.5*   MCHC  30.8*   30.5*   RDW  57.4*  59.1*   PLATELETCT  368  391   MPV  9.6  9.8     Recent Labs      03/17/19   0323  03/18/19   0307   SODIUM  139  138   POTASSIUM  4.4  4.3   CHLORIDE  101  101   CO2  34*  31   GLUCOSE  128*  111*   BUN  28*  28*   CREATININE  0.67  0.63   CALCIUM  7.5*  7.8*                   Imaging  DX-CHEST-2 VIEWS   Final Result         Patchy left basilar opacity, worse than prior exam, concerning for worsening infection.      DX-CHEST-PORTABLE (1 VIEW)   Final Result         1. Patchy left basilar opacities, atelectasis versus consolidation.      2. Free intraperitoneal air could relate to recent surgery. Correlate clinically.      CT-ABDOMEN-PELVIS WITH   Final Result      No evidence of right perinephric hematoma or hydronephrosis status post right renal biopsy.      Clot identified in the urinary bladder.      Anasarca.      Distal esophageal mass with lymphadenopathy.      CT-NEEDLE BX-RENAL   Final Result      Successful CT-guided core biopsy of right renal mass.      CT-ABDOMEN-PELVIS WITH   Final Result      1.  Again seen thickening of the distal esophagus likely related to the patient's known esophageal cancer in that area. There is extensive soft tissue mass/adenopathy within the gastrohepatic, periportal, and retroperitoneal regions which does encase the    celiac and superior mesenteric arteries. This is not significantly changed over short interval follow-up.      2.  Again seen large right renal mass which measures 9 x 7 cm in size consistent with either renal cell carcinoma or metastatic disease. There is again seen likely some invasion of the right renal vein.      3.  Emphysematous and bullous change of the lungs with bibasilar atelectasis and small bilateral pleural effusions.      4.  Moderate constipation.      DX-CHEST-2 VIEWS   Final Result         1.  Hazy bilateral lung base infiltrates, greater on the left, new since prior.   2.  Small bilateral pleural effusions.   3.   Hyperexpansion of lungs favors changes of COPD.      US-EXTREMITY VENOUS UPPER UNILAT LEFT   Final Result           Assessment/Plan  * Esophageal cancer (HCC)- (present on admission)   Assessment & Plan    Patient receiving radiation therapy    We will follow-up outpatient with oncology team    Overall poor prognosis, failure to thrive with underlying malignancy, poor functional status    PT/OT evaluations, may need postacute placement    Will need to complete XRT during hospital stay prior to discharge given debility      Deep venous thrombosis (HCC)- (present on admission)   Assessment & Plan    In the setting of underlying malignancy  Patient concerned that he had allergic reaction to Eliquis    Continue Lovenox 1 mg/kg twice daily, superior to other agents with underlying malignancy     Cancer associated pain- (present on admission)   Assessment & Plan    IV Dilaudid as needed for severe pain  Monitoring for adverse effects related to the use of narcotics  Pain is under control  Wean off IV Dilaudid over the coming days     Renal cell carcinoma of right kidney (HCC)- (present on admission)   Assessment & Plan    With gastrohepatic, periportal, aortocaval and retroperitoneal [place] metastases.  Outpatient neurology follow-up, outpatient oncology follow-up     Severe protein-calorie malnutrition (HCC)- (present on admission)   Assessment & Plan    Optimize nutrition     Lower extremity edema- (present on admission)   Assessment & Plan    Improving with lasix     Rash- (present on admission)   Assessment & Plan    Nearly resolved, monitor, was present bilateral lower extremities     Essential hypertension- (present on admission)   Assessment & Plan    Continue amlodipine     Dysphagia- (present on admission)   Assessment & Plan    Continue nutrition through PEG tube, underlying esophageal malignancy     Constipation due to pain medication therapy- (present on admission)   Assessment & Plan    Continue bowel  protocol     Tobacco abuse- (present on admission)   Assessment & Plan    Patient counseled and educated regarding cessation     COPD (chronic obstructive pulmonary disease) (HCC)- (present on admission)   Assessment & Plan    Continue RT protocol         VTE prophylaxis: Lovenox

## 2019-03-18 NOTE — PROGRESS NOTES
2 RN Skin Check    Posterior head with small red, blanching spot.   Upper neck at base of skull with medium red, blanching spot.  Left ear pinna with small intact scab.   Red/purple discoloration around mid spine, blanching. Mepilex in place.  Redness to sacrum, blanching. Mepilex changed and in place.  Lap sites to RLQ, dermabond, well approximated and open to air.  G-J tube to LUQ abdomen, site is clean, dry, and intact.  BLE edematous, flaking and peeling. No redness noted. Benadryl topical cream applied.  Scrotum and Perineum are red and blanching. Area cleaned.    Patient repositioning, waffle cushion, Mepilex, Silicone oxygen tubing all in place.

## 2019-03-18 NOTE — PROGRESS NOTES
Two RN Skin Assessment:    Patient's skin was assessed under all medical devices and over all areas of bony prominence.    Areas noted:     Posterior head with small red, blanching spot.   Upper neck at base of skull with medium red, blanching spot.  Left ear pinna with small intact scab.   Red/purple discoloration around mid spine, blanching. Mepilex in place.  Redness to sacrum, blanching. Mepilex in place.  Lap sites to RLQ, dermabond, well approximated and open to air.  G-J tube to LUQ abdomen, site is clean, dry, and intact.  BLE edematous, flaking and peeling. No redness noted.  Scrotum and Perineum are red and blanching.    q2  repositioning, waffle cushion, Mepilex, Silicone oxygen tubing all in place.    No other areas of skin breakdown were noted.

## 2019-03-18 NOTE — PROGRESS NOTES
Patient was brought down to Radiation Therapy department by transport. Patient was unable to due treatment today.  Will try again tomorrow.

## 2019-03-18 NOTE — PROGRESS NOTES
Bedside report received.  Assessment complete.  A&O x 3, need some redirecting and reminders. Patient calls appropriately.  Patient up with standby assist and FWW, and wheelchair follow.  Patient has 5/10 pain. Pt. Medicated by day shift  Patient nauseous and vomiting, during ambulation at beginning of shift. Pt strict NPO. Running Fibersource HV at 65 mL/hr, which is goal.  G-J tube in LLQ.  6 surgical lap sites, all approximated and healed.   BLE have some flakiness.   + void, + flatus, +BM.  Patient sitting up in chair.  Review plan with of care with patient. Call light and personal belongings with in reach. Hourly rounding in place. All needs met at this time.

## 2019-03-18 NOTE — CARE PLAN
Problem: Safety  Goal: Will remain free from falls  Outcome: PROGRESSING AS EXPECTED  Call light and personal belongings are within reach.   Patient educated on risk.      Problem: Pain Management  Goal: Pain level will decrease to patient's comfort goal  Outcome: PROGRESSING AS EXPECTED  Will encourage pt to ambulate, Will medicate per MAR, and will provide non-pharmacologic pain relief measures.

## 2019-03-18 NOTE — PROGRESS NOTES
"0914: Patient ambulated to bathroom with RN, he sat back in the recliner after the bathroom and he began dry heaving suddenly.  Small amount of frothy/clear emesis. Nausea and emesis eliminated by zofran.  No pain medications administered since 0755.  RT worked with patient at 0825.      2027: Patient stood up for ambulation and became diaphoretic and nauseous.  + small amount of emesis/phlegm (at first clear/frothy, and then became yellow/green/thick).  Nausea and emesis eliminated by zofran.  No pain medications administered since 1829.  RT worked with patient at 1948.      Patient states these bouts of nausea/emesis are sudden and \"have no warning.\"   Finger stick blood sugar 78.  VSS.        "

## 2019-03-18 NOTE — THERAPY
"Occupational Therapy Treatment completed with focus on ADLs, patient education and upper extremity function.  Functional Status:  SBA sit<>stand, SPV seated grooming/oral care, reports depression d/t unable to tolerate radiation today however motivated to use taught UE ROM and exercises in his down time  Plan of Care: Will benefit from Occupational Therapy 2 times per week  Discharge Recommendations:  Equipment Will Continue to Assess for Equipment Needs. Post-acute therapy Recommend inpatient transitional care services for continued occupational therapy services.     See \"Rehab Therapy-Acute\" Patient Summary Report for complete documentation.     Pt seen for OT tx today. Pt guided through gentle against gravity exercises for shoulder press, biceps flexion and shoulder abduction from seated position. Pt reported he will do AROM exercises during commercial breaks as he is motivated to strengthen and improve his endurance. Pt initally agreed to walk into bathroom for grooming/toileting. Pt completed sit<>stand SBA w/FWW, tolerated 1min standing and breathing to assess O2 saturations prior to ambulating then requested to sit. Pt reported fatigue and declined ambulation so completed grooming/oral care from seated position. Pt agreed to sit<>stand x3 for additional strengthening since he declined functional mobility. Pt will benefit from continued acute OT to increase activity tolerance and independence in ADLs.    "

## 2019-03-18 NOTE — PROGRESS NOTES
"Assumed care of patient from RN at 0700.     Reviewed VS, labs, orders, and notes.     Pt sitting up in chair, very lethargic . A&Ox 4.    /61   Pulse 86   Temp 37.4 °C (99.4 °F) (Temporal)   Resp 15   Ht 1.676 m (5' 6\")   Wt 60.1 kg (132 lb 7.9 oz)   SpO2 91%   BMI 21.39 kg/m² . MEWS Score: 1.     On room air L oxygen. Diminished lung sounds.     Moves all extremities, denies numbness or tingling.     Skin assessed over bony prominences and under medical devices.  Q shift skin check. Q 2 turns in place. Mepilex on sacrum, heels floated, waffle overlay on bed and chair.      Voiding, hypoactive BS, +flatus, LBM 3/18.     NPO diet, TF fibersource at 65 ml/hr, denies nausea/ vomiting.     Wound(s): mid back pressure ulcer with nonadherent foam in place. . G/J tube to left abdomen.     Patient reports 5 /10 declines intervention.    Pt. is 1+ assist, with handheld or FWW..    Traci Padron Fall Risk Score: high. Bed alarm refused. Fall prevention education reinforced with pt. Pt is wearing treaded slipper socks, IV pole is on the same side as the bathroom, lower bedrails are down.    Discussed POC, all questions answered at this time. Bed is locked and in the lowest position, call light within reach, Q 1 hour rounding in place. All needs met at this time.   "

## 2019-03-18 NOTE — PROGRESS NOTES
Patient refusing bed alarm despite education. Patient is A&Ox4 with steady gait. Demonstrates proper use of call light and is agreeable to call before getting out of bed. Bed locked in lowest position, upper side rails up, non-skid socks on patient, room free of clutter, call light within reach.

## 2019-03-19 PROBLEM — Z71.89 ACP (ADVANCE CARE PLANNING): Status: ACTIVE | Noted: 2019-01-01

## 2019-03-19 NOTE — PROGRESS NOTES
· 2 RN skin check complete with JANETTE Rubio.  · Confirmed pressure ulcers found on Mid back, foam dressing in place  · Blanching Redness to sacrum, mepilex in place, Scattered bruising and abrasions to bilateral arms. LLQ G/J tube CDI, x2 pitting edema to BLE  · The following interventions in place Q2 turns, waffle mattress.

## 2019-03-19 NOTE — PROGRESS NOTES
"Assumed care of patient from RN at 0700.      Reviewed VS, labs, orders, and notes.     Pt sitting up in chair, very lethargic . A&Ox 4.     /60   Pulse 88   Temp 36.7 °C (98.1 °F) (Temporal)   Resp 15   Ht 1.676 m (5' 6\")   Wt 60.1 kg (132 lb 7.9 oz)   SpO2 98%   BMI 21.39 kg/m² . MEWS Score: 1.      On 0.5 L oxygen. Diminished lung sounds. Yellow sputum, suction at bedside.     Moves all extremities, denies numbness or tingling. Generalized weakness.     Skin assessed over bony prominences and under medical devices.  Q shift skin check. Q 2 turns in place. Barrier past in use. Mepilex on sacrum, heels floated, waffle overlay on bed and chair.       Voiding, hypoactive BS, +flatus, LBM 3/18.      NPO diet, TF fibersource at 65 ml/hr, denies nausea/ vomiting.      Wound(s): mid back pressure ulcer with nonadherent foam in place. . G/J tube to left abdomen CDI.    Pt. is SBA assist from bed to chair or commode.      Bed alarm refused. Fall prevention education reinforced with pt. Pt is wearing treaded slipper socks, IV pole is on the same side as the bathroom, lower bedrails are down. Pt calls appropriatly.     Discussed POC, all questions answered at this time. Bed is locked and in the lowest position, call light within reach, Q 1 hour rounding in place. All needs met at this time.         "

## 2019-03-19 NOTE — PROGRESS NOTES
Patient refusing bed alarm despite education. Patient is A&Ox4 with steady gait. Demonstrates proper use of call light and is agreeable to call before getting out of bed. Bed locked in lowest position, upper side rails up, non-skid socks on patient, room free of clutter, call light within reach.     Patient's skin was assessed under all medical devices and over all areas of bony prominence.     Areas noted:     Posterior head small red, blanching spot.   Upper neck at base of skull with small red, blanching spot.  Left ear small scab.   Red/purple discoloration around mid spine, blanching. Mepilex in place.  Redness to sacrum, blanching. Mepilex in place.  Lap sites to RLQ, dermabond, MALIK.  G-J tube to LUQ abdomen, CDI.  BLE edema, flaking and peeling.  Scrotum and beto area are red and blanching.    Q2  turns, waffle overlay, Mepilex, and silicone oxygen tubing in place.     No other areas of skin breakdown were noted.

## 2019-03-19 NOTE — PROGRESS NOTES
[facility] Medicine Daily Progress Note    Date of Service  3/19/2019    Chief Complaint  73 y.o. male admitted 2/18/2019 with esophageal cancer.     Hospital Course  Admitted with esophageal cancer, dysphagia, laparoscopic G-tube placed for nutrition, noted to have DVT left upper extremity, right renal mass, transferred for IR guided biopsy.  Oncology was consulted.    Interval Problem Update  Complaining of back pain, on multimodal pain regime. I am consulting palliative care   His goal is to complete radiation treatment.  Blood pressure well controlled on amlodipine.  Saturating well on 0-1 L of oxygen, encourage incentive spirometer, try to wean off oxygen as much as possible.  I Had a discussion with the patient regarding prognosis, treatment options and goals of care. The patient has advanced age, poor functional performance, metastatic esophageal cancer, and renal cell cancer.  In addition, the patient has a number of comorbid conditions including chronic obstructive pulmonary disease, deep vein thrombosis and dysphagia.  He is currently in a lot of pain.  I introduced the spectrums of care from aggressive, including radiation, chemotherapy down through palliation, comfort and hospice care.  I explained that chemotherapy and/or radiation may prolong his life however it may not improve the quality of his life.  The patient does not want hospice/comfort care at this point.  He wants to proceed with radiation therapy and he wanted to think about further options after he discusses with family members. I spent 22 minutes in addition to the time spent managing the other medical problems.   Discussed with patient, patient's nurse and with multidisciplinary team during rounds including , pharmacist and charge nurse.     Consultants/Specialty   Palliative care  Urology  ID  Oncology  Radiation oncology    Code Status  DNR/DNI    Disposition  Anticipating postacute placement to SNF  PT/OT evaluations  requested    Review of Systems  Review of Systems   Constitutional: Positive for malaise/fatigue and weight loss. Negative for chills and fever.   HENT: Negative for ear discharge, ear pain, hearing loss, sore throat and tinnitus.    Eyes: Negative for pain, discharge and redness.   Respiratory: Negative for cough, hemoptysis, sputum production, shortness of breath and stridor.    Cardiovascular: Negative for chest pain, palpitations, orthopnea, claudication and leg swelling (Improved).   Gastrointestinal: Negative for abdominal pain, constipation, diarrhea, heartburn, nausea and vomiting.   Genitourinary: Negative for dysuria, frequency, hematuria and urgency.   Musculoskeletal: Positive for back pain and joint pain. Negative for myalgias and neck pain.   Skin: Positive for rash. Negative for itching.   Neurological: Positive for weakness. Negative for dizziness, tingling, tremors, sensory change, speech change, seizures, loss of consciousness and headaches.   Endo/Heme/Allergies: Does not bruise/bleed easily.   Psychiatric/Behavioral: Positive for depression. Negative for substance abuse and suicidal ideas. The patient is nervous/anxious.         Physical Exam  Temp:  [36.5 °C (97.7 °F)-36.7 °C (98.1 °F)] 36.7 °C (98.1 °F)  Pulse:  [85-90] 88  Resp:  [15-20] 15  BP: (127-159)/(60-75) 127/60  SpO2:  [97 %-100 %] 98 %    Physical Exam   Constitutional: He is oriented to person, place, and time. He appears cachectic. He appears ill. No distress.   Chronically ill appearing    HENT:   Head: Normocephalic and atraumatic.   Mouth/Throat: No oropharyngeal exudate.   Eyes: Conjunctivae are normal. Right eye exhibits no discharge. Left eye exhibits no discharge.   Neck: Normal range of motion. Neck supple. No JVD present. No tracheal deviation present. No thyromegaly present.   Cardiovascular: Normal rate and regular rhythm.  Exam reveals no gallop and no friction rub.    Pulmonary/Chest: Effort normal. No stridor. No  respiratory distress. He has no wheezes.   Rhonchi improved   Abdominal: Soft. Bowel sounds are normal. He exhibits no distension. There is no tenderness.   G-tube in place, no erythema or edema around insertion site    Genitourinary: Penile tenderness: Swelling and mild tenderness.   Genitourinary Comments: swelling   Musculoskeletal: He exhibits edema (+1 edema on legs b/l ). He exhibits no deformity.   Neurological: He is alert and oriented to person, place, and time. No cranial nerve deficit.   Skin: Skin is warm and dry. He is not diaphoretic.   Stasis dermatitis    Psychiatric: His affect is blunt. He is slowed and withdrawn. He exhibits a depressed mood.   Nursing note and vitals reviewed.        Fluids    Intake/Output Summary (Last 24 hours) at 03/19/19 1646  Last data filed at 03/19/19 1600   Gross per 24 hour   Intake             1740 ml   Output             1925 ml   Net             -185 ml       Laboratory  Recent Labs      03/17/19   0322  03/18/19   0307   WBC  11.1*  14.1*   RBC  3.02*  3.09*   HEMOGLOBIN  8.0*  8.2*   HEMATOCRIT  26.0*  26.9*   MCV  86.1  87.1   MCH  26.5*  26.5*   MCHC  30.8*  30.5*   RDW  57.4*  59.1*   PLATELETCT  368  391   MPV  9.6  9.8     Recent Labs      03/17/19   0323  03/18/19   0307   SODIUM  139  138   POTASSIUM  4.4  4.3   CHLORIDE  101  101   CO2  34*  31   GLUCOSE  128*  111*   BUN  28*  28*   CREATININE  0.67  0.63   CALCIUM  7.5*  7.8*                   Imaging  DX-CHEST-2 VIEWS   Final Result         Patchy left basilar opacity, worse than prior exam, concerning for worsening infection.      DX-CHEST-PORTABLE (1 VIEW)   Final Result         1. Patchy left basilar opacities, atelectasis versus consolidation.      2. Free intraperitoneal air could relate to recent surgery. Correlate clinically.      CT-ABDOMEN-PELVIS WITH   Final Result      No evidence of right perinephric hematoma or hydronephrosis status post right renal biopsy.      Clot identified in the  urinary bladder.      Anasarca.      Distal esophageal mass with lymphadenopathy.      CT-NEEDLE BX-RENAL   Final Result      Successful CT-guided core biopsy of right renal mass.      CT-ABDOMEN-PELVIS WITH   Final Result      1.  Again seen thickening of the distal esophagus likely related to the patient's known esophageal cancer in that area. There is extensive soft tissue mass/adenopathy within the gastrohepatic, periportal, and retroperitoneal regions which does encase the    celiac and superior mesenteric arteries. This is not significantly changed over short interval follow-up.      2.  Again seen large right renal mass which measures 9 x 7 cm in size consistent with either renal cell carcinoma or metastatic disease. There is again seen likely some invasion of the right renal vein.      3.  Emphysematous and bullous change of the lungs with bibasilar atelectasis and small bilateral pleural effusions.      4.  Moderate constipation.      DX-CHEST-2 VIEWS   Final Result         1.  Hazy bilateral lung base infiltrates, greater on the left, new since prior.   2.  Small bilateral pleural effusions.   3.  Hyperexpansion of lungs favors changes of COPD.      US-EXTREMITY VENOUS UPPER UNILAT LEFT   Final Result           Assessment/Plan  * Esophageal cancer (HCC)- (present on admission)   Assessment & Plan    Patient receiving radiation therapy  Will follow-up outpatient with oncology   Overall poor prognosis, failure to thrive with underlying malignancy, poor functional status  PT/OT evaluations, likely needs SNF   Will need to complete XRT during hospital stay prior to discharge given debility      Cancer associated pain- (present on admission)   Assessment & Plan    Multimodal pain regime   Monitoring for adverse effects related to the use of narcotics  Palliative consulted       ACP (advance care planning)   Assessment & Plan    On Mar 19, I had a discussion with the patient regarding prognosis, treatment  options and goals of care.   The patient has advanced age, poor functional performance, metastatic esophageal cancer, and renal cell cancer.    In addition, the patient has a number of comorbid conditions including chronic obstructive pulmonary disease, deep vein thrombosis and dysphagia.  He is currently in a lot of pain.    I introduced the spectrums of care from aggressive, including radiation, chemotherapy down through palliation, comfort and hospice care.  I explained that chemotherapy and/or radiation may prolong his life however it may not improve the quality of his life.    The patient does not want hospice/comfort care at this point.  He wants to proceed with radiation therapy and he wanted to think about further options after he discusses with family members.      Deep venous thrombosis (HCC)- (present on admission)   Assessment & Plan    In the setting of underlying malignancy  Patient concerned that he had allergic reaction to Eliquis     Continue Lovenox 1 mg/kg twice daily, superior to other agents with underlying malignancy      Renal cell carcinoma of right kidney (HCC)- (present on admission)   Assessment & Plan    With gastrohepatic, periportal, aortocaval and retroperitoneal [place] metastases.  Outpatient oncology follow-up       Severe protein-calorie malnutrition (HCC)- (present on admission)   Assessment & Plan    Nutrition consulted       Lower extremity edema- (present on admission)   Assessment & Plan    Improving with lasix      Rash- (present on admission)   Assessment & Plan    Nearly resolved, monitor, was present bilateral lower extremities      Essential hypertension- (present on admission)   Assessment & Plan    Continue amlodipine      Dysphagia- (present on admission)   Assessment & Plan    Continue nutrition through PEG tube, underlying esophageal malignancy      Constipation due to pain medication therapy- (present on admission)   Assessment & Plan    Continue bowel protocol       Tobacco abuse- (present on admission)   Assessment & Plan    Patient counseled and educated regarding cessation      COPD (chronic obstructive pulmonary disease) (HCC)- (present on admission)   Assessment & Plan    Continue RT protocol          VTE prophylaxis: Therapeutic Lovenox

## 2019-03-19 NOTE — DIETARY
Nutrition support weekly update:  Day 29 of admit.  Justus Barnard is a 73 y.o. male with admitting DX of esophageal CA.    Tube feeding/TPN initiated on . Current TF via new G- J tube running at [facility], goal rate 65 ml/hr, providing 1872 kcals, 84 grams protein, 1264 mL free water.     Assessment:  Weight History:   3/16 60.1kg (standing)   62.8kg (bed)    63.5kg (standing)     Wt loss of 3.4kg x 26 days (5.3%) and significant. Though may be 2' to start of lasix 20mg on 3/11 and noted with edema improving to BLE per note on 3/18.     Re-estimate of nutritional needs based on 60.1kg =   MSJ = 1294.56 x 1.2 - 1.4= 1553.5 - 1812.3   Protein: 1.2-1.5g/kg= 72.2 - 90.2   *Very comparable and will continue with estimated needs per RD note on 19     Compared to estimated needs per RD assessment on 19:   Calculation/Equation: MSJ x 1.2 = 1595 kcals/day  Total Calories / day: 1595 - 1905  (Calories / k - 30)  Total Grams Protein / day: 76 - 95  (Grams Protein / k.2 - 1.5)    Evaluation:   Pt continues with need of G-J tube 2' to esophageal CA and continues to have radiation treatment per MD note on 3/18.   Meds: decardron, Fentanyl 1 patch q72 hours, lasix 20mg QD, Senna started 3/17 though pt refused this AM   Fluids: No IVF at this time.   Skin: RN note 3/19 reporting BLE edema   GI/: Last BM reported 3/18 (large/brown/soft) prior to that BM recorded 3/17, though per MD note on 3/18 pt complaining of constipation with senna added and refused this AM.   Labs: WBC 14.1 Hgb 8.2 Glucose 111 BUN 28 Ca 7.8 Pre-Albumin 13.0 and improved from 19 5.0   Continue current feeding as pt seems to be tolerating and continues to meet pt's needs.    Malnutrition risk: per RD note on  Pt likely at risk for malnutrition d/t hypermetabolic disease, however unable to diagnose at this time.    Recommendations/Plan:  1. Continue TF of [facility] goal rate of 65 mL/hr, providing 1872  kcals, 84 grams of protein and 1264 mL of free water per day.  2. Fluids per MD.  3. RD to monitor wt and lab trends.    RD to follow.

## 2019-03-19 NOTE — THERAPY
"Pt demonstrated slow, antalgic gait pattern, however incresaed distance than prev tx. Pt w/low endurance and pain tolerance, requesting a seated break. Pt w/improving strength and ROM than prev txs. Pt would benefit form further acute PT txs to progress towards goals and independence. Recommend post acute placement at an inpatient transitional care facility to address deficits.    Physical Therapy Treatment completed.   Bed Mobility:  Supine to Sit:  (NT--recieved in chair pre session)  Transfers: Sit to Stand: Stand by Assist  Gait: Level Of Assist: Contact Guard Assist with Front-Wheel Walker       Plan of Care: Will benefit from Physical Therapy 2 times per week    See \"Rehab Therapy-Acute\" Patient Summary Report for complete documentation.       "

## 2019-03-19 NOTE — PROGRESS NOTES
Pt refuses bed alarm despite education, Pt verbalizes understanding and has demonstrated appropriate use of call light prior to OOB

## 2019-03-19 NOTE — CONSULTS
"Date & Time note created:    3/19/2019   11:12 AM       Date of Consult: 3/19/2019    Requesting Provider: Kev Kim M.D.  Consulting Provider: Essie Pandey  Reason for Consult: Non-chronic pain managment    Chief Complaint: Patient unable to tolerate radiation therapy due to pain  HPI:   Justus Barnard is a 73 y.o. male admitted 2/18/2019 for weight loss, nausea, and vomiting.  He was recently diagnosed with advanced esophageal cancer and planning for neoadjuvant chemotherapy and was found to have a large suspicious right renal mass.  Biopsy showed renal cell carcinoma and due to ongoing back pain, urology surgeon, medical team, and oncology decided it was best to move forward with surgical intervention and patient is  status post right radical nephrectomy on 2/28/2019.  He lost 80 pounds in the last year with 50 of it being within the three months prior to admission.  He developed nausea and sometimes fell that pills and food were getting stuck.    He reports he has had esophageal pain for approximately three months.  He feels that his esophageal pain is well controlled stating \"its not bad right now.\"  We discussed the Mankoski pain scale and dolores reports his pain to be a 5-6/10 severity which is tolerable for him.  He gets good relief from breakthrough pain medication.       The patient reports chronic low back pain that affects his ability to lay flat since he had two disks removed in his lower back in 1997 (he does not remember the exact procedure or what disks).  The patient reports his pain is exacerbated to a 9-10/10 severity making it nearly impossible for him to lie flat for longer than 1-2 minutes.  His pain is resolved once he changes positions.  He denies muscle spasms but reports \"its so painful it makes me jump up.\"      Patient is hopeful to complete radiation so he can eat and regain some strength but also stated \"sometimes I think I need to just quit fighting and give up.\"  " He has feelings of hopelessness, depression, and some anxiety.  He is also experiencing insomnia.  He has been refusing his Ambien as it has caused him nightmares.     Past Medical History:   Diagnosis Date   • Backpain 01/2019   • Bowel habit changes 01/2019    Constipation   • Breath shortness 01/2019    With exertion   • Cancer (HCC) 2018    Renal mass, esophagel CA   • Cataract     Bilateral IOL implants   • Dental disorder     Upper and lower dentures   • Dysphagia    • Emphysema of lung (HCC)    • Glaucoma    • Heart burn    • Hiatus hernia syndrome    • High cholesterol    • Hypertension    • Indigestion    • Psychiatric problem 01/2019    depression/anxiety, no current meds   • Renal disorder 01/2019    Recent kidney mass dx   • Stroke (HCC) 2013    No residual weakness     Past Surgical History:   Procedure Laterality Date   • NEPHRECTOMY ROBOTIC Right 2/28/2019    Procedure: NEPHRECTOMY ROBOTIC;  Surgeon: Brandon Peña M.D.;  Location: [facility];  Service: Urology   • LAPAROSCOPY N/A 2/14/2019    Procedure: LAPAROSCOPY - WITH G-TUBE PLACEMENT;  Surgeon: Luz Leblanc M.D.;  Location: [facility];  Service: General   • GASTROSCOPY  2/1/2019    Procedure: GASTROSCOPY;  Surgeon: Gen Gonzales M.D.;  Location: [facility];  Service: EUS   • EGD W/ENDOSCOPIC ULTRASOUND  2/1/2019    Procedure: EGD W/ENDOSCOPIC ULTRASOUND - UPPER, RADIAL, POSS LINEAR;  Surgeon: Gen Gonzales M.D.;  Location: [facility];  Service: EUS   • EGD WITH ASP/BX  2/1/2019    Procedure: EGD WITH ASP/BX - FNA OF TUMOR OF ESOPHAGUS;  Surgeon: Gen Gonzales M.D.;  Location: [facility];  Service: EUS   • CATARACT EXTRACTION WITH IOL Bilateral 2005   • OTHER ORTHOPEDIC SURGERY  1997    Lower back surgery L4,L5   • OTHER  1980    Benign cyst removed on back of neck     Current Medications:  No current facility-administered medications on file prior to encounter.      Current  Outpatient Prescriptions on File Prior to Encounter   Medication Sig Dispense Refill   • albuterol 108 (90 Base) MCG/ACT Aero Soln inhalation aerosol Inhale 2 Puffs by mouth every 6 hours as needed for Shortness of Breath.     • aspirin EC (ECOTRIN) 81 MG Tablet Delayed Response Take 81 mg by mouth every day.     • metroNIDAZOLE (FLAGYL) 500 MG Tab Take 500 mg by mouth 3 times a day. Pt started on 2019 for 7 day course.     • pravastatin (PRAVACHOL) 40 MG tablet Take 40 mg by mouth every evening.     • DILTIAZem CD (CARDIZEM CD) 300 MG CAPSULE SR 24 HR Take 300 mg by mouth every day.     • zolpidem (AMBIEN) 10 MG Tab Take 10 mg by mouth every bedtime.     • omeprazole (PRILOSEC) 40 MG delayed-release capsule Take 40 mg by mouth every day.     • oxyCODONE-acetaminophen (PERCOCET)  MG Tab Take 1 Tab by mouth 5 Times a Day.       Medication Allergy/Sensitivities:  Allergies   Allergen Reactions   • Nkda [No Known Drug Allergy]      Family History:  Father  of an MI when patient was very young.  He is unaware of a family history of cancer.  His mother is still alive and in her 90s.     Social History: Patient was smoking cigarettes until hospitalization with a 7.5 pack year history.  He does not drink alcohol or use illegal drugs or drugs that are not prescribed to him.  He is  with an adult son who lives out of [facility].  His step daughter Nayla Amaya is very supportive.  He reports that she works from 5 am-12 pm and after her shift, drives the patient's wife to [employer] to visit him.  The patient has support from family including Nayla, his wife, and his mother who live locally.     Living situation: Patient lives with his wife in [facility].      Palliative Performance Scale: 50%    Spiritual History: Patient was screened and declined spiritual care visit during Advance Care Planning consult 19 and declined spiritual care visit.  He did not mention a need when discussing how palliative care  "can support him.     ROS:    Review of Systems   Constitutional: Positive for malaise/fatigue and weight loss.   HENT: Positive for sore throat.    Eyes: Negative for blurred vision and double vision (reports double vision that disapates when he looks fart to the periphery).   Respiratory: Positive for cough, sputum production (green) and shortness of breath.    Cardiovascular: Positive for leg swelling.   Gastrointestinal: Positive for diarrhea and nausea (intermittent). Negative for abdominal pain, constipation and vomiting.   Genitourinary: Negative.    Musculoskeletal: Positive for back pain (lower).   Neurological: Positive for tingling (intermittent down bilateral legs into feet), sensory change (intermittent down bilateral legs into feet) and weakness. Negative for focal weakness.   Psychiatric/Behavioral: Positive for depression. Negative for substance abuse. The patient is nervous/anxious and has insomnia.      PE:   Recent vital signs  Weight/BMI: Body mass index is 21.39 kg/m².  /60   Pulse 88   Temp 36.7 °C (98.1 °F) (Temporal)   Resp 15   Ht 1.676 m (5' 6\")   Wt 60.1 kg (132 lb 7.9 oz)   SpO2 98%   BMI 21.39 kg/m²   Vitals:    03/18/19 2100 03/19/19 0040 03/19/19 0458 03/19/19 0836   BP: 138/62 132/66 159/75 127/60   Pulse: 90 85 87 88   Resp: 20 20 20 15   Temp: 36.6 °C (97.9 °F) 36.7 °C (98 °F) 36.5 °C (97.7 °F) 36.7 °C (98.1 °F)   TempSrc: Temporal Temporal Temporal Temporal   SpO2: 97% 100% 97% 98%   Weight:       Height:         Oxygen Therapy:  Pulse Oximetry: 98 %, O2 (LPM): 0.5, O2 Delivery: Nasal Cannula  Physical Exam    Lab Data Review:  Recent Results (from the past 24 hour(s))   CRP Quantitive (Non-Cardiac)    Collection Time: 03/18/19  4:13 PM   Result Value Ref Range    Stat C-Reactive Protein 3.90 (H) 0.00 - 0.75 mg/dL   Prealbumin    Collection Time: 03/18/19  4:13 PM   Result Value Ref Range    Pre-Albumin 13.0 (L) 18.0 - 38.0 mg/dL       Imaging/Procedures Review:    No " recent imaging since 3/6/19     Problem List:  1.  Cancer related esophogeal pain (stable)  2.  Low back pain status post diskectomy of two vertebra (active)  3.  Loose stools (active)  4.  Protein calorie malnutrition (active)  5.  Dysphagia and odynophagia (active)  6.  Insomnia (active)  7.  Depressed mood with mixed anxiety (active)  8.  Stage IV metastatic esophageal carcinoma (active)  9.  Renal cell carcinoma of the right kidney s/p right nephrectomy (stable)  10.  Deep venous thrombosis (stable)   11.  Bilateral lower extremity edema (active) - continue lasix per primary team  12.  COPD (stable) - RT protocol in place  13.  Tobacco abuse (chronic) - patient was counseled by primary team   14.  Rash (nearly resolved)  15.  Essential hypertension (stable) - taking amlodipine    DISCUSSION/RECOMMENDATIONS:   Cancer related esophogeal pain   MEDD (morphine equivalent daily dose) is approximately 215 mg:  -fentanyl transdermal patch 75 mcg = 150 mg MEDD  -oxycodone 15 mg x 2 doses = 30 mg = 45 mg MEDD  -hydromorphone 1 mg x 1 dose = 20 mg MEDD    Continue current opioid regimen for esophageal pain  See addition of opioid adjuncts under low back pain recommendations    Low back pain status post diskectomy of two vertebra  Increase gabapentin to 250 mg to TID via G-tube  Start dexamethasone 4 mg via G-tube BID (avoid right before bed)  Consider addition of duloxetine 20 mg daily via G-tube for neuropathic pain properties    Loose stools  Patient originally presented with constipation  Continue Percocet-senna 2 tabs twice daily to prevent constipation  Change MiraLAX to as needed daily via G-tube    Protein calorie malnutrition  Patient receiving enteral nutrition via G-tube    Dysphagia and odynophagia  N.p.o.; all nutrition/medications via G-tube    Stage IV metastatic esophageal carcinoma  Per oncology they will not consider systemic chemotherapy until performance status improves  Patient scheduled to follow-up  "with Dr. Page outpatient in 2-3 weeks    Insomnia  Discontinued Ambien as patient reports nightmares  Addition of gabapentin may assist with sleep at night  -Consider trial of clonazepam 0.25 mg at bedtime    Depressed mood with mixed anxiety  Consider addition of duloxetine for pain and depression properties  Patient has as needed alprazolam 0.5 mg daily  Consider discontinuation of alprazolam and trialing clonazepam 0.25 mg at bedtime to start and then twice daily if patient tolerates  -Addition may help with insomnia as well    Renal cell carcinoma of the right kidney s/p right nephrectomy   Oncology recommending surveillance    Deep venous thrombosis  Patient reports allergic rash related to Eliquis  Patient is currently on a therapeutic dose of enoxaparin 60 mg every 12 hours    Code Status:DNR/DNI    Advance Care Planning/Current Goals of Care: Advance directive and POLST completed during initial advance care planning consult with palliative care 2/22/19 and 2/19/19 respectively.  Please refer to associated notes for details.      Patient expressed he does feel somewhat anxious and depressed about his disease process.  He is remaining hopeful that he will be able to undergo radiation therapy in hopes to regain some ability to eat and drink and gain enough strength to have some quality of life.  He reports his biggest concern at this time is \"being there for my wife.\"  He reports that he has taken care of her the whole time they have been .  He does report he has a good support system between family and coworkers.  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  He knows he is not able to return to work but at some point would want to be \"well enough to drive there and visit.\"  He has coworkers visit him frequently.    In response to his question about\"sometimes I think I need to just quit fighting and give up,\" I provided the patient with therapeutic silence and allowed " "him time to process what he is going through.  I inquired about his biggest concerns and what is most important to him.  He does want to live longer but understands that his condition is terminal.  We discussed hospice care.  He is only familiar with it as his step dad was taking care of in [place] many years ago.  I clarified some misconceptions about hospice \"just giving you drugs until you die.\"  I provided an overview of hospice philosophy and explained how it not only cares for the patient but for their family.  I expressed that palliative care is present to support the patient and his family while they are on this journey.  All questions answered.  At this time the patient is hopeful that his pain will be controlled enough so that he can lay flat and tolerate radiation therapy.  He understands that if he is unable to tolerate a treatment tomorrow he will not likely be able to move forward with treatment.  20 minutes was spent discussing advanced care planning.    40 minutes spent with greater than 50% spent counseling and coordinating the patient's care regarding pain and symptom management.   Please refer to HPI and assessment/plan for details.     Thank you for allowing the palliative care team to participate in this patient's care. Please call with any questions/needs.     DAY Robertson.  Palliative Care Nurse Practitioner  [phone]    "

## 2019-03-19 NOTE — ASSESSMENT & PLAN NOTE
Patient with advanced age, poor functional performance, metastatic esophageal cancer, and renal cell cancer.    In addition, the patient has a number of comorbid conditions including chronic obstructive pulmonary disease, deep vein thrombosis and dysphagia.    Plan for discharge home with hospice.  [facility] arranging for home DME--still awaiting DME delivery.  Discussed with CM- anticipate Wednesday this week.

## 2019-03-19 NOTE — PROGRESS NOTES
Pt stated that he did not want to go to radiation today because he got very little sleep overnight and was worried about his granddaughter. This RN updated pt on timing for radiation today, and again asked if he wanted to go. Patient declined despite education on the need.

## 2019-03-19 NOTE — PROGRESS NOTES
IP Acute Physical Therapy Treatment  Plan of Care Note    Diagnosis:  No diagnosis found.    Co-morbidities:   Patient Active Problem List   Diagnosis   • Panniculitis   • Pulmonary embolism (CMS/HCC)   • Type 2 diabetes mellitus (CMS/HCC)   • Morbid obesity (CMS/HCC)   • Hypertension   • Hyperlipidemia       Precautions:  Precautions  Other Precautions: extensive wound care needs (05/26/18 1020)    Below is key subjective and objective information from the last 24 hours.  For further details and goals, please refer to the PT Assess/Treat/Goals flowsheet.    Subjective:  Subjective: I just feel ok.  I have achiness in my back (05/26/18 1020)    Prior Living Situation:  Type of Home: Apartment (1st floor) (05/25/18 1246)  Lives With:  (roommate) (05/25/18 1246)    Objective:  Bed Mobility:  Bed Mobility  Supine to Sit: Modified Independent (Using bed rails.) (05/25/18 1231)  Sit to Supine: Supervision (Supv) (Fatiguing for pt. ) (05/25/18 1231)    Transfers:  Transfers  Sit to Stand: Touching/Steadying Assistance (05/25/18 1231)  Stand to Sit: Touching/Steadying Assistance (05/25/18 1231)  Stand Pivot Transfers: Minimal Assist (Min) (05/25/18 1231)  Toilet Transfers: Minimal Assist (Min) (Cues for safe approach to commode, turning, hand placement.) (05/25/18 1231)  Assistive Device/: 2-wheeled walker;2 People;Gait Belt (2nd person to manage IV and wound VAC) (05/25/18 1231)  Transfer Comments 1: Pt instructed in hand placement and safe management of walker. Cues needed with assist for balance and safety.  (05/25/18 1231)  Transfer Comments 2: Assist with managing walker for turning. (05/25/18 1231)      Gait:  Gait  Gait Assistance: Minimal Assist (Min) (05/25/18 1231)  Assistive Device/: 2-wheeled walker;2 People;Gait Belt (bariatric walker) (05/25/18 1231)  Ambulation Distance (Feet):  (20' x 2; around bed to bathroom and back) (05/25/18 1231)  Pattern: Within functional limits (05/25/18  This RN call palliative care which is following the patient to review pain regiment and to round on patient. Was told they would do so today, Patient informed RN that he wants to go to radiation. This RN called down and was told that they would try to fit him in but that at this point the schedule was pretty full.    1231)  Gait Comments 1: Pt instructed in safe walker management. Pt relying heavily on walker for support. Repeated bumping into objects. Assist/cues to navigate around objects. (05/25/18 1231)  Gait Comments 2: Pt ambulated to bathroom, then had increased lightheaded with return ambulation so activity shortened and pt assisted back to bed. (05/25/18 1231)       Stairs:  Stairs Mobility  Stairs Mobility Comments: Pt has no steps to manage at home. (05/25/18 1231)    Education:   On this date, the patient was educated on bed therapeutic exercises while reclined in chair.    The response to education was: Verbalizes understanding and Demonstrates understanding.      Assessment:   Patient was able to tolerate minimal activity.  Short session today due to receiving blood.  Pt had no c/o pain during exercises.  Pt remained in bedside chair in reclined position during ex's.      PT Identified Barriers to Discharge: medical issues  Recommendation for Discharge: PT: 24 Hour assist, Post acute therapy    Goals:  Short Term Goals to Be Reviewed On: 05/31/18 (05/25/18 1231)  Short Term Goals = Discharge Goals: Yes (05/25/18 1231)  Goal Agreement: Patient agrees with goals and treatment plan (05/25/18 1231)  Transfer Discharge Goal: Modified independent with cane or least restrictive device. (05/25/18 1231)  Ambulation Discharge Goal: Modified independent with ambulation 100' or more with cane or least restrictive device. (05/25/18 1231)  Therapeutic Exercise Discharge Goal: Independent in LE HEP. (05/25/18 1231)    Plan:  Continue skilled PT, including the following Treatment/Interventions: Functional transfer training;Strengthening;Endurance training;Patient/Family training;Bed mobility;Gait training (05/26/18 1020)   PT Frequency: Once a day;7 days/week (05/25/18 1231)  Treatment Plan for Next Session: teach LE ex's and provide handout.  Continue with ambulation with 2WW, 2nd person to mange lines and wound vac       Equipment:  PT/OT Mobility Equipment for Discharge: May need bariatric 2WW for home use.  Will continue to assess. (05/25/18 8542)       Treatment Time:  PT Time Spent: 10 minutes (05/26/18 9687)

## 2019-03-20 NOTE — CARE PLAN
Problem: Skin Integrity  Goal: Risk for impaired skin integrity will decrease  Outcome: PROGRESSING AS EXPECTED    Intervention: Assess risk factors for impaired skin integrity and/or pressure ulcers  Decreased mobility, nutrition needs

## 2019-03-20 NOTE — PROGRESS NOTES
"Palliative Progress Note               Author: Essie Pandey Date & Time created: 3/20/2019  11:30 AM     Reason for subsequent visit: Cancer related esophageal pain and chronic low back pain    Interval History:  Interviewing limited due to patient being drowsy.  He just returned from radiation therapy and was able to tolerate #3 of 10 treatments.  When asked how his treatment when he stated \"terrible.\"  He confirmed his pain was controlled enough to tolerate the procedure though his lower back pain was at a high level.  Currently he is experiencing no pain either esophageal or low back.  He feels that he continued continue to tolerate his radiation therapy treatments if he is premedicated with Xanax and IV Dilaudid.    Review of Systems:  Positive for pain. Positive for fatigue. Negative for sedation.      Physical Exam:    Recent vital signs  Temp (24hrs), Av.7 °C (98.1 °F), Min:36.6 °C (97.8 °F), Max:37 °C (98.6 °F)  Temperature: 36.6 °C (97.8 °F)  Pulse  Av.8  Min: 65  Max: 101   Blood Pressure : (!) 163/76 (RN notified)       Physical Exam   Constitutional: He appears cachectic. He is sleeping. No distress.   HENT:   Mouth/Throat: No oropharyngeal exudate.   Cardiovascular: Normal rate.    Pulmonary/Chest: He has decreased breath sounds in the left lower field. He has rhonchi (inspiratory/faint) in the right upper field, the right middle field and the left middle field.   Abdominal: Soft. Bowel sounds are normal. He exhibits no distension.   Musculoskeletal: He exhibits edema (trace bilateral hands; forearmes).   Neurological: He is disoriented (time (thought it was night but reoriented easily)).   Skin: There is pallor.   Psychiatric: He is slowed. He exhibits a depressed mood.   Nursing note and vitals reviewed.    Recent Labs      19   0307  19   0306   SODIUM  138  134*   POTASSIUM  4.3  4.8   CHLORIDE  101  100   CO2  31  29   GLUCOSE  111*  172*   BUN  28*  23*   CREATININE  0.63 "  0.51   CALCIUM  7.8*  8.2*     Recent Labs      03/18/19   0307  03/20/19   0306   WBC  14.1*  8.9   RBC  3.09*  3.23*   HEMOGLOBIN  8.2*  8.8*   HEMATOCRIT  26.9*  28.1*   MCV  87.1  87.0   MCH  26.5*  27.2   MCHC  30.5*  31.3*   RDW  59.1*  59.0*   PLATELETCT  391  417   MPV  9.8  10.1     Medications reviewed. Labs Reviewed.     Problem List:  1.  Cancer related esophogeal pain (stable)  2.  Low back pain status post diskectomy of two vertebra (active)  3.  Loose stools (active)  4.  Protein calorie malnutrition (active) - enteral feeds  5.  Dysphagia and odynophagia (active) - G-tube  6.  Insomnia (active)  7.  Depressed mood with mixed anxiety (active)  8.  Stage IV metastatic esophageal carcinoma (active) - surveillance per oncology  9.  Renal cell carcinoma of the right kidney s/p right nephrectomy (stable)  10.  Deep venous thrombosis (stable) - on therapeutic Lovenox   11.  Bilateral lower extremity edema (active) - continue lasix per primary team  12.  COPD (stable) - RT protocol in place  13.  Tobacco abuse (chronic) - patient was counseled by primary team   14.  Rash (nearly resolved)  15.  Essential hypertension (stable) - taking amlodipine    Assessment/Plan:  Cancer related esophogeal pain   MEDD (morphine equivalent daily dose) is approximately 237.5 mg:  -fentanyl transdermal patch 75 mcg = 150 mg MEDD  -oxycodone 15 mg x 3 doses = 345 mg = 67.5 mg MEDD  -hydromorphone 1 mg x 1 dose = 20 mg MEDD     Continue current opioid regimen for esophageal pain  See addition of opioid adjuncts under low back pain recommendations  Consider changing continuous opioid analgesic from fentanyl patch to methadone if dose escalation required; patient has poor body habitus     Low back pain status post diskectomy of two vertebra  Continue gabapentin to 250 mg to TID via G-tube  Continue dexamethasone 4 mg via G-tube BID (avoid right before bed)  Consider addition of duloxetine 20 mg daily via G-tube for neuropathic  pain properties and depressed mood; will discuss with patient when more awake  -This medication does have issues with clogging enteral tubes     Loose stools  Patient originally presented with constipation  Continue Percocet-senna 2 tabs twice daily to prevent constipation  Decreased scheduled bowel regimen yesterday     Stage IV metastatic esophageal carcinoma  Per oncology they will not consider systemic chemotherapy until performance status improves  Patient scheduled to follow-up with Dr. Page outpatient in 2-3 weeks     Insomnia  Discontinued Ambien as patient reports nightmares; put on intolerance list  Addition of gabapentin may assist with sleep at night; patient drowsy currently  -Consider trial of clonazepam 0.25 mg at bedtime     Depressed mood with mixed anxiety  Consider addition of duloxetine for pain and depression properties  Patient has as needed alprazolam 0.5 mg daily  Consider discontinuation of alprazolam and trialing clonazepam 0.25 mg at bedtime to start and then twice daily if patient tolerates  -Addition may help with insomnia     Code Status: DNR/DNI    Advance Care Planning/Current Goals of Care: AD/POLST on file.      15 minutes spent with greater than 50% spent counseling and coordinating the patient's care regarding pain and symptom management plan.   Please refer to HPI and assessment/plan for details.     Thank you for allowing the palliative care team to participate in this patient's care. Please call with any questions/needs.     DAY Robertson.  Palliative Care Nurse Practitioner  [phone]

## 2019-03-20 NOTE — PROGRESS NOTES
Pt refuses bed alarm despite education. Pt has demonstrates proper use off call light and verbalizes understanding of calling before OOB.

## 2019-03-20 NOTE — CARE PLAN
Problem: Communication  Goal: The ability to communicate needs accurately and effectively will improve  Outcome: PROGRESSING AS EXPECTED  Pt uses call light effectively    Problem: Psychosocial Needs:  Goal: Level of anxiety will decrease  Outcome: PROGRESSING SLOWER THAN EXPECTED  Palliative has counseled Pt to assist in anxiety reduction and next steps in POC

## 2019-03-20 NOTE — PROGRESS NOTES
Patient was brought down to Radiation Therapy department by transport.  Patient received treatment number 3  Of 10

## 2019-03-20 NOTE — PROGRESS NOTES
· 2 RN skin check complete with JANETTE ESTRADA.  · LLQ J tube, CDI  · Blanching redness to sacrum, small skin tear to R glute, mepilex in place,  Foam in place to pressure ulcer on r upper back.  · The following interventions in place waffle mattress, Q2 turns.

## 2019-03-20 NOTE — PROGRESS NOTES
[facility] Medicine Daily Progress Note    Date of Service  3/20/2019    Chief Complaint  73 y.o. male admitted 2/18/2019 with esophageal cancer.     Hospital Course  Admitted with esophageal cancer, dysphagia, laparoscopic G-tube placed for nutrition, noted to have DVT left upper extremity, right renal mass, transferred for IR guided biopsy.  Procedure was done February 20, Positive for renal cell carcinoma with Furhman grade 3.   Oncology was consulted.    Interval Problem Update  Complaining of back pain, on multimodal pain regime.   Palliative care following, appreciate recommendations.  I am adding duloxetine to help with pain and depression.  Tolerated radiation 3 out of 10 today.  Blood pressure reasonably controlled with lisinopril and amlodipine.  Saturating well on 0.5-1 L of oxygen, encourage incentive spirometer, try to wean off oxygen as much as able.  Discussed with patient, patient's nurse and with multidisciplinary team during rounds including , pharmacist and charge nurse.     Consultants/Specialty   Palliative care  Urology  ID  Oncology  Radiation oncology    Code Status  DNR/DNI    Disposition  Can be transferred to oncology.  Anticipating postacute placement to SNF    Review of Systems  Review of Systems   Constitutional: Positive for malaise/fatigue and weight loss. Negative for chills and fever.   HENT: Negative for ear discharge, ear pain, hearing loss, sore throat and tinnitus.    Eyes: Negative for pain, discharge and redness.   Respiratory: Negative for cough, hemoptysis, sputum production, shortness of breath and stridor.    Cardiovascular: Negative for chest pain, palpitations, orthopnea, claudication and leg swelling (Improved).   Gastrointestinal: Negative for abdominal pain, constipation, diarrhea, heartburn, nausea and vomiting.   Genitourinary: Negative for dysuria, frequency, hematuria and urgency.   Musculoskeletal: Positive for back pain and joint pain. Negative for  myalgias and neck pain.   Skin: Positive for rash. Negative for itching.   Neurological: Positive for weakness. Negative for dizziness, tingling, tremors, sensory change, speech change, seizures, loss of consciousness and headaches.   Endo/Heme/Allergies: Does not bruise/bleed easily.   Psychiatric/Behavioral: Positive for depression. Negative for substance abuse and suicidal ideas. The patient is not nervous/anxious.         Physical Exam  Temp:  [36.3 °C (97.4 °F)-37 °C (98.6 °F)] 36.3 °C (97.4 °F)  Pulse:  [77-88] 85  Resp:  [15-18] 16  BP: (142-163)/(70-83) 142/74  SpO2:  [92 %-99 %] 95 %    Physical Exam   Constitutional: He appears cachectic. He appears ill. No distress.   Chronically ill appearing    HENT:   Head: Normocephalic and atraumatic.   Mouth/Throat: No oropharyngeal exudate.   Eyes: Conjunctivae are normal. Right eye exhibits no discharge. Left eye exhibits no discharge.   Neck: Normal range of motion. Neck supple. No JVD present. No tracheal deviation present. No thyromegaly present.   Cardiovascular: Normal rate and regular rhythm.  Exam reveals no gallop and no friction rub.    Pulmonary/Chest: Effort normal. No stridor. No respiratory distress. He has no wheezes.   Reduced air entry bilaterally.   Abdominal: Soft. Bowel sounds are normal. He exhibits no distension. There is no tenderness.   G-tube in place, no erythema or edema around insertion site    Musculoskeletal: He exhibits edema (Mild, bilateral lower extremity). He exhibits no deformity.   Neurological: He is alert. No cranial nerve deficit.   Skin: Skin is warm and dry. He is not diaphoretic. There is pallor.   Stasis dermatitis    Psychiatric: His affect is blunt. He is slowed and withdrawn. He exhibits a depressed mood.   Depressed mood.  No suicidal ideation.   Nursing note and vitals reviewed.        Fluids    Intake/Output Summary (Last 24 hours) at 03/20/19 1606  Last data filed at 03/20/19 1348   Gross per 24 hour   Intake              1450 ml   Output             1225 ml   Net              225 ml       Laboratory  Recent Labs      03/18/19   0307  03/20/19   0306   WBC  14.1*  8.9   RBC  3.09*  3.23*   HEMOGLOBIN  8.2*  8.8*   HEMATOCRIT  26.9*  28.1*   MCV  87.1  87.0   MCH  26.5*  27.2   MCHC  30.5*  31.3*   RDW  59.1*  59.0*   PLATELETCT  391  417   MPV  9.8  10.1     Recent Labs      03/18/19   0307  03/20/19   0306   SODIUM  138  134*   POTASSIUM  4.3  4.8   CHLORIDE  101  100   CO2  31  29   GLUCOSE  111*  172*   BUN  28*  23*   CREATININE  0.63  0.51   CALCIUM  7.8*  8.2*                   Imaging  DX-CHEST-2 VIEWS   Final Result         Patchy left basilar opacity, worse than prior exam, concerning for worsening infection.      DX-CHEST-PORTABLE (1 VIEW)   Final Result         1. Patchy left basilar opacities, atelectasis versus consolidation.      2. Free intraperitoneal air could relate to recent surgery. Correlate clinically.      CT-ABDOMEN-PELVIS WITH   Final Result      No evidence of right perinephric hematoma or hydronephrosis status post right renal biopsy.      Clot identified in the urinary bladder.      Anasarca.      Distal esophageal mass with lymphadenopathy.      CT-NEEDLE BX-RENAL   Final Result      Successful CT-guided core biopsy of right renal mass.      CT-ABDOMEN-PELVIS WITH   Final Result      1.  Again seen thickening of the distal esophagus likely related to the patient's known esophageal cancer in that area. There is extensive soft tissue mass/adenopathy within the gastrohepatic, periportal, and retroperitoneal regions which does encase the    celiac and superior mesenteric arteries. This is not significantly changed over short interval follow-up.      2.  Again seen large right renal mass which measures 9 x 7 cm in size consistent with either renal cell carcinoma or metastatic disease. There is again seen likely some invasion of the right renal vein.      3.  Emphysematous and bullous change of the lungs  with bibasilar atelectasis and small bilateral pleural effusions.      4.  Moderate constipation.      DX-CHEST-2 VIEWS   Final Result         1.  Hazy bilateral lung base infiltrates, greater on the left, new since prior.   2.  Small bilateral pleural effusions.   3.  Hyperexpansion of lungs favors changes of COPD.      US-EXTREMITY VENOUS UPPER UNILAT LEFT   Final Result           Assessment/Plan  * Esophageal cancer (HCC)- (present on admission)   Assessment & Plan    Patient receiving radiation therapy  Will follow-up outpatient with oncology   Overall poor prognosis, failure to thrive with underlying malignancy, poor functional status  PT/OT evaluations, >>SNF   Will need to complete XRT during hospital stay prior to discharge given debility       Cancer associated pain- (present on admission)   Assessment & Plan    Multimodal pain regime   Monitoring for adverse effects related to the use of narcotics  Palliative following, appreciate recommendations     ACP (advance care planning)   Assessment & Plan    On Mar 19, I had a discussion with the patient regarding prognosis, treatment options and goals of care.   The patient has advanced age, poor functional performance, metastatic esophageal cancer, and renal cell cancer.    In addition, the patient has a number of comorbid conditions including chronic obstructive pulmonary disease, deep vein thrombosis and dysphagia.  He is currently in a lot of pain.    I introduced the spectrums of care from aggressive, including radiation, chemotherapy down through palliation, comfort and hospice care.  I explained that chemotherapy and/or radiation may prolong his life however it may not improve the quality of his life.    The patient does not want hospice/comfort care at this point.  He wants to proceed with radiation therapy and he wanted to think about further options after he discusses with family members.      Deep venous thrombosis (HCC)- (present on admission)    Assessment & Plan    In the setting of underlying malignancy  Patient concerned that he had allergic reaction to Eliquis      Continue Lovenox 1 mg/kg twice daily, superior to other agents with underlying malignancy      Renal cell carcinoma of right kidney (HCC)- (present on admission)   Assessment & Plan    With gastrohepatic, periportal, aortocaval and retroperitoneal [place] metastases.  Outpatient oncology follow-up        Severe protein-calorie malnutrition (HCC)- (present on admission)   Assessment & Plan    Nutrition consulted        Lower extremity edema- (present on admission)   Assessment & Plan    Improving with lasix       Rash- (present on admission)   Assessment & Plan    Nearly resolved, monitor, was present bilateral lower extremities       Essential hypertension- (present on admission)   Assessment & Plan    Continue amlodipine and lisinopril      Dysphagia- (present on admission)   Assessment & Plan    Continue nutrition through PEG tube, underlying esophageal malignancy       Constipation due to pain medication therapy- (present on admission)   Assessment & Plan    Continue bowel protocol       Tobacco abuse- (present on admission)   Assessment & Plan    Patient counseled and educated regarding cessation       COPD (chronic obstructive pulmonary disease) (HCC)- (present on admission)   Assessment & Plan    Continue RT protocol           VTE prophylaxis: Therapeutic Lovenox

## 2019-03-20 NOTE — CARE PLAN
Problem: Pain Management  Goal: Pain level will decrease to patient's comfort goal  Outcome: PROGRESSING SLOWER THAN EXPECTED  Medicated for procedure this am- pt was able to tolerate procedure.  But states it took everything he had.

## 2019-03-21 NOTE — PROGRESS NOTES
Pt refuses bed alarm, skin check, and Q2 turns for the remainder of the night. Pt was educated on importance of all these interventions by this RN. Pt has demonstrated proper use call light prior to getting OOB

## 2019-03-21 NOTE — PROGRESS NOTES
Palliative Care Advance Care Planning Progress Note    General: Justus Barnard is a 73-year-old male admitted to/18/2019 for weight loss, nausea, and vomiting.  He has been diagnosed with metastatic esophageal cancer and renal cell carcinoma status post right radical nephrectomy on 2/28/2019.  He was originally seen by palliative care on 2/19/19 for advanced care planning and by our provider team on 3/19/19 for pain management.  He was undergoing radiation therapy with a plan for future chemotherapy if his performance status improved.  Unfortunately he has been unable to tolerate radiation therapy and his radiation oncologist Dr. Mathew has advised discontinuation.  Discussed case with rounding oncologist Dr. Gibbs who removed advised outpatient follow-up with Dr. Page based on poor performance status.    Discussion: Met with the patient and his wife Pat at patient's bedside.  Provided overview and updates of patient's current situation with patient's wife Pat.  Discussed options including SNF with post acute therapy and outpatient follow-up with oncology versus hospice.  Discussed my concerns with patient's performance status.  Encouraged the patient and his wife to consider what is most important to him given he has limited time left.  Patient's wife Pat was tearful.  After therapeutic silence was provided, Pat inquired about hospice care.    Hospice care discussed in detail with Pat and patient.  Answered many questions.  Discussed best case in worst case scenario for prognosis with out treatment and possibly with treatment.  Discussed my concerns that if patient elects to do skilled nursing and chemotherapy, he may get worse and miss an opportunity to go home and enjoy what time he has left with his current quality of life.  All options discussed and explored.  I advised the patient and his wife that they had time to consider options.  They would like to meet again on Saturday when Pat's daughter can  be present.  In the meantime I offered to provide SNF list as well as a hospice list for [facility].  Pat indicated that she would not want the patient to go to the local SNF in [place].  Obtained SNF and hospice choice forms from social work and provided to Pat.  They would like to reconvene at 11 AM on Saturday to discuss options again with Nayla present and determine a plan from there.    Provided therapeutic communication including open ended questions, reflective listening, therapeutic silence/touch, and normalization of thoughts/feelings throughout encounter. Provided business card with palliative care contact information and encouraged patient and Pat to call with any questions or needs.     Outcome: Patient and family considering SNF with outpatient oncology follow-up versus home hospice.    Plan: Meet with patient and family at 11 AM on Saturday.    Recommendations: I do not recommend an ethics or hospice consult at this time because patient is making his own choices and considering SNF/outpatient oncology follow-up versus hospice.    Updated: Social work and hospitalist.     Thank you for allowing Palliative Care to participate in this patient's care. Please call our team with questions and/or additional needs.    Total visit time was 50 minutes discussing advance care planning.    DAY Robertson.  Palliative Care Nurse Practitioner  [phone]

## 2019-03-21 NOTE — PROGRESS NOTES
Pt brought down for tx, unable to lay flat. Dr. Warren talked to patient and is going to address the hospital team about alternative options.

## 2019-03-21 NOTE — THERAPY
"Pt demonstrated increased saba and steadiness while ambulating. Pt able to tolerate short distances only. Pt required a seated break. Pt w/decreased endurance, suspect d/t medications. Pt would benefit from further acute PT txs to progress towards goals and independence. Recommend post acute placement at an inpatient transitional care facility to address deficits.    Physical Therapy Treatment completed.   Bed Mobility:  Supine to Sit: Refused  Transfers: Sit to Stand: Stand by Assist  Gait: Level Of Assist: Stand by Assist with Front-Wheel Walker  15ft x2     Plan of Care: Will benefit from Physical Therapy 2 times per week    See \"Rehab Therapy-Acute\" Patient Summary Report for complete documentation.       "

## 2019-03-21 NOTE — PROGRESS NOTES
"Pt wheeled to front observation space.  OOB to WC with min assist, spent approx 20 minutes in front lobby/observation area.  Discussed concerns r/t ongoing radiation therapy.  Worries that it will leave him \"wiped out\" everyday and has questions as to what will happen if he can't tolerate it a treatment- will they try again or stop?  How many times?  Also has questions about chemo after rehab.  Passed concerns onto PM shift RN Gen to pas to tomorrow's RN.  Request that palliative come and discuss plan of care and concerns with patient.  Gen Rn agrees to pass on request.   "

## 2019-03-21 NOTE — PROGRESS NOTES
Hospital Medicine Daily Progress Note    Date of Service  3/21/2019    Chief Complaint  73 y.o. male admitted 2/18/2019 with esophageal cancer.     Hospital Course  Admitted with esophageal cancer, dysphagia, laparoscopic G-tube placed for nutrition, noted to have DVT left upper extremity, right renal mass, transferred for IR guided biopsy.  Procedure was done February 20, Positive for renal cell carcinoma with Furhman grade 3.   Oncology was consulted.    Interval Problem Update  Still having severe pain, was not able to lie flat for radiation today despite premedication.  Ongoing discussions regarding goals of care, patient may not be able to tolerate any therapy for his cancers [radiation, chemo], appreciate palliative care follow-up.  The patient and family wants to discuss and think between hospice versus skilled nursing facility.  Plan for another meeting with palliative on Saturday.  I discussed with palliative care, his pain medications, adjusting.  Systolic blood pressure 150s with lisinopril and amlodipine.  Saturating well on room air, improved with incentive spirometer  Discussed with patient, palliative care, patient's nurse and with multidisciplinary team during rounds including , pharmacist and charge nurse.     Consultants/Specialty   Palliative care  Urology  ID  Oncology  Radiation oncology    Code Status  DNR/DNI    Disposition  Can be transferred to oncology.  Anticipating postacute placement to SNF    Review of Systems  Review of Systems   Constitutional: Positive for malaise/fatigue and weight loss. Negative for chills and fever.   HENT: Negative for ear discharge, ear pain, hearing loss, sore throat and tinnitus.    Eyes: Negative for pain, discharge and redness.   Respiratory: Negative for cough, hemoptysis, sputum production, shortness of breath and stridor.    Cardiovascular: Negative for chest pain, palpitations, orthopnea, claudication and leg swelling (Improved).    Gastrointestinal: Negative for abdominal pain, constipation, diarrhea, heartburn, nausea and vomiting.   Genitourinary: Negative for dysuria, frequency, hematuria and urgency.   Musculoskeletal: Positive for back pain (Worse today), joint pain and myalgias. Negative for neck pain.   Skin: Positive for rash. Negative for itching.   Neurological: Positive for weakness. Negative for dizziness, tingling, tremors, sensory change, speech change, seizures, loss of consciousness and headaches.   Endo/Heme/Allergies: Does not bruise/bleed easily.   Psychiatric/Behavioral: Positive for depression. Negative for substance abuse and suicidal ideas. The patient is not nervous/anxious.         Physical Exam  Temp:  [36.3 °C (97.3 °F)-36.5 °C (97.7 °F)] 36.4 °C (97.6 °F)  Pulse:  [72-81] 72  Resp:  [15-18] 15  BP: (138-158)/(67-79) 157/79  SpO2:  [93 %-95 %] 93 %    Physical Exam   Constitutional: He appears cachectic. He appears ill. No distress.   Chronically ill appearing    HENT:   Head: Normocephalic and atraumatic.   Mouth/Throat: No oropharyngeal exudate.   Eyes: Conjunctivae are normal. Right eye exhibits no discharge. Left eye exhibits no discharge.   Neck: Normal range of motion. Neck supple. No JVD present. No tracheal deviation present. No thyromegaly present.   Cardiovascular: Normal rate and regular rhythm.  Exam reveals no gallop and no friction rub.    Pulmonary/Chest: Effort normal. No stridor. No respiratory distress. He has no wheezes.   Reduced air entry bilaterally.   Abdominal: Soft. Bowel sounds are normal. He exhibits no distension. There is no tenderness.   G-tube in place, no erythema or edema around insertion site    Musculoskeletal: He exhibits edema (Mild, bilateral lower extremity). He exhibits no deformity.   Neurological: He is alert. No cranial nerve deficit.   Skin: Skin is warm and dry. He is not diaphoretic. There is pallor.   Stasis dermatitis    Psychiatric: His affect is blunt. He is slowed  and withdrawn. He exhibits a depressed mood.   Depressed mood.  No suicidal ideation.   Nursing note and vitals reviewed.        Fluids    Intake/Output Summary (Last 24 hours) at 03/21/19 1515  Last data filed at 03/21/19 1300   Gross per 24 hour   Intake             2280 ml   Output             1500 ml   Net              780 ml       Laboratory  Recent Labs      03/20/19   0306   WBC  8.9   RBC  3.23*   HEMOGLOBIN  8.8*   HEMATOCRIT  28.1*   MCV  87.0   MCH  27.2   MCHC  31.3*   RDW  59.0*   PLATELETCT  417   MPV  10.1     Recent Labs      03/20/19   0306   SODIUM  134*   POTASSIUM  4.8   CHLORIDE  100   CO2  29   GLUCOSE  172*   BUN  23*   CREATININE  0.51   CALCIUM  8.2*                   Imaging  DX-CHEST-2 VIEWS   Final Result         Patchy left basilar opacity, worse than prior exam, concerning for worsening infection.      DX-CHEST-PORTABLE (1 VIEW)   Final Result         1. Patchy left basilar opacities, atelectasis versus consolidation.      2. Free intraperitoneal air could relate to recent surgery. Correlate clinically.      CT-ABDOMEN-PELVIS WITH   Final Result      No evidence of right perinephric hematoma or hydronephrosis status post right renal biopsy.      Clot identified in the urinary bladder.      Anasarca.      Distal esophageal mass with lymphadenopathy.      CT-NEEDLE BX-RENAL   Final Result      Successful CT-guided core biopsy of right renal mass.      CT-ABDOMEN-PELVIS WITH   Final Result      1.  Again seen thickening of the distal esophagus likely related to the patient's known esophageal cancer in that area. There is extensive soft tissue mass/adenopathy within the gastrohepatic, periportal, and retroperitoneal regions which does encase the    celiac and superior mesenteric arteries. This is not significantly changed over short interval follow-up.      2.  Again seen large right renal mass which measures 9 x 7 cm in size consistent with either renal cell carcinoma or metastatic  disease. There is again seen likely some invasion of the right renal vein.      3.  Emphysematous and bullous change of the lungs with bibasilar atelectasis and small bilateral pleural effusions.      4.  Moderate constipation.      DX-CHEST-2 VIEWS   Final Result         1.  Hazy bilateral lung base infiltrates, greater on the left, new since prior.   2.  Small bilateral pleural effusions.   3.  Hyperexpansion of lungs favors changes of COPD.      US-EXTREMITY VENOUS UPPER UNILAT LEFT   Final Result           Assessment/Plan  * Esophageal cancer (HCC)- (present on admission)   Assessment & Plan    Patient receiving radiation therapy  Will follow-up outpatient with oncology   Overall poor prognosis, failure to thrive with underlying malignancy, poor functional status  PT/OT evaluations, >>SNF   Will need to complete XRT during hospital stay prior to discharge given debility       Cancer associated pain- (present on admission)   Assessment & Plan    Multimodal pain regime   Monitoring for adverse effects related to the use of narcotics  Palliative following, appreciate recommendations      ACP (advance care planning)   Assessment & Plan    On Mar 19, I had a discussion with the patient regarding prognosis, treatment options and goals of care.   The patient has advanced age, poor functional performance, metastatic esophageal cancer, and renal cell cancer.    In addition, the patient has a number of comorbid conditions including chronic obstructive pulmonary disease, deep vein thrombosis and dysphagia.  He is currently in a lot of pain.    I introduced the spectrums of care from aggressive, including radiation, chemotherapy down through palliation, comfort and hospice care.  I explained that chemotherapy and/or radiation may prolong his life however it may not improve the quality of his life.  The patient does not want hospice/comfort care at this point.  He wants to proceed with radiation therapy and he wanted to  think about further options after he discusses with family members.     On 3/21 Patient has not been able to tolerate her radiation. Patient & family [discuss & think between hospice Vs SNF] Another palliative on Sat Mar 23      Deep venous thrombosis (HCC)- (present on admission)   Assessment & Plan    In the setting of underlying malignancy   Patient concerned that he had allergic reaction to Eliquis       Continue Lovenox 1 mg/kg twice daily, superior to other agents with underlying malignancy      Renal cell carcinoma of right kidney (HCC)- (present on admission)   Assessment & Plan    With gastrohepatic, periportal, aortocaval and retroperitoneal [place] metastases.  Outpatient oncology follow-up        Severe protein-calorie malnutrition (HCC)- (present on admission)   Assessment & Plan    Nutrition consulted        Lower extremity edema- (present on admission)   Assessment & Plan    Improving with lasix       Rash- (present on admission)   Assessment & Plan    Nearly resolved, monitor, was present bilateral lower extremities       Essential hypertension- (present on admission)   Assessment & Plan    Continue amlodipine and lisinopril      Dysphagia- (present on admission)   Assessment & Plan    Continue nutrition through PEG tube, underlying esophageal malignancy        Constipation due to pain medication therapy- (present on admission)   Assessment & Plan    Continue bowel protocol        Tobacco abuse- (present on admission)   Assessment & Plan    Patient counseled and educated regarding cessation       COPD (chronic obstructive pulmonary disease) (HCC)- (present on admission)   Assessment & Plan    Continue RT protocol            VTE prophylaxis: Therapeutic Lovenox

## 2019-03-21 NOTE — PROGRESS NOTES
"Palliative Progress Note               Author: Essie L Maribellstephanie Date & Time created: 3/21/2019  11:00 AM     Reason for subsequent visit: Cancer related esophageal pain and chronic low back pain    Interval History:  Patient unable to tolerate radiation therapy today related to back pain.  He was unable to lie flat.  Patient reports Dr. Mathew is recommending that patient not continue radiation treatments due to intolerance.  He is aware that radiation therapy was palliative for pain control.  He states that radiation oncology is recommending patient move ahead with chemotherapy if patient is a candidate.  Patient expressed he does not feel much stronger then when he met with oncology 3/6/19.  Patient asked \"what do I do?  I'm not ready to die.\"     Patient denies pain currently.  He does have fatigue and continues to have insomnia.  He reports he slept during the day yesterday and could not sleep during the night last night.  He is requesting re-trialing of Ambien tonight.  He reports that his loose stools have slowed down and in fact now he has not had a bowel movement in 2 days.    Review of Systems:  Positive for dyspnea (on exertion). Positive for pain (low back when laying flat; intermittent esophageal pain ). Positive for fatigue. Negative for sedation. Positive for anxiety. Positive for depression. Positive for insomnia. Negative for nausea and vomiting. Positive for constipation. Positive for dysphagia.      Physical Exam:    Recent vital signs  Temp (24hrs), Av.4 °C (97.5 °F), Min:36.3 °C (97.3 °F), Max:36.5 °C (97.7 °F)  Temperature: 36.4 °C (97.6 °F)  Pulse  Av.7  Min: 65  Max: 101   Blood Pressure : 157/79       Physical Exam   Constitutional: He is oriented to person, place, and time. He appears cachectic. No distress.   HENT:   Mouth/Throat: No oropharyngeal exudate.   Cardiovascular: Normal rate.    Pulmonary/Chest: He has decreased breath sounds in the right lower field and the left " lower field. Rhonchi: inspiratory/faint.   Abdominal: Soft. He exhibits no distension. Bowel sounds are decreased.   PEG tube with enteral feeds via pump   Musculoskeletal: He exhibits edema (trace BLE).   Neurological: He is alert and oriented to person, place, and time. Disoriented: time (thought it was night but reoriented easily)   Skin: There is pallor.   Psychiatric: His behavior is normal. Judgment and thought content normal. Cognition and memory are normal. He exhibits a depressed mood.   Nursing note and vitals reviewed.    Medications reviewed.     Problem List:  1.  Cancer related esophogeal pain (stable)  2.  Low back pain status post diskectomy of two vertebra (active)  3.  Constipation (active)  4.  Protein calorie malnutrition (active) - enteral feeds  5.  Dysphagia and odynophagia (active) - G-tube  6.  Insomnia (active)  7.  Depressed mood with mixed anxiety (active)  8.  Stage IV metastatic esophageal carcinoma (active)   9.  Renal cell carcinoma of the right kidney s/p right nephrectomy (stable) - surveillance per oncology  10.  Deep venous thrombosis (stable) - on therapeutic Lovenox   11.  Bilateral lower extremity edema (active) - continue lasix per primary team  12.  COPD (stable) - RT protocol in place  13.  Tobacco abuse (chronic) - patient was counseled by primary team   14.  Rash (nearly resolved)  15.  Essential hypertension (stable) - taking amlodipine    Assessment/Plan:  Cancer related esophogeal pain   MEDD (morphine equivalent daily dose) is approximately 215 mg:  -fentanyl transdermal patch 75 mcg = 150 mg MEDD  -oxycodone 15 mg x 2 doses = 30 mg = 45 mg MEDD  -hydromorphone 1 mg x 1 dose = 20 mg MEDD     Continue current opioid regimen for esophageal pain  See addition of opioid adjuncts under low back pain recommendations  Consider changing continuous opioid analgesic from fentanyl patch to methadone if dose escalation required; patient has poor body habitus     Low back pain  status post diskectomy of two vertebra  Continue gabapentin to 250 mg to TID via G-tube  Continue dexamethasone 4 mg via G-tube BID (avoid right before bed)  Appreciate addition of duloxetine 20 mg daily via G-tube by hospitalist  Patient unable to tolerate radiation therapy today     Constipation  Continue docusate-senna 2 tabs twice daily to prevent constipation  Change Miralax back to daily      Stage IV metastatic esophageal carcinoma  Per oncology they will not consider systemic chemotherapy until performance status improves on 3/6/19  Patient to schedule follow-up with Dr. Page outpatient in 2-3 weeks from 3/9/19  Per Dr. Mathew patient only able to complete 20% of radiation; patient experiences severe low back pain   - risk outweighing benefit and Dr. Mathew recommending discontinuation and f/u with oncology     Insomnia  Retrial Ambien at lower dose of 5 mg HS (home  10 mg)  -Consider addition of nightly alprazolam 0.5 mg at bedtime   -or discontinuation of alprazolam and starting clonazepam 0.25 mg HS and daily PRN dose x 1     Depressed mood with mixed anxiety  Continue duloxetine 20 mg for pain and depression properties  Patient has as needed alprazolam 0.5 mg daily prior to radiation therapy  Consider discontinuation of alprazolam and trialing clonazepam 0.25 mg at bedtime to start and then twice daily if patient tolerates  -Addition may help with insomnia     Code Status: DNR/DNI    Advance Care Planning/Current Goals of Care: AD/POLST on file.  In response to the patient's statement in HPI, discussed options now that patient is unable to continue radiation therapy and performance status remains low.  Discussed PT/OT's recommendation for SNF for continued skilled therapy following hospitalization.  Discussed that based case scenario patient would regain enough strength to receive chemotherapy in the future but would likely be determined by his oncologist at follow-up appointment.  Discussed that  patient's performance status remains poor and his nutrition will likely not improve if he is unable to eat/drink again.  Discussed that enteral nutrition is maintaining his current state.  Discussed that without completion of radiation treatment it is unlikely he will be able to eat or drink safely again related to location of tumor unless he undergoes chemotherapy and has a good response.  Answered many questions about end-of-life process and prognosis.  Discussed hospice and addressed misconceptions based on previous experiences.    The patient expressed he is most worried about his wife.  I offered to call his wife and discuss current situation with her which she appreciated.  He requested that I call his daughter first.  Call placed to Nayla at [phone].  Introduced myself and provided overview of above.  Patient then asked Nayla if he should speak with his wife Dory.  After my discussion with Nayla I called patient's wife Dory.  Introduced myself and discussed reason for phone call (provide updates).  She was waiting at the doctor's office and expressed that she would be at the hospital this afternoon.  Agreed to meet at about 2 PM.  25 minutes was spent discussing advanced care planning with the patient, his daughter, and the patient's wife.    35 minutes spent with greater than 50% spent counseling and coordinating the patient's care regarding pain/symptom management and discharge planning.   Please refer to HPI and assessment/plan for details.     Thank you for allowing the palliative care team to participate in this patient's care. Please call with any questions/needs.     DAY Robertson.  Palliative Care Nurse Practitioner  [phone]

## 2019-03-21 NOTE — DISCHARGE PLANNING
Anticipated Discharge Disposition: Hospice v SNF    Action: LSW spoke with Palliative who stated that she will be meeting with pt, pt's wife and pt's daughter on Saturday (3/23/19) to discuss next steps for pt's discharge.     Barriers to Discharge: None    Plan: Awaiting family's decision for further discharge planning.

## 2019-03-22 NOTE — PROGRESS NOTES
Hospital Medicine Daily Progress Note    Date of Service  3/22/2019    Chief Complaint  73 y.o. male admitted 2/18/2019 with esophageal cancer.     Hospital Course  Admitted with esophageal cancer, dysphagia, laparoscopic G-tube placed for nutrition, noted to have DVT left upper extremity, right renal mass, transferred for IR guided biopsy.  Procedure was done February 20, Positive for renal cell carcinoma with Furhman grade 3.   Oncology was consulted.    Interval Problem Update  Back pain slightly better today, continue with multimodal pain regime    Tolerating TF  WBC up to 15.7, no focal signs of infection, continue to monitor clinically for signs of infection.   Has not been tolerating radiation, not fit for chemotherapy   The patient and family wants to discuss and think between hospice versus skilled nursing facility.    Ongoing discussions regarding goals of care, plan for palliative care meeting Mar 23.  Systolic blood pressure slightly high, Continue amlodipine.  I am increasing lisinopril.,  I am adding hydralazine as needed for extreme hypertension.   Saturating well on room air, improved with incentive spirometer  Discussed with patient, patient's nurse and with multidisciplinary team during rounds including , pharmacist and charge nurse.      Consultants/Specialty   Palliative care  Urology  ID  Oncology  Radiation oncology    Code Status  DNR/DNI    Disposition  Can be transferred to oncology.  Anticipating postacute placement to SNF    Review of Systems  Review of Systems   Constitutional: Positive for malaise/fatigue and weight loss. Negative for chills and fever.   HENT: Negative for ear discharge, ear pain, hearing loss, sore throat and tinnitus.    Eyes: Negative for pain, discharge and redness.   Respiratory: Negative for cough, hemoptysis, sputum production, shortness of breath and stridor.    Cardiovascular: Negative for chest pain, palpitations, orthopnea, claudication and leg  swelling (Improved).   Gastrointestinal: Negative for abdominal pain, constipation, diarrhea, heartburn, nausea and vomiting.   Genitourinary: Negative for dysuria, frequency, hematuria and urgency.   Musculoskeletal: Positive for back pain (Slightly better today), joint pain and myalgias. Negative for neck pain.   Skin: Positive for rash. Negative for itching.   Neurological: Positive for weakness. Negative for dizziness, tingling, tremors, sensory change, speech change, seizures, loss of consciousness and headaches.   Endo/Heme/Allergies: Does not bruise/bleed easily.   Psychiatric/Behavioral: Positive for depression. Negative for substance abuse and suicidal ideas. The patient is not nervous/anxious.         Physical Exam  Temp:  [36.6 °C (97.8 °F)-37.4 °C (99.4 °F)] 37 °C (98.6 °F)  Pulse:  [68-74] 68  Resp:  [16-18] 18  BP: (141-164)/(70-80) 154/72  SpO2:  [93 %-95 %] 95 %    Physical Exam   Constitutional: He appears cachectic. He appears ill. No distress.   Chronically ill appearing    HENT:   Head: Normocephalic and atraumatic.   Mouth/Throat: No oropharyngeal exudate.   Eyes: Conjunctivae are normal. Right eye exhibits no discharge. Left eye exhibits no discharge.   Neck: Normal range of motion. Neck supple. No JVD present. No tracheal deviation present. No thyromegaly present.   Cardiovascular: Normal rate and regular rhythm.  Exam reveals no gallop and no friction rub.    Pulmonary/Chest: Effort normal. No stridor. No respiratory distress. He has no wheezes.   Reduced air entry bilaterally.   Abdominal: Soft. Bowel sounds are normal. He exhibits no distension. There is no tenderness.   G-tube in place, no erythema or edema around insertion site    Musculoskeletal: He exhibits edema (Mild, bilateral lower extremity). He exhibits no deformity.   Neurological: He is alert. No cranial nerve deficit.   Skin: Skin is warm and dry. He is not diaphoretic. There is pallor.   Stasis dermatitis    Psychiatric: His  affect is blunt. He is slowed and withdrawn. He exhibits a depressed mood.   Depressed mood.  No suicidal ideation.   Nursing note and vitals reviewed.        Fluids    Intake/Output Summary (Last 24 hours) at 03/22/19 1320  Last data filed at 03/22/19 1015   Gross per 24 hour   Intake              900 ml   Output             1350 ml   Net             -450 ml       Laboratory  Recent Labs      03/20/19   0306  03/22/19   0315   WBC  8.9  15.7*   RBC  3.23*  4.03*   HEMOGLOBIN  8.8*  10.9*   HEMATOCRIT  28.1*  34.1*   MCV  87.0  84.6   MCH  27.2  27.0   MCHC  31.3*  32.0*   RDW  59.0*  57.1*   PLATELETCT  417  397   MPV  10.1  10.6     Recent Labs      03/20/19   0306   SODIUM  134*   POTASSIUM  4.8   CHLORIDE  100   CO2  29   GLUCOSE  172*   BUN  23*   CREATININE  0.51   CALCIUM  8.2*                   Imaging  DX-CHEST-2 VIEWS   Final Result         Patchy left basilar opacity, worse than prior exam, concerning for worsening infection.      DX-CHEST-PORTABLE (1 VIEW)   Final Result         1. Patchy left basilar opacities, atelectasis versus consolidation.      2. Free intraperitoneal air could relate to recent surgery. Correlate clinically.      CT-ABDOMEN-PELVIS WITH   Final Result      No evidence of right perinephric hematoma or hydronephrosis status post right renal biopsy.      Clot identified in the urinary bladder.      Anasarca.      Distal esophageal mass with lymphadenopathy.      CT-NEEDLE BX-RENAL   Final Result      Successful CT-guided core biopsy of right renal mass.      CT-ABDOMEN-PELVIS WITH   Final Result      1.  Again seen thickening of the distal esophagus likely related to the patient's known esophageal cancer in that area. There is extensive soft tissue mass/adenopathy within the gastrohepatic, periportal, and retroperitoneal regions which does encase the    celiac and superior mesenteric arteries. This is not significantly changed over short interval follow-up.      2.  Again seen large  right renal mass which measures 9 x 7 cm in size consistent with either renal cell carcinoma or metastatic disease. There is again seen likely some invasion of the right renal vein.      3.  Emphysematous and bullous change of the lungs with bibasilar atelectasis and small bilateral pleural effusions.      4.  Moderate constipation.      DX-CHEST-2 VIEWS   Final Result         1.  Hazy bilateral lung base infiltrates, greater on the left, new since prior.   2.  Small bilateral pleural effusions.   3.  Hyperexpansion of lungs favors changes of COPD.      US-EXTREMITY VENOUS UPPER UNILAT LEFT   Final Result           Assessment/Plan  * Esophageal cancer (HCC)- (present on admission)   Assessment & Plan    Patient receiving radiation therapy  Will follow-up outpatient with oncology   Overall poor prognosis, failure to thrive with underlying malignancy, poor functional status  PT/OT evaluations, >>SNF   Will need to complete XRT during hospital stay prior to discharge given debility        Cancer associated pain- (present on admission)   Assessment & Plan    Multimodal pain regime   Monitoring for adverse effects related to the use of narcotics  Palliative following, appreciate recommendations        ACP (advance care planning)   Assessment & Plan    On Mar 19, I had a discussion with the patient regarding prognosis, treatment options and goals of care.   The patient has advanced age, poor functional performance, metastatic esophageal cancer, and renal cell cancer.    In addition, the patient has a number of comorbid conditions including chronic obstructive pulmonary disease, deep vein thrombosis and dysphagia.  He is currently in a lot of pain.    I introduced the spectrums of care from aggressive, including radiation, chemotherapy down through palliation, comfort and hospice care.  I explained that chemotherapy and/or radiation may prolong his life however it may not improve the quality of his life.  The patient does  not want hospice/comfort care at this point.  He wants to proceed with radiation therapy and he wanted to think about further options after he discusses with family members.     On 3/21 Patient has not been able to tolerate her radiation. Patient & family [discuss & think between hospice Vs SNF] Another palliative on Sat Mar 23      Deep venous thrombosis (HCC)- (present on admission)   Assessment & Plan    In the setting of underlying malignancy   Patient concerned that he had allergic reaction to Eliquis        Continue Lovenox 1 mg/kg twice daily, superior to other agents with underlying malignancy      Renal cell carcinoma of right kidney (HCC)- (present on admission)   Assessment & Plan    With gastrohepatic, periportal, aortocaval and retroperitoneal raleigh metastases.  Does not appear that the patient will tolerate chemotherapy or radiation  Ongoing discussions regarding comfort care / Hospice   Outpatient oncology follow-up if does not go with hospice     Severe protein-calorie malnutrition (HCC)- (present on admission)   Assessment & Plan    Nutrition consulted         Lower extremity edema- (present on admission)   Assessment & Plan    Improving with lasix       Rash- (present on admission)   Assessment & Plan    Nearly resolved, monitor, was present bilateral lower extremities       Essential hypertension- (present on admission)   Assessment & Plan    Continue amlodipine and lisinopril   Lisinopril dose increased to 20, March 22      Dysphagia- (present on admission)   Assessment & Plan    Continue nutrition through PEG tube, underlying esophageal malignancy         Constipation due to pain medication therapy- (present on admission)   Assessment & Plan    Continue bowel protocol         Tobacco abuse- (present on admission)   Assessment & Plan    Patient counseled and educated regarding cessation        COPD (chronic obstructive pulmonary disease) (HCC)- (present on admission)   Assessment & Plan     Continue RT protocol             VTE prophylaxis: Therapeutic Lovenox

## 2019-03-22 NOTE — THERAPY
"Speech Language Therapy dysphagia treatment completed.   Functional Status: Patient sleeping, but rousing to verbal and tactile cues, with wife present at bedside, who appeared supportive. Patient still strict NPO with G tube. Patient still noted to have wet/gurgly vocal quality prior to PO trials and required moderate amount of oral suctioning via [place] to clear. Patient reported he has not been swallowing secretions and that he still suctions them out consistently. Patient consumed PO trials of single ice chips, NTL via 1/2 tsp, NTL via full tsp, and small NTL via cup sip x1. Patient presented with moderate to severe s/sx of oropharyngeal dysphagia, evidenced by frequent wet/gurgly vocal quality, consistent delayed coughing, oral suctioning via [place] required with secretions removed that appeared consistent with previous bolus color and consistency previously given, and fatigue. Patient reported c/o pharyngeal globus on boluses of NTL which required moderate amount of oral suctioning and multiple swallows to clear. Patient does not appear safe for PO intake and remains at high risk for aspiration. Recommend patient continue strict NPO with G tube. Patient educated on importance of oral care, provided with mouth moisturizer tube, educated on dysphagia, s/sx of aspiration, and risks for pnuemonia. Patient and wife verbalized understanding. RN aware. SLP is following based on POC.     Recommendations: Patient does not appear safe for PO intake and remains at high risk for aspiration. Recommend patient continue strict NPO with G tube. Please provide frequent oral care several times during the day.   Plan of Care: Will benefit from Speech Therapy 3 times per week  Post-Acute Therapy: Recommend inpatient transitional care services for continued speech therapy services.      See \"Rehab Therapy-Acute\" Patient Summary Report for complete documentation.     "

## 2019-03-22 NOTE — PROGRESS NOTES
Pt refuses bed, pt has demonstrated ability to call appropriately prior to OOB. Pt is also refusing turns and requests to sleep in chair tonight.

## 2019-03-22 NOTE — DOCUMENTATION QUERY
[facility]                                                                         Query Response Note      PATIENT:               ROHITH HUNG  ACCT #:                  [id number]  MRN:                       [id number]  :                       1945  ADMIT DATE:       2019 5:06 PM  DISCH DATE:          RESPONDING  PROVIDER #:        [id number]           RESPONSE TEXT:    MDD, moderate, single episode    QUERY TEXT:    Depression Type [id number]    Depression is documented in the Medical Record.  Please specify the type.    NOTE:  If an appropriate response is not listed below, please respond with a new note.              The patient's Clinical Indicators include:  Depression, blunt affect, slowed and withdrawn, depressed mood, no suicidal ideation, mixed anxiety, insomnia  Risk Factors: chronic illness/ malignancy, pain   Treatment: duloxetine  Query created by: Cheyenne Addison on 3/22/2019 10:41 AM        Electronically signed by:  DHRUV BRAN MD 3/22/2019 11:40 AM

## 2019-03-22 NOTE — PROGRESS NOTES
Report Received from Day RN, assumed care @1900  Fibersource running at 65 ml/hr  A/O x 4  VSS  Pain-2/10  O2-RA  Diet-NPO, denies N/V  Void-pos  BM-neg,pos flatus  Wound- LLQ G/Jtube, skin tear to R buttock mepilex in place, foam to upper back pressure sore.  Bed Locked in Lowest Position  Call light in reach

## 2019-03-23 NOTE — PROGRESS NOTES
[facility] Medicine Daily Progress Note    Date of Service  3/23/2019    Chief Complaint  73 y.o. male admitted 2/18/2019 with esophageal cancer.     Hospital Course  Admitted with esophageal cancer, dysphagia, laparoscopic G-tube placed for nutrition, noted to have DVT left upper extremity, right renal mass, transferred for IR guided biopsy.  Procedure was done February 20, Positive for renal cell carcinoma with Furhman grade 3.   Oncology was consulted.     Interval Problem Update  Saturating well on room air.  Heart rate 60s-70s  Blood pressure reasonably controlled  Hb 9.4 today, running around 8.2-10.9  Palliative meeting today, patient and family want to go with hospice.  Hospice referral has been placed.  They are looking into choices.  Pain is reasonably controlled.  Regards to his deep vein thrombosis, the patient wanted to continue on Lovenox injections for now.  However, he is not decisiveabout whether or not to get treatment when he is discharged to hospice.  He said he wanted to discuss with his family and think about it.  Discussed with patient, patient's nurse.      Consultants/Specialty   Palliative care  Urology  ID  Oncology  Radiation oncology    Code Status  DNR/DNI    Disposition  Can be transferred to oncology.  Anticipating postacute placement to SNF    Review of Systems  Review of Systems   Constitutional: Positive for malaise/fatigue and weight loss. Negative for chills and fever.   HENT: Negative for ear discharge, ear pain, hearing loss, sore throat and tinnitus.    Eyes: Negative for pain, discharge and redness.   Respiratory: Negative for cough, hemoptysis, sputum production, shortness of breath and stridor.    Cardiovascular: Negative for chest pain, palpitations, orthopnea, claudication and leg swelling (Improved).   Gastrointestinal: Negative for abdominal pain, constipation, diarrhea, heartburn, nausea and vomiting.   Genitourinary: Negative for dysuria, frequency, hematuria and  urgency.   Musculoskeletal: Positive for back pain (Slightly better today), joint pain and myalgias. Negative for neck pain.   Skin: Positive for rash. Negative for itching.   Neurological: Positive for weakness. Negative for dizziness, tingling, tremors, sensory change, speech change, seizures, loss of consciousness and headaches.   Endo/Heme/Allergies: Does not bruise/bleed easily.   Psychiatric/Behavioral: Positive for depression. Negative for substance abuse and suicidal ideas. The patient is not nervous/anxious.         Physical Exam  Temp:  [36.1 °C (97 °F)-36.7 °C (98 °F)] 36.4 °C (97.6 °F)  Pulse:  [68-70] 68  Resp:  [16-17] 17  BP: (112-153)/(64-74) 138/70  SpO2:  [91 %-93 %] 92 %    Physical Exam   Constitutional: He appears cachectic. He appears ill. No distress.   Chronically ill appearing    HENT:   Head: Normocephalic and atraumatic.   Mouth/Throat: No oropharyngeal exudate.   Eyes: Conjunctivae are normal. Right eye exhibits no discharge. Left eye exhibits no discharge.   Neck: Normal range of motion. Neck supple. No JVD present. No tracheal deviation present. No thyromegaly present.   Cardiovascular: Normal rate and regular rhythm.  Exam reveals no gallop and no friction rub.    Pulmonary/Chest: Effort normal. No stridor. No respiratory distress. He has no wheezes.   Reduced air entry bilaterally.   Abdominal: Soft. Bowel sounds are normal. He exhibits no distension. There is no tenderness.   G-tube in place, no erythema or edema around insertion site    Musculoskeletal: He exhibits edema (Mild, bilateral lower extremity). He exhibits no deformity.   Neurological: He is alert. No cranial nerve deficit.   Skin: Skin is warm and dry. He is not diaphoretic. There is pallor.   Stasis dermatitis    Psychiatric: His affect is blunt. He is slowed and withdrawn. He exhibits a depressed mood.   Depressed mood.  No suicidal ideation.   Nursing note and vitals reviewed.        Fluids    Intake/Output Summary  (Last 24 hours) at 03/23/19 1626  Last data filed at 03/23/19 1006   Gross per 24 hour   Intake             1605 ml   Output             1250 ml   Net              355 ml       Laboratory  Recent Labs      03/22/19   0315  03/23/19   0301   WBC  15.7*  13.3*   RBC  4.03*  3.46*   HEMOGLOBIN  10.9*  9.4*   HEMATOCRIT  34.1*  29.9*   MCV  84.6  86.4   MCH  27.0  27.2   MCHC  32.0*  31.4*   RDW  57.1*  59.7*   PLATELETCT  397  406   MPV  10.6  9.8                       Imaging  DX-CHEST-2 VIEWS   Final Result         Patchy left basilar opacity, worse than prior exam, concerning for worsening infection.      DX-CHEST-PORTABLE (1 VIEW)   Final Result         1. Patchy left basilar opacities, atelectasis versus consolidation.      2. Free intraperitoneal air could relate to recent surgery. Correlate clinically.      CT-ABDOMEN-PELVIS WITH   Final Result      No evidence of right perinephric hematoma or hydronephrosis status post right renal biopsy.      Clot identified in the urinary bladder.      Anasarca.      Distal esophageal mass with lymphadenopathy.      CT-NEEDLE BX-RENAL   Final Result      Successful CT-guided core biopsy of right renal mass.      CT-ABDOMEN-PELVIS WITH   Final Result      1.  Again seen thickening of the distal esophagus likely related to the patient's known esophageal cancer in that area. There is extensive soft tissue mass/adenopathy within the gastrohepatic, periportal, and retroperitoneal regions which does encase the    celiac and superior mesenteric arteries. This is not significantly changed over short interval follow-up.      2.  Again seen large right renal mass which measures 9 x 7 cm in size consistent with either renal cell carcinoma or metastatic disease. There is again seen likely some invasion of the right renal vein.      3.  Emphysematous and bullous change of the lungs with bibasilar atelectasis and small bilateral pleural effusions.      4.  Moderate constipation.       DX-CHEST-2 VIEWS   Final Result         1.  Hazy bilateral lung base infiltrates, greater on the left, new since prior.   2.  Small bilateral pleural effusions.   3.  Hyperexpansion of lungs favors changes of COPD.      US-EXTREMITY VENOUS UPPER UNILAT LEFT   Final Result           Assessment/Plan  * Esophageal cancer (HCC)- (present on admission)   Assessment & Plan    Patient receiving radiation therapy  Will follow-up outpatient with oncology   Overall poor prognosis, failure to thrive with underlying malignancy, poor functional status  PT/OT evaluations, >>SNF   Will need to complete XRT during hospital stay prior to discharge given debility        Cancer associated pain- (present on admission)   Assessment & Plan    Multimodal pain regime   Monitoring for adverse effects related to the use of narcotics  Palliative following, appreciate recommendations         ACP (advance care planning)   Assessment & Plan    On Mar 19, I had a discussion with the patient regarding prognosis, treatment options and goals of care.   The patient has advanced age, poor functional performance, metastatic esophageal cancer, and renal cell cancer.    In addition, the patient has a number of comorbid conditions including chronic obstructive pulmonary disease, deep vein thrombosis and dysphagia.  He is currently in a lot of pain.    I introduced the spectrums of care from aggressive, including radiation, chemotherapy down through palliation, comfort and hospice care.  I explained that chemotherapy and/or radiation may prolong his life however it may not improve the quality of his life.  The patient does not want hospice/comfort care at this point.  He wants to proceed with radiation therapy and he wanted to think about further options after he discusses with family members.     On 3/21 Patient has not been able to tolerate her radiation. Patient & family [discuss & think between hospice Vs SNF] Another palliative on Sat Mar 23   On  3/23, patient and family want to go with hospice.  Discussing choices.      Deep venous thrombosis (HCC)- (present on admission)   Assessment & Plan    In the setting of underlying malignancy   Patient concerned that he had allergic reaction to Eliquis     The patient wanted to go with hospice, he wanted to continue on Lovenox injections for now.    However, he is not decisiveabout whether or not to get treatment when he is discharged to hospice.    He said he wanted to discuss with his family and think about it.      Renal cell carcinoma of right kidney (HCC)- (present on admission)   Assessment & Plan    With gastrohepatic, periportal, aortocaval and retroperitoneal raleigh metastases.  Does not appear that the patient will tolerate chemotherapy or radiation  Ongoing discussions regarding comfort care / Hospice   Outpatient oncology follow-up if does not go with hospice     Severe protein-calorie malnutrition (HCC)- (present on admission)   Assessment & Plan    Nutrition consulted         Lower extremity edema- (present on admission)   Assessment & Plan    Improving with lasix       Rash- (present on admission)   Assessment & Plan    Nearly resolved, monitor, was present bilateral lower extremities       Essential hypertension- (present on admission)   Assessment & Plan    Continue amlodipine and lisinopril   Lisinopril dose increased to 20, March 22      Dysphagia- (present on admission)   Assessment & Plan    Continue nutrition through PEG tube, underlying esophageal malignancy         Constipation due to pain medication therapy- (present on admission)   Assessment & Plan    Continue bowel protocol          Tobacco abuse- (present on admission)   Assessment & Plan    Patient counseled and educated regarding cessation        COPD (chronic obstructive pulmonary disease) (HCC)- (present on admission)   Assessment & Plan    Continue RT protocol              VTE prophylaxis: Therapeutic Lovenox

## 2019-03-23 NOTE — CARE PLAN
Problem: Nutritional:  Goal: Nutrition support tolerated and meeting greater than 85% of estimated needs  Bolus feeds  Outcome: NOT MET

## 2019-03-23 NOTE — DIETARY
Nutrition Services: Consult to adjust to bolus TF in anticipation of D/c'ing home with hospice.     Day 33 of admit.  Justus Barnard is a 73 y.o. male with admitting DX of Esophageal cancer (HCC).    Tube feeding initiated on 2/19. Current TF via G-tube is Fibersource HN @ goal rate 65 ml/hr, providing 1872 kcal, 84 grams protein, and 1264 ml free water per day.    Assessment:  Weight 60.1 kg on 3/16; BMI 21.39. No weight recorded since that date.     Estimated nutritional needs: REE per MSJ x1.2 = 1554 kcal/day  25-30 kcal/kg = 1313-4181 kcal/day  1.2-1.5+ grams protein/kg = 72-90+ grams protein/day    Recommendations/Plan:  1. Change to bolus feeds for home. Standard formula remains appropriate; available standard formula is Isosource 1.5. Recommend 5 containers (250 ml each) of Isosource 1.5 per day to provide 1875 kcal, 85 grams protein, and 950 ml free water per day. Suggested schedule would be 2 @ breakfast, 1 @ lunch, and 2 @ dinner.   2. In addition to TF, recommend ~1000 ml free water per day.   3. Recommend referral for home health/infusion services to provide/manage home TF.    RD following while admitted.

## 2019-03-23 NOTE — PROGRESS NOTES
A/Ox 4.  VSS.  Reports moderate neck/shoulder pain 5 /10, medicated per MAR, heat applied.    + VASQUEZ, denies numbness and tingling, generalized weakness.  RA, satting >90%, denies SOB or difficulty breathing.  +2 BLE edema, +penile swelling, SCDs in place.  + normoactive BSx4, + flatus, no BM this shift, LBM 03/19, denies n/v.  NPO.  RUQ G-J tube in place, J tube not patent, G tube running continuous TF at goal rate, pt tolerating TF well.  + void, QS.  Up with SBA, tolerates activity well.  Up to chair and WC, spent time in lobby, repositions self frequently.  PIV S/L, PO intake encouraged.    Call light within reach, calls appropriately.  Bed in lowest locked position.

## 2019-03-23 NOTE — CARE PLAN
Problem: Safety  Goal: Will remain free from injury  Pt up to chair. Calls appropriately    Problem: Knowledge Deficit  Goal: Knowledge of disease process/condition, treatment plan, diagnostic tests, and medications will improve  Plan of care discussed with pt. Pt receptive.

## 2019-03-23 NOTE — PROGRESS NOTES
Palliative Care Advance Care Planning Progress Note    General: Justus Barnard is a 73-year-old male admitted 2/18/2019 for weight loss, nausea, and vomiting.  He has been diagnosed with metastatic esophageal cancer as well as renal cell carcinoma status post right radical nephrectomy on 2/28/19.  He was originally seen by palliative care on 2/19/19 for advance care planning and by our provider team on 3/19/19 for pain management.  He was undergoing radiation therapy with a plan for future chemotherapy if his performance status improved.  Unfortunately he was unable to tolerate radiation therapy and radiation oncology advised discontinuation.    Discussion: Met with patient, patient's wife, and patient's daughter-in-law Nayla at patient's bedside.  Patient reports his pain is well controlled though he does have some bilateral shoulder and left neck pain.  He reports he is getting good relief from his oxycodone.  Provided overview of my discussion with the patient and his wife 3/21/2019.  Patient's daughter-in-law Nayla attempted to call the patient's son who resides in [place] but he was occupied and told us to meet without his participation.    Again provided information about current medical issues, prognosis, and discharge options including SNF with continued therapy (recommended by PT/OT/SLP), if patient adamant about not going to a facility and going home but wanting continued rehabilitative care HHC, versus hospice care.  Answered a multitude of questions from Nayla including pain management questions and what the differences between HHC and hospice.  Provided a detailed overview of HHC versus hospice care and how they are missions defer.  Nayla voiced her concerns about the patient receiving skilled therapy but him being too weak to improve.  Patient expressed he wants to do what is best for his family.    Offered the patient and family some privacy to discuss options as a family but  Nayla requested that I stay.  I allowed the family time to speak openly, process information, and ask questions.  After all questions answered patient and family feel that a discharge home on hospice care would be best considering the patient's progressive cancer and poor performance status.    Reviewed choice form and choice process/discharge timeframe with Nayla, patient, and patient's wife.  Nayla would like to research/call the various hospice agencies to get more information before they make a choice.  Patient and family feel that the patient will definitely need a hospital bed.  We discussed transition from continuous enteral feeding via pump to bolus feeding at patient's request.    Provided therapeutic communication including open ended questions, reflective listening, therapeutic silence, and normalization of thoughts/feelings throughout encounter.  Patient/family encouraged to call with any questions or needs.     Outcome: Patient has chosen to discharge home on hospice care to [facility].    Plan: Hospice order placed and will await choice from family.  They have been given a choice form for the [facility].  Order placed for dietary consult to transition patient from enteral pump feeding to bolus feeding.  Nursing staff to educate family about G-tube care and bolus feeds.  Explained to family that hospice can continue this education and care.    Recommendations: I recommend a hospice consult.  Placed order in EPIC.    Updated: Dr. Kim.    Thank you for allowing Palliative Care to participate in this patient's care. Please call our team with questions and/or additional needs.    Total visit time was 63 minutes discussing advance care planning.    DAY Robertson.  Palliative Care Nurse Practitioner  [phone]

## 2019-03-24 NOTE — PROGRESS NOTES
Bedside report received   Patient is up in bedside chair   Alert and oriented x4  Denies needs or pain  Denies nausea or vomiting  RUQ G-J tube in place. J TUBE clamped, G-tube running continuous Tf  SCDs refused   Call light within reach,calls appropriately   Bed in lowest locked position

## 2019-03-24 NOTE — CARE PLAN
Problem: Knowledge Deficit  Goal: Knowledge of disease process/condition, treatment plan, diagnostic tests, and medications will improve  Plan of care discussed for pt    Problem: Pain Management  Goal: Pain level will decrease to patient's comfort goal  Oxycodone and tylenol for pain

## 2019-03-24 NOTE — PROGRESS NOTES
A/Ox 4.  VSS.  Reports moderate neck/shoulder pain 5 /10, medicated per MAR, heat applied.     + VASQUEZ, denies numbness and tingling, generalized weakness.  RA, satting >90%, denies SOB or difficulty breathing.  +2 BLE edema, +penile swelling, SCDs refused.  + normoactive BSx4, + flatus, + multiple BM this shift, denies n/v.  NPO.  RUQ G-J tube in place, J tube clamped, G tube running continuous TF.  Bolus feeds started for dinner, c/o stomach soreness.  + void, QS.  Up with SBA, tolerates activity well.  Up to chair and WC, repositions self frequently.  PIV S/L, PO intake encouraged.     Call light within reach, calls appropriately.  Bed in lowest locked position.

## 2019-03-24 NOTE — PROGRESS NOTES
[facility] Medicine Daily Progress Note    Date of Service  3/24/2019    Chief Complaint  73 y.o. male admitted 2/18/2019 with esophageal cancer.     Hospital Course  Admitted with esophageal cancer, dysphagia, laparoscopic G-tube placed for nutrition, noted to have DVT left upper extremity, right renal mass, transferred for IR guided biopsy.  Procedure was done February 20, Positive for renal cell carcinoma with Furhman grade 3.  Oncology was consulted.      Interval Problem Update  Heart rate 60s.   Pain is reasonably controlled on multimodal medications.   On bowel protocol for constipation.  Dysphagia, tolerating TF.    Patient wanted to continue on Lovenox injections for DVT.    He is still not decisiveabout whether or not to get treatment when he is discharged to hospice.     Discussed with patient, patient's nurse.          Consultants/Specialty   Palliative care  Urology  ID  Oncology  Radiation oncology    Code Status  DNR/DNI    Disposition  Can be transferred to oncology.  Anticipating postacute placement to SNF    Review of Systems  Review of Systems   Constitutional: Positive for malaise/fatigue and weight loss. Negative for chills and fever.   HENT: Negative for ear discharge, ear pain, hearing loss, sore throat and tinnitus.    Eyes: Negative for pain, discharge and redness.   Respiratory: Negative for cough, hemoptysis, sputum production, shortness of breath and stridor.    Cardiovascular: Negative for chest pain, palpitations, orthopnea, claudication and leg swelling (Improved).   Gastrointestinal: Negative for abdominal pain, constipation, diarrhea, heartburn, nausea and vomiting.   Genitourinary: Negative for dysuria, frequency, hematuria and urgency.   Musculoskeletal: Positive for back pain (Improving ), joint pain (Improving ) and myalgias. Negative for neck pain.   Skin: Negative for itching and rash (improved).   Neurological: Positive for weakness. Negative for dizziness, tingling, tremors,  sensory change, speech change, seizures, loss of consciousness and headaches.   Endo/Heme/Allergies: Does not bruise/bleed easily.   Psychiatric/Behavioral: Positive for depression. Negative for substance abuse and suicidal ideas. The patient is not nervous/anxious.         Physical Exam  Temp:  [36.7 °C (98.1 °F)-36.9 °C (98.5 °F)] 36.9 °C (98.4 °F)  Pulse:  [66-71] 66  Resp:  [15-16] 16  BP: (135-155)/(69-72) 135/70  SpO2:  [92 %-96 %] 95 %    Physical Exam   Constitutional: He appears cachectic. He appears ill. No distress.   Chronically ill appearing    HENT:   Head: Normocephalic and atraumatic.   Mouth/Throat: No oropharyngeal exudate.   Eyes: Conjunctivae are normal. Right eye exhibits no discharge. Left eye exhibits no discharge.   Neck: Normal range of motion. Neck supple. No JVD present. No tracheal deviation present. No thyromegaly present.   Cardiovascular: Normal rate and regular rhythm.  Exam reveals no gallop and no friction rub.    Pulmonary/Chest: Effort normal. No stridor. No respiratory distress. He has no wheezes.   Reduced air entry bilaterally.   Abdominal: Soft. Bowel sounds are normal. He exhibits no distension. There is no tenderness.   G-tube in place, no erythema or edema around insertion site    Musculoskeletal: He exhibits edema (Mild, bilateral lower extremity). He exhibits no deformity.   Neurological: He is alert. No cranial nerve deficit.   Skin: Skin is warm and dry. He is not diaphoretic. There is pallor.   Stasis dermatitis    Psychiatric: His affect is blunt. He is slowed and withdrawn. He exhibits a depressed mood.   Depressed mood.  No suicidal ideation.   Nursing note and vitals reviewed.        Fluids    Intake/Output Summary (Last 24 hours) at 03/24/19 1700  Last data filed at 03/24/19 1600   Gross per 24 hour   Intake             1580 ml   Output             1750 ml   Net             -170 ml       Laboratory  Recent Labs      03/22/19   0315  03/23/19   0301   WBC  15.7*   13.3*   RBC  4.03*  3.46*   HEMOGLOBIN  10.9*  9.4*   HEMATOCRIT  34.1*  29.9*   MCV  84.6  86.4   MCH  27.0  27.2   MCHC  32.0*  31.4*   RDW  57.1*  59.7*   PLATELETCT  397  406   MPV  10.6  9.8                       Imaging  DX-CHEST-2 VIEWS   Final Result         Patchy left basilar opacity, worse than prior exam, concerning for worsening infection.      DX-CHEST-PORTABLE (1 VIEW)   Final Result         1. Patchy left basilar opacities, atelectasis versus consolidation.      2. Free intraperitoneal air could relate to recent surgery. Correlate clinically.      CT-ABDOMEN-PELVIS WITH   Final Result      No evidence of right perinephric hematoma or hydronephrosis status post right renal biopsy.      Clot identified in the urinary bladder.      Anasarca.      Distal esophageal mass with lymphadenopathy.      CT-NEEDLE BX-RENAL   Final Result      Successful CT-guided core biopsy of right renal mass.      CT-ABDOMEN-PELVIS WITH   Final Result      1.  Again seen thickening of the distal esophagus likely related to the patient's known esophageal cancer in that area. There is extensive soft tissue mass/adenopathy within the gastrohepatic, periportal, and retroperitoneal regions which does encase the    celiac and superior mesenteric arteries. This is not significantly changed over short interval follow-up.      2.  Again seen large right renal mass which measures 9 x 7 cm in size consistent with either renal cell carcinoma or metastatic disease. There is again seen likely some invasion of the right renal vein.      3.  Emphysematous and bullous change of the lungs with bibasilar atelectasis and small bilateral pleural effusions.      4.  Moderate constipation.      DX-CHEST-2 VIEWS   Final Result         1.  Hazy bilateral lung base infiltrates, greater on the left, new since prior.   2.  Small bilateral pleural effusions.   3.  Hyperexpansion of lungs favors changes of COPD.      US-EXTREMITY VENOUS UPPER UNILAT LEFT    Final Result           Assessment/Plan  * Esophageal cancer (HCC)- (present on admission)   Assessment & Plan    Patient receiving radiation therapy  Will follow-up outpatient with oncology   Overall poor prognosis, failure to thrive with underlying malignancy, poor functional status  PT/OT evaluations, >>SNF   Will need to complete XRT during hospital stay prior to discharge given debility         Cancer associated pain- (present on admission)   Assessment & Plan    Multimodal pain regime   Monitoring for adverse effects related to the use of narcotics  Palliative following, appreciate recommendations         ACP (advance care planning)   Assessment & Plan    On Mar 19, I had a discussion with the patient regarding prognosis, treatment options and goals of care.   The patient has advanced age, poor functional performance, metastatic esophageal cancer, and renal cell cancer.    In addition, the patient has a number of comorbid conditions including chronic obstructive pulmonary disease, deep vein thrombosis and dysphagia.  He is currently in a lot of pain.    I introduced the spectrums of care from aggressive, including radiation, chemotherapy down through palliation, comfort and hospice care.  I explained that chemotherapy and/or radiation may prolong his life however it may not improve the quality of his life.  The patient does not want hospice/comfort care at this point.  He wants to proceed with radiation therapy and he wanted to think about further options after he discusses with family members.     On 3/21 Patient has not been able to tolerate her radiation. Patient & family [discuss & think between hospice Vs SNF] Another palliative on Sat Mar 23   On 3/23, patient and family want to go with hospice.  Discussing choices.      Deep venous thrombosis (HCC)- (present on admission)   Assessment & Plan    In the setting of underlying malignancy   Patient concerned that he had allergic reaction to Eliquis     The  patient wanted to go with hospice, he wanted to continue on Lovenox injections for now.    However, he is not decisiveabout whether or not to get treatment when he is discharged to hospice.    He said he wanted to discuss with his family and think about it.       Renal cell carcinoma of right kidney (HCC)- (present on admission)   Assessment & Plan    With gastrohepatic, periportal, aortocaval and retroperitoneal r[place] metastases.  Does not appear that the patient will tolerate chemotherapy or radiation  After discussions, patient and family wanted to go with Hospice        Severe protein-calorie malnutrition (HCC)- (present on admission)   Assessment & Plan    Nutrition consulted          Lower extremity edema- (present on admission)   Assessment & Plan    Improving with lasix       Rash- (present on admission)   Assessment & Plan    Nearly resolved, monitor, was present bilateral lower extremities       Essential hypertension- (present on admission)   Assessment & Plan    Continue amlodipine and lisinopril.    Lisinopril dose increased to 20, March 22.       Dysphagia- (present on admission)   Assessment & Plan    Continue nutrition through PEG tube, underlying esophageal malignancy           Constipation due to pain medication therapy- (present on admission)   Assessment & Plan    Continue bowel protocol          Tobacco abuse- (present on admission)   Assessment & Plan    Patient counseled and educated regarding cessation        COPD (chronic obstructive pulmonary disease) (HCC)- (present on admission)   Assessment & Plan    Continue RT protocol              VTE prophylaxis: Therapeutic Lovenox

## 2019-03-25 NOTE — PROGRESS NOTES
30-day Pharmcy Medication Review    I have reviewed the patient's chart and medication list. Pt is currently planning on discharging with hospice. I have the following recommendations while he remains inpatient:    · Pt consistently reporting pain levels of 4-5 over the past few days. Could consider increasing fentanyl patch dose to help make patient more comfortable as he transitions to hospice  · If pt changes his mind and decides to pursue treatment, would schedule the nicotine 14 mg/24hr patch x2 weeks, then 7 mg/24h x2 weeks  · Consider discontinuing unused/little used medications:  · B&O suppository  · Compazine  · Phenergan  · Froylan Epperson, PharmD, BCPS

## 2019-03-25 NOTE — DISCHARGE PLANNING
Agency/Facility Name: [facility]   Spoke To: Fax Correspondence   Outcome: Patient accepted on service    WESLY Reece notified via Skype

## 2019-03-25 NOTE — PROGRESS NOTES
[facility] Medicine Daily Progress Note    Date of Service  3/25/2019    Chief Complaint  73 y.o. male admitted 2/18/2019 with esophageal cancer.     Hospital Course  Admitted with esophageal cancer, dysphagia, laparoscopic G-tube placed for nutrition, noted to have DVT left upper extremity, right renal mass, transferred for IR guided biopsy.  Procedure was done February 20, Positive for renal cell carcinoma with Furhman grade 3.  Oncology was consulted.  Not a candidate for chemotherapy given his poor functional performance.  Palliative radiation was suggested, he will need 10 sessions.  The patient started radiation therapy.  However was having severe uncontrollable pain from lying flat.  He completed only 3 sessions.  Decision was made to abandon palliative radiation.  Patient and family want to go home on hospice, pending set up.    Interval Problem Update  Hemodynamically stable overnight, saturating well on room air.  Depression, improving.   Pain is reasonably controlled on multimodal medications.   No longer constipated, tolerating TF at goal.    Patient wanted to continue on Lovenox injections for DVT while in the hospital.  He is still not decisiveabout whether or not to get treatment when he is discharged to hospice.     Pending hospice set up, discussed with case management.  Discussed with patient, patient's nurse and with multidisciplinary team during rounds including , pharmacist and charge nurse.      Consultants/Specialty   Palliative care  Urology  ID  Oncology  Radiation oncology    Code Status  DNR/DNI    Disposition  Can be transferred to oncology.  Discharge to home with hospice.    Review of Systems  Review of Systems   Constitutional: Positive for malaise/fatigue and weight loss. Negative for chills and fever.   HENT: Negative for hearing loss, sore throat and tinnitus.    Eyes: Negative for pain, discharge and redness.   Respiratory: Negative for cough, hemoptysis, sputum  production, shortness of breath and stridor.    Cardiovascular: Negative for chest pain, palpitations and leg swelling (Improved).   Gastrointestinal: Negative for abdominal pain, constipation, diarrhea, heartburn, nausea and vomiting.   Genitourinary: Negative for dysuria, frequency and hematuria.   Musculoskeletal: Positive for back pain (Improving ), joint pain (Improving ) and myalgias. Negative for neck pain.   Skin: Negative for itching and rash (improved).   Neurological: Positive for weakness. Negative for dizziness, tingling, tremors, sensory change, speech change, seizures, loss of consciousness and headaches.   Endo/Heme/Allergies: Does not bruise/bleed easily.   Psychiatric/Behavioral: Positive for depression (Improving ). Negative for substance abuse and suicidal ideas. The patient is not nervous/anxious.         Physical Exam  Temp:  [36.1 °C (96.9 °F)-36.7 °C (98 °F)] 36.6 °C (97.9 °F)  Pulse:  [60-64] 60  Resp:  [16-18] 16  BP: (117-150)/(62-73) 117/69  SpO2:  [93 %-95 %] 93 %    Physical Exam   Constitutional: He appears cachectic. He appears ill. No distress.   Chronically ill appearing    HENT:   Head: Normocephalic and atraumatic.   Mouth/Throat: No oropharyngeal exudate.   Eyes: Conjunctivae are normal. Right eye exhibits no discharge. Left eye exhibits no discharge.   Neck: Normal range of motion. Neck supple. No JVD present. No tracheal deviation present. No thyromegaly present.   Cardiovascular: Normal rate and regular rhythm.  Exam reveals no gallop and no friction rub.    Pulmonary/Chest: Effort normal. No stridor. No respiratory distress. He has no wheezes.   Reduced air entry bilaterally.   Abdominal: Soft. Bowel sounds are normal. He exhibits no distension. There is no tenderness.   G-tube in place, no erythema or edema around insertion site    Musculoskeletal: He exhibits edema (Mild, bilateral lower extremity). He exhibits no deformity.   Neurological: He is alert. No cranial nerve  deficit.   Skin: Skin is warm and dry. He is not diaphoretic. There is pallor.   Stasis dermatitis    Psychiatric: His affect is blunt. He is slowed and withdrawn. He exhibits a depressed mood.   Depressed mood.  No suicidal ideation.   Nursing note and vitals reviewed.        Fluids    Intake/Output Summary (Last 24 hours) at 03/25/19 1717  Last data filed at 03/25/19 1657   Gross per 24 hour   Intake                0 ml   Output             1200 ml   Net            -1200 ml       Laboratory  Recent Labs      03/23/19   0301   WBC  13.3*   RBC  3.46*   HEMOGLOBIN  9.4*   HEMATOCRIT  29.9*   MCV  86.4   MCH  27.2   MCHC  31.4*   RDW  59.7*   PLATELETCT  406   MPV  9.8                       Imaging  DX-CHEST-2 VIEWS   Final Result         Patchy left basilar opacity, worse than prior exam, concerning for worsening infection.      DX-CHEST-PORTABLE (1 VIEW)   Final Result         1. Patchy left basilar opacities, atelectasis versus consolidation.      2. Free intraperitoneal air could relate to recent surgery. Correlate clinically.      CT-ABDOMEN-PELVIS WITH   Final Result      No evidence of right perinephric hematoma or hydronephrosis status post right renal biopsy.      Clot identified in the urinary bladder.      Anasarca.      Distal esophageal mass with lymphadenopathy.      CT-NEEDLE BX-RENAL   Final Result      Successful CT-guided core biopsy of right renal mass.      CT-ABDOMEN-PELVIS WITH   Final Result      1.  Again seen thickening of the distal esophagus likely related to the patient's known esophageal cancer in that area. There is extensive soft tissue mass/adenopathy within the gastrohepatic, periportal, and retroperitoneal regions which does encase the    celiac and superior mesenteric arteries. This is not significantly changed over short interval follow-up.      2.  Again seen large right renal mass which measures 9 x 7 cm in size consistent with either renal cell carcinoma or metastatic disease.  There is again seen likely some invasion of the right renal vein.      3.  Emphysematous and bullous change of the lungs with bibasilar atelectasis and small bilateral pleural effusions.      4.  Moderate constipation.      DX-CHEST-2 VIEWS   Final Result         1.  Hazy bilateral lung base infiltrates, greater on the left, new since prior.   2.  Small bilateral pleural effusions.   3.  Hyperexpansion of lungs favors changes of COPD.      US-EXTREMITY VENOUS UPPER UNILAT LEFT   Final Result           Assessment/Plan  * Esophageal cancer (HCC)- (present on admission)   Assessment & Plan    Patient receiving radiation therapy  Will follow-up outpatient with oncology   Overall poor prognosis, failure to thrive with underlying malignancy, poor functional status  PT/OT evaluations, >>SNF   Did not tolerate radiation therapy.  Now wants to go home on hospice      Cancer associated pain- (present on admission)   Assessment & Plan    Multimodal pain regime   Monitoring for adverse effects related to the use of narcotics  Palliative following, appreciate recommendations          ACP (advance care planning)   Assessment & Plan    On Mar 19, I had a discussion with the patient regarding prognosis, treatment options and goals of care.   The patient has advanced age, poor functional performance, metastatic esophageal cancer, and renal cell cancer.    In addition, the patient has a number of comorbid conditions including chronic obstructive pulmonary disease, deep vein thrombosis and dysphagia.  He is currently in a lot of pain.    I introduced the spectrums of care from aggressive, including radiation, chemotherapy down through palliation, comfort and hospice care.  I explained that chemotherapy and/or radiation may prolong his life however it may not improve the quality of his life.  The patient does not want hospice/comfort care at this point.  He wants to proceed with radiation therapy and he wanted to think about further  options after he discusses with family members.     On 3/21 Patient has not been able to tolerate her radiation. Patient & family [discuss & think between hospice Vs SNF] Another palliative on Sat Mar 23   On 3/23, patient and family want to go with hospice.  Discussing choices.      Deep venous thrombosis (HCC)- (present on admission)   Assessment & Plan    In the setting of underlying malignancy   Patient concerned that he had allergic reaction to Eliquis     The patient wanted to go with hospice, he wanted to continue on Lovenox injections for now.    However, he is not decisiveabout whether or not to get treatment when he is discharged to hospice.    He said he wanted to discuss with his family and think about it.        Renal cell carcinoma of right kidney (HCC)- (present on admission)   Assessment & Plan    With gastrohepatic, periportal, aortocaval and retroperitoneal raleigh metastases.  Does not appear that the patient will tolerate chemotherapy or radiation  After discussions, patient and family wanted to go with Hospice         Severe protein-calorie malnutrition (HCC)- (present on admission)   Assessment & Plan    Nutrition consulted          Lower extremity edema- (present on admission)   Assessment & Plan    Improving with lasix       Rash- (present on admission)   Assessment & Plan    Nearly resolved, monitor, was present bilateral lower extremities       Essential hypertension- (present on admission)   Assessment & Plan    Continue amlodipine and lisinopril.    Lisinopril dose increased to 20, March 22.       Dysphagia- (present on admission)   Assessment & Plan    Continue nutrition through PEG tube, underlying esophageal malignancy            Constipation due to pain medication therapy- (present on admission)   Assessment & Plan    Continue bowel protocol           Tobacco abuse- (present on admission)   Assessment & Plan    Patient counseled and educated regarding cessation        COPD (chronic  obstructive pulmonary disease) (HCC)- (present on admission)   Assessment & Plan    Continue RT protocol               VTE prophylaxis: Therapeutic Lovenox

## 2019-03-25 NOTE — PROGRESS NOTES
Palliative Care Advance Care Planning Progress Note  Message left with unit RONAL Reece to provide update about family meeting Saturday.  Patient/family elected to discharge with hospice and were given choice form.  Awaiting hospice choice form family.  They were instructed to notify palliative care or any team member regarding hospice choice and how to fill out choice form.    Thank you for allowing Palliative Care to participate in this patient's care. Please call our team with questions and/or additional needs.    DAY Robertson.  Palliative Care Nurse Practitioner  [phone]

## 2019-03-25 NOTE — DISCHARGE PLANNING
Received Choice form at 1400  Agency/Facility Name: [facility]   Referral sent per Choice form @ 1105

## 2019-03-25 NOTE — PROGRESS NOTES
Report received from day shift RN at bedside.    Patient alert and oriented x 4.  Patient has no issues concerning discomfort or nausea at this time.    Patient up to chair and instructed to call for assistance.  Call light within reach as well as personal belongings.

## 2019-03-25 NOTE — DISCHARGE PLANNING
Anticipated Discharge Disposition: Home with Hospice     Action: LSW met with Pt and Pt's family at bedside regarding Hospice choice, Pt's DTR Nayla advised she had spoke with the hospice companies and they had decided on [facility] hosp[facility], LSW provided hospice choice and Pt's wife completed form. LSW faxed to [facility] for referral to be sent.     LSW informed by Pt's family they were working with Juan F who would be coming to bedside for meeting/assesement on Tuesday 3/26/19 @ 2pm.     Barriers to Discharge: Hospice acceptance, DME arrangement     Plan: Pt and Pt's family to meet with [facility] hospice rep on Tuesday 3/26/19 at 2pm

## 2019-03-26 NOTE — PROGRESS NOTES
Bedside report received from day shift RN.  Patient has no issues concerning nausea, vomiting or discomfort at this time.  Patient alert and oriented x 4.  Patient up to chair with call light within reach.

## 2019-03-26 NOTE — CARE PLAN
Problem: Nutritional:  Goal: Nutrition support tolerated and meeting greater than 85% of estimated needs  Bolus feeds   Outcome: PROGRESSING SLOWER THAN EXPECTED  Pt reports immediate diarrhea after receiving bolus feeds of Isosource 1.5. He has been able to take a few cartons per day, but not his goal of 5. Discussed turning off continuous TF of Fibersource HN for 24 hours and attempting bolus feeds again to gauge his tolerance. RD following and will adjust plan pending clinical outcomes. Pt planning for DC next week.

## 2019-03-26 NOTE — PROGRESS NOTES
Bedside report received   Patient is up in bedside chair   Alert and oriented x4  Denies needs or pain  Denies nausea or vomiting  RUQ G-J tube in place. J TUBE clamped, G-tube running continuous Tf  SCDs refused   Up to restroom with standby assist using front wheel walker   Call light within reach,calls appropriately   Bed in lowest locked position

## 2019-03-26 NOTE — DISCHARGE PLANNING
Anticipated Discharge Disposition: Home with Hospice    Action: This RN CM met with pt's wife and family.  Per the wife, the meeting with [facility] went well.  Since the pt and pt's family live in [place], [facility] won't be able to get the hospital equipment out to the pt's home until next Tuesday April 2, 2017.  Pt is also transitioning from continuous feeds to bolus feeds in order to go home with hospice.    Barriers to Discharge: Hospice Equipment set up    Plan: Case management following along as needed with [facility].

## 2019-03-26 NOTE — PROGRESS NOTES
[facility] Medicine Daily Progress Note    Date of Service  3/26/2019    Chief Complaint  73 y.o. male admitted 2/18/2019 with nausea vomiting, weight loss.    Hospital Course    73-year-old male history of COPD, tobacco use, advanced esophageal cancer with large suspicious right renal mass admitted malignant cachexia, failure to thrive, uncontrolled pain due to cancer and possible pneumonia.    Interval Problem Update    Reports abdominal pain improved with fentanyl patch, oxycodone.  Plan hospice discussion with family.  She states his wife can care for him and has a nearby sister for additional help in [place].     Consultants/Specialty  Palliative care  Dr. Page, oncology  Radiation oncology Dr. Mathew  Infectious disease Dr. Dougherty    Code Status  DNR    Disposition  Arrangements for discharge home with family with hospice underway    Review of Systems  Review of Systems   Constitutional: Positive for malaise/fatigue and weight loss. Negative for chills, diaphoresis and fever.   HENT: Negative for congestion.    Eyes: Negative for discharge and redness.   Respiratory: Negative for cough, shortness of breath, wheezing and stridor.    Cardiovascular: Positive for leg swelling. Negative for chest pain and palpitations.   Gastrointestinal: Positive for abdominal pain and nausea. Negative for diarrhea and vomiting.   Genitourinary: Negative for flank pain and hematuria.   Musculoskeletal: Negative for back pain, joint pain and neck pain.   Neurological: Positive for weakness. Negative for tremors, sensory change, speech change and focal weakness.   Psychiatric/Behavioral: Negative for hallucinations and substance abuse.        Physical Exam  Temp:  [36.1 °C (97 °F)-36.6 °C (97.9 °F)] 36.1 °C (97 °F)  Pulse:  [60-69] 69  Resp:  [16-18] 17  BP: (117-141)/(60-69) 141/60  SpO2:  [91 %-93 %] 91 %    Physical Exam   Constitutional: He is oriented to person, place, and time. No distress.   Cachectic, awake, slow with  responses, appropriate oriented   HENT:   Head: Normocephalic and atraumatic.   Nose: Nose normal.   Bitemporal wasting   Eyes: Conjunctivae and EOM are normal. No scleral icterus.   Neck: Normal range of motion.   Cardiovascular: Normal rate and regular rhythm.    No murmur heard.  Pulmonary/Chest: Effort normal. No stridor. No respiratory distress. He has no wheezes. He has no rales.   Abdominal: Soft. Bowel sounds are normal. He exhibits no distension. There is no tenderness.   G-tube present   Musculoskeletal: He exhibits edema. He exhibits no tenderness.   Generalized 4/5 strength   Neurological: He is alert and oriented to person, place, and time.   No gross focal weakness    Skin: Skin is warm and dry. He is not diaphoretic. No pallor.   Psychiatric: He has a normal mood and affect. His behavior is normal.   Vitals reviewed.      Fluids    Intake/Output Summary (Last 24 hours) at 03/26/19 1520  Last data filed at 03/26/19 1300   Gross per 24 hour   Intake              300 ml   Output             1100 ml   Net             -800 ml       Laboratory                        Imaging  DX-CHEST-2 VIEWS   Final Result         Patchy left basilar opacity, worse than prior exam, concerning for worsening infection.      DX-CHEST-PORTABLE (1 VIEW)   Final Result         1. Patchy left basilar opacities, atelectasis versus consolidation.      2. Free intraperitoneal air could relate to recent surgery. Correlate clinically.      CT-ABDOMEN-PELVIS WITH   Final Result      No evidence of right perinephric hematoma or hydronephrosis status post right renal biopsy.      Clot identified in the urinary bladder.      Anasarca.      Distal esophageal mass with lymphadenopathy.      CT-NEEDLE BX-RENAL   Final Result      Successful CT-guided core biopsy of right renal mass.      CT-ABDOMEN-PELVIS WITH   Final Result      1.  Again seen thickening of the distal esophagus likely related to the patient's known esophageal cancer in that  area. There is extensive soft tissue mass/adenopathy within the gastrohepatic, periportal, and retroperitoneal regions which does encase the    celiac and superior mesenteric arteries. This is not significantly changed over short interval follow-up.      2.  Again seen large right renal mass which measures 9 x 7 cm in size consistent with either renal cell carcinoma or metastatic disease. There is again seen likely some invasion of the right renal vein.      3.  Emphysematous and bullous change of the lungs with bibasilar atelectasis and small bilateral pleural effusions.      4.  Moderate constipation.      DX-CHEST-2 VIEWS   Final Result         1.  Hazy bilateral lung base infiltrates, greater on the left, new since prior.   2.  Small bilateral pleural effusions.   3.  Hyperexpansion of lungs favors changes of COPD.      US-EXTREMITY VENOUS UPPER UNILAT LEFT   Final Result           Assessment/Plan  * Esophageal cancer (HCC)- (present on admission)   Assessment & Plan    Patient received palliative radiation therapy but did not tolerate.  Previously was to have follow-up with oncology  Overall poor prognosis, failure to thrive with underlying malignancy, poor functional status  Try to mobilize.  Did not tolerate radiation therapy.  Anticipate DC home with hospice     Cancer associated pain- (present on admission)   Assessment & Plan    Abdominal pain  Continue with fentanyl patch, PRN oxycodone  Monitoring for adverse effects related to the use of narcotics  Palliative following, appreciate recommendations          ACP (advance care planning)   Assessment & Plan    Patient with advanced age, poor functional performance, metastatic esophageal cancer, and renal cell cancer.    In addition, the patient has a number of comorbid conditions including chronic obstructive pulmonary disease, deep vein thrombosis and dysphagia.    Plan family discussion with hospice  Social service to arrange for discharge planning  anticipate DC to [place] with a near by sister who can help with hospice services.     Deep venous thrombosis (HCC)- (present on admission)   Assessment & Plan    In the setting of underlying malignancy   Patient concerned that he had allergic reaction to Eliquis     The patient wanted to go with hospice, he wanted to continue on Lovenox injections for now.    Family to meet with hospice and we will have further discussion regarding level of care and continue treatment for DVT     Renal cell carcinoma of right kidney (HCC)- (present on admission)   Assessment & Plan    With gastrohepatic, periportal, aortocaval and retroperitoneal raleigh metastases.  Does not appear that the patient will tolerate chemotherapy or radiation  Arrange for hospice.  Family meeting with palliative care.     Severe protein-calorie malnutrition (HCC)- (present on admission)   Assessment & Plan    Nutrition consulted          Lower extremity edema- (present on admission)   Assessment & Plan    Improving  Caution with Lasix  Monitor for signs of dehydration.     Rash- (present on admission)   Assessment & Plan    Nearly resolved, monitor, was present bilateral lower extremities       Essential hypertension- (present on admission)   Assessment & Plan    Controlled  Continue lisinopril, Norvasc  PRN hydralazine       Dysphagia- (present on admission)   Assessment & Plan    Continue nutrition through PEG tube, underlying esophageal malignancy            Constipation due to pain medication therapy- (present on admission)   Assessment & Plan    Continue bowel protocol           Tobacco abuse- (present on admission)   Assessment & Plan    Patient counseled and educated regarding cessation        COPD (chronic obstructive pulmonary disease) (HCC)- (present on admission)   Assessment & Plan    No exacerbation   Continue PRN albuterol   RT protocol                VTE prophylaxis: Lovenox.    Discussed with multidisciplinary team plan of care.

## 2019-03-26 NOTE — THERAPY
"Pt demonstrating improved balance and gait. Pt able to ambulate short distances. Discussed w/pt option of using a wheelchair for longer distances w/family support, as well as obtaining a hositpal bed and FWW for ease of mobility. Pt plans to d/c home on hospice. Pt appears functionally capable of returning home on hospice w/family support at this time. will discuss w/primary PT. Will follow for d/c needs.    Physical Therapy Treatment completed.   Bed Mobility:  Supine to Sit: Modified Independent  Transfers: Sit to Stand: Supervised  Gait: Level Of Assist: Supervised with Front-Wheel Walker         See \"Rehab Therapy-Acute\" Patient Summary Report for complete documentation.       "

## 2019-03-27 NOTE — PROGRESS NOTES
Tube feeding on hold for 24 hours. To see if patient can tolerate (no vomiting or diarrhea) bolus feedings tomorrow without continuous feed. Patient planning on discharging next Wednesday, April 3.  Hospice will deliver medical equipment to his home next Tuesday on April 2.

## 2019-03-27 NOTE — CARE PLAN
Problem: Bowel/Gastric:  Goal: Normal bowel function is maintained or improved  Dietary consult this afternoon for tube feeds.     Problem: Discharge Barriers/Planning  Goal: Patient's continuum of care needs will be met  CM working with [facility] hospice for DC planning     Problem: Pain Management  Goal: Pain level will decrease to patient's comfort goal  PRN pain medication given for pain management, RN will continue to assess pts pain levels throughout the shift.

## 2019-03-27 NOTE — CARE PLAN
Problem: Communication  Goal: The ability to communicate needs accurately and effectively will improve  Outcome: PROGRESSING AS EXPECTED  Patient updated on POC, all questions answered at this time.    Problem: Pain Management  Goal: Pain level will decrease to patient's comfort goal  Outcome: PROGRESSING AS EXPECTED  Pain being managed with PRN pain medication.

## 2019-03-27 NOTE — PROGRESS NOTES
[facility] Medicine Daily Progress Note    Date of Service  3/27/2019    Chief Complaint  73 y.o. male admitted 2/18/2019 with nausea vomiting, weight loss.    Hospital Course    73-year-old male history of COPD, tobacco use, advanced esophageal cancer with large suspicious right renal mass admitted malignant cachexia, failure to thrive, uncontrolled pain due to cancer and possible pneumonia.    Interval Problem Update    Tube feeds held due to nausea/vomiting.  Patient reports passing gas.   Discharge planning discussed with case management, reports [employer] arranging for DME delivery.    Consultants/Specialty  Palliative care  Dr. Page, oncology  Radiation oncology Dr. Mathew  Infectious disease Dr. Dougherty    Code Status  DNR    Disposition  Arrangements for discharge home with family with hospice underway    Review of Systems  Review of Systems   Constitutional: Positive for malaise/fatigue and weight loss. Negative for chills, diaphoresis and fever.   HENT: Negative for congestion.    Eyes: Negative for discharge and redness.   Respiratory: Negative for cough, shortness of breath, wheezing and stridor.    Cardiovascular: Positive for leg swelling. Negative for chest pain and palpitations.   Gastrointestinal: Positive for abdominal pain, nausea and vomiting. Negative for diarrhea.   Genitourinary: Negative for flank pain and hematuria.   Musculoskeletal: Negative for back pain, joint pain and neck pain.   Neurological: Positive for weakness. Negative for tremors, sensory change, speech change and focal weakness.        Physical Exam  Temp:  [36.1 °C (97 °F)-36.8 °C (98.3 °F)] 36.1 °C (97 °F)  Pulse:  [60-72] 63  Resp:  [17-18] 18  BP: (136-157)/(68-74) 142/71  SpO2:  [92 %-96 %] 93 %    Physical Exam   Constitutional: He is oriented to person, place, and time. No distress.   Cachectic, awake, slow with responses, appropriate oriented   HENT:   Head: Normocephalic and atraumatic.   Nose: Nose normal.    Bitemporal wasting   Eyes: Conjunctivae and EOM are normal. No scleral icterus.   Neck: Normal range of motion.   Cardiovascular: Normal rate and regular rhythm.    No murmur heard.  Pulmonary/Chest: Effort normal. No stridor. No respiratory distress. He has no wheezes. He has no rales.   Abdominal: Soft. Bowel sounds are normal. He exhibits no distension. There is tenderness (Mild).   G-tube present   Musculoskeletal: He exhibits edema. He exhibits no tenderness.   Generalized 4/5 strength   Neurological: He is alert and oriented to person, place, and time.   No gross focal weakness    Skin: Skin is warm and dry. He is not diaphoretic. No pallor.   Vitals reviewed.      Fluids    Intake/Output Summary (Last 24 hours) at 03/27/19 1541  Last data filed at 03/27/19 1500   Gross per 24 hour   Intake              150 ml   Output             2000 ml   Net            -1850 ml       Laboratory                        Imaging  DX-CHEST-2 VIEWS   Final Result         Patchy left basilar opacity, worse than prior exam, concerning for worsening infection.      DX-CHEST-PORTABLE (1 VIEW)   Final Result         1. Patchy left basilar opacities, atelectasis versus consolidation.      2. Free intraperitoneal air could relate to recent surgery. Correlate clinically.      CT-ABDOMEN-PELVIS WITH   Final Result      No evidence of right perinephric hematoma or hydronephrosis status post right renal biopsy.      Clot identified in the urinary bladder.      Anasarca.      Distal esophageal mass with lymphadenopathy.      CT-NEEDLE BX-RENAL   Final Result      Successful CT-guided core biopsy of right renal mass.      CT-ABDOMEN-PELVIS WITH   Final Result      1.  Again seen thickening of the distal esophagus likely related to the patient's known esophageal cancer in that area. There is extensive soft tissue mass/adenopathy within the gastrohepatic, periportal, and retroperitoneal regions which does encase the    celiac and superior  mesenteric arteries. This is not significantly changed over short interval follow-up.      2.  Again seen large right renal mass which measures 9 x 7 cm in size consistent with either renal cell carcinoma or metastatic disease. There is again seen likely some invasion of the right renal vein.      3.  Emphysematous and bullous change of the lungs with bibasilar atelectasis and small bilateral pleural effusions.      4.  Moderate constipation.      DX-CHEST-2 VIEWS   Final Result         1.  Hazy bilateral lung base infiltrates, greater on the left, new since prior.   2.  Small bilateral pleural effusions.   3.  Hyperexpansion of lungs favors changes of COPD.      US-EXTREMITY VENOUS UPPER UNILAT LEFT   Final Result           Assessment/Plan  * Dysphagia- (present on admission)   Assessment & Plan    PEG tube, underlying esophageal malignancy with recurrent nausea/vomiting  Patient with recurrent nausea/vomiting.  Hold tube feeds, resume low rate with improved symptoms.  PRN antiemetics.    Start IV fluids  Monitor for electrolyte deficiency.  Check x-ray abdomen advised for ileus.     Cancer associated pain- (present on admission)   Assessment & Plan    Abdominal pain with nausea/vomiting.   Check x-ray abdomen evaluate for ileus.  Start trickle tube feeds when improved symptoms.  PRN IV antiemetics.  Continue with fentanyl patch, PRN oxycodone  Monitoring for adverse effects related to the use of narcotics  Palliative following, appreciate recommendations          Esophageal cancer (HCC)- (present on admission)   Assessment & Plan    Patient received palliative radiation therapy but did not tolerate.  Previously was to have follow-up with oncology  Overall poor prognosis, failure to thrive with underlying malignancy, poor functional status  Try to mobilize.  Did not tolerate radiation therapy.  Anticipate DC home with hospice     ACP (advance care planning)   Assessment & Plan    Patient with advanced age, poor  functional performance, metastatic esophageal cancer, and renal cell cancer.    In addition, the patient has a number of comorbid conditions including chronic obstructive pulmonary disease, deep vein thrombosis and dysphagia.    Plan for discharge home with hospice.  [facility] arranging for home DME.       Deep venous thrombosis (HCC)- (present on admission)   Assessment & Plan    In the setting of underlying malignancy   Patient concerned that he had allergic reaction to Eliquis     The patient wanted to go with hospice, he wanted to continue on Lovenox injections for now.    Family to meet with hospice and we will have further discussion regarding level of care and continue treatment for DVT     Renal cell carcinoma of right kidney (HCC)- (present on admission)   Assessment & Plan    With gastrohepatic, periportal, aortocaval and retroperitoneal raleigh metastases.  Does not appear that the patient will tolerate chemotherapy or radiation  Patient to be discharged on hospice.     Severe protein-calorie malnutrition (HCC)- (present on admission)   Assessment & Plan    Nutrition consulted          Lower extremity edema- (present on admission)   Assessment & Plan    Improving  Caution with Lasix  Monitor for signs of dehydration.     Rash- (present on admission)   Assessment & Plan    Nearly resolved, monitor, was present bilateral lower extremities       Essential hypertension- (present on admission)   Assessment & Plan    Controlled  Continue lisinopril, Norvasc  PRN hydralazine       Constipation due to pain medication therapy- (present on admission)   Assessment & Plan    Continue bowel protocol           Tobacco abuse- (present on admission)   Assessment & Plan    Patient counseled and educated regarding cessation        COPD (chronic obstructive pulmonary disease) (HCC)- (present on admission)   Assessment & Plan    No exacerbation   Continue PRN albuterol   RT protocol                VTE prophylaxis:  Lovenox.    Discussed with case management plan of care.

## 2019-03-27 NOTE — DIETARY
"Nutrition services: Brief Update Day 37 of admit.  Justus Barnard is a 73 y.o. male with admitting DX of Esophageal CA.       Pt with TF off x 24 hours and to start back again at 3pm today 2' to prior intolerance of bolus feedings. Pt with G-J tube, though only utilizing the G portion of tube for feeding and medications. J portion of the tube is not being utilized and confirmed by RN. [facility] unable to accommodate continuous TF and needs bolus TF.     Spoke with RN on floor regarding resuming TF order and plan to start 100mL at first feeding and increasing 50-100mL per bolus following until goal rate achieved.    Current TF recommendation of Isosource 1.5 5 containers daily providing 1875 susana, 85g protein and 950mL free water with suggested schedule of 2 containers at Breakfast, 1 at Lunch and 2 @ dinner with additional 1000mL free water.     Assessment:  Height: 167.6 cm (5' 5.98\")  Weight: 60.1 kg (132 lb 7.9 oz)  Weight to Use in Calculations: 60.1 kg (132 lb 7.9 oz)  Ideal Body Weight: 64.4 kg (142 lb)  Percent Ideal Body Weight: 98.6  Body mass index is 21.4 kg/m².   NPO with TF     Evaluation:   Meds:Decadron, Cymbalta, Fentanyl, Miralax 1 packet QD, Senna   Fluids: No IVF at this time and recommended additional free water per day of 1000mL   Skin: Incision and skin tear noted    GI/: Pt given first bolus of 100mL at 1500 today and last BM 3/26 with miralax QD   Labs: WBC 13.3 Hgb 9.4 Na 134 BUN 23 Ca 8.2 Alb 2.6 Pre-Albumin 20.0 stat CRP 1.12   Plan for home with hospice though hospital equipment wont be delivered to pt's home until 4/2/2019   J-tube clamped and G being utilized     Malnutrition Risk: Per RD note on 2/26 Patient with severe malnutrition in the context of chronic disease related malnutrition r/t hypermetabolic disease state, as evidenced by severe muscle and fat loss, need for nutrition support solely.      Recommendations/Plan:  1. Resume slow return to bolus feeds as " tolerated for home. Standard formula remains appropriate; available standard formula is Isosource 1.5. Recommend 5 containers (250 ml each) of Isosource 1.5 per day to provide 1875 kcal, 85 grams protein, and 950 ml free water per day.  2. Adjusting previously recommended schedule to 1 container q 4 hours while awake: 1 @ 0600, 1 @ 1000, 1 @  1400, 1 @ 1800 and then 1 @ 2200.   3. Currently starting feeding at 100mL and increasing 50-100mL per feeding as tolerated.   4. In addition to TF, recommend ~1000 ml free water per day or 250mL q 4 hours while awake.   5. Recommend referral for home health/infusion services to provide/manage home TF.

## 2019-03-27 NOTE — PROGRESS NOTES
Received report, assumed pt care. Pt a&o 4, VSS, Assessment completed. Resting comfortably in chair with call light, bedside table in reach. No c/o at this time. Instructed to use call light when needing to get OOC verbalized understanding. Bed in low position. Will continue to monitor.

## 2019-03-27 NOTE — PROGRESS NOTES
Palliative Care   Rounded to assist patient with POLST x 2.  Patient had friend visiting.  Will [place] back tomorrow.     DAY Robertson.  Palliative Care Nurse Practitioner  [phone]

## 2019-03-27 NOTE — PROGRESS NOTES
"Pt A&O x 4. Very fatigued upon assessment.     Vitals: /74   Pulse 63   Temp 36.8 °C (98.3 °F) (Temporal)   Resp 18   Ht 1.676 m (5' 5.98\")   Wt 60.1 kg (132 lb 7.9 oz)   SpO2 95%   BMI 21.40 kg/m²      Pt rates pain 6 out of 10. Medicated per MAR.      Neuro: VASQUEZ. Denies new onset of numbness/ tingling.     Cardiac: Denies new onset of chest pain.     Vascular: Pulses 1+ BUE, BLE. No edema noted.     Respiratory: Lungs sound diminished with rhonchi to auscultation. + thick green sputum, + productive cough. Pulling 750 on IS, effective, needs coaching. On room air.  on, satting in 90's. Denies SOB at rest.     GI: Abdomen soft, non-tender. Hypoactive bowel sounds, + flatus, + BM. Denies nausea/ vomiting. Tube feed is being held for 24 hours due to intolerance with associated nausea. G-J tube in place.     : Pt voiding adequately.      MSK: Pt up to bathroom with one assist, tolerating well.     Integumentary: LUQ G-J tube, both clamped, dressing in place, CDI. Sacral redness, blanching.      Labs noted.     Fall precautions in place: Bed locked in lowest position, Upper bed rails up, treaded socks in place, personal belongings within reach, call light within reach, appropriate mobility signs in place, - bed alarm, patient refusing despite education.     Pt updated on POC.    "

## 2019-03-28 NOTE — PROGRESS NOTES
Received bedside report and assumed care at 1900    Pt is A&O x4  Pain 7/10, medicated per MAR  Denies nausea  Isosource 1.5 5x/day through G tube  + Urine Output  + Flatus  + BM per pt  Up x1 assistance  Bed in lowest position and locked.  Reviewed plan of care with patient, pt resting comfortably now, call light within reach, all needs met at this time

## 2019-03-28 NOTE — PROGRESS NOTES
[facility] Medicine Daily Progress Note    Date of Service  3/28/2019    Chief Complaint  73 y.o. male admitted 2/18/2019 with nausea vomiting, weight loss.    Hospital Course    73-year-old male history of COPD, tobacco use, advanced esophageal cancer with large suspicious right renal mass admitted malignant cachexia, failure to thrive, uncontrolled pain due to cancer and possible pneumonia.    Interval Problem Update    Reports nausea/vomiting improved.  Continuous  tube feeds off and was getting bolus tube feeds through J-tube.     X-ray of the abdomen:   1.  Air-fluid levels in small bowel and colonic loops, consistent with history of diarrhea.  2.  Patient was unable to tolerate supine images. Limited assessment for small bowel dilatation.    Consultants/Specialty  Palliative care  Dr. Page, oncology  Radiation oncology Dr. Mathew  Infectious disease Dr. Dougherty    Code Status  DNR    Disposition  Arrangements for discharge home with family with hospice underway    Review of Systems  Review of Systems   Constitutional: Positive for malaise/fatigue and weight loss. Negative for chills, diaphoresis and fever.   HENT: Negative for congestion.    Eyes: Negative for discharge and redness.   Respiratory: Negative for cough, shortness of breath, wheezing and stridor.    Cardiovascular: Negative for chest pain and palpitations.   Gastrointestinal: Positive for abdominal pain, nausea and vomiting. Negative for diarrhea.        Improved   Genitourinary: Negative for flank pain and hematuria.   Musculoskeletal: Negative for back pain, joint pain and neck pain.   Neurological: Positive for weakness. Negative for sensory change, speech change and focal weakness.        Physical Exam  Temp:  [36.2 °C (97.1 °F)-36.9 °C (98.5 °F)] 36.4 °C (97.6 °F)  Pulse:  [61-77] 65  Resp:  [16-17] 17  BP: (117-149)/(60-71) 143/65  SpO2:  [91 %-94 %] 91 %    Physical Exam   Constitutional: He is oriented to person, place, and time. No  distress.   Cachectic, awake, slow with responses, appropriate oriented   HENT:   Head: Normocephalic and atraumatic.   Nose: Nose normal.   Bitemporal wasting   Eyes: Conjunctivae and EOM are normal. No scleral icterus.   Neck: Normal range of motion.   Cardiovascular: Normal rate and regular rhythm.    No murmur heard.  Pulmonary/Chest: Effort normal. No stridor. No respiratory distress. He has no wheezes. He has no rales.   Abdominal: Soft. Bowel sounds are normal. He exhibits no distension. There is tenderness (Mild).   G-tube present   Musculoskeletal: He exhibits edema. He exhibits no tenderness.   Generalized 4/5 strength   Neurological: He is alert and oriented to person, place, and time.   No gross focal weakness    Skin: Skin is warm and dry. He is not diaphoretic. No pallor.   Vitals reviewed.      Fluids    Intake/Output Summary (Last 24 hours) at 03/28/19 1553  Last data filed at 03/28/19 1250   Gross per 24 hour   Intake             2290 ml   Output             1125 ml   Net             1165 ml       Laboratory      Recent Labs      03/28/19   0339   SODIUM  133*   POTASSIUM  4.8   CHLORIDE  99   CO2  28   GLUCOSE  94   BUN  30*   CREATININE  0.62   CALCIUM  8.3*                   Imaging  [id number] VIEW   Final Result      1.  Air-fluid levels in small bowel and colonic loops, consistent with history of diarrhea.   2.  Patient was unable to tolerate supine images. Limited assessment for small bowel dilatation.      DX-CHEST-2 VIEWS   Final Result         Patchy left basilar opacity, worse than prior exam, concerning for worsening infection.      DX-CHEST-PORTABLE (1 VIEW)   Final Result         1. Patchy left basilar opacities, atelectasis versus consolidation.      2. Free intraperitoneal air could relate to recent surgery. Correlate clinically.      CT-ABDOMEN-PELVIS WITH   Final Result      No evidence of right perinephric hematoma or hydronephrosis status post right renal biopsy.      Clot  identified in the urinary bladder.      Anasarca.      Distal esophageal mass with lymphadenopathy.      CT-NEEDLE BX-RENAL   Final Result      Successful CT-guided core biopsy of right renal mass.      CT-ABDOMEN-PELVIS WITH   Final Result      1.  Again seen thickening of the distal esophagus likely related to the patient's known esophageal cancer in that area. There is extensive soft tissue mass/adenopathy within the gastrohepatic, periportal, and retroperitoneal regions which does encase the    celiac and superior mesenteric arteries. This is not significantly changed over short interval follow-up.      2.  Again seen large right renal mass which measures 9 x 7 cm in size consistent with either renal cell carcinoma or metastatic disease. There is again seen likely some invasion of the right renal vein.      3.  Emphysematous and bullous change of the lungs with bibasilar atelectasis and small bilateral pleural effusions.      4.  Moderate constipation.      DX-CHEST-2 VIEWS   Final Result         1.  Hazy bilateral lung base infiltrates, greater on the left, new since prior.   2.  Small bilateral pleural effusions.   3.  Hyperexpansion of lungs favors changes of COPD.      US-EXTREMITY VENOUS UPPER UNILAT LEFT   Final Result           Assessment/Plan  * Dysphagia- (present on admission)   Assessment & Plan    PEG tube, underlying esophageal malignancy with recurrent nausea/vomiting-x-ray no obstruction.  Avoid bolus feeding through J-tube.  Continue with G-tube feeds  PRN antiemetics.    IV fluid support until can consistently tolerate feeds         Cancer associated pain- (present on admission)   Assessment & Plan    Abdominal pain with nausea/vomiting.  Patient was receiving bolus feeds through the J-tube.  Nursing staff was instructed to use G-tube for larger volume feeds and avoid bolus with J-tube  Hold off on continuous tube feeding through pump.  Instruct bolus tube feeds to patient.  PRN IV  antiemetics.  Continue with fentanyl patch, PRN oxycodone  Monitoring for adverse effects related to the use of narcotics  Palliative following, appreciate recommendations          Esophageal cancer (HCC)- (present on admission)   Assessment & Plan    Patient received palliative radiation therapy but did not tolerate.  Previously was to have follow-up with oncology  Overall poor prognosis, failure to thrive with underlying malignancy, poor functional status  Try to mobilize.  Did not tolerate radiation therapy.  Anticipate DC home with hospice     ACP (advance care planning)   Assessment & Plan    Patient with advanced age, poor functional performance, metastatic esophageal cancer, and renal cell cancer.    In addition, the patient has a number of comorbid conditions including chronic obstructive pulmonary disease, deep vein thrombosis and dysphagia.    Plan for discharge home with hospice.  [facility] hospice arranging for home DME.       Deep venous thrombosis (HCC)- (present on admission)   Assessment & Plan    In the setting of underlying malignancy   Patient concerned that he had allergic reaction to Eliquis     The patient wanted to go with hospice, he wanted to continue on Lovenox injections for now.    Family to meet with hospice and we will have further discussion regarding level of care and continue treatment for DVT     Renal cell carcinoma of right kidney (HCC)- (present on admission)   Assessment & Plan    With gastrohepatic, periportal, aortocaval and retroperitoneal raleigh metastases.  Does not appear that the patient will tolerate chemotherapy or radiation  Patient to be discharged on hospice.     Severe protein-calorie malnutrition (HCC)- (present on admission)   Assessment & Plan    Nutrition consulted          Lower extremity edema- (present on admission)   Assessment & Plan    Improving  Caution with Lasix  Monitor for signs of dehydration.     Rash- (present on admission)   Assessment & Plan     Nearly resolved, monitor, was present bilateral lower extremities       Essential hypertension- (present on admission)   Assessment & Plan    Controlled  Continue lisinopril, Norvasc  PRN hydralazine       Constipation due to pain medication therapy- (present on admission)   Assessment & Plan    Continue bowel protocol           Tobacco abuse- (present on admission)   Assessment & Plan    Patient counseled and educated regarding cessation        COPD (chronic obstructive pulmonary disease) (HCC)- (present on admission)   Assessment & Plan    No exacerbation   Continue PRN albuterol   RT protocol                VTE prophylaxis: Lovenox.    Discussed with case management plan of care.

## 2019-03-28 NOTE — THERAPY
Speech Therapy Contact Note    Per MD ([facility]), swallow tx is not necessary as pt is going home on hospice. SLP to remain available for any acute or D/C needs.

## 2019-03-28 NOTE — PROGRESS NOTES
"Palliative Progress Note               Author: Essie Pandey Date & Time created: 3/28/2019  11:07 AM     Reason for subsequent visit: Cancer related esophageal pain and chronic low back pain    Interval History:  No acute events overnight.  Patient reports he has been sleeping better over the last few days.  Patient denies esophageal pain currently.  He does report he feels that his voice has changed slightly and he gets short of breath at times.  He states that he has muscular tension in his upper back and neck and attributes this to not being able to sleep flat in bed.  He has been sleeping in his recliner chair.  Patient reports some mild abdominal pain and distention \"they were overfeeding me.\"  He is making the transition from pump feeding to bolus feeding and asked \"is the reason I am not continuing the pump at home.\"  Patient continues to have soft loose stools.    Patient inquired about following a few bites of sherbet ice cream when his wife visits for satiety.    Review of Systems:  Positive for dyspnea (on exertion). Positive for pain. Negative for anorexia. Positive for fatigue. Negative for sedation. Negative for anxiety. Negative for insomnia. Negative for diarrhea. Positive for nausea and vomiting (nausea without vomiting; +hiccups). Negative for constipation. Positive for dysphagia. Positive for odynophagia.      Physical Exam:    Recent vital signs  Temp (24hrs), Av.6 °C (97.9 °F), Min:36.2 °C (97.1 °F), Max:36.9 °C (98.5 °F)  Temperature: 36.8 °C (98.2 °F)  Pulse  Av.8  Min: 60  Max: 101   Blood Pressure : 120/60       Physical Exam   Constitutional: He is oriented to person, place, and time. He appears cachectic. No distress.   HENT:   Mouth/Throat: No oropharyngeal exudate (dry tongue).   Cardiovascular: Normal rate and normal heart sounds.    Pulmonary/Chest: He has decreased breath sounds in the right upper field, the right middle field, the right lower field and the left lower " field. He has rhonchi (inspiratory/faint) in the left upper field and the left middle field.   Abdominal: Soft. He exhibits no distension. Bowel sounds are increased.   PEG tube with enteral feeds via pump   Musculoskeletal: He exhibits edema (trace BLE).   Neurological: He is alert and oriented to person, place, and time. Disoriented: time (thought it was night but reoriented easily)   Skin: Skin is dry. There is pallor.   Psychiatric: He has a normal mood and affect. His speech is normal and behavior is normal. Judgment and thought content normal. Cognition and memory are normal.   Nursing note and vitals reviewed.    Medications reviewed.     Problem List:  1.  Cancer related esophogeal pain (stable)  2.  Low back pain status post diskectomy of two vertebra (active)  3.  Constipation (active)  4.  Protein calorie malnutrition (active) - enteral feeds  5.  Dysphagia and odynophagia (active) - G-tube  6.  Insomnia (active)  7.  Depressed mood with mixed anxiety (active)  8.  Stage IV metastatic esophageal carcinoma (active)   9.  Renal cell carcinoma of the right kidney s/p right nephrectomy (stable) - surveillance per oncology  10.  Deep venous thrombosis (stable) - on therapeutic Lovenox   11.  Bilateral lower extremity edema (active) - continue lasix per primary team  12.  COPD (stable) - RT protocol in place  13.  Tobacco abuse (chronic) - patient was counseled by primary team   14.  Rash (nearly resolved)  15.  Essential hypertension (stable) - taking amlodipine  16.  Muscle tension (active)  17.  Abdominal discomfort/distention with singultus (active)    Assessment/Plan:  Cancer related esophogeal pain   MEDD (morphine equivalent daily dose) is approximately 240 mg:  -fentanyl transdermal patch 75 mcg = 150 mg MEDD  -oxycodone 15 mg x 4 doses = 16 mg = 464 mg MEDD     Continue current opioid regimen for esophageal pain  See addition of opioid adjuncts under low back pain recommendations  Consider changing  continuous opioid analgesic from fentanyl patch to methadone if dose escalation required; patient has poor body habitus     Low back pain status post diskectomy of two vertebra  Continue gabapentin to 250 mg to TID via G-tube; patient did not want to increase  Continue dexamethasone 4 mg via G-tube BID (avoid right before bed)  Continue duloxetine 20 mg daily via G-tube by hospitalist  Addition of baclofen may assist    Muscle tension  Start baclofen 5 mg p.o. 3 times daily as needed muscle tension/spasms     Abdominal discomfort/distention with singultus  Continue transition from enteral pump to bolus feedings  Discussed strategies with patient to decrease abdominal distention  Encouraged patient to take baclofen for hiccups  Education provided about the benefit of bolus feeding at home over pump    Dysphagia and odynophagia   Discussed eating for satiety but understanding the risks of aspiration regarding patient's question about eating sherbet ice cream    Constipation  Continue docusate-senna 2 tabs twice daily to prevent constipation  Change Miralax back to daily   Last bowel movement 3/27/19     Stage IV metastatic esophageal carcinoma  Patient has been accepted and is planning discharge with [facility] to his home in [place] on 4/to/19 when medical equipment can be delivered     Insomnia  Continue Ambien 10 mg p.o. as needed at bedtime     Depressed mood with mixed anxiety  Continue duloxetine 20 mg for pain and depression properties  Continue alprazolam 0.5 mg daily      Code Status: DNR/DNI    Advance Care Planning/Current Goals of Care: Assisted patient with completion of new NAFISA [employer] form based on patient's current wishes: DNR/allow natural death, comfort focused treatment to be initiated when patient discharges with hospice, trial of artificial nutrition for nutrition and satiety.  All questions answered.  Patient declined transitioning to comfort care while in the hospital as he would like to  "\"keep everything the same until I go.  17 minutes was spent discussing advanced care planning and assisting with NAFISA ST form completion.    25 minutes spent with greater than 50% spent counseling and coordinating the patient's care regarding pain and symptom management plan.   Please refer to HPI and assessment/plan for details.     Thank you for allowing the palliative care team to participate in this patient's care. Please call with any questions/needs.  Patient was encouraged to call palliative care with any further needs.  Business card is at bedside.    DAY Robertson.  Palliative Care Nurse Practitioner  [phone]      "

## 2019-03-28 NOTE — CARE PLAN
Problem: Knowledge Deficit  Goal: Knowledge of the prescribed therapeutic regimen will improve  Outcome: PROGRESSING AS EXPECTED  Medications explained prior to administration. Patient verbalizes understanding.

## 2019-03-28 NOTE — PROGRESS NOTES
Report received from RN, assumed care at 0700  Pt is A0X4, and responds appropriately   Pt declines any SOB, chest pain, new onset of numbness/ tingiling  Pt rates pain at 7/10, on a scale of 1-10, pt medicated per MAR  Pt is voiding adequatly and without hesitancy  Pt has + flatus, + bowel sounds, + BM on 3/27/2019 per pt   Pt ambulates with a x1 assist   Pt is NPO, pt has some nausea/vomiting  Pt has a LLQ G/J tube in place        Plan of care discussed, all questions answered. Explained importance of calling before getting OOB and pt verbalizes understanding. Explained importance of oral care. Call light is within reach, treaded slipper socks on, bed in lowest/ locked position, hourly rounding in place, all needs met at this time

## 2019-03-28 NOTE — PROGRESS NOTES
Per pt when he was down at x-ray he had ABD pain and a  BM that consisted of all the TF that was given to pt prior to x-ray. However RN did not witness BM.

## 2019-03-29 NOTE — PROGRESS NOTES
[facility] Medicine Daily Progress Note    Date of Service  3/29/2019    Chief Complaint  73 y.o. male admitted 2/18/2019 with nausea vomiting, weight loss.    Hospital Course    73-year-old male history of COPD, tobacco use, advanced esophageal cancer with large suspicious right renal mass admitted malignant cachexia, failure to thrive, uncontrolled pain due to cancer and possible pneumonia.    Interval Problem Update    Receiving slow bolus feeding through J-tube, decreased nausea without vomiting.  Sodium declining with azotemia      Consultants/Specialty  Palliative care  Dr. Page, oncology  Radiation oncology Dr. Mathew  Infectious disease Dr. Dougherty    Code Status  DNR    Disposition  Arrangements for discharge home with family with hospice underway    Review of Systems  Review of Systems   Constitutional: Positive for malaise/fatigue and weight loss. Negative for chills, diaphoresis and fever.   HENT: Negative for congestion.    Respiratory: Negative for cough, shortness of breath, wheezing and stridor.    Cardiovascular: Negative for chest pain and palpitations.   Gastrointestinal: Positive for abdominal pain, nausea and vomiting. Negative for diarrhea.   Musculoskeletal: Negative for back pain, joint pain and neck pain.   Neurological: Positive for weakness. Negative for sensory change, speech change and focal weakness.        Physical Exam  Temp:  [36.2 °C (97.2 °F)-36.4 °C (97.6 °F)] 36.3 °C (97.3 °F)  Pulse:  [62-66] 66  Resp:  [16-17] 17  BP: (126-143)/(65-73) 138/73  SpO2:  [93 %-95 %] 93 %    Physical Exam   Constitutional: He is oriented to person, place, and time. No distress.   Cachectic   HENT:   Head: Normocephalic and atraumatic.   Nose: Nose normal.   Bitemporal wasting   Eyes: Conjunctivae and EOM are normal. No scleral icterus.   Neck: Normal range of motion.   Cardiovascular: Normal rate and regular rhythm.    No murmur heard.  Pulmonary/Chest: No stridor. He has no wheezes. He has no  rales.   Abdominal: Soft. Bowel sounds are normal. He exhibits no distension. There is tenderness (Mild).   G-tube present   Musculoskeletal: He exhibits edema. He exhibits no tenderness.   Neurological: He is alert and oriented to person, place, and time.   No gross focal weakness    Skin: He is not diaphoretic.   Vitals reviewed.      Fluids    Intake/Output Summary (Last 24 hours) at 03/29/19 1448  Last data filed at 03/29/19 1200   Gross per 24 hour   Intake             3112 ml   Output              900 ml   Net             2212 ml       Laboratory      Recent Labs      03/28/19   0339  03/29/19   0238   SODIUM  133*  131*   POTASSIUM  4.8  4.4   CHLORIDE  99  101   CO2  28  25   GLUCOSE  94  108*   BUN  30*  29*   CREATININE  0.62  0.57   CALCIUM  8.3*  7.9*                   Imaging  [id number] VIEW   Final Result      1.  Air-fluid levels in small bowel and colonic loops, consistent with history of diarrhea.   2.  Patient was unable to tolerate supine images. Limited assessment for small bowel dilatation.      DX-CHEST-2 VIEWS   Final Result         Patchy left basilar opacity, worse than prior exam, concerning for worsening infection.      DX-CHEST-PORTABLE (1 VIEW)   Final Result         1. Patchy left basilar opacities, atelectasis versus consolidation.      2. Free intraperitoneal air could relate to recent surgery. Correlate clinically.      CT-ABDOMEN-PELVIS WITH   Final Result      No evidence of right perinephric hematoma or hydronephrosis status post right renal biopsy.      Clot identified in the urinary bladder.      Anasarca.      Distal esophageal mass with lymphadenopathy.      CT-NEEDLE BX-RENAL   Final Result      Successful CT-guided core biopsy of right renal mass.      CT-ABDOMEN-PELVIS WITH   Final Result      1.  Again seen thickening of the distal esophagus likely related to the patient's known esophageal cancer in that area. There is extensive soft tissue mass/adenopathy within the  gastrohepatic, periportal, and retroperitoneal regions which does encase the    celiac and superior mesenteric arteries. This is not significantly changed over short interval follow-up.      2.  Again seen large right renal mass which measures 9 x 7 cm in size consistent with either renal cell carcinoma or metastatic disease. There is again seen likely some invasion of the right renal vein.      3.  Emphysematous and bullous change of the lungs with bibasilar atelectasis and small bilateral pleural effusions.      4.  Moderate constipation.      DX-CHEST-2 VIEWS   Final Result         1.  Hazy bilateral lung base infiltrates, greater on the left, new since prior.   2.  Small bilateral pleural effusions.   3.  Hyperexpansion of lungs favors changes of COPD.      US-EXTREMITY VENOUS UPPER UNILAT LEFT   Final Result           Assessment/Plan  * Dysphagia- (present on admission)   Assessment & Plan    PEG tube, underlying esophageal malignancy with recurrent nausea/vomiting-x-ray no obstruction.  Continue with slow  volume J-tube feeds.  Instruct bolus feeding.   Hold continuous tube feeds  PRN antiemetics.    IV fluid support until can consistently tolerate feeds         Cancer associated pain- (present on admission)   Assessment & Plan    Abdominal pain with nausea/vomiting.  Patient was receiving bolus feeds through the J-tube.  Nursing staff was instructed to use G-tube for larger volume feeds and avoid bolus with J-tube  Hold off on continuous tube feeding through pump.  Instruct bolus tube feeds to patient.  PRN IV antiemetics.  Continue with fentanyl patch, PRN oxycodone  Monitoring for adverse effects related to the use of narcotics  Palliative following, appreciate recommendations          Esophageal cancer (HCC)- (present on admission)   Assessment & Plan    Patient received palliative radiation therapy but did not tolerate.  Previously was to have follow-up with oncology  Overall poor prognosis, failure to  thrive with underlying malignancy, poor functional status  Try to mobilize.  Did not tolerate radiation therapy.  Anticipate DC home with hospice  Pain control.     ACP (advance care planning)   Assessment & Plan    Patient with advanced age, poor functional performance, metastatic esophageal cancer, and renal cell cancer.    In addition, the patient has a number of comorbid conditions including chronic obstructive pulmonary disease, deep vein thrombosis and dysphagia.    Plan for discharge home with hospice.  [facility] arranging for home DME--still awaiting DME delivery       Deep venous thrombosis (HCC)- (present on admission)   Assessment & Plan    In the setting of underlying malignancy   Patient concerned that he had allergic reaction to Eliquis   The patient wanted to go with hospice, he wanted to continue on Lovenox injections for now.    Family to meet with hospice and we will have further discussion regarding level of care and continue treatment for DVT     Renal cell carcinoma of right kidney (HCC)- (present on admission)   Assessment & Plan    With gastrohepatic, periportal, aortocaval and retroperitoneal [place] metastases.  Does not appear that the patient will tolerate chemotherapy or radiation  Patient to be discharged on hospice.     Severe protein-calorie malnutrition (HCC)- (present on admission)   Assessment & Plan    Nutrition consulted          Lower extremity edema- (present on admission)   Assessment & Plan    Improving  Worsening azotemia and low sodium.  Concern for hypovolemia-DC Lasix       Rash- (present on admission)   Assessment & Plan    Nearly resolved, monitor, was present bilateral lower extremities       Essential hypertension- (present on admission)   Assessment & Plan    Controlled  Continue lisinopril, Norvasc  PRN hydralazine       Constipation due to pain medication therapy- (present on admission)   Assessment & Plan    Continue bowel protocol           Tobacco  abuse- (present on admission)   Assessment & Plan    Patient counseled and educated regarding cessation        COPD (chronic obstructive pulmonary disease) (HCC)- (present on admission)   Assessment & Plan    No exacerbation   Continue PRN albuterol   RT protocol                VTE prophylaxis: Lovenox.    Discussed with case management plan of care.

## 2019-03-29 NOTE — CARE PLAN
Problem: Nutritional:  Goal: Nutrition support tolerated and meeting greater than 85% of estimated needs  Bolus feeds   Outcome: PROGRESSING AS EXPECTED  Pt gradually increasing and Last 2 bolus TF have been at goal of 1 container. Pt gradually increased from 100mL to 200mL, down to 120mL per request and then recently to goal of 1 container per feeding. Per MD note 3/28, pt reports N/V has improved. Palliative noted on 3/28, pt declined transitioning to Comfort Care in acute.     Spoke with RN for pt today. Suspected (and noted in MD progress note 3/28), pt had been given boluses via J-tube and was not tolerating (only continuous via J) and now that TF bolus via G part of the G-J tube, tolerating better. Previously in chart J reported as sluggish and clogging and [facility] on able to handle bolus tube feeding.     RD following.

## 2019-03-29 NOTE — PROGRESS NOTES
Received bedside report and assumed care at 1900    Pt is A&O x4  Pain 7/10, medicated per MAR  Denies nausea  Isosource 1.5 5x/day through G tube  + Urine Output  + Flatus  LBM 3/27  Up x1 assistance to wheelchair  Bed in lowest position and locked.  Reviewed plan of care with patient, pt resting comfortably now, call light within reach, all needs met at this time

## 2019-03-30 NOTE — CARE PLAN
Problem: Safety  Goal: Will remain free from falls  Outcome: PROGRESSING AS EXPECTED  Call light within reach. Bed in lowest and locked position.

## 2019-03-30 NOTE — PROGRESS NOTES
Bedside report received.  Assessment complete.  A&O x 4. Patient calls appropriately.  Patient up with one assist and FWW.   Patient has 10/10 pain. Pain medication given. Gas-x added for stomach pain  Denies N&V. Tolerating tube feeding, 5x/day diet.  Surgical G/J tube is CDI. Mepilex on sacrum CDI. Fentanyl patch on upper back  + void, + flatus, + BM.  Patient denies SOB.  Patient in pleasant mood, will continue to monitor and will ambulate.  Review plan with of care with patient. Call light and personal belongings with in reach. Hourly rounding in place. All needs met at this time.

## 2019-03-30 NOTE — PROGRESS NOTES
[facility] Medicine Daily Progress Note    Date of Service  3/30/2019    Chief Complaint  73 y.o. male admitted 2/18/2019 with nausea vomiting, weight loss.    Hospital Course    73-year-old male history of COPD, tobacco use, advanced esophageal cancer with large suspicious right renal mass admitted malignant cachexia, failure to thrive, uncontrolled pain due to cancer and possible pneumonia.    Interval Problem Update    Complaint of bloating , abdominal pain with J-tube bolus feeds.   Reports abdominal pain uncontrolled.    Consultants/Specialty  Palliative care  Dr. Page, oncology  Radiation oncology Dr. Mathew  Infectious disease Dr. Dougherty    Code Status  DNR    Disposition  Arrangements for discharge home with family with hospice underway    Review of Systems  Review of Systems   Constitutional: Positive for malaise/fatigue and weight loss. Negative for chills, diaphoresis and fever.   Respiratory: Negative for cough, shortness of breath and stridor.    Cardiovascular: Negative for chest pain, palpitations and leg swelling.   Gastrointestinal: Positive for abdominal pain, diarrhea and nausea. Negative for vomiting.   Musculoskeletal: Negative for back pain, joint pain and neck pain.   Neurological: Positive for weakness. Negative for sensory change, speech change and focal weakness.        Physical Exam  Temp:  [36.2 °C (97.2 °F)-36.6 °C (97.8 °F)] 36.4 °C (97.6 °F)  Pulse:  [66-74] 66  Resp:  [16-18] 16  BP: (121-143)/(57-69) 132/60  SpO2:  [91 %-94 %] 91 %    Physical Exam   Constitutional: He is oriented to person, place, and time. No distress.   Cachectic   HENT:   Head: Normocephalic and atraumatic.   Bitemporal wasting   Eyes: Conjunctivae and EOM are normal.   Neck: Normal range of motion.   Cardiovascular: Normal rate and regular rhythm.    No murmur heard.  Pulmonary/Chest: No stridor. He has no wheezes. He has no rales.   Abdominal: Soft. Bowel sounds are normal. He exhibits no distension. There is  tenderness. There is no rebound and no guarding.   G-tube present   Musculoskeletal: He exhibits edema. He exhibits no tenderness.   Neurological: He is alert and oriented to person, place, and time.   No gross focal weakness    Skin: He is not diaphoretic.   Vitals reviewed.      Fluids    Intake/Output Summary (Last 24 hours) at 03/30/19 1226  Last data filed at 03/30/19 0813   Gross per 24 hour   Intake             1661 ml   Output                0 ml   Net             1661 ml       Laboratory      Recent Labs      03/28/19   0339  03/29/19   0238  03/30/19   0501   SODIUM  133*  131*  134*   POTASSIUM  4.8  4.4  4.3   CHLORIDE  99  101  105   CO2  28  25  23   GLUCOSE  94  108*  93   BUN  30*  29*  26*   CREATININE  0.62  0.57  0.57   CALCIUM  8.3*  7.9*  8.0*                   Imaging  [id number] VIEW   Final Result      1.  Air-fluid levels in small bowel and colonic loops, consistent with history of diarrhea.   2.  Patient was unable to tolerate supine images. Limited assessment for small bowel dilatation.      DX-CHEST-2 VIEWS   Final Result         Patchy left basilar opacity, worse than prior exam, concerning for worsening infection.      DX-CHEST-PORTABLE (1 VIEW)   Final Result         1. Patchy left basilar opacities, atelectasis versus consolidation.      2. Free intraperitoneal air could relate to recent surgery. Correlate clinically.      CT-ABDOMEN-PELVIS WITH   Final Result      No evidence of right perinephric hematoma or hydronephrosis status post right renal biopsy.      Clot identified in the urinary bladder.      Anasarca.      Distal esophageal mass with lymphadenopathy.      CT-NEEDLE BX-RENAL   Final Result      Successful CT-guided core biopsy of right renal mass.      CT-ABDOMEN-PELVIS WITH   Final Result      1.  Again seen thickening of the distal esophagus likely related to the patient's known esophageal cancer in that area. There is extensive soft tissue mass/adenopathy within the  gastrohepatic, periportal, and retroperitoneal regions which does encase the    celiac and superior mesenteric arteries. This is not significantly changed over short interval follow-up.      2.  Again seen large right renal mass which measures 9 x 7 cm in size consistent with either renal cell carcinoma or metastatic disease. There is again seen likely some invasion of the right renal vein.      3.  Emphysematous and bullous change of the lungs with bibasilar atelectasis and small bilateral pleural effusions.      4.  Moderate constipation.      DX-CHEST-2 VIEWS   Final Result         1.  Hazy bilateral lung base infiltrates, greater on the left, new since prior.   2.  Small bilateral pleural effusions.   3.  Hyperexpansion of lungs favors changes of COPD.      US-EXTREMITY VENOUS UPPER UNILAT LEFT   Final Result           Assessment/Plan  * Dysphagia- (present on admission)   Assessment & Plan    PEG tube, underlying esophageal malignancy with recurrent nausea/vomiting-x-ray no obstruction.  Change feeding to G-tube.  Instruct bolus feeding.   Hold continuous tube feeds  PRN antiemetics.  Add simethicone  IV fluid support until can consistently tolerate feeds         Cancer associated pain- (present on admission)   Assessment & Plan    Uncontrolled pain w  diarrhea, bloating.  Try small volume slow bolus through G-tube  Hold off on continuous tube feeding through pump.  Instruct bolus tube feeds to patient.  PRN IV antiemetics.  Start simethicone for gas and bloating symptoms.  PRN oxycodone.  Increase fentanyl patch.  Monitoring for adverse effects related to the use of narcotics  Palliative following, appreciate recommendations          Esophageal cancer (HCC)- (present on admission)   Assessment & Plan    Patient received palliative radiation therapy but did not tolerate.  Previously was to have follow-up with oncology  Overall poor prognosis, failure to thrive with underlying malignancy, poor functional  status  Try to mobilize.  Did not tolerate radiation therapy.  Anticipate DC home with hospice  Pain control.     ACP (advance care planning)   Assessment & Plan    Patient with advanced age, poor functional performance, metastatic esophageal cancer, and renal cell cancer.    In addition, the patient has a number of comorbid conditions including chronic obstructive pulmonary disease, deep vein thrombosis and dysphagia.    Plan for discharge home with hospice.  [place] [facility] hosp[facility] arranging for home DME--still awaiting DME delivery       Deep venous thrombosis (HCC)- (present on admission)   Assessment & Plan    In the setting of underlying malignancy   Patient concerned that he had allergic reaction to Eliquis   The patient wanted to go with hospice, he wanted to continue on Lovenox injections for now.    Family to meet with hospice and we will have further discussion regarding level of care and continue treatment for DVT     Renal cell carcinoma of right kidney (HCC)- (present on admission)   Assessment & Plan    With gastrohepatic, periportal, aortocaval and retroperitoneal raleigh metastases.  Does not appear that the patient will tolerate chemotherapy or radiation  Patient to be discharged on hospice.     Severe protein-calorie malnutrition (HCC)- (present on admission)   Assessment & Plan    Nutrition consulted          Lower extremity edema- (present on admission)   Assessment & Plan    Improving         Rash- (present on admission)   Assessment & Plan    Nearly resolved, monitor, was present bilateral lower extremities       Essential hypertension- (present on admission)   Assessment & Plan    Controlled  Continue lisinopril, Norvasc  PRN hydralazine       Constipation due to pain medication therapy- (present on admission)   Assessment & Plan    Resolved.     Tobacco abuse- (present on admission)   Assessment & Plan    Patient counseled and educated regarding cessation        COPD (chronic obstructive  pulmonary disease) (HCC)- (present on admission)   Assessment & Plan    No exacerbation   Continue PRN albuterol   RT protocol                VTE prophylaxis: Lovenox.

## 2019-03-31 NOTE — PROGRESS NOTES
[facility] Medicine Daily Progress Note    Date of Service  3/31/2019    Chief Complaint  73 y.o. male admitted 2/18/2019 with nausea vomiting, weight loss.    Hospital Course    73-year-old male history of COPD, tobacco use, advanced esophageal cancer with large suspicious right renal mass admitted malignant cachexia, failure to thrive, uncontrolled pain due to cancer and possible pneumonia.    Interval Problem Update    Feeling better with small Gtube boluses and J tube for meds. Decreased  loose stools and abd pain.      Consultants/Specialty  Palliative care  Dr. Page, oncology  Radiation oncology Dr. Mathew  Infectious disease Dr. Dougherty    Code Status  DNR    Disposition  Arrangements for discharge home with family with hospice underway    Review of Systems  Review of Systems   Constitutional: Positive for weight loss. Negative for chills, diaphoresis and fever.   Respiratory: Negative for cough, shortness of breath and stridor.    Cardiovascular: Negative for chest pain and palpitations.   Gastrointestinal: Positive for abdominal pain, diarrhea and nausea. Negative for vomiting.        Improved    Musculoskeletal: Negative for back pain, joint pain and neck pain.   Neurological: Positive for weakness. Negative for sensory change, speech change and focal weakness.        Physical Exam  Temp:  [36.5 °C (97.7 °F)-37 °C (98.6 °F)] 36.6 °C (97.8 °F)  Pulse:  [55-67] 62  Resp:  [16] 16  BP: (132-146)/(58-66) 146/66  SpO2:  [92 %-95 %] 95 %    Physical Exam   Constitutional: He is oriented to person, place, and time. No distress.   Cachectic   HENT:   Head: Normocephalic and atraumatic.   Bitemporal wasting   Eyes: Conjunctivae and EOM are normal.   Neck: Normal range of motion.   Cardiovascular: Normal rate and regular rhythm.    No murmur heard.  Pulmonary/Chest: No stridor. He has no wheezes. He has no rales.   Abdominal: Soft. Bowel sounds are normal. He exhibits no distension. There is tenderness (moderate ).  There is no rebound and no guarding.   G-tube present   Musculoskeletal: He exhibits no edema or tenderness.   Neurological: He is alert and oriented to person, place, and time.   No gross focal weakness    Skin: He is not diaphoretic. There is pallor.   Vitals reviewed.      Fluids    Intake/Output Summary (Last 24 hours) at 03/31/19 0813  Last data filed at 03/31/19 0400   Gross per 24 hour   Intake             1247 ml   Output              705 ml   Net              542 ml       Laboratory      Recent Labs      03/29/19   0238  03/30/19   0501  03/31/19   0252   SODIUM  131*  134*  132*   POTASSIUM  4.4  4.3  4.5   CHLORIDE  101  105  103   CO2  25  23  25   GLUCOSE  108*  93  107*   BUN  29*  26*  25*   CREATININE  0.57  0.57  0.59   CALCIUM  7.9*  8.0*  7.9*                   Imaging  [id number] VIEW   Final Result      1.  Air-fluid levels in small bowel and colonic loops, consistent with history of diarrhea.   2.  Patient was unable to tolerate supine images. Limited assessment for small bowel dilatation.      DX-CHEST-2 VIEWS   Final Result         Patchy left basilar opacity, worse than prior exam, concerning for worsening infection.      DX-CHEST-PORTABLE (1 VIEW)   Final Result         1. Patchy left basilar opacities, atelectasis versus consolidation.      2. Free intraperitoneal air could relate to recent surgery. Correlate clinically.      CT-ABDOMEN-PELVIS WITH   Final Result      No evidence of right perinephric hematoma or hydronephrosis status post right renal biopsy.      Clot identified in the urinary bladder.      Anasarca.      Distal esophageal mass with lymphadenopathy.      CT-NEEDLE BX-RENAL   Final Result      Successful CT-guided core biopsy of right renal mass.      CT-ABDOMEN-PELVIS WITH   Final Result      1.  Again seen thickening of the distal esophagus likely related to the patient's known esophageal cancer in that area. There is extensive soft tissue mass/adenopathy within the  gastrohepatic, periportal, and retroperitoneal regions which does encase the    celiac and superior mesenteric arteries. This is not significantly changed over short interval follow-up.      2.  Again seen large right renal mass which measures 9 x 7 cm in size consistent with either renal cell carcinoma or metastatic disease. There is again seen likely some invasion of the right renal vein.      3.  Emphysematous and bullous change of the lungs with bibasilar atelectasis and small bilateral pleural effusions.      4.  Moderate constipation.      DX-CHEST-2 VIEWS   Final Result         1.  Hazy bilateral lung base infiltrates, greater on the left, new since prior.   2.  Small bilateral pleural effusions.   3.  Hyperexpansion of lungs favors changes of COPD.      US-EXTREMITY VENOUS UPPER UNILAT LEFT   Final Result           Assessment/Plan  * Cancer associated pain- (present on admission)   Assessment & Plan    Uncontrolled pain w  diarrhea, bloating-- improved.   Continue smaller  volume slow bolus through G-tube.  With Jtube for meds.  Hold off on continuous tube feeding through pump.  Instruct bolus tube feeds to patient.  Scheduled simthicone PRN IV antiemetics.  Pain control with sched fentanyl,  PRN oxycodone.    Monitoring for adverse effects related to the use of narcotics  Palliative has evaluated patient.      Dysphagia- (present on admission)   Assessment & Plan    PEG tube, underlying esophageal malignancy with recurrent nausea/vomiting-x-ray no obstruction. Improved symptoms.   Continue feeding small amounts thru G-tube with J tube for meds.  Instruct bolus feeding.   Hold continuous tube feeds  PRN antiemetics with scheduled simethicone  IV fluid support until can consistently tolerate feeds         Esophageal cancer (HCC)- (present on admission)   Assessment & Plan    Patient received palliative radiation therapy but did not tolerate.  Previously was to have follow-up with oncology  Overall poor  prognosis, failure to thrive with underlying malignancy, poor functional status  Try to mobilize.  Did not tolerate radiation therapy.  Anticipate DC home with hospice  Pain control.     ACP (advance care planning)   Assessment & Plan    Patient with advanced age, poor functional performance, metastatic esophageal cancer, and renal cell cancer.    In addition, the patient has a number of comorbid conditions including chronic obstructive pulmonary disease, deep vein thrombosis and dysphagia.    Plan for discharge home with hospice.  [place] [facility] hospice arranging for home DME--still awaiting DME delivery       Deep venous thrombosis (HCC)- (present on admission)   Assessment & Plan    In the setting of underlying malignancy .  Distal left subclavian to mid vein.   Patient concerned that he had allergic reaction to Eliquis - states he had N/V  The patient wanted to go with hospice, he wanted to continue on Lovenox injections for now.  Will Discuss with pharmacy using different Xa inhibitor and try to get off shots for home therapy.       Renal cell carcinoma of right kidney (HCC)- (present on admission)   Assessment & Plan    With gastrohepatic, periportal, aortocaval and retroperitoneal raleigh metastases.  Does not appear that the patient will tolerate chemotherapy or radiation  Patient to be discharged on hospice.     Severe protein-calorie malnutrition (HCC)- (present on admission)   Assessment & Plan    Nutrition consulted          Lower extremity edema- (present on admission)   Assessment & Plan    Improving         Rash- (present on admission)   Assessment & Plan    Nearly resolved, monitor, was present bilateral lower extremities       Essential hypertension- (present on admission)   Assessment & Plan    Controlled  Continue lisinopril, Norvasc  PRN hydralazine       Constipation due to pain medication therapy- (present on admission)   Assessment & Plan    Resolved.     Tobacco abuse- (present on admission)    Assessment & Plan    Patient counseled and educated regarding cessation        COPD (chronic obstructive pulmonary disease) (HCC)- (present on admission)   Assessment & Plan    No exacerbation   Continue PRN albuterol   RT protocol                VTE prophylaxis: Lovenox.

## 2019-03-31 NOTE — PROGRESS NOTES
Bedside report received.  Assessment complete.  A&O x 4. Patient calls appropriately.  Patient up with one assist and FWW.   Patient has 6/10 pain. Pain medication given  Denies N&V. Tolerating tube feeding iso-source bolus diet.  This RN has found that slowly bolusing feeds at half of a bottle a time works better for patient and he does not feel as sick afterwards. He is fully tolerating 4 bottles, goal is 5. Patient is excited that we have a method that he is slowly getting use to and that he can tolerate.  Surgical G-J tube is CDI. G tube being used for feedings, J tube being used for medications, ok with MD and this works best for the patient.  + void, + flatus, + BM.  Patient denies SOB.  Patient in pleasant mood, excited to be tolerating feedings and feeling better.  Review plan with of care with patient. Call light and personal belongings with in reach. Hourly rounding in place. All needs met at this time.

## 2019-04-01 NOTE — PROGRESS NOTES
Bedside report received.  Assessment complete.    A&O x 4. Patient calls appropriately.  Patient ambulates with FWW and standby assist.   Patient reports 7/10 pain. Medicated per MAR.   Reports abdominal pain with intermittent nausea. Only tolerated 40 mL of tube feed this AM. At 0630, patient tolerated 1 can (240 mL of tube feed).   Tube feed and medications administered through G tube. J tube seems to be partially clogged and positional at this time.  + void, + flatus  Patient denies SOB.    Review plan with of care with patient. Call light and personal belongings with in reach. Hourly rounding in place. All needs met at this time.

## 2019-04-01 NOTE — PROGRESS NOTES
Report Received from Day RN, assumed care @1900  A/O x 4  VSS  Pain-3/10 neck, heat applied  O2-RA  Diet-NPO, isosource tube feeds, denies N/V  Void-pos  BM  Wound- LLQ GJ tube, healed RLQ incision  Bed Locked in Lowest Position  Call light in reach

## 2019-04-01 NOTE — DIETARY
Nutrition Services: Update   Day 42 of admit.  Justus Barnard is a 73 y.o. male with admitting DX of esophageal cancer.    Per discussion with RN, pt is bolusing about 2 - 3 containers of Isosource 1.5 per day - goal is 5.  However, pt will be going home with hospice - TF regimen is geared more for comfort measures/palliative purposes.  RD continues to monitor while pt is inpatient.  RD called the kitchen to send more Isosource 1.5 up to pt's room per RN request.     Recommendations/Plan:  1. 5 containers/day of Isosource 1.5.   2. Fluids per MD.  3. Monitor weight.    RD following

## 2019-04-01 NOTE — PROGRESS NOTES
Patient and patient's daughter educated and demonstrated proper administration of bolus tube feeding.  Patient seems to be able to tolerate 250 mL - 300 mL of tube feed, fluids, or medications per G tube, at a time

## 2019-04-01 NOTE — CARE PLAN
Problem: Communication  Goal: The ability to communicate needs accurately and effectively will improve  Outcome: PROGRESSING AS EXPECTED  Pt uses call light effectively    Problem: Infection  Goal: Will remain free from infection  Outcome: PROGRESSING AS EXPECTED  Hand Hygiene performed prior to entering and leaving Pt room

## 2019-04-01 NOTE — PROGRESS NOTES
[facility] Medicine Daily Progress Note    Date of Service  4/1/2019    Chief Complaint  73 y.o. male admitted 2/18/2019 with nausea vomiting, weight loss.    Hospital Course    73-year-old male history of COPD, tobacco use, advanced esophageal cancer with large suspicious right renal mass admitted malignant cachexia, failure to thrive, uncontrolled pain due to cancer and possible pneumonia.    Interval Problem Update    Feeling better with GTube slow and low  vol bolus feeds. + Nausea w/o new emesis.      Consultants/Specialty  Palliative care  Dr. Page, oncology  Radiation oncology Dr. Mathew  Infectious disease Dr. Dougherty    Code Status  DNR    Disposition  Arrangements for discharge home with family with hospice underway    Review of Systems  Review of Systems   Constitutional: Positive for malaise/fatigue and weight loss. Negative for chills, diaphoresis and fever.   Respiratory: Negative for cough, shortness of breath and stridor.    Cardiovascular: Negative for chest pain and palpitations.   Gastrointestinal: Positive for abdominal pain, diarrhea and nausea. Negative for vomiting.        Improved    Musculoskeletal: Negative for back pain, joint pain and neck pain.   Neurological: Positive for weakness. Negative for sensory change, speech change and focal weakness.        Physical Exam  Temp:  [36.6 °C (97.8 °F)-36.8 °C (98.3 °F)] 36.6 °C (97.8 °F)  Pulse:  [60-84] 62  Resp:  [16-18] 18  BP: (124-147)/(61-69) 147/66  SpO2:  [94 %-95 %] 94 %    Physical Exam   Constitutional: He is oriented to person, place, and time. No distress.   Cachectic   HENT:   Head: Normocephalic and atraumatic.   Bitemporal wasting   Eyes: Conjunctivae and EOM are normal.   Neck: Normal range of motion.   Cardiovascular: Normal rate and regular rhythm.    No murmur heard.  Pulmonary/Chest: Effort normal. No stridor. No respiratory distress. He has no wheezes. He has no rales.   Abdominal: Soft. Bowel sounds are normal. He exhibits  no distension. There is tenderness (moderate ). There is no rebound and no guarding.   G, J -tube present   Musculoskeletal: He exhibits no edema or tenderness.   Neurological: He is alert and oriented to person, place, and time.   No gross focal weakness    Skin: Skin is warm and dry. He is not diaphoretic. There is pallor.   Vitals reviewed.      Fluids    Intake/Output Summary (Last 24 hours) at 04/01/19 0929  Last data filed at 04/01/19 0700   Gross per 24 hour   Intake             2530 ml   Output             1950 ml   Net              580 ml       Laboratory      Recent Labs      03/30/19   0501  03/31/19   0252   SODIUM  134*  132*   POTASSIUM  4.3  4.5   CHLORIDE  105  103   CO2  23  25   GLUCOSE  93  107*   BUN  26*  25*   CREATININE  0.57  0.59   CALCIUM  8.0*  7.9*                   Imaging  [id number] VIEW   Final Result      1.  Air-fluid levels in small bowel and colonic loops, consistent with history of diarrhea.   2.  Patient was unable to tolerate supine images. Limited assessment for small bowel dilatation.      DX-CHEST-2 VIEWS   Final Result         Patchy left basilar opacity, worse than prior exam, concerning for worsening infection.      DX-CHEST-PORTABLE (1 VIEW)   Final Result         1. Patchy left basilar opacities, atelectasis versus consolidation.      2. Free intraperitoneal air could relate to recent surgery. Correlate clinically.      CT-ABDOMEN-PELVIS WITH   Final Result      No evidence of right perinephric hematoma or hydronephrosis status post right renal biopsy.      Clot identified in the urinary bladder.      Anasarca.      Distal esophageal mass with lymphadenopathy.      CT-NEEDLE BX-RENAL   Final Result      Successful CT-guided core biopsy of right renal mass.      CT-ABDOMEN-PELVIS WITH   Final Result      1.  Again seen thickening of the distal esophagus likely related to the patient's known esophageal cancer in that area. There is extensive soft tissue mass/adenopathy  within the gastrohepatic, periportal, and retroperitoneal regions which does encase the    celiac and superior mesenteric arteries. This is not significantly changed over short interval follow-up.      2.  Again seen large right renal mass which measures 9 x 7 cm in size consistent with either renal cell carcinoma or metastatic disease. There is again seen likely some invasion of the right renal vein.      3.  Emphysematous and bullous change of the lungs with bibasilar atelectasis and small bilateral pleural effusions.      4.  Moderate constipation.      DX-CHEST-2 VIEWS   Final Result         1.  Hazy bilateral lung base infiltrates, greater on the left, new since prior.   2.  Small bilateral pleural effusions.   3.  Hyperexpansion of lungs favors changes of COPD.      US-EXTREMITY VENOUS UPPER UNILAT LEFT   Final Result           Assessment/Plan  * Cancer associated pain- (present on admission)   Assessment & Plan    Uncontrolled pain w  diarrhea, bloating-- improved.   Continue smaller volume slow bolus feeds and medications  through G-tube.   Hold off on continuous tube feeding through pump.  Instruct bolus tube feeds to patient.  Scheduled simthicone PRN IV antiemetics.  Pain control with sched fentanyl,  PRN oxycodone- monitor response.   Monitoring for adverse effects related to the use of narcotics  Palliative has evaluated patient.      Dysphagia- (present on admission)   Assessment & Plan    PEG tube, underlying esophageal malignancy with recurrent nausea/vomiting-x-ray no obstruction. Improved symptoms.   Continue bolus feeding small amounts thru G-tube.  Instruct bolus feeding.   Hold continuous tube feeds  PRN antiemetics with scheduled simethicone  IV fluid support until can consistently tolerate feeds         Deep venous thrombosis (HCC)- (present on admission)   Assessment & Plan    In the setting of underlying malignancy .  Distal left subclavian to mid vein.   Patient concerned that he had  allergic reaction to Eliquis - states he had N/V  The patient wanted to go with hospice. Different  Xa inhibitor - Xarelto to be tried- Dc Lovenox. Monitor for intolerance.        Esophageal cancer (HCC)- (present on admission)   Assessment & Plan    Patient received palliative radiation therapy but did not tolerate.  Previously was to have follow-up with oncology  Overall poor prognosis, failure to thrive with underlying malignancy, poor functional status  Try to mobilize.  Did not tolerate radiation therapy.  Anticipate DC home with hospice  Pain control.     ACP (advance care planning)   Assessment & Plan    Patient with advanced age, poor functional performance, metastatic esophageal cancer, and renal cell cancer.    In addition, the patient has a number of comorbid conditions including chronic obstructive pulmonary disease, deep vein thrombosis and dysphagia.    Plan for discharge home with hospice.  [facility] arranging for home DME--still awaiting DME delivery.  Discussed with CM- anticipate Wednesday this week.        Renal cell carcinoma of right kidney (HCC)- (present on admission)   Assessment & Plan    With gastrohepatic, periportal, aortocaval and retroperitoneal raleigh metastases.  Does not appear that the patient will tolerate chemotherapy or radiation  Patient to be discharged on hospice.     Severe protein-calorie malnutrition (HCC)- (present on admission)   Assessment & Plan    Nutrition consulted          Lower extremity edema- (present on admission)   Assessment & Plan    Improving         Rash- (present on admission)   Assessment & Plan    Nearly resolved, monitor, was present bilateral lower extremities       Essential hypertension- (present on admission)   Assessment & Plan    Controlled  Continue lisinopril, Norvasc  PRN hydralazine       Constipation due to pain medication therapy- (present on admission)   Assessment & Plan    Resolved.     Tobacco abuse- (present on admission)    Assessment & Plan    Patient counseled and educated regarding cessation        COPD (chronic obstructive pulmonary disease) (HCC)- (present on admission)   Assessment & Plan    No exacerbation   Continue PRN albuterol   RT protocol                VTE prophylaxis: xarelto

## 2019-04-01 NOTE — CARE PLAN
Problem: Nutritional:  Goal: Nutrition support tolerated and meeting greater than 85% of estimated needs  Bolus feeds   Outcome: PROGRESSING SLOWER THAN EXPECTED  See RD note.

## 2019-04-02 NOTE — DISCHARGE PLANNING
Anticipated Discharge Disposition: Home with Hospice ([facility])    Action: This RN CM called [facility] and spoke with Juan F regarding pt's home DME and TF's.  Per Juan F the pt's SME and TF's will be delievered this afternoon.  The family will pick the pt up for discharge between [id number] on Wednesday 4/3/19.    Barriers to Discharge: none    Plan: This RN CM spoke with Dr. Maldonado and updated him on the above information. Plan is for pt to dc tomorrow 4/3/19 around [id number]; after DME, TF's delivered today to pt's home in [place], [place] by [facility].  Pt will be taken home by his family.

## 2019-04-02 NOTE — PROGRESS NOTES
[facility] Medicine Daily Progress Note    Date of Service  4/2/2019    Chief Complaint  73 y.o. male admitted 2/18/2019 with nausea vomiting, weight loss.    Hospital Course    73-year-old male history of COPD, tobacco use, advanced esophageal cancer with large suspicious right renal mass admitted malignant cachexia, failure to thrive, uncontrolled pain due to cancer and possible pneumonia.    Interval Problem Update  Pt seen and examined no acute events overnight,   Cont on GTube slow and low  vol bolus feeds. + Nausea w/o new emesis.    Plan for home tomorrow with hospice.     Consultants/Specialty  Palliative care  Dr. Page, oncology  Radiation oncology Dr. Mathew  Infectious disease Dr. Dougherty    Code Status  DNR    Disposition  Arrangements for discharge home with family with hospice underway    Review of Systems  Review of Systems   Constitutional: Positive for malaise/fatigue and weight loss. Negative for chills, diaphoresis and fever.   HENT: Negative for congestion.    Eyes: Negative for pain and discharge.   Respiratory: Negative for cough, shortness of breath and stridor.    Cardiovascular: Negative for chest pain and palpitations.   Gastrointestinal: Positive for abdominal pain, diarrhea and nausea. Negative for vomiting.        Improved    Musculoskeletal: Negative for back pain, joint pain and neck pain.   Neurological: Positive for weakness. Negative for sensory change, speech change and focal weakness.        Physical Exam  Temp:  [36.3 °C (97.4 °F)-36.9 °C (98.4 °F)] 36.8 °C (98.2 °F)  Pulse:  [66-71] 66  Resp:  [17] 17  BP: (124-146)/(68-73) 140/68  SpO2:  [93 %-97 %] 94 %    Physical Exam   Constitutional: He is oriented to person, place, and time. No distress.   Cachectic   HENT:   Head: Normocephalic and atraumatic.   Bitemporal wasting   Eyes: Conjunctivae and EOM are normal.   Neck: Normal range of motion. Neck supple. No JVD present.   Cardiovascular: Normal rate and regular rhythm.    No  murmur heard.  Pulmonary/Chest: Effort normal. No stridor. No respiratory distress. He has no wheezes. He has no rales.   Abdominal: Soft. Bowel sounds are normal. He exhibits no distension. There is tenderness (moderate ).   G, J -tube present   Musculoskeletal: He exhibits no edema or tenderness.   Neurological: He is alert and oriented to person, place, and time.   No gross focal weakness    Skin: Skin is warm and dry. He is not diaphoretic. There is pallor.   Nursing note and vitals reviewed.      Fluids    Intake/Output Summary (Last 24 hours) at 04/02/19 1424  Last data filed at 04/02/19 0400   Gross per 24 hour   Intake             1112 ml   Output             1350 ml   Net             -238 ml       Laboratory      Recent Labs      03/31/19   0252   SODIUM  132*   POTASSIUM  4.5   CHLORIDE  103   CO2  25   GLUCOSE  107*   BUN  25*   CREATININE  0.59   CALCIUM  7.9*                   Imaging  [id number] VIEW   Final Result      1.  Air-fluid levels in small bowel and colonic loops, consistent with history of diarrhea.   2.  Patient was unable to tolerate supine images. Limited assessment for small bowel dilatation.      DX-CHEST-2 VIEWS   Final Result         Patchy left basilar opacity, worse than prior exam, concerning for worsening infection.      DX-CHEST-PORTABLE (1 VIEW)   Final Result         1. Patchy left basilar opacities, atelectasis versus consolidation.      2. Free intraperitoneal air could relate to recent surgery. Correlate clinically.      CT-ABDOMEN-PELVIS WITH   Final Result      No evidence of right perinephric hematoma or hydronephrosis status post right renal biopsy.      Clot identified in the urinary bladder.      Anasarca.      Distal esophageal mass with lymphadenopathy.      CT-NEEDLE BX-RENAL   Final Result      Successful CT-guided core biopsy of right renal mass.      CT-ABDOMEN-PELVIS WITH   Final Result      1.  Again seen thickening of the distal esophagus likely related to  the patient's known esophageal cancer in that area. There is extensive soft tissue mass/adenopathy within the gastrohepatic, periportal, and retroperitoneal regions which does encase the    celiac and superior mesenteric arteries. This is not significantly changed over short interval follow-up.      2.  Again seen large right renal mass which measures 9 x 7 cm in size consistent with either renal cell carcinoma or metastatic disease. There is again seen likely some invasion of the right renal vein.      3.  Emphysematous and bullous change of the lungs with bibasilar atelectasis and small bilateral pleural effusions.      4.  Moderate constipation.      DX-CHEST-2 VIEWS   Final Result         1.  Hazy bilateral lung base infiltrates, greater on the left, new since prior.   2.  Small bilateral pleural effusions.   3.  Hyperexpansion of lungs favors changes of COPD.      US-EXTREMITY VENOUS UPPER UNILAT LEFT   Final Result           Assessment/Plan  * Cancer associated pain- (present on admission)   Assessment & Plan    Uncontrolled pain w  diarrhea, bloating-- improved.   Continue smaller volume slow bolus feeds and medications  through G-tube.   Hold off on continuous tube feeding through pump.  Instruct bolus tube feeds to patient.  Scheduled simthicone PRN IV antiemetics.  Pain control with sched fentanyl,  PRN oxycodone- monitor response.   Monitoring for adverse effects related to the use of narcotics  Palliative has evaluated patient.      Dysphagia- (present on admission)   Assessment & Plan    PEG tube, underlying esophageal malignancy with recurrent nausea/vomiting-x-ray no obstruction. Improved symptoms.   Continue bolus feeding small amounts thru G-tube.  Instruct bolus feeding.   Hold continuous tube feeds  PRN antiemetics with scheduled simethicone  IV fluid support until can consistently tolerate feeds         Deep venous thrombosis (HCC)- (present on admission)   Assessment & Plan    In the setting of  underlying malignancy .  Distal left subclavian to mid vein.   Patient concerned that he had allergic reaction to Eliquis - states he had N/V  The patient wanted to go with hospice. Different  Xa inhibitor - Xarelto to be tried- Dc Lovenox. Monitor for intolerance.        Esophageal cancer (HCC)- (present on admission)   Assessment & Plan    Patient received palliative radiation therapy but did not tolerate.  Previously was to have follow-up with oncology  Overall poor prognosis, failure to thrive with underlying malignancy, poor functional status  Did not tolerate radiation therapy.  Plan for  DC home with hospice on 4/3  Pain control.     ACP (advance care planning)   Assessment & Plan    Patient with advanced age, poor functional performance, metastatic esophageal cancer, and renal cell cancer.    In addition, the patient has a number of comorbid conditions including chronic obstructive pulmonary disease, deep vein thrombosis and dysphagia.    Plan for discharge home with hospice.  [facility] arranging for home DME--still awaiting DME delivery.  Discussed with CM- anticipate Wednesday this week.        Renal cell carcinoma of right kidney (HCC)- (present on admission)   Assessment & Plan    With gastrohepatic, periportal, aortocaval and retroperitoneal raleigh metastases.  Does not appear that the patient will tolerate chemotherapy or radiation  Patient to be discharged on hospice.     Severe protein-calorie malnutrition (HCC)- (present on admission)   Assessment & Plan    Nutrition consulted          Lower extremity edema- (present on admission)   Assessment & Plan    Improving         Rash- (present on admission)   Assessment & Plan    Nearly resolved, monitor, was present bilateral lower extremities       Essential hypertension- (present on admission)   Assessment & Plan    Controlled  Continue lisinopril, Norvasc  PRN hydralazine       Constipation due to pain medication therapy- (present on  admission)   Assessment & Plan    Resolved.     Tobacco abuse- (present on admission)   Assessment & Plan    Patient counseled and educated regarding cessation        COPD (chronic obstructive pulmonary disease) (HCC)- (present on admission)   Assessment & Plan    No exacerbation   Continue PRN albuterol   RT protocol                VTE prophylaxis: xarelto

## 2019-04-02 NOTE — PROGRESS NOTES
Bedside report received.  Assessment complete.    A&O x 4. Patient calls appropriately.  Patient ambualtes with FWW and standby assist.   Patient reports 6/10 pain. Medicated per MAR.  Denies N&V. Patient NPO, with tube feeding in use. Patient tolerated medications and about 140 mL bolus of tube feed this AM.  GJ tube LLQ. J tube seems to be blocked or clogged. Unable to flush at all. NOC RN used Clogg zapper x1, reported it was difficult to pass guide wire and there was no improvement after use. Will update MD during rounds.   + void, + flatus  Patient denies SOB.    Review plan with of care with patient. Call light and personal belongings with in reach. Hourly rounding in place. All needs met at this time.

## 2019-04-02 NOTE — PROGRESS NOTES
Report Received from Day RN, assumed care @1900  Pt Sitting up in chair  A/O x 4  VSS  Pain-2/10, declines medication  O2-RA  Diet-Npo, denies N/V, declines tube feed for the night.  Void-pos  BM-pos 4/1  Wound- No changes from previous note  Bed Locked in Lowest Position  Call light in reach

## 2019-04-02 NOTE — DISCHARGE SUMMARY
Hospital Medicine Discharge Note     Admit Date:  2/18/2019       Discharge Date:   4/3/2019        Diagnoses (includes active and resolved):     Principal Problem:    Cancer associated pain POA: Yes  Active Problems:    Esophageal cancer (HCC) POA: Yes    Deep venous thrombosis (HCC) POA: Yes    Dysphagia POA: Yes    Renal cell carcinoma of right kidney (HCC) POA: Yes    ACP (advance care planning) POA: No    COPD (chronic obstructive pulmonary disease) (HCC) POA: Yes    Tobacco abuse POA: Yes    Constipation due to pain medication therapy POA: Yes    Essential hypertension POA: Yes    Rash POA: Yes    Lower extremity edema POA: Yes    Severe protein-calorie malnutrition (HCC) POA: Yes  Resolved Problems:    * No resolved hospital problems. *    Hospital Summary (Brief Narrative):            73-year-old male history of COPD, tobacco use, advanced esophageal cancer with large suspicious right renal mass admitted malignant cachexia, failure to thrive, uncontrolled pain due to cancer and possible pneumonia.  He was treated with IV Zosyn.  He  underwent right radical nephrectomy.  Requires pain control with fentanyl patch and as needed liquid oxycodone.  He has upper extremity DVT and was treated with Lovenox.  Patient received palliative radiation therapy for his esophageal cancer but did not tolerate.  Following discussions with palliative care  arrangements being made for discharge home with hospice with DME to be delivered. He has intermittent nausea/vomiting that has improved with small volume gravity feeds through the G-tube  Benefits and risks of anticoagulation discussed.  Patient wishes to continue with his treatment for upper extremity DVT with Xarelto.    Pt will discharged home with hospice today      Consultants:        , oncology  , radiation, oncology   Dr. Peña, urology  Dr. Dougherty, infectious disease    Imaging/ Testing:      YW-RICIVLP-0 VIEW   Final Result      1.  Air-fluid  levels in small bowel and colonic loops, consistent with history of diarrhea.   2.  Patient was unable to tolerate supine images. Limited assessment for small bowel dilatation.      DX-CHEST-2 VIEWS   Final Result         Patchy left basilar opacity, worse than prior exam, concerning for worsening infection.      DX-CHEST-PORTABLE (1 VIEW)   Final Result         1. Patchy left basilar opacities, atelectasis versus consolidation.      2. Free intraperitoneal air could relate to recent surgery. Correlate clinically.      CT-ABDOMEN-PELVIS WITH   Final Result      No evidence of right perinephric hematoma or hydronephrosis status post right renal biopsy.      Clot identified in the urinary bladder.      Anasarca.      Distal esophageal mass with lymphadenopathy.      CT-NEEDLE BX-RENAL   Final Result      Successful CT-guided core biopsy of right renal mass.      CT-ABDOMEN-PELVIS WITH   Final Result      1.  Again seen thickening of the distal esophagus likely related to the patient's known esophageal cancer in that area. There is extensive soft tissue mass/adenopathy within the gastrohepatic, periportal, and retroperitoneal regions which does encase the    celiac and superior mesenteric arteries. This is not significantly changed over short interval follow-up.      2.  Again seen large right renal mass which measures 9 x 7 cm in size consistent with either renal cell carcinoma or metastatic disease. There is again seen likely some invasion of the right renal vein.      3.  Emphysematous and bullous change of the lungs with bibasilar atelectasis and small bilateral pleural effusions.      4.  Moderate constipation.      DX-CHEST-2 VIEWS   Final Result         1.  Hazy bilateral lung base infiltrates, greater on the left, new since prior.   2.  Small bilateral pleural effusions.   3.  Hyperexpansion of lungs favors changes of COPD.      US-EXTREMITY VENOUS UPPER UNILAT LEFT   Final Result            Procedures:           DATE OF SERVICE:  02/28/2019     PREOPERATIVE DIAGNOSES:  1.  Renal cell carcinoma of the right kidney.  2.  Metastatic esophageal carcinoma.     POSTOPERATIVE DIAGNOSES:  1.  Renal cell carcinoma of the right kidney.  2.  Metastatic esophageal carcinoma.     PROCEDURE:  1.  Right radical nephrectomy.  2.  Biopsy of peritoneal implant mass.     SURGEON:  Brandon Peña MD    Discharge Medications:             Medication List      START taking these medications      Instructions   ALPRAZolam 0.5 MG Tabs  Commonly known as:  XANAX   1 Tab by Per G Tube route 3 times a day as needed for Sleep or Anxiety for up to 5 days.  Dose:  0.5 mg     baclofen 5 mg/mL Susp  Commonly known as:  LIORESAL   1 mL by Per G Tube route 3 times a day.  Dose:  5 mg     dexamethasone 4 MG Tabs  Commonly known as:  DECADRON   Take 1 Tab by mouth 2 times a day.  Dose:  4 mg     DULoxetine 20 MG Cpep  Start taking on:  4/2/2019  Commonly known as:  CYMBALTA   1 Cap by Per G Tube route every day.  Dose:  20 mg     fentaNYL 100 MCG/HR Pt72  Start taking on:  4/2/2019  Commonly known as:  DURAGESIC   Apply 1 Patch to skin as directed every 72 hours for 30 days.  Dose:  1 Patch     gabapentin 250 MG/5ML solution  Commonly known as:  NEURONTIN   5 mL by Per G Tube route 3 times a day.  Dose:  250 mg     lisinopril 20 MG Tabs  Start taking on:  4/2/2019  Commonly known as:  PRINIVIL   1 Tab by Per G Tube route every day.  Dose:  20 mg     ondansetron 4 MG Tbdp  Commonly known as:  ZOFRAN ODT   Take 1 Tab by mouth every four hours as needed for Nausea.  Dose:  4 mg     oxyCODONE 5 MG/5ML solution  Commonly known as:  ROXICODONE   15 mL by Per G Tube route every four hours as needed for Severe Pain for up to 10 days.  Dose:  15 mg     * rivaroxaban 15 MG Tabs tablet  Commonly known as:  XARELTO   Take 1 Tab by mouth 2 times a day, with meals for 19 days.  Dose:  15 mg     * rivaroxaban 20 MG Tabs tablet  Start taking on:  4/21/2019  Commonly known  as:  XARELTO   Take 1 Tab by mouth with dinner.  Dose:  20 mg     senna-docusate 8.6-50 MG Tabs  Start taking on:  4/2/2019  Commonly known as:  PERICOLACE or SENOKOT S   Take 2 Tabs by mouth every day.  Dose:  2 Tab     simethicone 80 MG Chew  Commonly known as:  MYLICON   Take 1 Tab by mouth every 6 hours as needed (Gas).  Dose:  80 mg        * This list has 2 medication(s) that are the same as other medications prescribed for you. Read the directions carefully, and ask your doctor or other care provider to review them with you.            CONTINUE taking these medications      Instructions   albuterol 108 (90 Base) MCG/ACT Aers inhalation aerosol   Inhale 2 Puffs by mouth every 6 hours as needed for Shortness of Breath.  Dose:  2 Puff     AMBIEN 10 MG Tabs  Generic drug:  zolpidem   Take 10 mg by mouth every bedtime.  Dose:  10 mg     DILTIAZem  MG Cp24  Commonly known as:  CARDIZEM CD   Take 300 mg by mouth every day.  Dose:  300 mg     omeprazole 40 MG delayed-release capsule  Commonly known as:  PRILOSEC   Take 40 mg by mouth every day.  Dose:  40 mg        STOP taking these medications    aspirin EC 81 MG Tbec  Commonly known as:  ECOTRIN     metroNIDAZOLE 500 MG Tabs  Commonly known as:  FLAGYL     PERCOCET  MG Tabs  Generic drug:  oxyCODONE-acetaminophen     pravastatin 40 MG tablet  Commonly known as:  PRAVACHOL               Diet:           03/18/19 1519  Diet NPO  ALL MEALS     Question:  Restrict to:  Answer:  Strict    03/18/19 1518      G-tube feeds as instructed.      Activity:   As tolerated.      Code status:   DNR    Primary Care Provider:    Robert Yanez FEnzoNEnzoPEnzo    Follow up appointment details :      .  Robert Yanez F.N.P.  [address]  [facility] [address]  [phone]    In 2 days      Hospice as arranged .                  Time spent on discharge day patient visit: 42minutes    #################################################

## 2019-04-02 NOTE — PROGRESS NOTES
· 2 RN skin check complete with JANETTE Miranda.  · Skin tear to inner R buttock, Bilateral calluses on balls of feet, dry darkened skin to upper mid back, G/J tube to LLQ.

## 2019-04-03 NOTE — PROGRESS NOTES
Bedside report received.  Assessment complete.    A&O x 4. Patient calls appropriately.  Patient ambualtes with FWW and standby assist.   Patient reports some pain this AM. Declines pain medication at this time. Wants to wait until before he is discharged.   Denies N&V. Patient NPO, tube feeding in use. Patient tolerating bolus feedings per his G tube.  + void, + flatus.  Patient denies SOB.    Review plan with of care with patient. Call light and personal belongings with in reach. Hourly rounding in place. All needs met at this time.

## 2019-04-03 NOTE — PROGRESS NOTES
Report received from day shift RN, assumed Care.   Patient is AOx4, responds appropriately.      Pt reports pain 5/10, medicated per MAR.   Patient is strict NPO, tolerating bolus feeds of Isosource 1.5. Denies nausea/vomiting. + flatus, bowel sounds hypoactive x4 quads. G tube in place to LLQ, site CDI. J tube currently clogged as updated from day shift nurse.  Up x1 with FWW with steady gait.    Plan of care discussed, all questions answered.    Educated on use of call light and importance of calling before getting out of bed. Pt verbalizes understanding.    Call light and belongings within reach, treaded slipper socks on, bed in lowest locked position.  All needs met at this time.

## 2019-04-03 NOTE — PROGRESS NOTES
Patient discharged to home with family.   IV removed.   This Rn discussed discharge information with patient and his daughter.   Medicated per Mar.   Patient had all belongings and prescriptions.

## 2019-04-03 NOTE — CARE PLAN
Problem: Communication  Goal: The ability to communicate needs accurately and effectively will improve  Outcome: PROGRESSING AS EXPECTED  Encourage pt to voice feelings, engage pt in conversation    Problem: Safety  Goal: Will remain free from injury  Outcome: PROGRESSING AS EXPECTED  Educate pt on use of call light, provide frequent toileting

## 2019-04-03 NOTE — DISCHARGE INSTRUCTIONS
"Discharge Instructions      Gastrostomy Tube Home Guide, Adult  A gastrostomy tube is a tube that is surgically placed into the stomach. It is also called a \"G-tube.\" G-tubes are used when a person is unable to eat and drink enough on their own to stay healthy. The tube is inserted into the stomach through a small cut (incision) in the skin. This tube is used for:  · Feeding.  · Giving medication.  GASTROSTOMY TUBE CARE  · Wash your hands with soap and water.  · Remove the old dressing (if any). Some styles of G-tubes may need a dressing inserted between the skin and the G-tube. Other types of G-tubes do not require a dressing. Ask your health care provider if a dressing is needed.  · Check the area where the tube enters the skin (insertion site) for redness, swelling, or pus-like (purulent) drainage. A small amount of clear or tan liquid drainage is normal. Check to make sure scar tissue (skin) is not growing around the insertion site. This could have a raised, bumpy appearance.  · A cotton swab can be used to clean the skin around the tube:  ¨ When the G-tube is first put in, a normal saline solution or water can be used to clean the skin.  ¨ Mild soap and warm water can be used when the skin around the G-tube site has healed.  ¨ Roll the cotton swab around the G-tube insertion site to remove any drainage or crusting at the insertion site.  STOMACH RESIDUALS  Feeding tube residuals are the amount of liquids that are in the stomach at any given time. Residuals may be checked before giving feedings, medications, or as instructed by your health care provider.  · Ask your health care provider if there are instances when you would not start tube feedings depending on the amount or type of contents withdrawn from the stomach.  · Check residuals by attaching a syringe to the G-tube and pulling back on the syringe plunger. Note the amount, and return the residual back into the stomach.  FLUSHING THE G-TUBE  · The G-tube " should be periodically flushed with clean warm water to keep it from clogging.  ¨ Flush the G-tube after feedings or medications. Draw up 30 mL of warm water in a syringe. Connect the syringe to the G-tube and slowly push the water into the tube.  ¨ Do not push feedings, medications, or flushes rapidly. Flush the G-tube gently and slowly.  ¨ Only use syringes made for G-tubes to flush medications or feedings.  ¨ Your health care provider may want the G-tube flushed more often or with more water. If this is the case, follow your health care provider's instructions.  FEEDINGS  Your health care provider will determine whether feedings are given as a bolus (a certain amount given at one time and at scheduled times) or whether feedings will be given continuously on a feeding pump.   · Formulas should be given at room temperature.  · If feedings are continuous, no more than 4 hours worth of feedings should be placed in the feeding bag. This helps prevent spoilage or accidental excess infusion.  · Cover and place unused formula in the refrigerator.  · If feedings are continuous, stop the feedings when medications or flushes are given. Be sure to restart the feedings.  · Feeding bags and syringes should be replaced as instructed by your health care provider.  GIVING MEDICATION   · In general, it is best if all medications are in a liquid form for G-tube administration. Liquid medications are less likely to clog the G-tube.  ¨ Mix the liquid medication with 30 mL (or amount recommended by your health care provider) of warm water.  ¨ Draw up the medication into the syringe.  ¨ Attach the syringe to the G-tube and slowly push the mixture into the G-tube.  ¨ After giving the medication, draw up 30 mL of warm water in the syringe and slowly flush the G-tube.  · For pills or capsules, check with your health care provider first before crushing medications. Some pills are not effective if they are crushed. Some capsules are  sustained-release medications.  ¨ If appropriate, crush the pill or capsule and mix with 30 mL of warm water. Using the syringe, slowly push the medication through the tube, then flush the tube with another 30 mL of tap water.  G-TUBE PROBLEMS  G-tube was pulled out.  · Cause: May have been pulled out accidentally.  · Solutions: Cover the opening with clean dressing and tape. Call your health care provider right away. The G-tube should be put in as soon as possible (within 4 hours) so the G-tube opening (tract) does not close. The G-tube needs to be put in at a health care setting. An X-ray needs to be done to confirm placement before the G-tube can be used again.  Redness, irritation, soreness, or foul odor around the gastrostomy site.  · Cause: May be caused by leakage or infection.  · Solutions: Call your health care provider right away.  Large amount of leakage of fluid or mucus-like liquid present (a large amount means it soaks clothing).  · Cause: Many reasons could cause the G-tube to leak.  · Solutions: Call your health care provider to discuss the amount of leakage.  Skin or scar tissue appears to be growing where tube enters skin.   · Cause: Tissue growth may develop around the insertion site if the G-tube is moved or pulled on excessively.  · Solutions: Secure tube with tape so that excess movement does not occur. Call your health care provider.  G-tube is clogged.  · Cause: Thick formula or medication.  · Solutions: Try to slowly push warm water into the tube with a large syringe. Never try to push any object into the tube to unclog it. Do not force fluid into the G-tube. If you are unable to unclog the tube, call your health care provider right away.  TIPS  · Head of bed (HOB) position refers to the upright position of a person's upper body.  ¨ When giving medications or a feeding bolus, keep the HOB up as told by your health care provider. Do this during the feeding and for 1 hour after the feeding or  medication administration.  ¨ If continuous feedings are being given, it is best to keep the HOB up as told by your health care provider. When ADLs (activities of daily living) are performed and the HOB needs to be flat, be sure to turn the feeding pump off. Restart the feeding pump when the HOB is returned to the recommended height.  · Do not pull or put tension on the tube.  · To prevent fluid backflow, kink the G-tube before removing the cap or disconnecting a syringe.  · Check the G-tube length every day. Measure from the insertion site to the end of the G-tube. If the length is longer than previous measurements, the tube may be coming out. Call your health care provider if you notice increasing G-tube length.  · Oral care, such as brushing teeth, must be continued.  · You may need to remove excess air (vent) from the G-tube. Your health care provider will tell you if this is needed.  · Always call your health care provider if you have questions or problems with the G-tube.  SEEK IMMEDIATE MEDICAL CARE IF:   · You have severe abdominal pain, tenderness, or abdominal bloating (distension).  · You have nausea or vomiting.  · You are constipated or have problems moving your bowels.  · The G-tube insertion site is red, swollen, has a foul smell, or has yellow or brown drainage.  · You have difficulty breathing or shortness of breath.  · You have a fever.  · You have a large amount of feeding tube residuals.  · The G-tube is clogged and cannot be flushed.  MAKE SURE YOU:   · Understand these instructions.  · Will watch your condition.  · Will get help right away if you are not doing well or get worse.     This information is not intended to replace advice given to you by your health care provider. Make sure you discuss any questions you have with your health care provider.     Document Released: 02/26/2003 Document Revised: 05/03/2016 Document Reviewed: 08/25/2014  Elsevier Interactive Patient Education ©2016  [employer]    How can pain medicine affect me?  You were prescribed pain medicine. This medicine may:  · Make you tired or sleepy.  · Make you feel dizzy.  · Affect how well you can:  ¨ Drive  ¨ Do certain activities.  Pain medicine may not make all of your pain go away. You should be comfortable enough to:  · Move.  · Breathe.  · Take care of yourself.  How often should I take pain medicine and how much should I take?  · Take pain medicine only as told by your doctor and only as needed for pain.  · You do not need to take pain medicine if you are not having pain, unless your doctor tells you to do that.  · You can take less than the prescribed dose if you find that less medicine helps your pain.  · If you have very bad (severe) pain, call your doctor. Do not take more pills than told by your doctor. Do not take pills more often than told by your doctor.  What should I avoid while I am taking pain medicine?  Follow these instructions after you start taking pain medicine, while you are taking the medicine, and for 8 hours after you stop taking the medicine:  · Do not drive.  · Do not use machinery.  · Do not use power tools.  · Do not sign legal documents.  · Do not drink alcohol.  · Do not take sleeping pills.  · Do not take care of children by yourself.  · Do not do any activities that involve climbing or being in high places.  · Do not go into any body of water unless there is an adult nearby who can watch and help you. This includes:  ¨ Lakes.  ¨ Rivers.  ¨ Oceans.  ¨ Spas.  ¨ Swimming pools.  How can I keep others safe while I am taking pain medicine?  · Store your pain medicine as told by your doctor. Make sure that you keep it where children and pets cannot reach it.  · Do not share your pain medicine with anyone.  · Do not save any leftover pills. If you have any leftover pain medicine, get rid of it or destroy it as told by your doctor.  What else do I need to know about taking pain medicine?  · Use a  poop (stool) softener if you have trouble pooping (constipation) because of your pain medicine. Eating more fruits and vegetables also helps with constipation.  · Write down the times when you take your pain medicine. Look at the times before you take your next dose of medicine.  · Your pain medicine might have acetaminophen in it. Do not take any other acetaminophen while you are taking this medicine. An overdose of acetaminophen can do very bad damage to your liver. If you are taking any medicines in addition to your pain medicine, check the active ingredients on those medicines to see if acetaminophen is listed.  When should I call my doctor?  · Your medicine is not helping the pain.  · You do either of these soon after you take the medicine:  ¨ Throw up (vomit).  ¨ Have watery poop (diarrhea).  · You have new pain in areas that did not hurt before.  · You have an allergic reaction to your medicine. This may include:  ¨ Feeling itchy.  ¨ Swelling.  ¨ Feeling dizzy.  ¨ Getting a new rash.  · You cannot put up with feeling:  ¨ Dizzy.  ¨ Sick to your stomach (nauseous).  When should I call 911 or go to the emergency room?  · You pass out (faint).  · You feel very confused.  · You throw up again and again.  · Your skin or lips turn pale or bluish in color.  · You are:  ¨ Short of breath.  ¨ Breathing much more slowly than usual.  · You have a very bad allergic reaction to your medicine. This includes:  ¨ Developing a swollen tongue.  ¨ Having trouble breathing.  This information is not intended to replace advice given to you by your health care provider. Make sure you discuss any questions you have with your health care provider.  Document Released: 06/05/2009 Document Revised: 08/24/2017 Document Reviewed: 10/22/2015  © 2017 Elsevier      Discharged to home by car with relative. Discharged via wheelchair, hospital escort: Yes.  Special equipment needed: Not Applicable    Be sure to schedule a follow-up appointment  with your primary care doctor or any specialists as instructed.     Discharge Plan:   Diet Plan: Discussed  Activity Level: Discussed  Smoking Cessation Offered: Patient Refused  Confirmed Follow up Appointment: Patient to Call and Schedule Appointment  Confirmed Symptoms Management: Discussed  Medication Reconciliation Updated: Yes  Influenza Vaccine Indication: Not indicated: Previously immunized this influenza season and > 8 years of age    I understand that a diet low in cholesterol, fat, and sodium is recommended for good health. Unless I have been given specific instructions below for another diet, I accept this instruction as my diet prescription.     · Is patient discharged on Warfarin / Coumadin?   No     Depression / Suicide Risk    As you are discharged from this [facility] facility, it is important to learn how to keep safe from harming yourself.    Recognize the warning signs:  · Abrupt changes in personality, positive or negative- including increase in energy   · Giving away possessions  · Change in eating patterns- significant weight changes-  positive or negative  · Change in sleeping patterns- unable to sleep or sleeping all the time   · Unwillingness or inability to communicate  · Depression  · Unusual sadness, discouragement and loneliness  · Talk of wanting to die  · Neglect of personal appearance   · Rebelliousness- reckless behavior  · Withdrawal from people/activities they love  · Confusion- inability to concentrate     If you or a loved one observes any of these behaviors or has concerns about self-harm, here's what you can do:  · Talk about it- your feelings and reasons for harming yourself  · Remove any means that you might use to hurt yourself (examples: pills, rope, extension cords, firearm)  · Get professional help from the community (Mental Health, Substance Abuse, psychological counseling)  · Do not be alone:Call your Safe Contact- someone whom you trust who will be there for  you.  · Call your local CRISIS HOTLINE 474-9321 or 557-913-0381  · Call your local Children's Mobile Crisis Response Team [place] (918) 223-7015 or www.[employer]ersheet  · Call the toll free National Suicide Prevention Hotlines   · National Suicide Prevention Lifeline 440-305-MYZS (8098)  · National [employer] Line Network 800-SUICIDE (175-1743)

## 2019-04-09 NOTE — DOCUMENTATION QUERY
[facility]                                                                         Query Response Note      PATIENT:               ROHITH HUNG  ACCT #:                  [id number]  MRN:                       [id number]  :                       1945  ADMIT DATE:       2019 5:06 PM  DISCH DATE:        4/3/2019 10:35 AM  RESPONDING  PROVIDER #:        [id number]           RESPONSE TEXT:    Hyponatremia    QUERY TEXT:    Electrolyte Imbalance 360eMD_[facility]    Na 131 is noted in the 3/29 Lab Results.  Can a diagnosis be specified to support this finding?    NOTE:  If an appropriate response is not listed below, please respond with a new note.    The patient's Clinical Indicators include:  3/29 Na 131, 3/30 Na 134, 3/31 Na 132  worsening azotemia and low sodium, concern for hypovolemia  Risk Factors: Lasix  Treatment: dc'd Lasix, IV NS  Query created by: Cheyenne Addison on 4/3/2019 8:44 AM        Electronically signed by:  REUBEN MARTIN MD 2019 7:47 AM

## 2019-06-28 NOTE — CARE PLAN
Problem: Communication  Goal: The ability to communicate needs accurately and effectively will improve  Outcome: PROGRESSING AS EXPECTED  POC reviewed with pt. All questions answered at this time.     Problem: Pain Management  Goal: Pain level will decrease to patient's comfort goal  Outcome: PROGRESSING AS EXPECTED  Pain well controlled with current regimen. Pt educated on regimen and to call for intervention as needed.        <<----- Click to add NO significant Past Surgical History

## 2019-10-15 NOTE — PROGRESS NOTES
Pt off unit to preop.  Family present at bedside, accompanying pt downstairs.     PATRIZIA NAVARRETE ; 10/29/2019 ; NPP PulmMed [address] [facility]  PATRIZIA NAVARRETE ; 11/14/2019 ; NPP OtoLaryng [address] [place] [address] PATRIZIA NAVARRETE ; 10/29/2019 ; NPP PulmMed [address] [facility]  PATRIZIA NAVARRETE ; 11/14/2019 ; NPP OtoLaryng [address] [place] [address] PATRIZIA NAVARRETE ; 10/29/2019 ; NPP PulmMed [address] [facility]  PATRIZIA NAVARRETE ; 11/14/2019 ; NPP OtoLaryng [address] [place] [address]

## 2021-02-14 NOTE — PROGRESS NOTES
"Subjective:      Justus Barnard is a 72 y.o. male who presents with URI            1 month waxing and waning sinus pressure, productive cough without blood in sputum. Subjective fever chills. +SOB/wheeze. +PMH COPD and pneumonia approx 2 years ago. Using rescue inhaler once or twice daily. Sx's are moderate severity. No other aggravating or alleviating factors.          Review of Systems   Constitutional: Negative for malaise/fatigue and weight loss.   Eyes: Negative for discharge and redness.   Cardiovascular: Negative for leg swelling.   Musculoskeletal: Negative for joint pain and myalgias.   Skin: Negative for itching and rash.   Neurological: Negative for headaches.   .  Medications, Allergies, and current problem list reviewed today in Epic         Objective:     /74   Pulse 76   Temp 36.8 °C (98.2 °F)   Resp 16   Ht 1.753 m (5' 9\")   Wt 72.6 kg (160 lb)   SpO2 92%   BMI 23.63 kg/m²      Physical Exam   Constitutional: He appears well-developed and well-nourished. No distress.   HENT:   Head: Normocephalic and atraumatic.   Right Ear: External ear normal.   Left Ear: External ear normal.   Nasal congestion  Pharynx red without exudate     Eyes: Conjunctivae are normal.   Neck: Neck supple. No JVD present.   Cardiovascular: Normal rate, regular rhythm and normal heart sounds.    Pulmonary/Chest: Effort normal. He has wheezes.   Neurological:   Speech is clear. Patient is appropriate and cooperative.     Skin: Skin is warm and dry. No rash noted.               Assessment/Plan:   Pulse ox adequate    1. COPD exacerbation (CMS-[facility])  doxycycline (VIBRAMYCIN) 100 MG Tab    benzonatate (TESSALON) 200 MG capsule     Differential diagnosis, natural history, supportive care, and indications for immediate follow-up discussed at length.     "
I was present for and supervised the key/critical aspects of the procedures performed during the care of the patient.

## 2025-04-26 NOTE — PROGRESS NOTES
Hospital Medicine Daily Progress Note    Date of Service  3/3/2019    Chief Complaint  73 y.o. male admitted 2/18/2019 with esophageal cancer.    Hospital Course  Admitted with esophageal cancer, dysphagia, laparoscopic G-tube placed for nutrition, noted to have DVT left upper extremity, right renal mass, transferred for IR guided biopsy    Interval Problem Update  Patient is resting in chair, feels ok no new complains, no hematuria today, pain is stable, not in distress, continue holding lovenox for now until cleared by urologist, pending oncology eval. Possible surgery this week.   2/27: Sitting up in bed comfortably.  Pain is fairly controlled.  Discussed with oncology on-call Dr. Arianna Sanchez.  Apparently Dr. Page from oncology already spoke to urology and recommended to proceed with nephrectomy.  2/28: Sitting up in chair comfortably.  Pain fairly controlled.  Scheduled for right nephrectomy around 4 PM.  No new issues overnight.  3/1: Patient had no acute overnight events and now is status post right nephrectomy.  Valdes catheter had no evidence of hematuria.  Found to be on 3 L of O2 and now weaning.  The pain is fairly controlled.  Start tube feeds through the J-tube and tolerating well.  Waiting on further oncology recommendations.     3/2: seen and examined, patient states he is having pain and swelling in the tip of his penis area, states he feels like urine catheter is leaking, however I checked and it is now. Nursing also checked it is intact. Continue to monitor     3/3: seen and examined, patient sitting in chair, states nothing was done about his catheter, frustrated. Spoke to urology yesterday, keeping an eye on it.   Otherwise doing well, no complains    Consultants/Specialty  Palliative care  Urology  ID  Oncology    Code Status  DNR/DNI    Disposition  TBD    Review of Systems  Review of Systems   Constitutional: Positive for weight loss. Negative for chills and fever.   HENT: Negative for ear  pain, hearing loss and tinnitus.    Eyes: Negative for blurred vision, double vision and photophobia.   Respiratory: Negative for cough, hemoptysis, sputum production and shortness of breath.    Cardiovascular: Negative for chest pain, palpitations, orthopnea and claudication.   Gastrointestinal: Positive for abdominal pain. Negative for diarrhea, heartburn, nausea and vomiting.   Genitourinary: Positive for dysuria. Negative for frequency, hematuria and urgency.        Penis swelling and tenderness   Musculoskeletal: Negative for back pain, myalgias and neck pain.   Skin: Negative for rash.   Neurological: Positive for weakness. Negative for dizziness, tingling, tremors and headaches.   Endo/Heme/Allergies: Does not bruise/bleed easily.   Psychiatric/Behavioral: Negative for depression, substance abuse and suicidal ideas.        Physical Exam  Temp:  [36.3 °C (97.3 °F)-36.7 °C (98.1 °F)] 36.3 °C (97.3 °F)  Pulse:  [] 80  Resp:  [17] 17  BP: (125-149)/(61-74) 149/73  SpO2:  [95 %-98 %] 96 %    Physical Exam   Constitutional: He is oriented to person, place, and time. He appears cachectic. He appears ill. No distress.   Thin  Chronically ill looking   HENT:   Head: Normocephalic and atraumatic.   Mouth/Throat: No oropharyngeal exudate.   Eyes: Conjunctivae are normal. No scleral icterus.   Neck: Normal range of motion. Neck supple. No thyromegaly present.   Cardiovascular: Normal rate and regular rhythm.  Exam reveals no gallop and no friction rub.    Pulmonary/Chest: Effort normal. No respiratory distress. He has no wheezes. He has no rales.   Abdominal: Soft. Bowel sounds are normal. He exhibits no distension. There is no tenderness.   Genitourinary: Penile erythema and penile tenderness present.   Genitourinary Comments: swelling   Musculoskeletal: He exhibits no edema or deformity.   Neurological: He is alert and oriented to person, place, and time.   Skin: Skin is warm and dry. He is not diaphoretic.    Psychiatric: He is withdrawn.   Nursing note and vitals reviewed.      Fluids    Intake/Output Summary (Last 24 hours) at 03/03/19 0823  Last data filed at 03/03/19 0415   Gross per 24 hour   Intake             1640 ml   Output              900 ml   Net              740 ml       Laboratory                        Imaging  CT-ABDOMEN-PELVIS WITH   Final Result      No evidence of right perinephric hematoma or hydronephrosis status post right renal biopsy.      Clot identified in the urinary bladder.      Anasarca.      Distal esophageal mass with lymphadenopathy.      CT-NEEDLE BX-RENAL   Final Result      Successful CT-guided core biopsy of right renal mass.      CT-ABDOMEN-PELVIS WITH   Final Result      1.  Again seen thickening of the distal esophagus likely related to the patient's known esophageal cancer in that area. There is extensive soft tissue mass/adenopathy within the gastrohepatic, periportal, and retroperitoneal regions which does encase the    celiac and superior mesenteric arteries. This is not significantly changed over short interval follow-up.      2.  Again seen large right renal mass which measures 9 x 7 cm in size consistent with either renal cell carcinoma or metastatic disease. There is again seen likely some invasion of the right renal vein.      3.  Emphysematous and bullous change of the lungs with bibasilar atelectasis and small bilateral pleural effusions.      4.  Moderate constipation.      DX-CHEST-2 VIEWS   Final Result         1.  Hazy bilateral lung base infiltrates, greater on the left, new since prior.   2.  Small bilateral pleural effusions.   3.  Hyperexpansion of lungs favors changes of COPD.      US-EXTREMITY VENOUS UPPER UNILAT LEFT   Final Result      CT-NEEDLE BX-RENAL    (Results Pending)        Assessment/Plan  * Esophageal cancer (HCC)- (present on admission)   Assessment & Plan    Laparoscopic G-tube placement  Tube feeding  Pain control.  Palliative following.      Renal cell carcinoma of right kidney (HCC)- (present on admission)   Assessment & Plan    With gastrohepatic, periportal, aortocaval and retroperitoneal [place] metastases.  Urology to discuss plan of care with Oncology, possible surgery this week if ok with onco.   Awaiting onc recommendations.   Palliative following.  2/28: Now S/P right nephrectomy  Pending oncology recs, attempted to contact oncology physician however no response. Dr. Arianna Shin with [facility] care specialist on 3/2/19     Hematuria- (present on admission)   Assessment & Plan    Valdes, irrigate clots prn.  Holding lovenox until ok with urology.      Delirium   Assessment & Plan    Avoid benzodiazepines and anticholinergics  Frequent orientation  Avoid early morning labs  Avoid vital signs during sleep  Stable.      Hypomagnesemia- (present on admission)   Assessment & Plan    follow level.     Pneumonitis- (present on admission)   Assessment & Plan      Antibiotics through 2/27/2019. completed     Essential hypertension- (present on admission)   Assessment & Plan    Amlodipine     Leukocytosis- (present on admission)   Assessment & Plan    Probably reactive continue monitoring no fever.      Dysphagia- (present on admission)   Assessment & Plan    Tube feeding  SLP to follow     Acute deep vein thrombosis (DVT) of left upper extremity (HCC)- (present on admission)   Assessment & Plan    2/18 US showed distal left subclavian to mid  brachial vein, lovenox held for possible surgery.      Hypokalemia- (present on admission)   Assessment & Plan    Follow BMP.     Advanced care planning/counseling discussion- (present on admission)   Assessment & Plan    Palliative care consulted.     Constipation due to pain medication therapy- (present on admission)   Assessment & Plan    Bowel protocol     Cancer associated pain- (present on admission)   Assessment & Plan    Fentanyl, Oxycodone  IV Dilaudid.  Stable.      Malignant cachexia (HCC)- (present on  admission)   Assessment & Plan    Severe malnutrition.  Dietitian following.     COPD (chronic obstructive pulmonary disease) (HCC)- (present on admission)   Assessment & Plan    RT protocol     Penile swelling   Assessment & Plan    Valdes in place, leaking, urology aware, continue to monitor.  Pain control  Dc fluids     Tobacco abuse- (present on admission)   Assessment & Plan    Needs to quit.      Plan of care discussed with multidisciplinary team during rounds.    VTE prophylaxis: SCDs     1 person assist

## 2025-07-22 NOTE — FLOWSHEET NOTE
02/12/19 0313   Events/Summary/Plan   Events/Summary/Plan MDI with spacer tolerated with patient actuating inhaler x 2 puffs   Interdisciplinary Plan of Care-Goals (Indications)   Bronchodilator Indications History / Diagnosis   Interdisciplinary Plan of Care-Outcomes    Bronchodilator Outcome Patient at Stable Baseline   Education   Education Yes - Pt. / Family has been Instructed in use of Respiratory Equipment;Yes - Pt. / Family has been Instructed in use of Respiratory Medications and Adverse Reactions   RT Assessment of Delivered Medications   Evaluation of Medication Delivery Daily Yes-- Pt /Family has been Instructed in use of Respiratory Medications and Adverse Reactions   MDI/DPI Group   #MDI/DPI Given MDI/DPI x 1   Chest Exam   Respiration 20   Pulse 80   Breath Sounds   Pre/Post Intervention Pre Intervention Assessment   RUL Breath Sounds Clear   RML Breath Sounds Diminished   RLL Breath Sounds Diminished   CARMELO Breath Sounds Clear   LLL Breath Sounds Diminished   Oximetry   #Pulse Oximetry (Single Determination) Yes   Oxygen   Home O2 Use Prior To Admission? No   Pulse Oximetry 96 %   O2 (LPM) 2   O2 Daily Delivery Respiratory  Silicone Nasal Cannula      Conjuntivae and eyelids appear normal , Sclerae : White without injection, no icterus.

## (undated) DEVICE — KIT 18FR INITIAL PLACEMENT - (1/CA)

## (undated) DEVICE — SUTURE GENERAL

## (undated) DEVICE — Device

## (undated) DEVICE — ELECTRODE 850 FOAM ADHESIVE - HYDROGEL RADIOTRNSPRNT (50/PK)

## (undated) DEVICE — SCISSOR MONOPLR CRV(HOT SHEAR - DA VINCI 10X'S REUSABLE

## (undated) DEVICE — SENSOR SPO2 NEO LNCS ADHESIVE (20/BX) SEE USER NOTES

## (undated) DEVICE — DERMABOND ADVANCED - (12EA/BX)

## (undated) DEVICE — ELECTRODE 5MM LHK LAPSCP STERILE DISP- MEGADYNE  (5/CA)

## (undated) DEVICE — MASK WITH FACE SHIELD (25/BX 4BX/CA)

## (undated) DEVICE — HEAD HOLDER JUNIOR/ADULT

## (undated) DEVICE — TUBE GASTRO-ENTERIC FEED 18FR

## (undated) DEVICE — SODIUM CHL IRRIGATION 0.9% 1000ML (12EA/CA)

## (undated) DEVICE — WATER IRRIGATION STERILE 1000ML (12EA/CA)

## (undated) DEVICE — TUBE CONNECT SUCTION CLEAR 120 X 1/4" (50EA/CA)"

## (undated) DEVICE — KIT ANESTHESIA W/CIRCUIT & 3/LT BAG W/FILTER (20EA/CA)

## (undated) DEVICE — DRESSING TRANSPARENT FILM TEGADERM 2.375 X 2.75"  (100EA/BX)"

## (undated) DEVICE — SUTURE 3-0 VICRYL PLUS SH - 27 INCH (36/BX)

## (undated) DEVICE — TIP EVICEL 45CM FLEXIBLE - (3/BX)

## (undated) DEVICE — TROCAR Z THREAD12MM OPTICAL - NON BLADED (6/BX)

## (undated) DEVICE — GOWN WARMING STANDARD FLEX - (30/CA)

## (undated) DEVICE — TOWELS CLOTH SURGICAL - (4/PK 20PK/CA)

## (undated) DEVICE — SYRINGE DISP. 50CC LS - (40/BX)

## (undated) DEVICE — TUBE CONNECTING SUCTION - CLEAR PLASTIC STERILE 72 IN (50EA/CA)

## (undated) DEVICE — TUBE E-T HI-LO CUFF 7.5MM (10EA/PK)

## (undated) DEVICE — LACTATED RINGERS INJ 1000 ML - (14EA/CA 60CA/PF)

## (undated) DEVICE — TROCAR SEPARATOR 15MMZTHREAD - (6/BX)

## (undated) DEVICE — CANNULA W/ SUPPLY TUBING O2 - (50/CA)

## (undated) DEVICE — FORCEP BIPOLAR FENESTRATED - DA VINCI 10X'S REUSABLE

## (undated) DEVICE — BLADE SURGICAL CLIPPER - (50EA/CA)

## (undated) DEVICE — CLIP HEM-O-LOC GREEN - (14EA/BX)

## (undated) DEVICE — SYRINGE SAFETY 3 ML 18 GA X 1 1/2 BLUNT LL (100/BX 8BX/CA)

## (undated) DEVICE — SYRINGE 30 ML LS (56/BX)

## (undated) DEVICE — MASK ANESTHESIA ADULT  - (100/CA)

## (undated) DEVICE — WATER IRRIG. STER. 1500 ML - (9/CA)

## (undated) DEVICE — TUBING LAPAROSCOPIC PLUME DEVICE (10EA/CA)

## (undated) DEVICE — NEPTUNE 4 PORT MANIFOLD - (20/PK)

## (undated) DEVICE — ELECTRODE DUAL RETURN W/ CORD - (50/PK)

## (undated) DEVICE — TROCAR 5X100 NON BLADED Z-TH - READ KII (6/BX)

## (undated) DEVICE — GOWN SURGEONS LARGE - (32/CA)

## (undated) DEVICE — KIT ROOM DECONTAMINATION

## (undated) DEVICE — TUBING CLEARLINK DUO-VENT - C-FLO (48EA/CA)

## (undated) DEVICE — CATHETER IV SAFETY 20 GA X 1-1/4 (50/BX)

## (undated) DEVICE — NEEDLE SAFETY 18 GA X 1 1/2 IN (100EA/BX)

## (undated) DEVICE — SYRINGE SAFETY 5 ML 18 GA X 1-1/2 BLUNT LL (100/BX 4BX/CA)

## (undated) DEVICE — SUTURE 0 VICRYL PLUS CT-1 - 36 INCH (36/BX)

## (undated) DEVICE — APPLIER 5MM MED/LARGE CLIP - (3/BX)

## (undated) DEVICE — CANISTER SUCTION RIGID RED 1500CC (40EA/CA)

## (undated) DEVICE — SCISSORS 5MM CVD (6EA/BX)

## (undated) DEVICE — BAG, SPONGE COUNT 50600

## (undated) DEVICE — DRAPE 4 ARM DA VANCI SI - (5EA/CA)

## (undated) DEVICE — CHLORAPREP 26 ML APPLICATOR - ORANGE TINT(25/CA)

## (undated) DEVICE — SPECIMEN BAG ENDOCATCH II15MM 15MM SPECIMEN RETRIEVAL (3EA/CA)

## (undated) DEVICE — BAG RETRIEVAL 12/15 MM INZII (5EA/CA) THIS WILL REPLACE ITEM 75018

## (undated) DEVICE — GLOVE, LITE (PAIR)

## (undated) DEVICE — NEEDLE INSFL 120MM 14GA VRRS - (20/BX)

## (undated) DEVICE — SET EXTENSION WITH 2 PORTS (48EA/CA) ***PART #2C8610 IS A SUBSTITUTE*****

## (undated) DEVICE — SPONGE GAUZESTER. 2X2 4-PL - (2/PK 50PK/BX 30BX/CS)

## (undated) DEVICE — BAG DRAINAGE URINARY CLOSED 2000ML (20EA/CA)

## (undated) DEVICE — GLOVE BIOGEL SZ 7.5 SURGICAL PF LTX - (50PR/BX 4BX/CA)

## (undated) DEVICE — SPONGE GAUZE NON-STERILE 4X4 - (2000/CA 10PK/CA)

## (undated) DEVICE — KIT  I.V. START (100EA/CA)

## (undated) DEVICE — GLOVE BIOGEL INDICATOR SZ 6.5 SURGICAL PF LTX - (50PR/BX 4BX/CA)

## (undated) DEVICE — SENSOR SPO2 ADULT LNCS ADTX (20/BX) ORDER ITEM #19593

## (undated) DEVICE — GLOVE BIOGEL PI INDICATOR SZ 8.0 SURGICAL PF LF -(50/BX 4BX/CA)

## (undated) DEVICE — GLOVE BIOGEL SZ 6 PF LATEX - (50EA/BX 4BX/CA)

## (undated) DEVICE — SET LEADWIRE 5 LEAD BEDSIDE DISPOSABLE ECG (1SET OF 5/EA)

## (undated) DEVICE — OBTURATOR 8MM BLADELESS - (24EA/BX) DA VINCI

## (undated) DEVICE — BLANKET WARMING LOWER BODY - (10/CA) INACTIVE USE #8585

## (undated) DEVICE — SUTURE 4-0 VICRYL PLUS FS-2 - 27 INCH (36/BX)

## (undated) DEVICE — SUTURE 2-0 SILK FS (12EA/BX)

## (undated) DEVICE — BITE BLOCK ADULT 60FR (100EA/CA)

## (undated) DEVICE — GLOVE BIOGEL SZ 7 SURGICAL PF LTX - (50PR/BX 4BX/CA)

## (undated) DEVICE — ENDOWRIST PRO GRASP - DA VINCI 10X'S REUSABLE

## (undated) DEVICE — GOWN SURGEONS X-LARGE - DISP. (30/CA)

## (undated) DEVICE — STAPLER 45MM ARTICULATING - ENDO (3EA/BX)

## (undated) DEVICE — TUBING INSUFFLATION - (10/BX)

## (undated) DEVICE — SUCTION INSTRUMENT YANKAUER BULBOUS TIP W/O VENT (50EA/CA)

## (undated) DEVICE — HUMID-VENT HEAT AND MOISTURE EXCHANGE- (50/BX)

## (undated) DEVICE — EVICEL 2ML - (1/BX) REFRIGERATE UPON RECPT

## (undated) DEVICE — OBTURATOR 8MM BLADELESS LONG - (24EA/BX) DA VINCI

## (undated) DEVICE — BLANKET WARMING UPPER BODY - (10/CA)

## (undated) DEVICE — SLEEVE, VASO, THIGH, MED

## (undated) DEVICE — TUBE SUCTION YANKAUER  1/4 X 6FT (20EA/CA)"

## (undated) DEVICE — CANNULA W/SEAL12X100ZTHREAD - (12/BX)

## (undated) DEVICE — PROTECTOR ULNA NERVE - (36PR/CA)

## (undated) DEVICE — SUTURE 3-0 MONOCRYL PLUS PS-2 - (12/BX)

## (undated) DEVICE — BASIN EMESIS DISP. - (250/CA)

## (undated) DEVICE — GLOVE SZ 7.5 BIOGEL PI MICRO - PF LF (50PR/BX)

## (undated) DEVICE — GLOVE BIOGEL SZ 6.5 SURGICAL PF LTX (50PR/BX 4BX/CA)

## (undated) DEVICE — COVER TIP ENDOWRIST HOT SHEAR - (10EA/BX) DA VINCI

## (undated) DEVICE — TUBE E-T HI-LO CUFF 7.0MM (10EA/PK)

## (undated) DEVICE — CLIP HEMOLOCK PURPLE - (14/BX)

## (undated) DEVICE — SYRINGE SAFETY 10 ML 18 GA X 1 1/2 BLUNT LL (100/BX 4BX/CA)

## (undated) DEVICE — SET SUCTION/IRRIGATION WITH DISPOSABLE TIP (6/CA )PART #0250-070-520 IS A SUB

## (undated) DEVICE — DRAPE IOBAN II INCISE 23X17 - (10EA/BX 4BX/CA)

## (undated) DEVICE — ROBOTIC SURGERY SERVICES

## (undated) DEVICE — CLIP MED LG INTNL HRZN TI ESCP - (20/BX)

## (undated) DEVICE — STAPLE 45MM VASCULAR WHITE 2.5MM (12EA/BX)

## (undated) DEVICE — CANISTER SUCTION 3000ML MECHANICAL FILTER AUTO SHUTOFF MEDI-VAC NONSTERILE LF DISP  (40EA/CA)

## (undated) DEVICE — SYRINGE 30 ML LL (56/BX)